# Patient Record
Sex: FEMALE | Race: WHITE | Employment: OTHER | URBAN - METROPOLITAN AREA
[De-identification: names, ages, dates, MRNs, and addresses within clinical notes are randomized per-mention and may not be internally consistent; named-entity substitution may affect disease eponyms.]

---

## 2017-04-28 ENCOUNTER — ONCOLOGY VISIT (OUTPATIENT)
Dept: ONCOLOGY | Facility: CLINIC | Age: 82
End: 2017-04-28
Payer: MEDICARE

## 2017-04-28 VITALS
HEART RATE: 82 BPM | BODY MASS INDEX: 24.75 KG/M2 | TEMPERATURE: 97.4 F | RESPIRATION RATE: 16 BRPM | OXYGEN SATURATION: 99 % | SYSTOLIC BLOOD PRESSURE: 151 MMHG | DIASTOLIC BLOOD PRESSURE: 82 MMHG | WEIGHT: 143 LBS

## 2017-04-28 DIAGNOSIS — C82.00 GRADE I FOLLICULAR SMALL CLEAVED CELL LYMPHOMA (H): Primary | ICD-10-CM

## 2017-04-28 DIAGNOSIS — C82.00 GRADE I FOLLICULAR SMALL CLEAVED CELL LYMPHOMA (H): ICD-10-CM

## 2017-04-28 LAB
ALBUMIN SERPL-MCNC: 3.7 G/DL (ref 3.4–5)
ALP SERPL-CCNC: 75 U/L (ref 40–150)
ALT SERPL W P-5'-P-CCNC: 30 U/L (ref 0–50)
ANION GAP SERPL CALCULATED.3IONS-SCNC: 7 MMOL/L (ref 3–14)
AST SERPL W P-5'-P-CCNC: 20 U/L (ref 0–45)
BASOPHILS # BLD AUTO: 0 10E9/L (ref 0–0.2)
BASOPHILS NFR BLD AUTO: 0.2 %
BILIRUB SERPL-MCNC: 0.8 MG/DL (ref 0.2–1.3)
BUN SERPL-MCNC: 25 MG/DL (ref 7–30)
CALCIUM SERPL-MCNC: 9.7 MG/DL (ref 8.5–10.1)
CHLORIDE SERPL-SCNC: 102 MMOL/L (ref 94–109)
CO2 SERPL-SCNC: 31 MMOL/L (ref 20–32)
CREAT SERPL-MCNC: 0.92 MG/DL (ref 0.52–1.04)
DIFFERENTIAL METHOD BLD: NORMAL
EOSINOPHIL # BLD AUTO: 0.1 10E9/L (ref 0–0.7)
EOSINOPHIL NFR BLD AUTO: 1.1 %
ERYTHROCYTE [DISTWIDTH] IN BLOOD BY AUTOMATED COUNT: 13.4 % (ref 10–15)
GFR SERPL CREATININE-BSD FRML MDRD: 58 ML/MIN/1.7M2
GLUCOSE SERPL-MCNC: 89 MG/DL (ref 70–99)
HCT VFR BLD AUTO: 37.9 % (ref 35–47)
HGB BLD-MCNC: 13 G/DL (ref 11.7–15.7)
LDH SERPL L TO P-CCNC: 194 U/L (ref 81–234)
LYMPHOCYTES # BLD AUTO: 1.3 10E9/L (ref 0.8–5.3)
LYMPHOCYTES NFR BLD AUTO: 29.4 %
MCH RBC QN AUTO: 30.2 PG (ref 26.5–33)
MCHC RBC AUTO-ENTMCNC: 34.3 G/DL (ref 31.5–36.5)
MCV RBC AUTO: 88 FL (ref 78–100)
MONOCYTES # BLD AUTO: 0.4 10E9/L (ref 0–1.3)
MONOCYTES NFR BLD AUTO: 9.9 %
NEUTROPHILS # BLD AUTO: 2.6 10E9/L (ref 1.6–8.3)
NEUTROPHILS NFR BLD AUTO: 59.4 %
PLATELET # BLD AUTO: 176 10E9/L (ref 150–450)
POTASSIUM SERPL-SCNC: 4.2 MMOL/L (ref 3.4–5.3)
PROT SERPL-MCNC: 7.3 G/DL (ref 6.8–8.8)
RBC # BLD AUTO: 4.31 10E12/L (ref 3.8–5.2)
SODIUM SERPL-SCNC: 140 MMOL/L (ref 133–144)
URATE SERPL-MCNC: 4.3 MG/DL (ref 2.6–6)
WBC # BLD AUTO: 4.4 10E9/L (ref 4–11)

## 2017-04-28 PROCEDURE — 85025 COMPLETE CBC W/AUTO DIFF WBC: CPT | Performed by: INTERNAL MEDICINE

## 2017-04-28 PROCEDURE — 84550 ASSAY OF BLOOD/URIC ACID: CPT | Performed by: INTERNAL MEDICINE

## 2017-04-28 PROCEDURE — 80053 COMPREHEN METABOLIC PANEL: CPT | Performed by: INTERNAL MEDICINE

## 2017-04-28 PROCEDURE — 83615 LACTATE (LD) (LDH) ENZYME: CPT | Performed by: INTERNAL MEDICINE

## 2017-04-28 PROCEDURE — 99214 OFFICE O/P EST MOD 30 MIN: CPT | Performed by: INTERNAL MEDICINE

## 2017-04-28 PROCEDURE — 36415 COLL VENOUS BLD VENIPUNCTURE: CPT | Performed by: INTERNAL MEDICINE

## 2017-04-28 ASSESSMENT — PAIN SCALES - GENERAL: PAINLEVEL: NO PAIN (0)

## 2017-04-28 NOTE — PROGRESS NOTES
Oncology Follow-up Visit:    4/28/2017      PCP: Dr. Rahman  Diagnosis: Low grade, stage IVB (BM involvement, night sweats) follicular lymphoma.  FLIPI score is 3 (one for stage IV, one for age>60, and one for elevated LDH), associated with 52% median 5 year OS survival and 36% median 10 year overall survival.     Oncologic History:   Ms. Potter is a 81 year old female who presented with night sweats and lymphadenopathy. She developed drenching night sweats in 11/2011 which persisted. She had no other associated constitutional symptoms such as fevers or weight loss. She had a negative ID workup with Dr. Ruiz (for valley fever as she had traveled to [place] in the past).   As part of evaluation, CT scan was performed in April 2012 which demonstrated multiple borderline mildly enlarged retroperitoneal and mesenteric lymph nodes, with the largest size of 1.5 cm. The patient, however, persisted to have night sweats and underwent repeat CTCAP done without contrast (she is allergic to IV contrast and had an anaphylactic reaction to it) which demonstrated unchanged dilatation of common bile duct, likely related to postcholecystectomy state, and stable mildly enlarged retroperitoneal and mesenteric lymph nodes which were nonspecific.   We proceeded with CT guided biopsy of one of the intraabdominal lymph nodes and results c/w low grade follicular lymphoma.  Flow cytometry showed kappa monotypic CD10 positive B cells.  We proceeded with bone marrow biopsy to complete her staging. The results, as detailed below, revealed involvement of marrow by follicular lymphoma. Flow cytometry showed a rare population of CD10-positive, kappa-monotypic B cells (0.2%). Molecular diagnostics was negative for B-cell gene rearrangement. Cytogenetics revealed, of the interphase cells examined, 0.2% had a signal pattern indicative of an IGH/BCL2 gene fusion, a rate that falls just slightly above the normal control range (0-0.1%) for our  "laboratory; thus, these findings are suspicious for the presence of low-level bone marrow involvement by follicular lymphoma. However, this cannot be determined with certainty, in light of the few abnormal cells found by FISH. Of note, a G-banded chromosome study is still pending.   Hep B/C negative. EVE negative. No M-spike on SPEP.    Treatment: Weekly Rituxan x 4, completed 9/13/2012. During her first Rituxan infusion she has developed a reaction with tingling of upper lip and chin and feeling \"cold\" in her chest. She was given IV solumedrol and the infusion was slowed down and these symptoms have resolved. Since then she has been premedicated with solumedrol, and all infusions were uneventful since.    Interval History:  Ms. Potter is a 81 year old female diagnosed with follicular lymphoma present today for follow-up. She has completed 4 weekly doses of Rituxan in 09/2012.   Her night sweats have resolved subsequently and LDH has remained normal.   CT of the chest, abdomen and pelvis on 4/26/2016 showed stable post treatment changes of lymphoma including stranding of the mesenteric fat. There was no enlarged lymph nodes or other evidence of recurrence of lymphoma. She had a screening mammogram on on 12/2/2016 which was negative.  She is here with her . They spent a winter in [place]. She had a lingering URI in March but now her respiratory symptoms and cough have resolved.   She is  well and has had no constitutional symptoms. She denies weight loss. She has no complaints.  In addition, a complete 12 point  review of systems is negative.        Past medical, social, surgical, and family histories reviewed.  Breast Cancer screening - mammogram negative 11/2013.  Echocardiogram in 10/2014 for evaluation of benign palpitations showed left ventricular function is normal with an EF of 55-60%.  Left ventricular Doppler filling pattern consistent with abnormal relaxation.  DEXA scan on 5/12/2015 showed most neg T " score of -2.2 in the lumbar spine. This was unchanged compared to 11/2010. She was on Boneva years ago but apparently developed L jaw tingling and is no longer on biphosphonates.       Allergies:  Allergies as of 04/28/2017 - Garcia as Reviewed 11/29/2016   Allergen Reaction Noted     Bisphosphonates  10/01/2009     Ivp dye [contrast dye] Swelling 10/01/2009     Lisinopril Cough 06/24/2013     Losartan Hives 11/22/2010     Morphine sulfate Nausea and Vomiting 08/16/2012     Prilosec [omeprazole] Hives 10/01/2009       Current Medications:  Current Outpatient Prescriptions   Medication Sig Dispense Refill     fluocinolone acetonide 0.01 % OIL Place 5 drops in ear(s) 2 times daily 20 mL 1     levothyroxine (SYNTHROID, LEVOTHROID) 50 MCG tablet Take 1 tablet (50 mcg) by mouth daily 90 tablet 4     HYZAAR 50-12.5 MG per tablet Take 1 tablet by mouth daily 90 tablet 4     calcium carbonate (OS-FIDENCIO 500 MG [place]. [place]) 500 MG tablet Take 500 mg by mouth 2 times daily       Cholecalciferol (D 1000) 1000 UNITS CAPS        Naproxen Sodium (ALEVE PO)        carboxymethylcellulose (REFRESH PLUS) 0.5 % SOLN Place 1 drop into both eyes 3 times daily as needed for dry eyes       Multiple Vitamin (MULTI-VITAMIN) per tablet Take 1 tablet by mouth daily.            Physical Exam:  /82 (BP Location: Right arm, Patient Position: Chair, Cuff Size: Adult Regular)  Pulse 82  Temp 97.4  F (36.3  C) (Oral)  Resp 16  Wt 64.9 kg (143 lb)  LMP 10/01/1986  SpO2 99%  BMI 24.75 kg/m2    LMP 10/01/1986  GENERAL APPEARANCE: Healthy, alert and in no acute distress.  HEENT: Sclerae anicteric. Oropharynx without ulcers, lesions, or thrush.  NECK: Supple. No asymmetry or masses.  LYMPHATICS: No palpable cervical, supraclavicular, axillary lymphadenopathy b/l  RESP: Lungs clear to auscultation bilaterally without rales, rhonchi or wheezes.  CARDIOVASCULAR: Regular rate and rhythm. Normal S1, S2; no S3 or S4. No murmur, gallop, or rub.  ABDOMEN:  Soft, nontender. Bowel sounds normal. No palpable organomegaly or masses.  MUSCULOSKELETAL: Extremities without gross deformities noted. No edema of bilateral lower extremities.  SKIN: No suspicious lesions or rashes.  NEURO: Alert and oriented x 3. Cranial nerves II-XII grossly intact.  PSYCHIATRIC: Mentation and affect appear normal.    Labs:  Lab Results   Component Value Date    WBC 4.4 04/28/2017     Lab Results   Component Value Date    RBC 4.31 04/28/2017     Lab Results   Component Value Date    HGB 13.0 04/28/2017     Lab Results   Component Value Date    HCT 37.9 04/28/2017     No components found for: MCT  Lab Results   Component Value Date    MCV 88 04/28/2017     Lab Results   Component Value Date    MCH 30.2 04/28/2017     Lab Results   Component Value Date    MCHC 34.3 04/28/2017     Lab Results   Component Value Date    RDW 13.4 04/28/2017     Lab Results   Component Value Date     04/28/2017       ASSESSMENT/PLAN:  Hilda is a very pleasant 81 year-old woman recently diagnosed with stage IV follicular lymphoma, FLIPI score is 3 (one for stage IV, one for age>60, and one for elevated LDH), associated with 52% median 5 year OS survival and 36% median 10 year overall survival.  1. NHL.  S/p Rituxan x4, completed in 09/2012. Responded to treatment with resolution of night sweats and decrease in lymphadenopathy. We'll continue to observe her from now on.  We'll see her back in 6 months with labs - cbcd, cmp, LDH, uric acid. Per updated NCCN guidelines, at this time, a CT of the chest, abdomen and pelvis is recommended no more often than annually in Hilda's situation. We'll obtain with next visit.    2. Immunizations - She is uptodate with  Pneumovax, Prevnar vaccinations. She did receive a flu shot this season already.    3. Breast cancer screening -mammogram negative in 12/2016. Next due in 12/2017. This is usually ordered by Dr. Rahman.      At the end of our visit the patient verbalized  understanding, denied any further questions, and concurred with the plan of care.

## 2017-04-28 NOTE — MR AVS SNAPSHOT
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 Also bring a list of your medicines, including vitamins, minerals and over-the-counter drugs. It is safest to leave personal items at home.  Be sure to tell your doctor:   If you have any allergies.   If there s any chance you are pregnant.   If you are breastfeeding.   If you have any special needs.  You may have contrast for this exam. To prepare:   Do not eat or drink for 2 hours before your exam. If you need to take medicine, you may take it with small sips of water. (We may ask you to take liquid medicine as well.)   The day before your exam, drink extra fluids at least six 8-ounce glasses (unless your doctor tells you to restrict your fluids).  Patients over 70 or patients with diabetes or kidney problems:   If you haven t had a blood test (creatinine test) within the last 30 days, go to your clinic or Diagnostic Imaging Department for this test.  If you have diabetes:   If your kidney function is normal, continue taking your metformin (Avandamet, Glucophage, Glucovance, Metaglip) on the day of your exam.   If your kidney function is abnormal, wait 48 hours before restarting this medicine.  You will have oral contrast for this exam:   You will drink the contrast  at home. Get this from your clinic or Diagnostic Imaging Department. Please follow the directions given.  Please wear loose clothing, such as a sweat suit or jogging clothes. Avoid snaps, zippers and other metal. We may ask you to undress and put on a hospital gown.  If you have any questions, please call the Imaging Department where you will have your exam.            Oct 24, 2017 10:30 AM CDT   LAB with LAB FIRST FLOOR [facility] ([facility])    [address] [address] [place] [id number] [phone]           Patient must bring picture ID.  Patient should be prepared to give a urine specimen  Please do not eat 10-12 hours before your appointment if you are coming in fasting for labs on lipids, cholesterol, or glucose (sugar).  Pregnant women should follow their Care Team instructions. Water with medications is okay. Do not drink coffee or other fluids.   If you have concerns about taking  your medications, please ask at office or if scheduling via [employer], send a message by clicking on Secure Messaging, Message Your Care Team.            Oct 27, 2017  3:00 PM CDT   Return Visit with Jane Roth MD   [facility] ([facility])    [id number] [place] [id number] [phone]            Nov 30, 2017 10:30 AM CST   New Visit with Andi Bay MD   [facility] ([facility])    [address] [place] [id number] [phone]              Future tests that were ordered for you today     Open Future Orders        Priority Expected Expires Ordered    CT Chest abdomen pelvis w & w/o contrast Routine  4/28/2018 4/28/2017            Who to contact     If you have questions or need follow up information about today's clinic visit or your schedule please contact [facility] directly at [phone].  Normal or non-critical lab and imaging results will be  communicated to you by [email or web address], letter or phone within 4 business days after the clinic has received the results. If you do not hear from us within 7 days, please contact the clinic through [employer] or phone. If you have a critical or abnormal lab result, we will notify you by phone as soon as possible.  Submit refill requests through [employer] or call your pharmacy and they will forward the refill request to us. Please allow 3 business days for your refill to be completed.          Additional Information About Your Visit        [employer] Information     [employer] is an electronic gateway that provides easy, online access to your medical records. With [employer], you can request a clinic appointment, read your test results, renew a prescription or communicate with your care team.     To sign up for [employer] visit the website at [email or web address]   You will be asked to enter the access code listed below, as well as some personal information. Please follow the directions to create your username and password.     Your access code is: [email or web address]  Expires: 2017 12:56 PM     Your access code will  in 90 days. If you need help or a new code, please contact your [facility] or call [phone] for assistance.        Care EveryWhere ID     This is your Care EveryWhere ID. This could be used by other organizations to access your [place] medical records  [id number]        Your Vitals Were     Pulse Temperature Respirations Last Period Pulse Oximetry BMI (Body Mass Index)    82 97.4  F (36.3  C) (Oral) 16 10/01/1986 99% 24.75 kg/m2       Blood Pressure from Last 3 Encounters:   17 151/82   16 138/74   16 122/82    Weight from Last 3 Encounters:   17 64.9 kg (143 lb)   16 65.8 kg (145 lb)   16 65.3 kg (144 lb)               Primary Care Provider Office Phone # Fax #    Jessica Rahman [phone]       [facility]  [address] [facility] [address]        Thank you!     Thank you for choosing [facility]  for your care. Our goal is always to provide you with excellent care. Hearing back from our patients is one way we can continue to improve our services. Please take a few minutes to complete the written survey that you may receive in the mail after your visit with us. Thank you!             Your Updated Medication List - Protect others around you: Learn how to safely use, store and throw away your medicines at www.disposemymeds.org.          This list is accurate as of: 4/28/17  1:04 PM.  Always use your most recent med list.                   Brand Name Dispense Instructions for use    calcium carbonate 500 MG tablet    OS-FIDENCIO 500 mg [place]. [place]     Take 500 mg by mouth 2 times daily       carboxymethylcellulose 0.5 % Soln ophthalmic solution    REFRESH PLUS     Place 1 drop into both eyes 3 times daily as needed for dry eyes       D 1000 1000 UNITS Caps          fluocinolone acetonide 0.01 % Oil     20 mL    Place 5 drops in ear(s) 2 times daily       HYZAAR 50-12.5 MG per tablet   Generic drug:  losartan-hydrochlorothiazide     90 tablet    Take 1 tablet by mouth daily       levothyroxine 50 MCG tablet    SYNTHROID/LEVOTHROID    90 tablet    Take 1 tablet (50 mcg) by mouth daily       Multi-vitamin Tabs tablet   Generic drug:  multivitamin, therapeutic with minerals      Take 1 tablet by mouth daily.

## 2017-04-28 NOTE — NURSING NOTE
"Oncology Rooming Note    April 28, 2017 12:37 PM   Hilda Potter is a 81 year old female who presents for: No chief complaint on file.    Initial Vitals: /82 (BP Location: Right arm, Patient Position: Chair, Cuff Size: Adult Regular)  Pulse 82  Temp 97.4  F (36.3  C) (Oral)  Wt 64.9 kg (143 lb)  LMP 10/01/1986  SpO2 99%  BMI 24.75 kg/m2 Estimated body mass index is 24.75 kg/(m^2) as calculated from the following:    Height as of 11/8/16: 1.619 m (5' 3.74\").    Weight as of this encounter: 64.9 kg (143 lb). Body surface area is 1.71 meters squared.  No Pain (0) Comment: Data Unavailable   Patient's last menstrual period was 10/01/1986.  Allergies reviewed: Yes  Medications reviewed: Yes    Medications: Medication refills not needed today.  Pharmacy name entered into [facility]:    [id number] - [place] [id number] AT [id number] - [place], [place] - [address] AT [facility] & [address]  [facility] - ONCOLOGY PH[facility]Y  [facility] [address] [place] [address]    Clinical concerns:    5 minutes for nursing intake (face to face time)     Tamra Rose RN                "

## 2017-05-16 RX ORDER — METHYLPREDNISOLONE 32 MG/1
TABLET ORAL
Qty: 2 TABLET | Refills: 1 | Status: SHIPPED | OUTPATIENT
Start: 2017-05-16 | End: 2017-07-12

## 2017-06-30 ENCOUNTER — TELEPHONE (OUTPATIENT)
Dept: ONCOLOGY | Facility: CLINIC | Age: 82
End: 2017-06-30

## 2017-06-30 NOTE — TELEPHONE ENCOUNTER
Patient left vm with a question for Dr Roth. 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 She read Xiaflex can cause reactivation of lymphoma. Looking for recommendation from Dr Roth. Message forwarded to Dr MERY Cleaning  RN, BSN, OCN

## 2017-07-05 NOTE — TELEPHONE ENCOUNTER
Received the following response from Dr. Roth regarding patient's request for Xiaflex treatment for right hand contracture:    I am not well familiar with collagenase (Xiaflex), but from package insert review and review or warnings on this drug, lymphoma as a side effect is not listed on my review - however, lymph node enlargement and lymph node pain is listed.  Hilda's lymphoma is not cured, it is latent and she is aware of it.  I think a local injection of collagenase to release her trigger finger should not be harmful.  I am ok with Hilda going ahead with it.  Thank you, Dr. LAURA.    Voicemail message was left for patient with above recommendations.  Encouraged patient to call with any further questions.    Jason Mosley, RN, BSN, OCN  Care Coordinator  [facility]  [phone]

## 2017-07-12 ENCOUNTER — OFFICE VISIT (OUTPATIENT)
Dept: FAMILY MEDICINE | Facility: CLINIC | Age: 82
End: 2017-07-12
Payer: MEDICARE

## 2017-07-12 VITALS
SYSTOLIC BLOOD PRESSURE: 142 MMHG | OXYGEN SATURATION: 96 % | BODY MASS INDEX: 24.33 KG/M2 | WEIGHT: 142.5 LBS | HEIGHT: 64 IN | DIASTOLIC BLOOD PRESSURE: 82 MMHG | TEMPERATURE: 97.6 F | HEART RATE: 89 BPM

## 2017-07-12 DIAGNOSIS — K21.9 GASTROESOPHAGEAL REFLUX DISEASE WITHOUT ESOPHAGITIS: ICD-10-CM

## 2017-07-12 DIAGNOSIS — R82.90 NONSPECIFIC FINDING ON EXAMINATION OF URINE: ICD-10-CM

## 2017-07-12 DIAGNOSIS — R31.9 BLOOD IN URINE: Primary | ICD-10-CM

## 2017-07-12 DIAGNOSIS — N30.01 ACUTE CYSTITIS WITH HEMATURIA: ICD-10-CM

## 2017-07-12 LAB
ALBUMIN UR-MCNC: ABNORMAL MG/DL
APPEARANCE UR: ABNORMAL
BACTERIA #/AREA URNS HPF: ABNORMAL /HPF
BILIRUB UR QL STRIP: NEGATIVE
COLOR UR AUTO: ABNORMAL
GLUCOSE UR STRIP-MCNC: NEGATIVE MG/DL
HGB UR QL STRIP: ABNORMAL
KETONES UR STRIP-MCNC: NEGATIVE MG/DL
LEUKOCYTE ESTERASE UR QL STRIP: ABNORMAL
NITRATE UR QL: POSITIVE
NON-SQ EPI CELLS #/AREA URNS LPF: ABNORMAL /LPF
PH UR STRIP: 6 PH (ref 5–7)
RBC #/AREA URNS AUTO: >100 /HPF (ref 0–2)
SP GR UR STRIP: 1.01 (ref 1–1.03)
URN SPEC COLLECT METH UR: ABNORMAL
UROBILINOGEN UR STRIP-ACNC: 0.2 EU/DL (ref 0.2–1)
WBC #/AREA URNS AUTO: ABNORMAL /HPF (ref 0–2)

## 2017-07-12 PROCEDURE — 81001 URINALYSIS AUTO W/SCOPE: CPT | Performed by: FAMILY MEDICINE

## 2017-07-12 PROCEDURE — 87088 URINE BACTERIA CULTURE: CPT | Performed by: FAMILY MEDICINE

## 2017-07-12 PROCEDURE — 87186 SC STD MICRODIL/AGAR DIL: CPT | Performed by: FAMILY MEDICINE

## 2017-07-12 PROCEDURE — 87086 URINE CULTURE/COLONY COUNT: CPT | Performed by: FAMILY MEDICINE

## 2017-07-12 PROCEDURE — 99213 OFFICE O/P EST LOW 20 MIN: CPT | Performed by: FAMILY MEDICINE

## 2017-07-12 RX ORDER — SULFAMETHOXAZOLE/TRIMETHOPRIM 800-160 MG
1 TABLET ORAL 2 TIMES DAILY
Qty: 14 TABLET | Refills: 0 | Status: SHIPPED | OUTPATIENT
Start: 2017-07-12 | End: 2017-10-03

## 2017-07-12 NOTE — NURSING NOTE
"Chief Complaint   Patient presents with     Urinary Problem     blood urine, right side abdomen pain x 2 days       Initial /82  Pulse 89  Temp 97.6  F (36.4  C) (Oral)  Ht 5' 3.74\" (1.619 m)  Wt 142 lb 8 oz (64.6 kg)  LMP 10/01/1986  SpO2 96%  BMI 24.66 kg/m2 Estimated body mass index is 24.66 kg/(m^2) as calculated from the following:    Height as of this encounter: 5' 3.74\" (1.619 m).    Weight as of this encounter: 142 lb 8 oz (64.6 kg).  Medication Reconciliation: complete     An NANCY Flood    "

## 2017-07-12 NOTE — MR AVS SNAPSHOT
After Visit Summary   7/12/2017    Hilda Potter    MRN: [id number]           Patient Information     Date Of Birth          1935        Visit Information        Provider Department      7/12/2017 10:00 AM Jessica Rahman MD [facility]        Today's Diagnoses     Blood in urine    -  1    Acute cystitis with hematuria        Gastroesophageal reflux disease without esophagitis          Care Instructions    [facility]    If you have any questions regarding to your visit please contact your care team:       Team Purple:   Clinic Hours Telephone Number   Dr. Jessica Lobo     7am-7pm  Monday - Thursday   7am-5pm  Fridays  [phone]  (Appointment scheduling available 24/7)    Questions about your Visit?   Team Line:  [phone]   Urgent Care - [place] - 11am-9pm Monday-Friday Saturday-Sunday- 9am-5pm   [place] - 5pm-9pm Monday-Friday Saturday-Sunday- 9am-5pm  [phone] - [facility]  [phone] - [place]       What options do I have for visits at the clinic other than the traditional office visit?  To expand how we care for you, many of our providers are utilizing electronic visits (e-visits) and telephone visits, when medically appropriate, for interactions with their patients rather than a visit in the clinic.   We also offer nurse visits for many medical concerns. Just like any other service, we will bill your insurance company for this type of visit based on time spent on the phone with your provider. Not all insurance companies cover these visits. Please check with your medical insurance if this type of visit is covered. You will be responsible for any charges that are not paid by your insurance.      E-visits via Sol[employer]:  generally incur a $35.00 fee.  Telephone visits:  Time spent on the phone: *charged based on time that is spent on the phone in increments of 10 minutes.  Estimated cost:   5-10 mins $30.00   11-20 mins. $59.00   21-30 mins. $85.00     Use [employer] (secure email communication and access to your chart) to send your primary care provider a message or make an appointment. Ask someone on your Team how to sign up for [employer].  For a Price Quote for your services, please call our [employer] Line at [phone].  As always, Thank you for trusting us with your health care needs!              Follow-ups after your visit        Your next 10 appointments already scheduled     Oct 24, 2017  9:45 AM CDT   CT CHEST ABDOMEN PELVIS W/O & W CONTRAST with MGCT1   [facility]    [address] [place] [address] [phone]           Please bring any scans or X-rays taken at other hospitals, if similar tests were done. Also bring a list of your medicines, including vitamins, minerals and over-the-counter drugs. It is safest to leave personal items at home.  Be sure to tell your doctor:   If you have any allergies.   If there s any chance you are pregnant.   If you are breastfeeding.   If you have any special needs.  You may have contrast for this exam. To prepare:   Do not eat or drink for 2 hours before your exam. If you need to take medicine, you may take it with small sips of water. (We may ask you to take liquid medicine as well.)   The day before your exam, drink extra fluids at least six 8-ounce glasses (unless your doctor tells you to restrict your fluids).  Patients over 70 or patients with diabetes or kidney problems:   If you haven t had a blood test (creatinine test) within the last 30 days, go to your clinic or Diagnostic Imaging Department for this test.  If you have diabetes:   If your kidney function is normal, continue taking your metformin (Avandamet, Glucophage, Glucovance, Metaglip) on the day of your exam.   If your kidney function is abnormal, wait 48 hours before restarting this medicine.  You will  have oral contrast for this exam:   You will drink the contrast at home. Get this from your clinic or Diagnostic Imaging Department. Please follow the directions given.  Please wear loose clothing, such as a sweat suit or jogging clothes. Avoid snaps, zippers and other metal. We may ask you to undress and put on a hospital gown.  If you have any questions, please call the Imaging Department where you will have your exam.            Oct 24, 2017 10:30 AM CDT   LAB with LAB FIRST FLOOR [facility])    [address] [place] [id number] [phone]           Patient must bring picture ID.  Patient should be prepared to give a urine specimen  Please do not eat 10-12 hours before your appointment if you are coming in fasting for labs on lipids, cholesterol, or glucose (sugar).  Pregnant women should follow their Care Team instructions. Water with medications is okay. Do not drink coffee or other fluids.   If you have concerns about taking  your medications, please ask at office or if scheduling via [employer], send a message by clicking on Secure Messaging, Message Your Care Team.            Oct 27, 2017  3:00 PM CDT   Return Visit with Jane Roth MD   [facility])    [address] [place] [id number] [phone]            Nov 30, 2017 10:30 AM CST   New Visit with Andi Bay MD   [facility] ([facility])    [address] [facility] [address] [phone]              Who to contact     If you have questions or need follow up information about today's clinic visit or your schedule please contact [facility] directly at [phone].  Normal or non-critical lab and imaging results will be communicated to you by MyChart, letter or phone within 4 business days after the clinic has received the results. If you do not hear from us  "within 7 days, please contact the clinic through Sqrrl or phone. If you have a critical or abnormal lab result, we will notify you by phone as soon as possible.  Submit refill requests through Sqrrl or call your pharmacy and they will forward the refill request to us. Please allow 3 business days for your refill to be completed.          Additional Information About Your Visit        Sqrrl Information     S[employer]l lets you send messages to your doctor, view your test results, renew your prescriptions, schedule appointments and more. To sign up, go to [email or web address] . Click on \"Log in\" on the left side of the screen, which will take you to the Welcome page. Then click on \"Sign up Now\" on the right side of the page.     You will be asked to enter the access code listed below, as well as some personal information. Please follow the directions to create your username and password.     Your access code is: [email or web address]  Expires: 2017 12:56 PM     Your access code will  in 90 days. If you need help or a new code, please call your [place] clinic or [phone].        Care EveryWhere ID     This is your Care EveryWhere ID. This could be used by other organizations to access your [place] medical records  [id number]        Your Vitals Were     Pulse Temperature Height Last Period Pulse Oximetry BMI (Body Mass Index)    89 97.6  F (36.4  C) (Oral) 5' 3.74\" (1.619 m) 10/01/1986 96% 24.66 kg/m2       Blood Pressure from Last 3 Encounters:   17 142/82   17 151/82   16 138/74    Weight from Last 3 Encounters:   17 142 lb 8 oz (64.6 kg)   17 143 lb (64.9 kg)   16 145 lb (65.8 kg)              We Performed the Following     UA reflex to Microscopic and Culture     Urine Microscopic          Today's Medication Changes          These changes are accurate as of: 17 10:39 AM.  If you have any questions, ask your nurse or doctor.               Start taking these " medicines.        Dose/Directions    sulfamethoxazole-trimethoprim 800-160 MG per tablet   Commonly known as:  BACTRIM DS/SEPTRA DS   Used for:  Acute cystitis with hematuria   Started by:  Jessica Rahman MD        Dose:  1 tablet   Take 1 tablet by mouth 2 times daily   Quantity:  14 tablet   Refills:  0            Where to get your medicines      These medications were sent to [facility] [id number] - [address]  AT [facility] & [address] [facility] [address]     Phone:  [phone]     sulfamethoxazole-trimethoprim 800-160 MG per tablet                Primary Care Provider Office Phone # Fax #    Jessica Rahman [phone]       [address] [address]        Equal Access to Services     JAY ORTEGA : Hadii aad ku hadasho Soomaali, waaxda luqadaha, qaybta kaalmada adeegyada, jj russellin hayaan misbah burgos . So [facility] [phone].    ATENCIÓN: Si habla español, tiene a ayala disposición servicios gratuitos de asistencia lingüística. Abdon al [phone].    We comply with applicable federal civil rights laws and [place] laws. We do not discriminate on the basis of race, color, national origin, age, disability sex, sexual orientation or gender identity.            Thank you!     Thank you for choosing [facility]  for your care. Our goal is always to provide you with excellent care. Hearing back from our patients is one way we can continue to improve our services. Please take a few minutes to complete the written survey that you may receive in the mail after your visit with us. Thank you!             Your Updated Medication List - Protect others around you: Learn how to safely use, store and throw away your medicines at www.disposemymeds.org.          This list is accurate as of: 7/12/17 10:39 AM.  Always use your most recent med list.                   Brand Name  Dispense Instructions for use Diagnosis    calcium carbonate 1250 MG tablet    OS-FIDENCIO 500 mg [place]. 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)       carboxymethylcellulose 0.5 % Soln ophthalmic solution    REFRESH PLUS     Place 1 drop into both eyes 3 times daily as needed for dry eyes        fluocinolone acetonide 0.01 % Oil     20 mL    Place 5 drops in ear(s) 2 times daily    Chronic eczematous otitis externa of both ears       HYZAAR 50-12.5 MG per tablet   Generic drug:  losartan-hydrochlorothiazide     90 tablet    Take 1 tablet by mouth daily    Essential hypertension with goal blood pressure less than 140/90       levothyroxine 50 MCG tablet    SYNTHROID/LEVOTHROID    90 tablet    Take 1 tablet (50 mcg) by mouth daily    Hypothyroidism, unspecified type       Multi-vitamin Tabs tablet   Generic drug:  multivitamin, therapeutic with minerals      Take 1 tablet by mouth daily.        sulfamethoxazole-trimethoprim 800-160 MG per tablet    BACTRIM DS/SEPTRA DS    14 tablet    Take 1 tablet by mouth 2 times daily    Acute cystitis with hematuria

## 2017-07-12 NOTE — LETTER
[facility]  [address] [place] [address]. [place] [address]    July 14, 2017    Hilda Potter  [address] [place] [address]          Dear Hilda,    Your urine culture shows that the antibiotic you are on should take care of the bacteria that grew out in your urine. Let me know if you are not feeling better      Enclosed is a copy of your results.     Results for orders placed or performed in visit on 07/12/17   UA reflex to Microscopic and Culture   Result Value Ref Range    Color Urine Brown     Appearance Urine Slightly Cloudy     Glucose Urine Negative NEG mg/dL    Bilirubin Urine Negative NEG    Ketones Urine Negative NEG mg/dL    Specific Gravity Urine 1.015 1.003 - 1.035    Blood Urine Large (A) NEG    pH Urine 6.0 5.0 - 7.0 pH    Protein Albumin Urine Trace (A) NEG mg/dL    Urobilinogen Urine 0.2 0.2 - 1.0 EU/dL    Nitrite Urine Positive (A) NEG    Leukocyte Esterase Urine Large (A) NEG    Source Midstream Urine    Urine Microscopic   Result Value Ref Range    WBC Urine  (A) 0 - 2 /HPF    RBC Urine >100 (A) 0 - 2 /HPF    Squamous Epithelial /LPF Urine Few FEW /LPF    Bacteria Urine Few (A) NEG /HPF   Urine Culture Aerobic Bacterial   Result Value Ref Range    Specimen Description Midstream Urine     Culture Micro >100,000 colonies/mL Escherichia coli (A)     Micro Report Status FINAL 07/14/2017     Organism: >100,000 colonies/mL Escherichia coli        Susceptibility    >100,000 colonies/ml escherichia coli (jannet) -  (no method available)     AMPICILLIN >=32 Resistant  ug/mL     CEFAZOLIN Value in next row  ug/mL      <=4 SusceptibleCefazolin JANNET breakpoints are for the treatment of uncomplicated urinary tract infections.  For the treatment of systemic infections, please contact the laboratory for additional testing.     CEFOXITIN Value in next row  ug/mL      <=4 SusceptibleCefazolin JANNET breakpoints are for the treatment  of uncomplicated urinary tract infections.  For the treatment of systemic infections, please contact the laboratory for additional testing.     CEFTAZIDIME Value in next row  ug/mL      <=4 SusceptibleCefazolin SERGIO breakpoints are for the treatment of uncomplicated urinary tract infections.  For the treatment of systemic infections, please contact the laboratory for additional testing.     CEFTRIAXONE Value in next row  ug/mL      <=4 SusceptibleCefazolin SERGIO breakpoints are for the treatment of uncomplicated urinary tract infections.  For the treatment of systemic infections, please contact the laboratory for additional testing.     CIPROFLOXACIN Value in next row  ug/mL      <=4 SusceptibleCefazolin SERGIO breakpoints are for the treatment of uncomplicated urinary tract infections.  For the treatment of systemic infections, please contact the laboratory for additional testing.     GENTAMICIN Value in next row  ug/mL      <=4 SusceptibleCefazolin SERGIO breakpoints are for the treatment of uncomplicated urinary tract infections.  For the treatment of systemic infections, please contact the laboratory for additional testing.     LEVOFLOXACIN Value in next row  ug/mL      <=4 SusceptibleCefazolin SERGIO breakpoints are for the treatment of uncomplicated urinary tract infections.  For the treatment of systemic infections, please contact the laboratory for additional testing.     NITROFURANTOIN Value in next row  ug/mL      <=4 SusceptibleCefazolin SERGIO breakpoints are for the treatment of uncomplicated urinary tract infections.  For the treatment of systemic infections, please contact the laboratory for additional testing.     TOBRAMYCIN Value in next row  ug/mL      <=4 SusceptibleCefazolin SERGIO breakpoints are for the treatment of uncomplicated urinary tract infections.  For the treatment of systemic infections, please contact the laboratory for additional testing.     Trimethoprim/Sulfa Value in next row  ug/mL      <=4  SusceptibleCefazolin SERGIO breakpoints are for the treatment of uncomplicated urinary tract infections.  For the treatment of systemic infections, please contact the laboratory for additional testing.     AMPICILLIN/SULBACTAM Value in next row  ug/mL      <=4 SusceptibleCefazolin SERGIO breakpoints are for the treatment of uncomplicated urinary tract infections.  For the treatment of systemic infections, please contact the laboratory for additional testing.     Piperacillin/Tazo Value in next row  ug/mL      <=4 SusceptibleCefazolin SERGIO breakpoints are for the treatment of uncomplicated urinary tract infections.  For the treatment of systemic infections, please contact the laboratory for additional testing.     CEFEPIME Value in next row  ug/mL      <=4 SusceptibleCefazolin SERGIO breakpoints are for the treatment of uncomplicated urinary tract infections.  For the treatment of systemic infections, please contact the laboratory for additional testing.       If you have any questions or concerns, please call myself or my nurse at [phone].      Sincerely,        Jessica Rahman MD/LAURA

## 2017-07-12 NOTE — PATIENT INSTRUCTIONS
[facility]    If you have any questions regarding to your visit please contact your care team:       Team Purple:   Clinic Hours Telephone Number   Dr. Jessica Lobo     7am-7pm  Monday - Thursday   7am-5pm  Fridays  [phone]  (Appointment scheduling available 24/7)    Questions about your Visit?   Team Line:  [phone]   Urgent Care - [place] and [facility] - 11am-9pm Monday-Friday Saturday-Sunday- 9am-5pm   [place] - 5pm-9pm Monday-Friday Saturday-Sunday- 9am-5pm  [phone] - [facility]  [phone] - [place]       What options do I have for visits at the clinic other than the traditional office visit?  To expand how we care for you, many of our providers are utilizing electronic visits (e-visits) and telephone visits, when medically appropriate, for interactions with their patients rather than a visit in the clinic.   We also offer nurse visits for many medical concerns. Just like any other service, we will bill your insurance company for this type of visit based on time spent on the phone with your provider. Not all insurance companies cover these visits. Please check with your medical insurance if this type of visit is covered. You will be responsible for any charges that are not paid by your insurance.      E-visits via [employer]:  generally incur a $35.00 fee.  Telephone visits:  Time spent on the phone: *charged based on time that is spent on the phone in increments of 10 minutes. Estimated cost:   5-10 mins $30.00   11-20 mins. $59.00   21-30 mins. $85.00     Use [employer] (secure email communication and access to your chart) to send your primary care provider a message or make an appointment. Ask someone on your Team how to sign up for [employer].  For a Price Quote for your services, please call our [employer] Line at [phone].  As always, Thank you for trusting us with your health care needs!

## 2017-07-12 NOTE — PROGRESS NOTES
SUBJECTIVE:                                                    Hilda Potter is a 82 year old female who presents to clinic today for the following health issues:      Patient presents with:  Urinary Problem: blood urine, right side abdomen pain x 2 days             Problem list and histories reviewed & adjusted, as indicated.  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)     Premature beats     Premature atrial beats     Compression fracture of L1 lumbar vertebra, seen on CT     Lung nodule < 6cm on CT     Dupuytren's contracture of right hand     Hypothyroidism, unspecified type     Past Surgical History:   Procedure Laterality Date     APPENDECTOMY       BLEPHAROPLASTY BILATERAL  10/2007    both eyes, upper lids     C LENGTHEN,TENDON,HAND/FINGER  1993    repair of dupytrons's contracture     COLONOSCOPY  6/02, 10/13    Q 5 years, polyps     D & C       HC REMOVAL GALLBLADDER       SALPINGO OOPHORECTOMY,R/L/LUIS DANIEL      left ovary and tube     SMALL BOWEL RESECTION  1986    colon resection      TONSILLECTOMY & ADENOIDECTOMY       TUBAL LIGATION       UPPER GI ENDOSCOPY  6/02, 8/11       Social History   Substance Use Topics     Smoking status: Never Smoker     Smokeless tobacco: Never Used     Alcohol use Yes      Comment: wine      Family History   Problem Relation Age of Onset     Hypertension Mother      Arthritis Mother      Cardiovascular Mother      Glaucoma Mother      C.A.D. Father      Hypertension Father      CEREBROVASCULAR DISEASE Father      Arthritis  "Father      Cardiovascular Father      Eye Disorder Father      HEART DISEASE Father      Glaucoma Father      Cardiovascular Brother          Allergies   Allergen Reactions     Bisphosphonates      GI distress     Ivp Dye [Contrast Dye] Swelling     Lisinopril Cough     Losartan Hives     But can tolerate Hyzaar.      Morphine Sulfate Nausea and Vomiting     Prilosec [Omeprazole] Hives     BP Readings from Last 3 Encounters:   07/12/17 142/82   04/28/17 151/82   11/08/16 138/74    Wt Readings from Last 3 Encounters:   07/12/17 142 lb 8 oz (64.6 kg)   04/28/17 143 lb (64.9 kg)   11/08/16 145 lb (65.8 kg)                  Labs reviewed in EPIC    Reviewed and updated as needed this visit by clinical staff  Tobacco  Allergies  Meds  Med Hx  Surg Hx  Fam Hx  Soc Hx      Reviewed and updated as needed this visit by Provider         ROS:  This 82 year old female is here today because she has had urine frequency for the past 2 days. Worse last night: Q 2 hours. She had hematuria this morning. She has lymphoma and will have cat scan in October. Lately, she has had more heart burn lately. She has had 2 normal upper endoscopies: 2011 and 2013. She wonders if she needs another one. She worries that her lymphoma may have spread to her esophagus. She is able to swallow normally and zantac helps her pain.   She had a little vaginal itch a few days ago. It seems to be better today.        OBJECTIVE:     /82  Pulse 89  Temp 97.6  F (36.4  C) (Oral)  Ht 5' 3.74\" (1.619 m)  Wt 142 lb 8 oz (64.6 kg)  LMP 10/01/1986  SpO2 96%  BMI 24.66 kg/m2  Body mass index is 24.66 kg/(m^2).  GENERAL: healthy, alert and no distress  NECK: no adenopathy, no asymmetry, masses, or scars and thyroid normal to palpation  RESP: lungs clear to auscultation - no rales, rhonchi or wheezes  CV: regular rate and rhythm, normal S1 S2, no S3 or S4, no murmur, click or rub, no peripheral edema and peripheral pulses strong  ABDOMEN: soft, " nontender, no hepatosplenomegaly, no masses and bowel sounds normal  MS: no gross musculoskeletal defects noted, no edema    Diagnostic Test Results:  Results for orders placed or performed in visit on 07/12/17 (from the past 24 hour(s))   UA reflex to Microscopic and Culture   Result Value Ref Range    Color Urine Brown     Appearance Urine Slightly Cloudy     Glucose Urine Negative NEG mg/dL    Bilirubin Urine Negative NEG    Ketones Urine Negative NEG mg/dL    Specific Gravity Urine 1.015 1.003 - 1.035    Blood Urine Large (A) NEG    pH Urine 6.0 5.0 - 7.0 pH    Protein Albumin Urine Trace (A) NEG mg/dL    Urobilinogen Urine 0.2 0.2 - 1.0 EU/dL    Nitrite Urine Positive (A) NEG    Leukocyte Esterase Urine Large (A) NEG    Source Midstream Urine    Urine Microscopic   Result Value Ref Range    WBC Urine  (A) 0 - 2 /HPF    RBC Urine >100 (A) 0 - 2 /HPF    Squamous Epithelial /LPF Urine Few FEW /LPF    Bacteria Urine Few (A) NEG /HPF       ASSESSMENT/PLAN:              1. Blood in urine  As above   - UA reflex to Microscopic and Culture  - Urine Microscopic    2. Acute cystitis with hematuria  As above   - sulfamethoxazole-trimethoprim (BACTRIM DS/SEPTRA DS) 800-160 MG per tablet; Take 1 tablet by mouth 2 times daily  Dispense: 14 tablet; Refill: 0  Call if vaginal itch returns, then I will treat with diflucan      3. Gastroesophageal reflux disease without esophagitis  Discussed. No need for further upper endo. OK to use over the counter zantac PRN       Return to clinic if no improvement     MALCOLM RANGEL MD  [facility]

## 2017-07-14 LAB
BACTERIA SPEC CULT: ABNORMAL
MICRO REPORT STATUS: ABNORMAL
MICROORGANISM SPEC CULT: ABNORMAL
SPECIMEN SOURCE: ABNORMAL

## 2017-07-26 ENCOUNTER — OFFICE VISIT (OUTPATIENT)
Dept: FAMILY MEDICINE | Facility: CLINIC | Age: 82
End: 2017-07-26
Payer: MEDICARE

## 2017-07-26 VITALS
WEIGHT: 145.6 LBS | TEMPERATURE: 97.6 F | SYSTOLIC BLOOD PRESSURE: 120 MMHG | OXYGEN SATURATION: 94 % | DIASTOLIC BLOOD PRESSURE: 58 MMHG | BODY MASS INDEX: 25.2 KG/M2 | HEART RATE: 86 BPM

## 2017-07-26 DIAGNOSIS — B37.31 YEAST INFECTION OF THE VAGINA: Primary | ICD-10-CM

## 2017-07-26 PROCEDURE — 99213 OFFICE O/P EST LOW 20 MIN: CPT | Performed by: FAMILY MEDICINE

## 2017-07-26 RX ORDER — FLUCONAZOLE 150 MG/1
TABLET ORAL
Qty: 2 TABLET | Refills: 0 | Status: SHIPPED | OUTPATIENT
Start: 2017-07-26 | End: 2017-10-03

## 2017-07-26 NOTE — NURSING NOTE
"Chief Complaint   Patient presents with     UTI     Vaginal Problem     Possible yeast infection       Initial /58  Pulse 86  Temp 97.6  F (36.4  C) (Oral)  Wt 145 lb 9.6 oz (66 kg)  LMP 10/01/1986  SpO2 94%  BMI 25.2 kg/m2 Estimated body mass index is 25.2 kg/(m^2) as calculated from the following:    Height as of 7/12/17: 5' 3.74\" (1.619 m).    Weight as of this encounter: 145 lb 9.6 oz (66 kg).  Medication Reconciliation: complete     Shanda George MA    "

## 2017-07-26 NOTE — PROGRESS NOTES
SUBJECTIVE:                                                    Hilad Potter is a 82 year old female who presents to clinic today for the following health issues:      Vaginal Symptoms      Duration: Along with bladder infection    Description  itching, burning, odor and pelvic pain    Intensity:  mild    Accompanying signs and symptoms (fever/dysuria/abdominal or back pain): None    History  Sexually active: yes, single partner, contraception not required  Possibility of pregnancy: No  Recent antibiotic use: YES    Precipitating or alleviating factors: None    Therapies tried and outcome: aquafor   Outcome: takes some itching away    Bladder infection follow up  Has gotten better  No current urinary symptoms           Problem list and histories reviewed & adjusted, as indicated.  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)     Premature beats     Premature atrial beats     Compression fracture of L1 lumbar vertebra, seen on CT     Lung nodule < 6cm on CT     Dupuytren's contracture of right hand     Hypothyroidism, unspecified type     Past Surgical History:   Procedure Laterality Date     APPENDECTOMY       BLEPHAROPLASTY BILATERAL  10/2007    both eyes, upper lids     C LENGTHEN,TENDON,HAND/FINGER  1993    repair of dupytrons's contracture     COLONOSCOPY  6/02, 10/13    Q 5 years, polyps     D & C       HC REMOVAL GALLBLADDER       SALPINGO OOPHORECTOMY,R/L/LUIS DANIEL      left ovary and tube     SMALL BOWEL RESECTION  1986    colon resection      TONSILLECTOMY &  ADENOIDECTOMY       TUBAL LIGATION       UPPER GI ENDOSCOPY  6/02, 8/11       Social History   Substance Use Topics     Smoking status: Never Smoker     Smokeless tobacco: Never Used     Alcohol use Yes      Comment: wine      Family History   Problem Relation Age of Onset     Hypertension Mother      Arthritis Mother      Cardiovascular Mother      Glaucoma Mother      C.A.D. Father      Hypertension Father      CEREBROVASCULAR DISEASE Father      Arthritis Father      Cardiovascular Father      Eye Disorder Father      HEART DISEASE Father      Glaucoma Father      Cardiovascular Brother          BP Readings from Last 3 Encounters:   07/26/17 120/58   07/12/17 142/82   04/28/17 151/82    Wt Readings from Last 3 Encounters:   07/26/17 145 lb 9.6 oz (66 kg)   07/12/17 142 lb 8 oz (64.6 kg)   04/28/17 143 lb (64.9 kg)                  Labs reviewed in EPIC        Reviewed and updated as needed this visit by clinical staffTobacco  Allergies  Meds       Reviewed and updated as needed this visit by Provider         ROS:  This 82 year old female is here today because she saw me 7/12/17 for urinary tract infection which grew out E Coli sensitive to septra. She finished her septra and now she has vaginal itching. She doesn't wear mini pads. Has no vaginal discharge. aquaphor isn't helping the itch very much. All other review of systems are negative  Personal, family, and social history reviewed with patient and revised.         OBJECTIVE:     /58  Pulse 86  Temp 97.6  F (36.4  C) (Oral)  Wt 145 lb 9.6 oz (66 kg)  LMP 10/01/1986  SpO2 94%  BMI 25.2 kg/m2  Body mass index is 25.2 kg/(m^2).   labia and vulva are normal   Introitus shows small areas of yeast infection  Bimanual exam was normal   Well hydrated  Well nourished  Well groomed      Diagnostic Test Results:  none     ASSESSMENT/PLAN:              1. Yeast infection of the vagina  As above, OK to use over the counter monistat or lotrimin cream or  suppositories to relieve the itch   - fluconazole (DIFLUCAN) 150 MG tablet; Take one today and repeat in 3 days  Dispense: 2 tablet; Refill: 0    Return to clinic if no improvement     MALCOLM RANGEL MD  [facility]

## 2017-07-26 NOTE — PATIENT INSTRUCTIONS
[facility]    If you have any questions regarding to your visit please contact your care team:       Team Purple:   Clinic Hours Telephone Number   Dr. Jessica Lobo     7am-7pm  Monday - Thursday   7am-5pm  Fridays  [phone]  (Appointment scheduling available 24/7)    Questions about your Visit?   Team Line:  [phone]   Urgent Care - [place] and [facility] - 11am-9pm Monday-Friday Saturday-Sunday- 9am-5pm   [place] - 5pm-9pm Monday-Friday Saturday-Sunday- 9am-5pm  [phone] - [facility]  [phone] - [place]       What options do I have for visits at the clinic other than the traditional office visit?  To expand how we care for you, many of our providers are utilizing electronic visits (e-visits) and telephone visits, when medically appropriate, for interactions with their patients rather than a visit in the clinic.   We also offer nurse visits for many medical concerns. Just like any other service, we will bill your insurance company for this type of visit based on time spent on the phone with your provider. Not all insurance companies cover these visits. Please check with your medical insurance if this type of visit is covered. You will be responsible for any charges that are not paid by your insurance.      E-visits via [employer]:  generally incur a $35.00 fee.  Telephone visits:  Time spent on the phone: *charged based on time that is spent on the phone in increments of 10 minutes. Estimated cost:   5-10 mins $30.00   11-20 mins. $59.00   21-30 mins. $85.00     Use [employer] (secure email communication and access to your chart) to send your primary care provider a message or make an appointment. Ask someone on your Team how to sign up for [employer].  For a Price Quote for your services, please call our Consumer Price Line at [phone].  As always, Thank you for trusting us with your health care needs!

## 2017-07-26 NOTE — MR AVS SNAPSHOT
After Visit Summary   7/26/2017    Hilda Potter    MRN: [id number]           Patient Information     Date Of Birth          1935        Visit Information        Provider Department      7/26/2017 3:30 PM Jessica Rahman MD [facility]        Today's Diagnoses     Yeast infection of the vagina    -  1      Care Instructions    [facility]    If you have any questions regarding to your visit please contact your care team:       Team Purple:   Clinic Hours Telephone Number   Dr. Jessica Lobo     7am-7pm  Monday - Thursday   7am-5pm  Fridays  [phone]  (Appointment scheduling available 24/7)    Questions about your Visit?   Team Line:  [phone]   Urgent Care - [facility] and [facility] - 11am-9pm Monday-Friday Saturday-Sunday- 9am-5pm   [place] - 5pm-9pm Monday-Friday Saturday-Sunday- 9am-5pm  [phone] - [facility]  [phone] - [place]       What options do I have for visits at the clinic other than the traditional office visit?  To expand how we care for you, many of our providers are utilizing electronic visits (e-visits) and telephone visits, when medically appropriate, for interactions with their patients rather than a visit in the clinic.   We also offer nurse visits for many medical concerns. Just like any other service, we will bill your insurance company for this type of visit based on time spent on the phone with your provider. Not all insurance companies cover these visits. Please check with your medical insurance if this type of visit is covered. You will be responsible for any charges that are not paid by your insurance.      E-visits via [employer]:  generally incur a $35.00 fee.  Telephone visits:  Time spent on the phone: *charged based on time that is spent on the phone in increments of 10 minutes. Estimated cost:   5-10 mins $30.00   11-20 mins. $59.00   21-30 mins. $85.00      Use [employer] (secure email communication and access to your chart) to send your primary care provider a message or make an appointment. Ask someone on your Team how to sign up for [employer].  For a Price Quote for your services, please call our Consumer Price Line at [phone].  As always, Thank you for trusting us with your health care needs!              Follow-ups after your visit        Your next 10 appointments already scheduled     Oct 24, 2017  9:45 AM CDT   CT CHEST ABDOMEN PELVIS W/O & W CONTRAST with MGCT1   [facility] ([facility])    [address] [place] [address] [phone]           Please bring any scans or X-rays taken at other hospitals, if similar tests were done. Also bring a list of your medicines, including vitamins, minerals and over-the-counter drugs. It is safest to leave personal items at home.  Be sure to tell your doctor:   If you have any allergies.   If there s any chance you are pregnant.   If you are breastfeeding.   If you have any special needs.  You may have contrast for this exam. To prepare:   Do not eat or drink for 2 hours before your exam. If you need to take medicine, you may take it with small sips of water. (We may ask you to take liquid medicine as well.)   The day before your exam, drink extra fluids at least six 8-ounce glasses (unless your doctor tells you to restrict your fluids).  Patients over 70 or patients with diabetes or kidney problems:   If you haven t had a blood test (creatinine test) within the last 30 days, go to your clinic or Diagnostic Imaging Department for this test.  If you have diabetes:   If your kidney function is normal, continue taking your metformin (Avandamet, Glucophage, Glucovance, Metaglip) on the day of your exam.   If your kidney function is abnormal, wait 48 hours before restarting this medicine.  You will have oral contrast for this exam:   You will drink the contrast at home. Get  this from your clinic or Diagnostic Imaging Department. Please follow the directions given.  Please wear loose clothing, such as a sweat suit or jogging clothes. Avoid snaps, zippers and other metal. We may ask you to undress and put on a hospital gown.  If you have any questions, please call the Imaging Department where you will have your exam.            Oct 24, 2017 10:30 AM CDT   LAB with LAB FIRST FLOOR [facility] ([facility])    [address] [place] [address] [phone]           Patient must bring picture ID. Patient should be prepared to give a urine specimen  Please do not eat 10-12 hours before your appointment if you are coming in fasting for labs on lipids, cholesterol, or glucose (sugar). Pregnant women should follow their Care Team instructions. Water with medications is okay. Do not drink coffee or other fluids. If you have concerns about taking  your medications, please ask at office or if scheduling via [employer], send a message by clicking on Secure Messaging, Message Your Care Team.            Oct 27, 2017  3:00 PM CDT   Return Visit with Jane Roth MD   [facility] ([facility])    [address] [place] [id number] [phone]            Nov 30, 2017 10:30 AM CST   New Visit with Andi Bay MD   [facility] [place] ([facility])    [address] [facility] [id number] [phone]              Who to contact     If you have questions or need follow up information about today's clinic visit or your schedule please contact [facility] directly at [phone].  Normal or non-critical lab and imaging results will be communicated to you by MyChart, letter or phone within 4 business days after the clinic has received the results. If you do not hear from us within 7 days, please contact the clinic through MyChart or phone. If you have a  "critical or abnormal lab result, we will notify you by phone as soon as possible.  Submit refill requests through [employer] or call your pharmacy and they will forward the refill request to us. Please allow 3 business days for your refill to be completed.          Additional Information About Your Visit        [employer] Information     [employer] lets you send messages to your doctor, view your test results, renew your prescriptions, schedule appointments and more. To sign up, go to [email or web address] . Click on \"Log in\" on the left side of the screen, which will take you to the Welcome page. Then click on \"Sign up Now\" on the right side of the page.     You will be asked to enter the access code listed below, as well as some personal information. Please follow the directions to create your username and password.     Your access code is: [email or web address]  Expires: 2017 12:56 PM     Your access code will  in 90 days. If you need help or a new code, please call your [facility] clinic or [phone].        Care EveryWhere ID     This is your Care EveryWhere ID. This could be used by other organizations to access your [facility] medical records  [id number]        Your Vitals Were     Pulse Temperature Last Period Pulse Oximetry BMI (Body Mass Index)       86 97.6  F (36.4  C) (Oral) 10/01/1986 94% 25.2 kg/m2        Blood Pressure from Last 3 Encounters:   17 120/58   17 142/82   17 151/82    Weight from Last 3 Encounters:   17 145 lb 9.6 oz (66 kg)   17 142 lb 8 oz (64.6 kg)   17 143 lb (64.9 kg)              Today, you had the following     No orders found for display         Today's Medication Changes          These changes are accurate as of: 17  3:55 PM.  If you have any questions, ask your nurse or doctor.               Start taking these medicines.        Dose/Directions    fluconazole 150 MG tablet   Commonly known as:  DIFLUCAN   Used for:  Yeast infection of the " vagina   Started by:  Jessica Rahman MD        Take one today and repeat in 3 days   Quantity:  2 tablet   Refills:  0            Where to get your medicines      These medications were sent to [facility] [id number] - [address]  AT [facility] [address] , [facility] [address]     Phone:  [phone]     fluconazole 150 MG tablet                Primary Care Provider Office Phone # Fax #    Jessica Rahman [phone]       [address] [address]        Equal Access to Services     [facility]: Hadii aad ku hadasho Soomaali, waaxda luqadaha, qaybta kaalmada adeegyada, waxay drewin haymayran misbah burgos . So [facility] [phone].    ATENCIÓN: Si habla español, tiene a ayala disposición servicios gratuitos de asistencia lingüística. [facility] [phone].    We comply with applicable federal civil rights laws and [place] laws. We do not discriminate on the basis of race, color, national origin, age, disability sex, sexual orientation or gender identity.            Thank you!     Thank you for choosing [facility]  for your care. Our goal is always to provide you with excellent care. Hearing back from our patients is one way we can continue to improve our services. Please take a few minutes to complete the written survey that you may receive in the mail after your visit with us. Thank you!             Your Updated Medication List - Protect others around you: Learn how to safely use, store and throw away your medicines at www.disposemymeds.org.          This list is accurate as of: 7/26/17  3:55 PM.  Always use your most recent med list.                   Brand Name Dispense Instructions for use Diagnosis    calcium carbonate 1250 MG tablet    OS-FIDENCIO 500 mg [place]. 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)        carboxymethylcellulose 0.5 % Soln ophthalmic solution    REFRESH PLUS     Place 1 drop into both eyes 3 times daily as needed for dry eyes        fluconazole 150 MG tablet    DIFLUCAN    2 tablet    Take one today and repeat in 3 days    Yeast infection of the vagina       fluocinolone acetonide 0.01 % Oil     20 mL    Place 5 drops in ear(s) 2 times daily    Chronic eczematous otitis externa of both ears       HYZAAR 50-12.5 MG per tablet   Generic drug:  losartan-hydrochlorothiazide     90 tablet    Take 1 tablet by mouth daily    Essential hypertension with goal blood pressure less than 140/90       levothyroxine 50 MCG tablet    SYNTHROID/LEVOTHROID    90 tablet    Take 1 tablet (50 mcg) by mouth daily    Hypothyroidism, unspecified type       Multi-vitamin Tabs tablet   Generic drug:  multivitamin, therapeutic with minerals      Take 1 tablet by mouth daily.        sulfamethoxazole-trimethoprim 800-160 MG per tablet    BACTRIM DS/SEPTRA DS    14 tablet    Take 1 tablet by mouth 2 times daily    Acute cystitis with hematuria

## 2017-09-05 ENCOUNTER — TELEPHONE (OUTPATIENT)
Dept: FAMILY MEDICINE | Facility: CLINIC | Age: 82
End: 2017-09-05

## 2017-09-05 NOTE — TELEPHONE ENCOUNTER
Reason for call: med refill  Patient called regarding (reason for call):med refill for yeast infection  Additional comments: She thinks it is back      Phone number to reach patient:  Home number on file [phone] (home)    Best Time:  anytime    Can we leave a detailed message on this number?  YES

## 2017-09-06 NOTE — TELEPHONE ENCOUNTER
Patient was seen on 7/26/17 for yeast infection and was given diflucan.    Voice mail left for patient to call RN hotline at [phone].    What symptoms is patient having?  Did symptoms get better when initially treated?  Marielle Oates RN

## 2017-09-22 NOTE — PATIENT INSTRUCTIONS
Preventive Health Recommendations    Female Ages 65 +    Yearly exam:     See your health care provider every year in order to  o Review health changes.   o Discuss preventive care.    o Review your medicines if your doctor has prescribed any.      You no longer need a yearly Pap test unless you've had an abnormal Pap test in the past 10 years. If you have vaginal symptoms, such as bleeding or discharge, be sure to talk with your provider about a Pap test.      Every 1 to 2 years, have a mammogram.  If you are over 69, talk with your health care provider about whether or not you want to continue having screening mammograms.      Every 10 years, have a colonoscopy. Or, have a yearly FIT test (stool test). These exams will check for colon cancer.       Have a cholesterol test every 5 years, or more often if your doctor advises it.       Have a diabetes test (fasting glucose) every three years. If you are at risk for diabetes, you should have this test more often.       At age 65, have a bone density scan (DEXA) to check for osteoporosis (brittle bone disease).    Shots:    Get a flu shot each year.    Get a tetanus shot every 10 years.    Talk to your doctor about your pneumonia vaccines. There are now two you should receive - Pneumovax (PPSV 23) and Prevnar (PCV 13).    Talk to your doctor about the shingles vaccine.    Talk to your doctor about the hepatitis B vaccine.    Nutrition:     Eat at least 5 servings of fruits and vegetables each day.      Eat whole-grain bread, whole-wheat pasta and brown rice instead of white grains and rice.      Talk to your provider about Calcium and Vitamin D.     Lifestyle    Exercise at least 150 minutes a week (30 minutes a day, 5 days a week). This will help you control your weight and prevent disease.      Limit alcohol to one drink per day.      No smoking.       Wear sunscreen to prevent skin cancer.       See your dentist twice a year for an exam and cleaning.      See your  eye doctor every 1 to 2 years to screen for conditions such as glaucoma, macular degeneration and cataracts.    [facility]    If you have any questions regarding to your visit please contact your care team:       Team Purple:   Clinic Hours Telephone Number   Dr. Jessica Lobo     7am-7pm  Monday - Thursday   7am-5pm  Fridays  [phone]  (Appointment scheduling available 24/7)    Questions about your Visit?   Team Line:  [phone]   Urgent Care - [place] - 11am-9pm Monday-Friday Saturday-Sunday- 9am-5pm   [place] - 5pm-9pm Monday-Friday Saturday-Sunday- 9am-5pm  [phone] - [facility]  [phone] - [place]       What options do I have for visits at the clinic other than the traditional office visit?  To expand how we care for you, many of our providers are utilizing electronic visits (e-visits) and telephone visits, when medically appropriate, for interactions with their patients rather than a visit in the clinic.   We also offer nurse visits for many medical concerns. Just like any other service, we will bill your insurance company for this type of visit based on time spent on the phone with your provider. Not all insurance companies cover these visits. Please check with your medical insurance if this type of visit is covered. You will be responsible for any charges that are not paid by your insurance.      E-visits via [employer]:  generally incur a $35.00 fee.  Telephone visits:  Time spent on the phone: *charged based on time that is spent on the phone in increments of 10 minutes. Estimated cost:   5-10 mins $30.00   11-20 mins. $59.00   21-30 mins. $85.00     Use [employer] (secure email communication and access to your chart) to send your primary care provider a message or make an appointment. Ask someone on your Team how to sign up for [employer].  For a Price Quote for your services, please call our Consumer Price  Line at [phone].  As always, Thank you for trusting us with your health care needs!

## 2017-09-23 DIAGNOSIS — I10 ESSENTIAL HYPERTENSION WITH GOAL BLOOD PRESSURE LESS THAN 140/90: ICD-10-CM

## 2017-09-25 NOTE — TELEPHONE ENCOUNTER
HYZAAR 50-12.5 MG per tablet      Last Written Prescription Date: 9/16/2016  Last Fill Quantity: 90, # refills: 4  Last Office Visit with [facility], [facility] or [facility] prescribing provider: 7/26/2017  Next 5 appointments (look out 90 days)     Oct 03, 2017 10:00 AM CDT   PHYSICAL with Jessica Rahman MD   [facility] ([address] [id number] [phone]            Nov 07, 2017 11:00 AM CST   Return Visit with Jane Roth MD   [facility] ([facility])    [address] [place] [id number] [phone]                   Potassium   Date Value Ref Range Status   04/28/2017 4.2 3.4 - 5.3 mmol/L Final     Creatinine   Date Value Ref Range Status   04/28/2017 0.92 0.52 - 1.04 mg/dL Final     BP Readings from Last 3 Encounters:   07/26/17 120/58   07/12/17 142/82   04/28/17 151/82

## 2017-09-26 RX ORDER — LOSARTAN/HYDROCHLOROTHIAZIDE 50-12.5 MG
TABLET ORAL
Qty: 90 TABLET | Refills: 0 | Status: SHIPPED | OUTPATIENT
Start: 2017-09-26 | End: 2017-10-03

## 2017-10-03 ENCOUNTER — OFFICE VISIT (OUTPATIENT)
Dept: FAMILY MEDICINE | Facility: CLINIC | Age: 82
End: 2017-10-03
Payer: MEDICARE

## 2017-10-03 VITALS
WEIGHT: 144 LBS | HEIGHT: 64 IN | TEMPERATURE: 97.2 F | OXYGEN SATURATION: 98 % | DIASTOLIC BLOOD PRESSURE: 80 MMHG | HEART RATE: 88 BPM | BODY MASS INDEX: 24.59 KG/M2 | SYSTOLIC BLOOD PRESSURE: 138 MMHG

## 2017-10-03 DIAGNOSIS — Z00.00 ENCOUNTER FOR ROUTINE ADULT HEALTH EXAMINATION WITHOUT ABNORMAL FINDINGS: Primary | ICD-10-CM

## 2017-10-03 DIAGNOSIS — E03.9 HYPOTHYROIDISM, UNSPECIFIED TYPE: ICD-10-CM

## 2017-10-03 DIAGNOSIS — Z91.81 AT RISK FOR FALLING: ICD-10-CM

## 2017-10-03 DIAGNOSIS — Z13.6 CARDIOVASCULAR SCREENING; LDL GOAL LESS THAN 130: ICD-10-CM

## 2017-10-03 DIAGNOSIS — Z23 NEED FOR PROPHYLACTIC VACCINATION AND INOCULATION AGAINST INFLUENZA: ICD-10-CM

## 2017-10-03 DIAGNOSIS — I10 ESSENTIAL HYPERTENSION WITH GOAL BLOOD PRESSURE LESS THAN 140/90: ICD-10-CM

## 2017-10-03 LAB
ALBUMIN SERPL-MCNC: 3.7 G/DL (ref 3.4–5)
ALP SERPL-CCNC: 79 U/L (ref 40–150)
ALT SERPL W P-5'-P-CCNC: 25 U/L (ref 0–50)
ANION GAP SERPL CALCULATED.3IONS-SCNC: 7 MMOL/L (ref 3–14)
AST SERPL W P-5'-P-CCNC: 16 U/L (ref 0–45)
BILIRUB SERPL-MCNC: 0.5 MG/DL (ref 0.2–1.3)
BUN SERPL-MCNC: 19 MG/DL (ref 7–30)
CALCIUM SERPL-MCNC: 9.2 MG/DL (ref 8.5–10.1)
CHLORIDE SERPL-SCNC: 103 MMOL/L (ref 94–109)
CHOLEST SERPL-MCNC: 225 MG/DL
CO2 SERPL-SCNC: 29 MMOL/L (ref 20–32)
CREAT SERPL-MCNC: 0.92 MG/DL (ref 0.52–1.04)
GFR SERPL CREATININE-BSD FRML MDRD: 58 ML/MIN/1.7M2
GLUCOSE SERPL-MCNC: 112 MG/DL (ref 70–99)
HDLC SERPL-MCNC: 61 MG/DL
LDLC SERPL CALC-MCNC: 137 MG/DL
NONHDLC SERPL-MCNC: 164 MG/DL
POTASSIUM SERPL-SCNC: 4.6 MMOL/L (ref 3.4–5.3)
PROT SERPL-MCNC: 7.3 G/DL (ref 6.8–8.8)
SODIUM SERPL-SCNC: 139 MMOL/L (ref 133–144)
TRIGL SERPL-MCNC: 136 MG/DL
TSH SERPL DL<=0.005 MIU/L-ACNC: 1.9 MU/L (ref 0.4–4)

## 2017-10-03 PROCEDURE — 84443 ASSAY THYROID STIM HORMONE: CPT | Performed by: FAMILY MEDICINE

## 2017-10-03 PROCEDURE — 36415 COLL VENOUS BLD VENIPUNCTURE: CPT | Performed by: FAMILY MEDICINE

## 2017-10-03 PROCEDURE — 80061 LIPID PANEL: CPT | Performed by: FAMILY MEDICINE

## 2017-10-03 PROCEDURE — G0008 ADMIN INFLUENZA VIRUS VAC: HCPCS | Performed by: FAMILY MEDICINE

## 2017-10-03 PROCEDURE — 90662 IIV NO PRSV INCREASED AG IM: CPT | Performed by: FAMILY MEDICINE

## 2017-10-03 PROCEDURE — 80053 COMPREHEN METABOLIC PANEL: CPT | Performed by: FAMILY MEDICINE

## 2017-10-03 PROCEDURE — G0439 PPPS, SUBSEQ VISIT: HCPCS | Performed by: FAMILY MEDICINE

## 2017-10-03 RX ORDER — LEVOTHYROXINE SODIUM 50 UG/1
50 TABLET ORAL DAILY
Qty: 90 TABLET | Refills: 4 | Status: SHIPPED | OUTPATIENT
Start: 2017-10-03 | End: 2018-09-10

## 2017-10-03 RX ORDER — LOSARTAN/HYDROCHLOROTHIAZIDE 50-12.5 MG
1 TABLET ORAL DAILY
Qty: 90 TABLET | Refills: 4 | Status: SHIPPED | OUTPATIENT
Start: 2017-10-03 | End: 2018-09-10

## 2017-10-03 ASSESSMENT — PAIN SCALES - GENERAL: PAINLEVEL: NO PAIN (0)

## 2017-10-03 NOTE — PROGRESS NOTES
SUBJECTIVE:   Hilda Potter is a 82 year old female who presents for Preventive Visit.    Are you in the first 12 months of your Medicare coverage?  No    Physical   Annual:     Getting at least 3 servings of Calcium per day::  Yes    Bi-annual eye exam::  Yes    Dental care twice a year::  NO    Sleep apnea or symptoms of sleep apnea::  None    Frequency of exercise::  4-5 days/week    Duration of exercise::  30-45 minutes    Taking medications regularly::  Yes    Medication side effects::  None    Additional concerns today::  No      COGNITIVE SCREEN  1) Repeat 3 items (Banana, Sunrise, Chair)    2) Clock draw: NORMAL  3) 3 item recall: Recalls 2 objects   Results: NORMAL clock, 1-2 items recalled: COGNITIVE IMPAIRMENT LESS LIKELY    Mini-CogTM Copyright LAINEY Garcia. Licensed by the author for use in [facility]; reprinted with permission (arben@[email or web address]). All rights reserved.          Reviewed and updated as needed this visit by clinical staffTobacco  Allergies  Meds         Reviewed and updated as needed this visit by Provider        Social History   Substance Use Topics     Smoking status: Never Smoker     Smokeless tobacco: Never Used     Alcohol use Yes      Comment: wine        The patient does not drink >3 drinks per day nor >7 drinks per week.               Today's PHQ-2 Score: PHQ-2 ( 1999 Pfizer) 10/3/2017   Q1: Little interest or pleasure in doing things 0   Q2: Feeling down, depressed or hopeless 0   PHQ-2 Score 0   Q1: Little interest or pleasure in doing things Not at all   Q2: Feeling down, depressed or hopeless Not at all   PHQ-2 Score 0       Do you feel safe in your environment - Yes    Do you have a Health Care Directive?: Yes: Advance Directive has been received and scanned.    Current providers sharing in care for this patient include:   Patient Care Team:  Jessica Rahman MD as PCP - General      Hearing impairment: No    Ability to successfully perform activities of daily  living: Yes, no assistance needed     Fall risk:  Fallen 2 or more times in the past year?: No  Any fall with injury in the past year?: No      Home safety:  lack of grab bars in the bathroom      The following health maintenance items are reviewed in Epic and correct as of today:Health Maintenance   Topic Date Due     MIGRAINE ACTION PLAN  06/24/1953     ADVANCE DIRECTIVE PLANNING Q5 YRS  06/02/2016     MEDICARE ANNUAL WELLNESS VISIT  11/09/2016     FALL RISK ASSESSMENT  11/09/2016     MICROALBUMIN Q1 YEAR  11/09/2016     INFLUENZA VACCINE (SYSTEM ASSIGNED)  09/01/2017     TSH Q1 YEAR  09/16/2017     EYE EXAM Q1 YEAR  11/29/2017     COLONOSCOPY Q5 YR  10/09/2018     TETANUS IMMUNIZATION (SYSTEM ASSIGNED)  07/25/2023     DEXA SCAN SCREENING (SYSTEM ASSIGNED)  Completed     PNEUMOCOCCAL  Completed     Labs reviewed in EPIC  BP Readings from Last 3 Encounters:   10/03/17 138/80   07/26/17 120/58   07/12/17 142/82    Wt Readings from Last 3 Encounters:   10/03/17 144 lb (65.3 kg)   07/26/17 145 lb 9.6 oz (66 kg)   07/12/17 142 lb 8 oz (64.6 kg)                  Patient Active Problem List   Diagnosis     AR (allergic rhinitis)     Reflux Esophagitis     Osteoporosis     Atrophic vaginitis     CARDIOVASCULAR SCREENING; LDL GOAL LESS THAN 130     Advanced directives, counseling/discussion     GERD (gastroesophageal reflux disease)     Cataract, mild-mod, ou     History of blepharoplasty, upper lids, ou     Migraine     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)     Premature beats     Premature atrial beats     Compression fracture of L1 lumbar vertebra, seen on CT     Lung nodule < 6cm on CT     Dupuytren's contracture of right hand     Hypothyroidism, unspecified type     Essential hypertension with goal blood pressure less than 140/90     Past Surgical History:   Procedure Laterality Date     APPENDECTOMY       BLEPHAROPLASTY BILATERAL  10/2007    both eyes, upper lids     C LENGTHEN,TENDON,HAND/FINGER  1993     repair of dupytrons's contracture     COLONOSCOPY  6/02, 10/13    Q 5 years, polyps     D & C       HC REMOVAL GALLBLADDER       SALPINGO OOPHORECTOMY,R/L/LUIS DANIEL      left ovary and tube     SMALL BOWEL RESECTION  1986    colon resection      TONSILLECTOMY & ADENOIDECTOMY       TUBAL LIGATION       UPPER GI ENDOSCOPY  6/02, 8/11       Social History   Substance Use Topics     Smoking status: Never Smoker     Smokeless tobacco: Never Used     Alcohol use Yes      Comment: wine      Family History   Problem Relation Age of Onset     Hypertension Mother      Arthritis Mother      Cardiovascular Mother      Glaucoma Mother      C.A.D. Father      Hypertension Father      CEREBROVASCULAR DISEASE Father      Arthritis Father      Cardiovascular Father      Eye Disorder Father      HEART DISEASE Father      Glaucoma Father      Cardiovascular Brother          Current Outpatient Prescriptions   Medication Sig Dispense Refill     HYZAAR 50-12.5 MG per tablet Take 1 tablet by mouth daily 90 tablet 4     levothyroxine (SYNTHROID/LEVOTHROID) 50 MCG tablet Take 1 tablet (50 mcg) by mouth daily 90 tablet 4     calcium carbonate (OS-FIDENCIO 500 MG [place]. [place]) 500 MG tablet Take 500 mg by mouth 2 times daily       carboxymethylcellulose (REFRESH PLUS) 0.5 % SOLN Place 1 drop into both eyes 3 times daily as needed for dry eyes       Multiple Vitamin (MULTI-VITAMIN) per tablet Take 1 tablet by mouth daily.         [DISCONTINUED] HYZAAR 50-12.5 MG per tablet TAKE 1 TABLET BY MOUTH DAILY 90 tablet 0     [DISCONTINUED] levothyroxine (SYNTHROID, LEVOTHROID) 50 MCG tablet Take 1 tablet (50 mcg) by mouth daily 90 tablet 4     Allergies   Allergen Reactions     Bisphosphonates      GI distress     Ivp Dye [Contrast Dye] Swelling     Lisinopril Cough     Losartan Hives     But can tolerate Hyzaar.      Morphine Sulfate Nausea and Vomiting     Prilosec [Omeprazole] Hives              Immunization History   Administered Date(s) Administered      "Influenza (High Dose) 3 valent vaccine 11/17/2011, 10/24/2013, 09/24/2014, 10/26/2015, 10/03/2017     Influenza (IIV3) 10/14/2008, 10/01/2009, 10/24/2010, 08/30/2012, 08/30/2012     Influenza Vaccine IM 3yrs+ 4 Valent IIV4 09/16/2016     Pneumococcal (PCV 13) 10/27/2015     Pneumococcal 23 valent 01/20/2009     TD (ADULT, 7+) 07/10/2003     TDAP Vaccine (Adacel) 07/25/2013     Zoster vaccine, live 04/19/2012        ROS:  This 82 year old female is here today for annual female exam. She sees oncology once a year for her follicular cell lymphoma. Annual labs and cat scan are coming up soon. She struggles with GERD and wonders if she should have another upper endo. She has had 2: in 2011 and 2013 by [facility]. She is due for colonoscopy again next fall. Zantac does help her GERD symptoms. All other review of systems are negative  Personal, family, and social history reviewed with patient and revised.    C: NEGATIVE for fever, chills, change in weight  I: NEGATIVE for worrisome rashes, moles or lesions  E: NEGATIVE for vision changes or irritation  E/M: NEGATIVE for ear, mouth and throat problems  R: NEGATIVE for significant cough or SOB  B: NEGATIVE for masses, tenderness or discharge  CV: NEGATIVE for chest pain, palpitations or peripheral edema  GI: NEGATIVE for nausea, abdominal pain, heartburn, or change in bowel habits, except for the above issues   : NEGATIVE for frequency, dysuria, or hematuria  M: NEGATIVE for significant arthralgias or myalgia  N: NEGATIVE for weakness, dizziness or paresthesias  E: NEGATIVE for temperature intolerance, skin/hair changes  H: NEGATIVE for bleeding problems  P: NEGATIVE for changes in mood or affect    OBJECTIVE:   /80  Pulse 88  Temp 97.2  F (36.2  C) (Oral)  Ht 5' 3.54\" (1.614 m)  Wt 144 lb (65.3 kg)  LMP 10/01/1986  SpO2 98%  BMI 25.07 kg/m2 Estimated body mass index is 25.07 kg/(m^2) as calculated from the following:    Height as of this encounter: 5' 3.54\" (1.614 " m).    Weight as of this encounter: 144 lb (65.3 kg).  EXAM:   GENERAL APPEARANCE: healthy, alert and no distress  EYES: Eyes grossly normal to inspection, PERRL and conjunctivae and sclerae normal  HENT: ear canals and TM's normal, nose and mouth without ulcers or lesions, oropharynx clear and oral mucous membranes moist  NECK: no adenopathy, no asymmetry, masses, or scars and thyroid normal to palpation  RESP: lungs clear to auscultation - no rales, rhonchi or wheezes  BREAST: normal without masses, tenderness or nipple discharge and no palpable axillary masses or adenopathy  CV: regular rate and rhythm, normal S1 S2, no S3 or S4, no murmur, click or rub, no peripheral edema and peripheral pulses strong  ABDOMEN: soft, nontender, no hepatosplenomegaly, no masses and bowel sounds normal  MS: no musculoskeletal defects are noted and gait is age appropriate without ataxia  SKIN: no suspicious lesions or rashes  NEURO: Normal strength and tone, sensory exam grossly normal, mentation intact and speech normal  PSYCH: mentation appears normal and affect normal/bright    ASSESSMENT / PLAN:   1. Encounter for routine adult health examination without abnormal findings  Healthy lady     2. Essential hypertension with goal blood pressure less than 140/90  Good control   - HYZAAR 50-12.5 MG per tablet; Take 1 tablet by mouth daily  Dispense: 90 tablet; Refill: 4  - Comprehensive metabolic panel    3. Hypothyroidism, unspecified type  Good control   - TSH WITH FREE T4 REFLEX  - levothyroxine (SYNTHROID/LEVOTHROID) 50 MCG tablet; Take 1 tablet (50 mcg) by mouth daily  Dispense: 90 tablet; Refill: 4  - Comprehensive metabolic panel    4. At risk for falling  Low risk     5. Need for prophylactic vaccination and inoculation against influenza  due  - FLU VACCINE, INCREASED ANTIGEN, PRESV FREE, AGE 65+ [81037]  - Vaccine Administration, Initial [13906]    6. CARDIOVASCULAR SCREENING; LDL GOAL LESS THAN 130  due  - Lipid panel reflex  "to direct LDL    End of Life Planning:  Patient currently has an advanced directive: No.  I have verified the patient's ablity to prepare an advanced directive/make health care decisions.  Literature was provided to assist patient in preparing an advanced directive.    COUNSELING:  Reviewed preventive health counseling, as reflected in patient instructions       Regular exercise       Healthy diet/nutrition        Estimated body mass index is 25.07 kg/(m^2) as calculated from the following:    Height as of this encounter: 5' 3.54\" (1.614 m).    Weight as of this encounter: 144 lb (65.3 kg).     reports that she has never smoked. She has never used smokeless tobacco.        Appropriate preventive services were discussed with this patient, including applicable screening as appropriate for cardiovascular disease, diabetes, osteopenia/osteoporosis, and glaucoma.  As appropriate for age/gender, discussed screening for colorectal cancer, prostate cancer, breast cancer, and cervical cancer. Checklist reviewing preventive services available has been given to the patient.    Reviewed patients plan of care and provided an AVS. The Basic Care Plan (routine screening as documented in Health Maintenance) for Hilda meets the Care Plan requirement. This Care Plan has been established and reviewed with the Patient.    Counseling Resources:  ATP IV Guidelines  Pooled Cohorts Equation Calculator  Breast Cancer Risk Calculator  FRAX Risk Assessment  ICSI Preventive Guidelines  Dietary Guidelines for Americans, 2010  USDA's MyPlate  ASA Prophylaxis  Lung CA Screening    MALCOLM RANGEL MD  [facility]                Injectable Influenza Immunization Documentation    1.  Is the person to be vaccinated sick today?   No    2. Does the person to be vaccinated have an allergy to a component   of the vaccine?   No    3. Has the person to be vaccinated ever had a serious reaction   to influenza vaccine in the past?   No    4. " Has the person to be vaccinated ever had Guillain-Barré syndrome?   No    Form completed by Madelyn Castillo MA

## 2017-10-03 NOTE — LETTER
[facility]  [address] [place] [address]    October 3, 2017    Hilda Potter  [address] [address]      Dear Hilda,    Your lab tests are looking OK. Your glucose is elevated simply because you were not fasting. Continue your healthy lifestyle.    Enclosed is a copy of your results.   Results for orders placed or performed in visit on 10/03/17   TSH WITH FREE T4 REFLEX   Result Value Ref Range    TSH 1.90 0.40 - 4.00 mU/L   Lipid panel reflex to direct LDL   Result Value Ref Range    Cholesterol 225 (H) <200 mg/dL    Triglycerides 136 <150 mg/dL    HDL Cholesterol 61 >49 mg/dL    LDL Cholesterol Calculated 137 (H) <100 mg/dL    Non HDL Cholesterol 164 (H) <130 mg/dL   Comprehensive metabolic panel   Result Value Ref Range    Sodium 139 133 - 144 mmol/L    Potassium 4.6 3.4 - 5.3 mmol/L    Chloride 103 94 - 109 mmol/L    Carbon Dioxide 29 20 - 32 mmol/L    Anion Gap 7 3 - 14 mmol/L    Glucose 112 (H) 70 - 99 mg/dL    Urea Nitrogen 19 7 - 30 mg/dL    Creatinine 0.92 0.52 - 1.04 mg/dL    GFR Estimate 58 (L) >60 mL/min/1.7m2    GFR Estimate If Black 71 >60 mL/min/1.7m2    Calcium 9.2 8.5 - 10.1 mg/dL    Bilirubin Total 0.5 0.2 - 1.3 mg/dL    Albumin 3.7 3.4 - 5.0 g/dL    Protein Total 7.3 6.8 - 8.8 g/dL    Alkaline Phosphatase 79 40 - 150 U/L    ALT 25 0 - 50 U/L    AST 16 0 - 45 U/L       If you have any questions or concerns, please call myself or my nurse at [phone].    Sincerely,    Jessica Rahman MD/los

## 2017-10-03 NOTE — MR AVS SNAPSHOT
After Visit Summary   10/3/2017    Hilda Potter    MRN: [id number]           Patient Information     Date Of Birth          1935        Visit Information        Provider Department      10/3/2017 10:00 AM Jessica Rahman MD [facility]        Today's Diagnoses     Encounter for routine adult health examination without abnormal findings    -  1    Essential hypertension with goal blood pressure less than 140/90        Hypothyroidism, unspecified type        At risk for falling        Need for prophylactic vaccination and inoculation against influenza        CARDIOVASCULAR SCREENING; LDL GOAL LESS THAN 130          Care Instructions      Preventive Health Recommendations    Female Ages 65 +    Yearly exam:     See your health care provider every year in order to  o Review health changes.   o Discuss preventive care.    o Review your medicines if your doctor has prescribed any.      You no longer need a yearly Pap test unless you've had an abnormal Pap test in the past 10 years. If you have vaginal symptoms, such as bleeding or discharge, be sure to talk with your provider about a Pap test.      Every 1 to 2 years, have a mammogram.  If you are over 69, talk with your health care provider about whether or not you want to continue having screening mammograms.      Every 10 years, have a colonoscopy. Or, have a yearly FIT test (stool test). These exams will check for colon cancer.       Have a cholesterol test every 5 years, or more often if your doctor advises it.       Have a diabetes test (fasting glucose) every three years. If you are at risk for diabetes, you should have this test more often.       At age 65, have a bone density scan (DEXA) to check for osteoporosis (brittle bone disease).    Shots:    Get a flu shot each year.    Get a tetanus shot every 10 years.    Talk to your doctor about your pneumonia vaccines. There are now two you should receive - Pneumovax (PPSV  23) and Prevnar (PCV 13).    Talk to your doctor about the shingles vaccine.    Talk to your doctor about the hepatitis B vaccine.    Nutrition:     Eat at least 5 servings of fruits and vegetables each day.      Eat whole-grain bread, whole-wheat pasta and brown rice instead of white grains and rice.      Talk to your provider about Calcium and Vitamin D.     Lifestyle    Exercise at least 150 minutes a week (30 minutes a day, 5 days a week). This will help you control your weight and prevent disease.      Limit alcohol to one drink per day.      No smoking.       Wear sunscreen to prevent skin cancer.       See your dentist twice a year for an exam and cleaning.      See your eye doctor every 1 to 2 years to screen for conditions such as glaucoma, macular degeneration and cataracts.    [facility]    If you have any questions regarding to your visit please contact your care team:       Team Purple:   Clinic Hours Telephone Number   Dr. Jessica Lobo     7am-7pm  Monday - Thursday   7am-5pm  Fridays  [phone]  (Appointment scheduling available 24/7)    Questions about your Visit?   Team Line:  [phone]   Urgent Care - [place] [facility] - 11am-9pm Monday-Friday Saturday-Sunday- 9am-5pm   [place] - 5pm-9pm Monday-Friday Saturday-Sunday- 9am-5pm  [phone] - [place]   [phone] - [place]       What options do I have for visits at the clinic other than the traditional office visit?  To expand how we care for you, many of our providers are utilizing electronic visits (e-visits) and telephone visits, when medically appropriate, for interactions with their patients rather than a visit in the clinic.   We also offer nurse visits for many medical concerns. Just like any other service, we will bill your insurance company for this type of visit based on time spent on the phone with your provider. Not all insurance companies cover  these visits. Please check with your medical insurance if this type of visit is covered. You will be responsible for any charges that are not paid by your insurance.      E-visits via [employer]:  generally incur a $35.00 fee.  Telephone visits:  Time spent on the phone: *charged based on time that is spent on the phone in increments of 10 minutes. Estimated cost:   5-10 mins $30.00   11-20 mins. $59.00   21-30 mins. $85.00     Use XGraph (secure email communication and access to your chart) to send your primary care provider a message or make an appointment. Ask someone on your Team how to sign up for XGraph.  For a Price Quote for your services, please call our [employer] Line at [phone].  As always, Thank you for trusting us with your health care needs!              Follow-ups after your visit        Your next 10 appointments already scheduled     Oct 24, 2017  9:45 AM CDT   CT CHEST ABDOMEN PELVIS W/O & W CONTRAST with MGCT1   [facility] ([facility])    [address] [place] [address] [phone]           Please bring any scans or X-rays taken at other hospitals, if similar tests were done. Also bring a list of your medicines, including vitamins, minerals and over-the-counter drugs. It is safest to leave personal items at home.  Be sure to tell your doctor:   If you have any allergies.   If there s any chance you are pregnant.   If you are breastfeeding.   If you have any special needs.  You may have contrast for this exam. To prepare:   Do not eat or drink for 2 hours before your exam. If you need to take medicine, you may take it with small sips of water. (We may ask you to take liquid medicine as well.)   The day before your exam, drink extra fluids at least six 8-ounce glasses (unless your doctor tells you to restrict your fluids).  Patients over 70 or patients with diabetes or kidney problems:   If you haven t had a blood test (creatinine test)  within the last 30 days, go to your clinic or Diagnostic Imaging Department for this test.  If you have diabetes:   If your kidney function is normal, continue taking your metformin (Avandamet, Glucophage, Glucovance, Metaglip) on the day of your exam.   If your kidney function is abnormal, wait 48 hours before restarting this medicine.  You will have oral contrast for this exam:   You will drink the contrast at home. Get this from your clinic or Diagnostic Imaging Department. Please follow the directions given.  Please wear loose clothing, such as a sweat suit or jogging clothes. Avoid snaps, zippers and other metal. We may ask you to undress and put on a hospital gown.  If you have any questions, please call the Imaging Department where you will have your exam.            Nov 07, 2017 10:15 AM CST   LAB with LAB ONC Critical access hospital ([facility])    [address] [place] [address] [phone]           Patient must bring picture ID. Patient should be prepared to give a urine specimen  Please do not eat 10-12 hours before your appointment if you are coming in fasting for labs on lipids, cholesterol, or glucose (sugar). Pregnant women should follow their Care Team instructions. Water with medications is okay. Do not drink coffee or other fluids. If you have concerns about taking  your medications, please ask at office or if scheduling via Impact, send a message by clicking on Secure Messaging, Message Your Care Team.            Nov 07, 2017 11:00 AM CST   Return Visit with Jane Roth MD   [facility] ([facility])    [address] [place] [address] [phone]            Nov 30, 2017 10:30 AM CST   New Visit with Andi Bay MD   [facility] [place] ([facility])    [address]  [place] [address] [phone]              Who to contact     If you have questions or  "need follow up information about today's clinic visit or your schedule please contact [facility] directly at [phone].  Normal or non-critical lab and imaging results will be communicated to you by MyChart, letter or phone within 4 business days after the clinic has received the results. If you do not hear from us within 7 days, please contact the clinic through [employer] or phone. If you have a critical or abnormal lab result, we will notify you by phone as soon as possible.  Submit refill requests through [employer] or call your pharmacy and they will forward the refill request to us. Please allow 3 business days for your refill to be completed.          Additional Information About Your Visit        [employer] Information     [employer] lets you send messages to your doctor, view your test results, renew your prescriptions, schedule appointments and more. To sign up, go to [email or web address] . Click on \"Log in\" on the left side of the screen, which will take you to the Welcome page. Then click on \"Sign up Now\" on the right side of the page.     You will be asked to enter the access code listed below, as well as some personal information. Please follow the directions to create your username and password.     Your access code is: [email or web address]  Expires: 10/26/2017  8:23 AM     Your access code will  in 90 days. If you need help or a new code, please call your [facility] clinic or [phone].        Care EveryWhere ID     This is your Care EveryWhere ID. This could be used by other organizations to access your [facility] medical records  [id number]        Your Vitals Were     Pulse Temperature Height Last Period Pulse Oximetry BMI (Body Mass Index)    88 97.2  F (36.2  C) (Oral) 5' 3.54\" (1.614 m) 10/01/1986 98% 25.07 kg/m2       Blood Pressure from Last 3 Encounters:   10/03/17 138/80   17 120/58   17 142/82    Weight from Last 3 Encounters:   10/03/17 144 lb (65.3 kg)   17 145 lb " 9.6 oz (66 kg)   07/12/17 142 lb 8 oz (64.6 kg)              We Performed the Following     Comprehensive metabolic panel     FLU VACCINE, INCREASED ANTIGEN, PRESV FREE, AGE 65+ [86407]     Lipid panel reflex to direct LDL     TSH WITH FREE T4 REFLEX     Vaccine Administration, Initial [49070]          Today's Medication Changes          These changes are accurate as of: 10/3/17 10:48 AM.  If you have any questions, ask your nurse or doctor.               These medicines have changed or have updated prescriptions.        Dose/Directions    HYZAAR 50-12.5 MG per tablet   This may have changed:  See the new instructions.   Used for:  Essential hypertension with goal blood pressure less than 140/90   Generic drug:  losartan-hydrochlorothiazide   Changed by:  Jessica Rahman MD        Dose:  1 tablet   Take 1 tablet by mouth daily   Quantity:  90 tablet   Refills:  4            Where to get your medicines      These medications were sent to [facility] [address] [place] [address]  AT [address] [facility] [address]     Phone:  [phone]     HYZAAR 50-12.5 MG per tablet    levothyroxine 50 MCG tablet                Primary Care Provider Office Phone # Fax #    Jessica Rahman [phone]       [address] [address]        Equal Access to Services     JAY ORTEGA AH: Hadii vaishnavi ku hadasho Soomaali, waaxda luqadaha, qaybta kaalmada adeegyada, jj hauser. So [facility] [phone].    ATENCIÓN: Si habla español, tiene a ayala disposición servicios gratuitos de asistencia lingüística. Abdon al [phone].    We comply with applicable federal civil rights laws and [place] laws. We do not discriminate on the basis of race, color, national origin, age, disability, sex, sexual orientation, or gender identity.            Thank you!     Thank you for choosing [facility]  for your care. Our goal is always to provide you with excellent care. Hearing back from our patients is one way we can continue to improve our services. Please take a few minutes to complete the written survey that you may receive in the mail after your visit with us. Thank you!             Your Updated Medication List - Protect others around you: Learn how to safely use, store and throw away your medicines at www.disposemymeds.org.          This list is accurate as of: 10/3/17 10:48 AM.  Always use your most recent med list.                   Brand Name Dispense Instructions for use Diagnosis    calcium carbonate 1250 MG tablet    OS-FIDENCIO 500 mg [place]. 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)       carboxymethylcellulose 0.5 % Soln ophthalmic solution    REFRESH PLUS     Place 1 drop into both eyes 3 times daily as needed for dry eyes        HYZAAR 50-12.5 MG per tablet   Generic drug:  losartan-hydrochlorothiazide     90 tablet    Take 1 tablet by mouth daily    Essential hypertension with goal blood pressure less than 140/90       levothyroxine 50 MCG tablet    SYNTHROID/LEVOTHROID    90 tablet    Take 1 tablet (50 mcg) by mouth daily    Hypothyroidism, unspecified type       Multi-vitamin Tabs tablet   Generic drug:  multivitamin, therapeutic with minerals      Take 1 tablet by mouth daily.

## 2017-10-03 NOTE — NURSING NOTE
"Chief Complaint   Patient presents with     Physical     Health Maintenance     Fall Risk       Initial /80  Pulse 88  Temp 97.2  F (36.2  C) (Oral)  Ht 5' 3.54\" (1.614 m)  Wt 144 lb (65.3 kg)  LMP 10/01/1986  SpO2 98%  BMI 25.07 kg/m2 Estimated body mass index is 25.07 kg/(m^2) as calculated from the following:    Height as of this encounter: 5' 3.54\" (1.614 m).    Weight as of this encounter: 144 lb (65.3 kg).  Medication Reconciliation: complete    "

## 2017-10-24 ENCOUNTER — RADIANT APPOINTMENT (OUTPATIENT)
Dept: CT IMAGING | Facility: CLINIC | Age: 82
End: 2017-10-24
Attending: INTERNAL MEDICINE
Payer: MEDICARE

## 2017-10-24 DIAGNOSIS — C82.00 GRADE I FOLLICULAR SMALL CLEAVED CELL LYMPHOMA (H): ICD-10-CM

## 2017-10-24 PROCEDURE — 71250 CT THORAX DX C-: CPT | Performed by: RADIOLOGY

## 2017-10-24 PROCEDURE — 74176 CT ABD & PELVIS W/O CONTRAST: CPT | Performed by: RADIOLOGY

## 2017-10-30 DIAGNOSIS — E03.9 HYPOTHYROIDISM, UNSPECIFIED TYPE: ICD-10-CM

## 2017-10-30 NOTE — TELEPHONE ENCOUNTER
Called and verified with pharmacy on duplicate prescription. Please disregard. Shanda George MA

## 2017-10-31 RX ORDER — LEVOTHYROXINE SODIUM 50 UG/1
TABLET ORAL
Qty: 90 TABLET | Refills: 0 | OUTPATIENT
Start: 2017-10-31

## 2017-11-06 NOTE — PROGRESS NOTES
Oncology Follow-up Visit:  11/7/2017      PCP: Dr. Rahman  Diagnosis: Low grade, stage IVB (BM involvement, night sweats) follicular lymphoma.  FLIPI score is 3 (one for stage IV, one for age>60, and one for elevated LDH), associated with 52% median 5 year OS survival and 36% median 10 year overall survival.     Oncologic History:   Ms. Potter is a 82 year old female who presented with night sweats and lymphadenopathy. She developed drenching night sweats in 11/2011 which persisted. She had no other associated constitutional symptoms such as fevers or weight loss. She had a negative ID workup with Dr. Ruiz (for valley fever as she had traveled to [place] in the past).   As part of evaluation, CT scan was performed in April 2012 which demonstrated multiple borderline mildly enlarged retroperitoneal and mesenteric lymph nodes, with the largest size of 1.5 cm. The patient, however, persisted to have night sweats and underwent repeat CTCAP done without contrast (she is allergic to IV contrast and had an anaphylactic reaction to it) which demonstrated unchanged dilatation of common bile duct, likely related to postcholecystectomy state, and stable mildly enlarged retroperitoneal and mesenteric lymph nodes which were nonspecific.   We proceeded with CT guided biopsy of one of the intraabdominal lymph nodes and results c/w low grade follicular lymphoma.  Flow cytometry showed kappa monotypic CD10 positive B cells.  We proceeded with bone marrow biopsy to complete her staging. The results, as detailed below, revealed involvement of marrow by follicular lymphoma. Flow cytometry showed a rare population of CD10-positive, kappa-monotypic B cells (0.2%). Molecular diagnostics was negative for B-cell gene rearrangement. Cytogenetics revealed, of the interphase cells examined, 0.2% had a signal pattern indicative of an IGH/BCL2 gene fusion, a rate that falls just slightly above the normal control range (0-0.1%) for our  "laboratory; thus, these findings are suspicious for the presence of low-level bone marrow involvement by follicular lymphoma. However, this cannot be determined with certainty, in light of the few abnormal cells found by FISH. Of note, a G-banded chromosome study is still pending.   Hep B/C negative. EVE negative. No M-spike on SPEP.    Treatment: Weekly Rituxan x 4, completed 9/13/2012. During her first Rituxan infusion she has developed a reaction with tingling of upper lip and chin and feeling \"cold\" in her chest. She was given IV solumedrol and the infusion was slowed down and these symptoms have resolved. Since then she has been premedicated with solumedrol, and all infusions were uneventful since.    Interval History:  Ms. Potter is a 82 year old female diagnosed with follicular lymphoma present today for follow-up. She has completed 4 weekly doses of Rituxan in 09/2012.   Her night sweats have resolved subsequently and LDH has remained normal.    She had a screening mammogram on on 12/2/2016 which was negative.  CT of the chest, abdomen and pelvis on 10/24/2017 showed no e/o lymphoma recurrence with stable posttreatment changes of lymphoma. There was no significant change in  mesenteric fat stranding from prior. There were stable tiny pulmonary nodules from 4/26/2016, including 2 mm nodule in  the right base. CT findings d/w patient.  She is here with her . She has had no constitutional symptoms. She denies weight loss. She has no complaints. She is on melatonin 3 mg PO QHS prn insomnia, but does not need it every day.  In addition, a complete 12 point  review of systems is negative.        Past medical, social, surgical, and family histories reviewed.  Breast Cancer screening - mammogram negative 11/2013.  Echocardiogram in 10/2014 for evaluation of benign palpitations showed left ventricular function is normal with an EF of 55-60%.  Left ventricular Doppler filling pattern consistent with abnormal " relaxation.  DEXA scan on 5/12/2015 showed most neg T score of -2.2 in the lumbar spine. This was unchanged compared to 11/2010. She was on Boneva years ago but apparently developed L jaw tingling and is no longer on biphosphonates.       Allergies:  Allergies as of 11/07/2017 - Garcia as Reviewed 10/03/2017   Allergen Reaction Noted     Bisphosphonates  10/01/2009     Ivp dye [contrast dye] Swelling 10/01/2009     Lisinopril Cough 06/24/2013     Losartan Hives 11/22/2010     Morphine sulfate Nausea and Vomiting 08/16/2012     Prilosec [omeprazole] Hives 10/01/2009       Current Medications:  Current Outpatient Prescriptions   Medication Sig Dispense Refill     HYZAAR 50-12.5 MG per tablet Take 1 tablet by mouth daily 90 tablet 4     levothyroxine (SYNTHROID/LEVOTHROID) 50 MCG tablet Take 1 tablet (50 mcg) by mouth daily 90 tablet 4     calcium carbonate (OS-FIDENCIO 500 MG [place]. [place]) 500 MG tablet Take 500 mg by mouth 2 times daily       carboxymethylcellulose (REFRESH PLUS) 0.5 % SOLN Place 1 drop into both eyes 3 times daily as needed for dry eyes       Multiple Vitamin (MULTI-VITAMIN) per tablet Take 1 tablet by mouth daily.            Physical Exam:  /80 (BP Location: Right arm)  Pulse 87  Temp 98.5  F (36.9  C)  Resp 16  Wt 65.5 kg (144 lb 4.8 oz)  LMP 10/01/1986  SpO2 97%  BMI 25.13 kg/m2      GENERAL APPEARANCE: Healthy, alert and in no acute distress.  HEENT: Sclerae anicteric. Oropharynx without ulcers, lesions, or thrush.  NECK: Supple. No asymmetry or masses.  LYMPHATICS: No palpable cervical, supraclavicular, axillary lymphadenopathy b/l  RESP: Lungs clear to auscultation bilaterally without rales, rhonchi or wheezes.  CARDIOVASCULAR: Regular rate and rhythm. Normal S1, S2; no S3 or S4. No murmur, gallop, or rub.  ABDOMEN: Soft, nontender. Bowel sounds normal. No palpable organomegaly or masses.  MUSCULOSKELETAL: Extremities without gross deformities noted. No edema of bilateral lower  extremities.  SKIN: No suspicious lesions or rashes.  NEURO: Alert and oriented x 3. Cranial nerves II-XII grossly intact.  PSYCHIATRIC: Mentation and affect appear normal.    Labs:  Orders Only on 11/07/2017   Component Date Value Ref Range Status     WBC 11/07/2017 4.3  4.0 - 11.0 10e9/L Final     RBC Count 11/07/2017 4.28  3.8 - 5.2 10e12/L Final     Hemoglobin 11/07/2017 12.9  11.7 - 15.7 g/dL Final     Hematocrit 11/07/2017 38.2  35.0 - 47.0 % Final     MCV 11/07/2017 89  78 - 100 fl Final     MCH 11/07/2017 30.1  26.5 - 33.0 pg Final     MCHC 11/07/2017 33.8  31.5 - 36.5 g/dL Final     RDW 11/07/2017 13.4  10.0 - 15.0 % Final     Platelet Count 11/07/2017 174  150 - 450 10e9/L Final     Diff Method 11/07/2017 Automated Method   Final     % Neutrophils 11/07/2017 59.7  % Final     % Lymphocytes 11/07/2017 29.8  % Final     % Monocytes 11/07/2017 9.1  % Final     % Eosinophils 11/07/2017 1.2  % Final     % Basophils 11/07/2017 0.2  % Final     Absolute Neutrophil 11/07/2017 2.6  1.6 - 8.3 10e9/L Final     Absolute Lymphocytes 11/07/2017 1.3  0.8 - 5.3 10e9/L Final     Absolute Monocytes 11/07/2017 0.4  0.0 - 1.3 10e9/L Final     Absolute Eosinophils 11/07/2017 0.1  0.0 - 0.7 10e9/L Final     Absolute Basophils 11/07/2017 0.0  0.0 - 0.2 10e9/L Final     Sodium 11/07/2017 140  133 - 144 mmol/L Final     Potassium 11/07/2017 4.1  3.4 - 5.3 mmol/L Final     Chloride 11/07/2017 102  94 - 109 mmol/L Final     Carbon Dioxide 11/07/2017 32  20 - 32 mmol/L Final     Anion Gap 11/07/2017 6  3 - 14 mmol/L Final     Glucose 11/07/2017 109* 70 - 99 mg/dL Final     Urea Nitrogen 11/07/2017 24  7 - 30 mg/dL Final     Creatinine 11/07/2017 0.90  0.52 - 1.04 mg/dL Final     GFR Estimate 11/07/2017 60* >60 mL/min/1.7m2 Final    Non  GFR Calc     GFR Estimate If Black 11/07/2017 72  >60 mL/min/1.7m2 Final    African American GFR Calc     Calcium 11/07/2017 9.1  8.5 - 10.1 mg/dL Final     Bilirubin Total 11/07/2017  0.7  0.2 - 1.3 mg/dL Final     Albumin 11/07/2017 3.6  3.4 - 5.0 g/dL Final     Protein Total 11/07/2017 7.2  6.8 - 8.8 g/dL Final     Alkaline Phosphatase 11/07/2017 72  40 - 150 U/L Final     ALT 11/07/2017 32  0 - 50 U/L Final     AST 11/07/2017 18  0 - 45 U/L Final     Lactate Dehydrogenase 11/07/2017 179  81 - 234 U/L Final     Uric Acid 11/07/2017 3.9  2.6 - 6.0 mg/dL Final       ASSESSMENT/PLAN:  Hilda is a very pleasant 82 year-old woman recently diagnosed with stage IV follicular lymphoma, FLIPI score is 3 (one for stage IV, one for age>60, and one for elevated LDH), associated with 52% median 5 year OS survival and 36% median 10 year overall survival.  1. NHL.  S/p Rituxan x4, completed in 09/2012. Responded to treatment with resolution of night sweats and decrease in lymphadenopathy. We'll continue to observe her from now on.She is leaving for [place] in Jan and will be back in April.   We'll see her back in 6 months with labs - cbcd, cmp, LDH, uric acid. Per updated NCCN guidelines, at this time, a CT of the chest, abdomen and pelvis is recommended no more often than annually in Hilda's situation. We'll consider repeat in 1,5-2 years from Nov 2017.    2. Immunizations - She is uptodate with  Pneumovax, Prevnar vaccinations. She did receive a flu shot this season already.    3. Breast cancer screening -mammogram negative in 12/2016. Next due in 12/2017. Screening mammogram ordered- she prefers to have it at [facility] and we'll inform her of the results.    4. Fasting hyperglycemia x2 (BG>100 but <126), check HbA1c today, and inform the patient of the results. F/up with Dr. Rahman.    At the end of our visit the patient verbalized understanding, denied any further questions, and concurred with the plan of care.

## 2017-11-07 ENCOUNTER — ONCOLOGY VISIT (OUTPATIENT)
Dept: ONCOLOGY | Facility: CLINIC | Age: 82
End: 2017-11-07
Payer: MEDICARE

## 2017-11-07 VITALS
TEMPERATURE: 98.5 F | OXYGEN SATURATION: 97 % | SYSTOLIC BLOOD PRESSURE: 159 MMHG | HEART RATE: 87 BPM | WEIGHT: 144.3 LBS | BODY MASS INDEX: 25.13 KG/M2 | RESPIRATION RATE: 16 BRPM | DIASTOLIC BLOOD PRESSURE: 80 MMHG

## 2017-11-07 DIAGNOSIS — C82.00 GRADE I FOLLICULAR SMALL CLEAVED CELL LYMPHOMA (H): Primary | ICD-10-CM

## 2017-11-07 DIAGNOSIS — C82.00 GRADE I FOLLICULAR SMALL CLEAVED CELL LYMPHOMA (H): ICD-10-CM

## 2017-11-07 DIAGNOSIS — R73.9 HYPERGLYCEMIA: ICD-10-CM

## 2017-11-07 DIAGNOSIS — Z12.31 VISIT FOR SCREENING MAMMOGRAM: ICD-10-CM

## 2017-11-07 LAB
ALBUMIN SERPL-MCNC: 3.6 G/DL (ref 3.4–5)
ALP SERPL-CCNC: 72 U/L (ref 40–150)
ALT SERPL W P-5'-P-CCNC: 32 U/L (ref 0–50)
ANION GAP SERPL CALCULATED.3IONS-SCNC: 6 MMOL/L (ref 3–14)
AST SERPL W P-5'-P-CCNC: 18 U/L (ref 0–45)
BASOPHILS # BLD AUTO: 0 10E9/L (ref 0–0.2)
BASOPHILS NFR BLD AUTO: 0.2 %
BILIRUB SERPL-MCNC: 0.7 MG/DL (ref 0.2–1.3)
BUN SERPL-MCNC: 24 MG/DL (ref 7–30)
CALCIUM SERPL-MCNC: 9.1 MG/DL (ref 8.5–10.1)
CHLORIDE SERPL-SCNC: 102 MMOL/L (ref 94–109)
CO2 SERPL-SCNC: 32 MMOL/L (ref 20–32)
CREAT SERPL-MCNC: 0.9 MG/DL (ref 0.52–1.04)
DIFFERENTIAL METHOD BLD: NORMAL
EOSINOPHIL # BLD AUTO: 0.1 10E9/L (ref 0–0.7)
EOSINOPHIL NFR BLD AUTO: 1.2 %
ERYTHROCYTE [DISTWIDTH] IN BLOOD BY AUTOMATED COUNT: 13.4 % (ref 10–15)
GFR SERPL CREATININE-BSD FRML MDRD: 60 ML/MIN/1.7M2
GLUCOSE SERPL-MCNC: 109 MG/DL (ref 70–99)
HBA1C MFR BLD: 5.6 % (ref 4.3–6)
HCT VFR BLD AUTO: 38.2 % (ref 35–47)
HGB BLD-MCNC: 12.9 G/DL (ref 11.7–15.7)
LDH SERPL L TO P-CCNC: 179 U/L (ref 81–234)
LYMPHOCYTES # BLD AUTO: 1.3 10E9/L (ref 0.8–5.3)
LYMPHOCYTES NFR BLD AUTO: 29.8 %
MCH RBC QN AUTO: 30.1 PG (ref 26.5–33)
MCHC RBC AUTO-ENTMCNC: 33.8 G/DL (ref 31.5–36.5)
MCV RBC AUTO: 89 FL (ref 78–100)
MONOCYTES # BLD AUTO: 0.4 10E9/L (ref 0–1.3)
MONOCYTES NFR BLD AUTO: 9.1 %
NEUTROPHILS # BLD AUTO: 2.6 10E9/L (ref 1.6–8.3)
NEUTROPHILS NFR BLD AUTO: 59.7 %
PLATELET # BLD AUTO: 174 10E9/L (ref 150–450)
POTASSIUM SERPL-SCNC: 4.1 MMOL/L (ref 3.4–5.3)
PROT SERPL-MCNC: 7.2 G/DL (ref 6.8–8.8)
RBC # BLD AUTO: 4.28 10E12/L (ref 3.8–5.2)
SODIUM SERPL-SCNC: 140 MMOL/L (ref 133–144)
URATE SERPL-MCNC: 3.9 MG/DL (ref 2.6–6)
WBC # BLD AUTO: 4.3 10E9/L (ref 4–11)

## 2017-11-07 PROCEDURE — 83615 LACTATE (LD) (LDH) ENZYME: CPT | Performed by: INTERNAL MEDICINE

## 2017-11-07 PROCEDURE — 84550 ASSAY OF BLOOD/URIC ACID: CPT | Performed by: INTERNAL MEDICINE

## 2017-11-07 PROCEDURE — 99214 OFFICE O/P EST MOD 30 MIN: CPT | Performed by: INTERNAL MEDICINE

## 2017-11-07 PROCEDURE — 36415 COLL VENOUS BLD VENIPUNCTURE: CPT | Performed by: INTERNAL MEDICINE

## 2017-11-07 PROCEDURE — 80053 COMPREHEN METABOLIC PANEL: CPT | Performed by: INTERNAL MEDICINE

## 2017-11-07 PROCEDURE — 85025 COMPLETE CBC W/AUTO DIFF WBC: CPT | Performed by: INTERNAL MEDICINE

## 2017-11-07 PROCEDURE — 83036 HEMOGLOBIN GLYCOSYLATED A1C: CPT | Performed by: INTERNAL MEDICINE

## 2017-11-07 NOTE — MR AVS SNAPSHOT
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 If you do, we will ask you to remove it before your exam.  Wear comfortable, two-piece clothing.  If you have any allergies, tell your care team.  Bring any previous mammograms from other facilities or have them mailed to the breast center.            Apr 27, 2018  1:45 PM CDT   LAB with LAB ONC [facility])    [address] [place] [address] [phone]           Please do not eat 10-12 hours before your appointment if you are coming in fasting for labs on lipids, cholesterol, or glucose (sugar). This does not apply to pregnant women. Water, hot tea and black coffee (with nothing added) are okay. Do not drink other fluids, diet soda or chew gum.            Apr 27, 2018  2:30 PM CDT   [facility] Visit with Jane Roth MD   [facility]    [address] [place] [address] [phone]              Future tests that  were ordered for you today     Open Future Orders        Priority Expected Expires Ordered    MA Screening Digital Bilateral Routine  2018            Who to contact     If you have questions or need follow up information about today's clinic visit or your schedule please contact [facility] [[email or web address]] directly at [phone].  Normal or non-critical lab and imaging results will be communicated to you by MyChart, letter or phone within 4 business days after the clinic has received the results. If you do not hear from us within 7 days, please contact the clinic through MyChart or phone. If you have a critical or abnormal lab result, we will notify you by phone as soon as possible.  Submit refill requests through [employer] or call your pharmacy and they will forward the refill request to us. Please allow 3 business days for your refill to be completed.          Additional Information About Your Visit        Sw[employer] Information     [employer] is an electronic gateway that provides easy, online access to your medical records. With [employer], you can request a clinic appointment, read your test results, renew a prescription or communicate with your care team.     To sign up for [employer] visit the website at [email or web address]   You will be asked to enter the access code listed below, as well as some personal information. Please follow the directions to create your username and password.     Your access code is: [email or web address]  Expires: 2018 10:12 AM     Your access code will  in 90 days. If you need help or a new code, please contact your [facility] or call [phone] for assistance.        Care EveryWhere ID     This is your Care EveryWhere ID. This could be used by other organizations to access your [place] medical records  [id number]        Your Vitals Were     Pulse Temperature Respirations Last Period Pulse Oximetry BMI (Body Mass Index)    87 98.5  F  (36.9  C) 16 10/01/1986 97% 25.13 kg/m2       Blood Pressure from Last 3 Encounters:   11/07/17 159/80   10/03/17 138/80   07/26/17 120/58    Weight from Last 3 Encounters:   11/07/17 65.5 kg (144 lb 4.8 oz)   10/03/17 65.3 kg (144 lb)   07/26/17 66 kg (145 lb 9.6 oz)              We Performed the Following     Hgb A1C        Primary Care Provider Office Phone # Fax #    Jessica Rahman [phone]       [address] [address]        Equal Access to Services     JAY ORTEGA : Hadii vaishnavi rubyo [place], waaxda luqadaha, qaybta kaalmada adeegyada, jj hauser. So [facility] [phone].    ATENCIÓN: Si habla español, tiene a ayala disposición servicios gratuitos de asistencia lingüística. Llame al [phone].    We comply with applicable federal civil rights laws and [place] laws. We do not discriminate on the basis of race, color, national origin, age, disability, sex, sexual orientation, or gender identity.            Thank you!     Thank you for choosing [facility]  for your care. Our goal is always to provide you with excellent care. Hearing back from our patients is one way we can continue to improve our services. Please take a few minutes to complete the written survey that you may receive in the mail after your visit with us. Thank you!             Your Updated Medication List - Protect others around you: Learn how to safely use, store and throw away your medicines at www.disposemymeds.org.          This list is accurate as of: 11/7/17 11:28 AM.  Always use your most recent med list.                   Brand Name Dispense Instructions for use Diagnosis    calcium carbonate 1250 MG tablet    OS-FIDENCIO 500 mg [place]. 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)       carboxymethylcellulose 0.5 % Soln ophthalmic solution    REFRESH PLUS     Place 1  drop into both eyes 3 times daily as needed for dry eyes        HYZAAR 50-12.5 MG per tablet   Generic drug:  losartan-hydrochlorothiazide     90 tablet    Take 1 tablet by mouth daily    Essential hypertension with goal blood pressure less than 140/90       levothyroxine 50 MCG tablet    SYNTHROID/LEVOTHROID    90 tablet    Take 1 tablet (50 mcg) by mouth daily    Hypothyroidism, unspecified type       MELATONIN PO      Take 3 mg by mouth At Bedtime        Multi-vitamin Tabs tablet   Generic drug:  multivitamin, therapeutic with minerals      Take 1 tablet by mouth daily.           Abrash

## 2017-11-07 NOTE — LETTER
11/7/2017         RE: Hilda Potter  [address]  [place] [address]        Dear Colleague,    Thank you for referring your patient, Hilda Potter, to the [facility]. Please see a copy of my visit note below.    Oncology Follow-up Visit:  11/7/2017      PCP: Dr. Rahman  Diagnosis: Low grade, stage IVB (BM involvement, night sweats) follicular lymphoma.  FLIPI score is 3 (one for stage IV, one for age>60, and one for elevated LDH), associated with 52% median 5 year OS survival and 36% median 10 year overall survival.     Oncologic History:   Ms. Potter is a 82 year old female who presented with night sweats and lymphadenopathy. She developed drenching night sweats in 11/2011 which persisted. She had no other associated constitutional symptoms such as fevers or weight loss. She had a negative ID workup with Dr. Ruiz (for valley fever as she had traveled to [place] in the past).   As part of evaluation, CT scan was performed in April 2012 which demonstrated multiple borderline mildly enlarged retroperitoneal and mesenteric lymph nodes, with the largest size of 1.5 cm. The patient, however, persisted to have night sweats and underwent repeat CTCAP done without contrast (she is allergic to IV contrast and had an anaphylactic reaction to it) which demonstrated unchanged dilatation of common bile duct, likely related to postcholecystectomy state, and stable mildly enlarged retroperitoneal and mesenteric lymph nodes which were nonspecific.   We proceeded with CT guided biopsy of one of the intraabdominal lymph nodes and results c/w low grade follicular lymphoma.  Flow cytometry showed kappa monotypic CD10 positive B cells.  We proceeded with bone marrow biopsy to complete her staging. The results, as detailed below, revealed involvement of marrow by follicular lymphoma. Flow cytometry showed a rare population of CD10-positive, kappa-monotypic B cells (0.2%). Molecular  "diagnostics was negative for B-cell gene rearrangement. Cytogenetics revealed, of the interphase cells examined, 0.2% had a signal pattern indicative of an IGH/BCL2 gene fusion, a rate that falls just slightly above the normal control range (0-0.1%) for our laboratory; thus, these findings are suspicious for the presence of low-level bone marrow involvement by follicular lymphoma. However, this cannot be determined with certainty, in light of the few abnormal cells found by FISH. Of note, a G-banded chromosome study is still pending.   Hep B/C negative. EVE negative. No M-spike on SPEP.    Treatment: Weekly Rituxan x 4, completed 9/13/2012. During her first Rituxan infusion she has developed a reaction with tingling of upper lip and chin and feeling \"cold\" in her chest. She was given IV solumedrol and the infusion was slowed down and these symptoms have resolved. Since then she has been premedicated with solumedrol, and all infusions were uneventful since.    Interval History:  Ms. Potter is a 82 year old female diagnosed with follicular lymphoma present today for follow-up. She has completed 4 weekly doses of Rituxan in 09/2012.   Her night sweats have resolved subsequently and LDH has remained normal.    She had a screening mammogram on on 12/2/2016 which was negative.  CT of the chest, abdomen and pelvis on 10/24/2017 showed no e/o lymphoma recurrence with stable posttreatment changes of lymphoma. There was no significant change in  mesenteric fat stranding from prior. There were stable tiny pulmonary nodules from 4/26/2016, including 2 mm nodule in  the right base. CT findings d/w patient.  She is here with her . She has had no constitutional symptoms. She denies weight loss. She has no complaints. She is on melatonin 3 mg PO QHS prn insomnia, but does not need it every day.  In addition, a complete 12 point  review of systems is negative.        Past medical, social, surgical, and family histories " reviewed.  Breast Cancer screening - mammogram negative 11/2013.  Echocardiogram in 10/2014 for evaluation of benign palpitations showed left ventricular function is normal with an EF of 55-60%.  Left ventricular Doppler filling pattern consistent with abnormal relaxation.  DEXA scan on 5/12/2015 showed most neg T score of -2.2 in the lumbar spine. This was unchanged compared to 11/2010. She was on Boneva years ago but apparently developed L jaw tingling and is no longer on biphosphonates.       Allergies:  Allergies as of 11/07/2017 - Garcia as Reviewed 10/03/2017   Allergen Reaction Noted     Bisphosphonates  10/01/2009     Ivp dye [contrast dye] Swelling 10/01/2009     Lisinopril Cough 06/24/2013     Losartan Hives 11/22/2010     Morphine sulfate Nausea and Vomiting 08/16/2012     Prilosec [omeprazole] Hives 10/01/2009       Current Medications:  Current Outpatient Prescriptions   Medication Sig Dispense Refill     HYZAAR 50-12.5 MG per tablet Take 1 tablet by mouth daily 90 tablet 4     levothyroxine (SYNTHROID/LEVOTHROID) 50 MCG tablet Take 1 tablet (50 mcg) by mouth daily 90 tablet 4     calcium carbonate (OS-FIDENCIO 500 MG [place]. [place]) 500 MG tablet Take 500 mg by mouth 2 times daily       carboxymethylcellulose (REFRESH PLUS) 0.5 % SOLN Place 1 drop into both eyes 3 times daily as needed for dry eyes       Multiple Vitamin (MULTI-VITAMIN) per tablet Take 1 tablet by mouth daily.            Physical Exam:  /80 (BP Location: Right arm)  Pulse 87  Temp 98.5  F (36.9  C)  Resp 16  Wt 65.5 kg (144 lb 4.8 oz)  LMP 10/01/1986  SpO2 97%  BMI 25.13 kg/m2      GENERAL APPEARANCE: Healthy, alert and in no acute distress.  HEENT: Sclerae anicteric. Oropharynx without ulcers, lesions, or thrush.  NECK: Supple. No asymmetry or masses.  LYMPHATICS: No palpable cervical, supraclavicular, axillary lymphadenopathy b/l  RESP: Lungs clear to auscultation bilaterally without rales, rhonchi or wheezes.  CARDIOVASCULAR:  Regular rate and rhythm. Normal S1, S2; no S3 or S4. No murmur, gallop, or rub.  ABDOMEN: Soft, nontender. Bowel sounds normal. No palpable organomegaly or masses.  MUSCULOSKELETAL: Extremities without gross deformities noted. No edema of bilateral lower extremities.  SKIN: No suspicious lesions or rashes.  NEURO: Alert and oriented x 3. Cranial nerves II-XII grossly intact.  PSYCHIATRIC: Mentation and affect appear normal.    Labs:  Orders Only on 11/07/2017   Component Date Value Ref Range Status     WBC 11/07/2017 4.3  4.0 - 11.0 10e9/L Final     RBC Count 11/07/2017 4.28  3.8 - 5.2 10e12/L Final     Hemoglobin 11/07/2017 12.9  11.7 - 15.7 g/dL Final     Hematocrit 11/07/2017 38.2  35.0 - 47.0 % Final     MCV 11/07/2017 89  78 - 100 fl Final     MCH 11/07/2017 30.1  26.5 - 33.0 pg Final     MCHC 11/07/2017 33.8  31.5 - 36.5 g/dL Final     RDW 11/07/2017 13.4  10.0 - 15.0 % Final     Platelet Count 11/07/2017 174  150 - 450 10e9/L Final     Diff Method 11/07/2017 Automated Method   Final     % Neutrophils 11/07/2017 59.7  % Final     % Lymphocytes 11/07/2017 29.8  % Final     % Monocytes 11/07/2017 9.1  % Final     % Eosinophils 11/07/2017 1.2  % Final     % Basophils 11/07/2017 0.2  % Final     Absolute Neutrophil 11/07/2017 2.6  1.6 - 8.3 10e9/L Final     Absolute Lymphocytes 11/07/2017 1.3  0.8 - 5.3 10e9/L Final     Absolute Monocytes 11/07/2017 0.4  0.0 - 1.3 10e9/L Final     Absolute Eosinophils 11/07/2017 0.1  0.0 - 0.7 10e9/L Final     Absolute Basophils 11/07/2017 0.0  0.0 - 0.2 10e9/L Final     Sodium 11/07/2017 140  133 - 144 mmol/L Final     Potassium 11/07/2017 4.1  3.4 - 5.3 mmol/L Final     Chloride 11/07/2017 102  94 - 109 mmol/L Final     Carbon Dioxide 11/07/2017 32  20 - 32 mmol/L Final     Anion Gap 11/07/2017 6  3 - 14 mmol/L Final     Glucose 11/07/2017 109* 70 - 99 mg/dL Final     Urea Nitrogen 11/07/2017 24  7 - 30 mg/dL Final     Creatinine 11/07/2017 0.90  0.52 - 1.04 mg/dL Final     GFR  Estimate 11/07/2017 60* >60 mL/min/1.7m2 Final    Non  GFR Calc     GFR Estimate If Black 11/07/2017 72  >60 mL/min/1.7m2 Final    African American GFR Calc     Calcium 11/07/2017 9.1  8.5 - 10.1 mg/dL Final     Bilirubin Total 11/07/2017 0.7  0.2 - 1.3 mg/dL Final     Albumin 11/07/2017 3.6  3.4 - 5.0 g/dL Final     Protein Total 11/07/2017 7.2  6.8 - 8.8 g/dL Final     Alkaline Phosphatase 11/07/2017 72  40 - 150 U/L Final     ALT 11/07/2017 32  0 - 50 U/L Final     AST 11/07/2017 18  0 - 45 U/L Final     Lactate Dehydrogenase 11/07/2017 179  81 - 234 U/L Final     Uric Acid 11/07/2017 3.9  2.6 - 6.0 mg/dL Final       ASSESSMENT/PLAN:  Hilda is a very pleasant 82 year-old woman recently diagnosed with stage IV follicular lymphoma, FLIPI score is 3 (one for stage IV, one for age>60, and one for elevated LDH), associated with 52% median 5 year OS survival and 36% median 10 year overall survival.  1. NHL.  S/p Rituxan x4, completed in 09/2012. Responded to treatment with resolution of night sweats and decrease in lymphadenopathy. We'll continue to observe her from now on.She is leaving for [place] in Jan and will be back in April.   We'll see her back in 6 months with labs - cbcd, cmp, LDH, uric acid. Per updated NCCN guidelines, at this time, a CT of the chest, abdomen and pelvis is recommended no more often than annually in Hilda's situation. We'll consider repeat in 1,5-2 years from Nov 2017.    2. Immunizations - She is uptodate with  Pneumovax, Prevnar vaccinations. She did receive a flu shot this season already.    3. Breast cancer screening -mammogram negative in 12/2016. Next due in 12/2017. Screening mammogram ordered- she prefers to have it at [facility] [place] [facility] and we'll inform her of the results.    4. Fasting hyperglycemia x2 (BG>100 but <126), check HbA1c today, and inform the patient of the results. F/up with Dr. Rahman.    At the end of our visit the patient verbalized understanding, denied any  "further questions, and concurred with the plan of care.                Oncology Rooming Note    November 7, 2017 10:56 AM   Hilda Potter is a 82 year old female who presents for:    Chief Complaint   Patient presents with     Oncology Clinic Visit     Initial Vitals: LMP 10/01/1986 Estimated body mass index is 25.07 kg/(m^2) as calculated from the following:    Height as of 10/3/17: 1.614 m (5' 3.54\").    Weight as of 10/3/17: 65.3 kg (144 lb). There is no height or weight on file to calculate BSA.  Data Unavailable Comment: Data Unavailable   Patient's last menstrual period was 10/01/1986.  Allergies reviewed: Yes  Medications reviewed: Yes    Medications: Medication refills not needed today.  Pharmacy name entered into [facility]:    [facility] [id number] [place] [address]  AT [facility] [address] [place] [address] - [phone] [facility] [address]  [facility] - ONCOLOGY PHARMACY  [facility] [address] [place] [address]        Thalia Spence, LAUREL                Again, thank you for allowing me to participate in the care of your patient.        Sincerely,        Jane Roth MD, MD    "

## 2017-11-07 NOTE — PROGRESS NOTES
"Oncology Rooming Note    November 7, 2017 10:56 AM   Hilda Potter is a 82 year old female who presents for:    Chief Complaint   Patient presents with     Oncology Clinic Visit     Initial Vitals: LMP 10/01/1986 Estimated body mass index is 25.07 kg/(m^2) as calculated from the following:    Height as of 10/3/17: 1.614 m (5' 3.54\").    Weight as of 10/3/17: 65.3 kg (144 lb). There is no height or weight on file to calculate BSA.  Data Unavailable Comment: Data Unavailable   Patient's last menstrual period was 10/01/1986.  Allergies reviewed: Yes  Medications reviewed: Yes    Medications: Medication refills not needed today.  Pharmacy name entered into [facility]:    [facility] [phone] AT [facility] [id number] - [place], [place] - [phone] AT [facility] & [address]  [facility] - ONCOLOGY PHARMACY  [facility] [address] [place] [address]        Thalia Spence RN              "

## 2017-11-13 ENCOUNTER — OFFICE VISIT (OUTPATIENT)
Dept: FAMILY MEDICINE | Facility: CLINIC | Age: 82
End: 2017-11-13
Payer: MEDICARE

## 2017-11-13 VITALS
HEART RATE: 91 BPM | WEIGHT: 145.6 LBS | OXYGEN SATURATION: 97 % | TEMPERATURE: 98.5 F | SYSTOLIC BLOOD PRESSURE: 124 MMHG | DIASTOLIC BLOOD PRESSURE: 64 MMHG | BODY MASS INDEX: 25.35 KG/M2

## 2017-11-13 DIAGNOSIS — M54.42 ACUTE BILATERAL LOW BACK PAIN WITH LEFT-SIDED SCIATICA: Primary | ICD-10-CM

## 2017-11-13 DIAGNOSIS — Z71.89 ADVANCED DIRECTIVES, COUNSELING/DISCUSSION: ICD-10-CM

## 2017-11-13 PROCEDURE — 99213 OFFICE O/P EST LOW 20 MIN: CPT | Performed by: NURSE PRACTITIONER

## 2017-11-13 RX ORDER — CYCLOBENZAPRINE HCL 10 MG
5-10 TABLET ORAL 3 TIMES DAILY PRN
Qty: 30 TABLET | Refills: 1 | Status: SHIPPED | OUTPATIENT
Start: 2017-11-13 | End: 2018-09-10

## 2017-11-13 NOTE — PROGRESS NOTES
SUBJECTIVE:   Hilda Potter is a 82 year old female who presents to clinic today for the following health issues:      Muscle/Joint Pain     Onset: 2 days    Description:   Location: lower left side/back  Character: Sharp    Progression of Symptoms: worse    Accompanying Signs & Symptoms:  Other symptoms: none    Precipitating factors:   Trauma or overuse: YES- raking leaves    Alleviating factors:  Improved by: nothing  Therapies Tried and outcome: heat         Problem list and histories reviewed & adjusted, as indicated.  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)     Premature beats     Premature atrial beats     Compression fracture of L1 lumbar vertebra, seen on CT     Lung nodule < 6cm on CT     Dupuytren's contracture of right hand     Hypothyroidism, unspecified type     Essential hypertension with goal blood pressure less than 140/90     Combined forms of age-related cataract, mild-mod, both eyes     Past Surgical History:   Procedure Laterality Date     APPENDECTOMY       BLEPHAROPLASTY BILATERAL  10/2007    both eyes, upper lids     C LENGTHEN,TENDON,HAND/FINGER  1993    repair of dupytrons's contracture     COLONOSCOPY  6/02, 10/13    Q 5 years, polyps     D & C       HC REMOVAL GALLBLADDER       SALPINGO OOPHORECTOMY,R/L/LUIS DANIEL      left ovary and tube     SMALL BOWEL RESECTION  1986    colon resection      TONSILLECTOMY & ADENOIDECTOMY       TUBAL LIGATION       UPPER GI ENDOSCOPY  6/02, 8/11       Social History   Substance Use Topics     Smoking status: Never Smoker     Smokeless tobacco: Never Used     Alcohol use Yes       Comment: wine      Family History   Problem Relation Age of Onset     Hypertension Mother      Arthritis Mother      Cardiovascular Mother      Glaucoma Mother      C.A.D. Father      Hypertension Father      CEREBROVASCULAR DISEASE Father      Arthritis Father      Cardiovascular Father      Eye Disorder Father      HEART DISEASE Father      Glaucoma Father      Cardiovascular Brother          Current Outpatient Prescriptions   Medication Sig Dispense Refill     cyclobenzaprine (FLEXERIL) 10 MG tablet Take 0.5-1 tablets (5-10 mg) by mouth 3 times daily as needed for muscle spasms 30 tablet 1     MELATONIN PO Take 3 mg by mouth At Bedtime       HYZAAR 50-12.5 MG per tablet Take 1 tablet by mouth daily 90 tablet 4     levothyroxine (SYNTHROID/LEVOTHROID) 50 MCG tablet Take 1 tablet (50 mcg) by mouth daily 90 tablet 4     calcium carbonate (OS-FIDENCIO 500 MG [place]. [place]) 500 MG tablet Take 500 mg by mouth 2 times daily       carboxymethylcellulose (REFRESH PLUS) 0.5 % SOLN Place 1 drop into both eyes 3 times daily as needed for dry eyes       Multiple Vitamin (MULTI-VITAMIN) per tablet Take 1 tablet by mouth daily.         Allergies   Allergen Reactions     Bisphosphonates      GI distress     Ivp Dye [Contrast Dye] Swelling     Lisinopril Cough     Losartan Hives     But can tolerate Hyzaar.      Morphine Sulfate Nausea and Vomiting     Prilosec [Omeprazole] Hives     BP Readings from Last 3 Encounters:   11/13/17 124/64   11/07/17 159/80   10/03/17 138/80    Wt Readings from Last 3 Encounters:   11/13/17 145 lb 9.6 oz (66 kg)   11/07/17 144 lb 4.8 oz (65.5 kg)   10/03/17 144 lb (65.3 kg)                  Labs reviewed in EPIC        Reviewed and updated as needed this visit by clinical staff       Reviewed and updated as needed this visit by Provider         ROS:  Constitutional, HEENT, cardiovascular, pulmonary, GI, , musculoskeletal, neuro, skin, endocrine and psych systems are negative, except as otherwise  noted.      OBJECTIVE:   /64  Pulse 91  Temp 98.5  F (36.9  C) (Oral)  Wt 145 lb 9.6 oz (66 kg)  LMP 10/01/1986  SpO2 97%  BMI 25.35 kg/m2  Body mass index is 25.35 kg/(m^2).  GENERAL: healthy, alert and no distress  NECK: no adenopathy, no asymmetry, masses, or scars and thyroid normal to palpation  RESP: lungs clear to auscultation - no rales, rhonchi or wheezes  CV: regular rate and rhythm, normal S1 S2, no S3 or S4, no murmur, click or rub, no peripheral edema and peripheral pulses strong  MS: no gross musculoskeletal defects noted, no edema No pain with palpation of spine POSITIVE for pain with position change (bending/ twisting/ moving/ laying), left sided back pain with dependent leg raise > 90 degrees   NEURO: Normal strength and tone, mentation intact and speech normal    Diagnostic Test Results:  See orders    ASSESSMENT/PLAN:         ICD-10-CM    1. Acute bilateral low back pain with left-sided sciatica M54.42 cyclobenzaprine (FLEXERIL) 10 MG tablet     SPINE SURGERY REFERRAL     PHYSICAL THERAPY REFERRAL    recent ct- shows multilevel degenerative changes in thoracolumbar spine, endplate lheight loss at L1, grade 1 anterolithesis of L4-L5, severe disc space narrowi   2. Advanced directives, counseling/discussion Z71.89        See Patient Instructions Let me know how your back is doing, follow up as needed. As discussed, if you take the muscle relaxer, be careful not to fall as it can make you tired.     Fatimah Cervantes, KATHRINEP  [facility]

## 2017-11-13 NOTE — MR AVS SNAPSHOT
After Visit Summary   11/13/2017    Hilda Potter    MRN: [id number]           Patient Information     Date Of Birth          1935        Visit Information        Provider Department      11/13/2017 3:20 PM Fatimah Cervantes NP [facility]        Today's Diagnoses     Acute bilateral low back pain with left-sided sciatica    -  1    Advanced directives, counseling/discussion          Care Instructions    Let me know how your back is doing, follow up as needed. As discussed, if you take the muscle relaxer, be careful not to fall as it can make you tired.   Back Care Tips    Caring for your back  These are things you can do to prevent a recurrence of acute back pain and to reduce symptoms from chronic back pain:    Maintain a healthy weight. If you are overweight, losing weight will help most types of back pain.    Exercise is an important part of recovery from most types of back pain. The muscles behind and in front of the spine support the back. This means strengthening both the back muscles and the abdominal muscles will provide better support for your spine.     Swimming and brisk walking are good overall exercises to improve your fitness level.    Practice safe lifting methods (below).    Practice good posture when sitting, standing and walking. Avoid prolonged sitting. This puts more stress on the lower back than standing or walking.    Wear quality shoes with sufficient arch support. Foot and ankle alignment can affect back symptoms. Women should avoid wearing high heels.    Therapeutic massage can help relax the back muscles without stretching them.    During the first 24 to 72 hours after an acute injury or flare-up of chronic back pain, apply an ice pack to the painful area for 20 minutes and then remove it for 20 minutes, over a period of 60 to 90 minutes, or several times a day. As a safety precaution, do not use a heating pad at bedtime. Sleeping on a heating pad can lead  to skin burns or tissue damage.    You can alternate ice and heat therapies.  Medications  Talk to your healthcare provider before using medicines, especially if you have other medical problems or are taking other medicines.    You may use acetaminophen or ibuprofen to control pain, unless your healthcare provider prescribed other pain medicine. If you have chronic conditions like diabetes, liver or kidney disease, stomach ulcers, or gastrointestinal bleeding, or are taking blood thinners, talk with your healthcare provider before taking any medicines.    Be careful if you are given prescription pain medicines, narcotics, or medicine for muscle spasm. They can cause drowsiness, affect your coordination, reflexes, and judgment. Do not drive or operate heavy machinery while taking these types of medicines. Take prescription pain medicine only as prescribed by your healthcare provider.  Lumbar stretch  Here is a simple stretching exercise that will help relax muscle spasm and keep your back more limber. If exercise makes your back pain worse, don t do it.    Lie on your back with your knees bent and both feet on the ground.    Slowly raise your left knee to your chest as you flatten your lower back against the floor. Hold for 5 seconds.    Relax and repeat the exercise with your right knee.    Do 10 of these exercises for each leg.  Safe lifting method    Don t bend over at the waist to lift an object off the floor.  Instead, bend your knees and hips in a squat.     Keep your back and head upright    Hold the object close to your body, directly in front of you.    Straighten your legs to lift the object.     Lower the object to the floor in the reverse fashion.    If you must slide something across the floor, push it.  Posture tips  Sitting  Sit in chairs with straight backs or low-back support. Keep your knees lower than your hips, with your feet flat on the floor.  When driving, sit up straight. Adjust the seat  forward so you are not leaning toward the steering wheel.  A small pillow or rolled towel behind your lower back may help if you are driving long distances.   Standing  When standing for long periods, shift most of your weight to one leg at a time. Alternate legs every few minutes.   Sleeping  The best way to sleep is on your side with your knees bent. Put a low pillow under your head to support your neck in a neutral spine position. Avoid thick pillows that bend your neck to one side. Put a pillow between your legs to further relax your lower back. If you sleep on your back, put pillows under your knees to support your legs in a slightly flexed position. Use a firm mattress. If your mattress sags, replace it, or use a 1/2-inch plywood board under the mattress to add support.  Follow-up care  Follow up with your healthcare provider, or as advised.  If X-rays, a CT scan or an MRI scan were taken, they will be reviewed by a radiologist. You will be notified of any new findings that may affect your care.  Call 911  Seek emergency medical care if any of the following occur:    Trouble breathing    Confusion    Very drowsy    Fainting or loss of consciousness    Rapid or very slow heart rate    Loss of  bowel or bladder control  When to seek medical care  Call your healthcare provider if any of the following occur:    Pain becomes worse or spreads to your arms or legs    Weakness or numbness in one or both arms or legs    Numbness in the groin area  Date Last Reviewed: 6/1/2016 2000-2017 [employer]. [address] [place] [address]. All rights reserved. This information is not intended as a substitute for professional medical care. Always follow your healthcare professional's instructions.        Relieving Back Pain  Back pain is a common problem. You can strain back muscles by lifting too much weight or just by moving the wrong way. Back strain can be uncomfortable, even painful. And it can  take weeks or months to improve. To help yourself feel better and prevent future back strains, try these tips.  Important Note: Do not give aspirin to children or teens without first discussing it with your healthcare provider.      ? Ice    Ice reduces muscle pain and swelling. It helps most during the first 24 to 48 hours after an injury.    Wrap an ice pack or a bag of frozen peas in a thin towel. (Never place ice directly on your skin.)    Place the ice where your back hurts the most.    Don t ice for more than 20 minutes at a time.    You can use ice several times a day.  ? Medicines  Over-the-counter pain relievers can include acetaminophen and anti-inflammatory medicines, which includes aspirin or ibuprofen. They can help ease discomfort. Some also reduce swelling.    Tell your healthcare provider about any medicines you are already taking.    Take medicines only as directed.  ? Heat  After the first 48 hours, heat can relax sore muscles and improve blood flow.    Try a warm bath or shower. Or use a heating pad set on low. To prevent a burn, keep a cloth between you and the heating pad.    Don t use a heating pad for more than 15 minutes at a time. Never sleep on a heating pad.  Date Last Reviewed: 9/1/2015 2000-2017 [employer]. [address] [place] [address], [place] [address]. All rights reserved. This information is not intended as a substitute for professional medical care. Always follow your healthcare professional's instructions.                Follow-ups after your visit        Additional Services     PHYSICAL THERAPY REFERRAL       *This therapy referral will be filtered to a centralized scheduling office at [facility] and the patient will receive a call to schedule an appointment at a [facility] location most convenient for them. *     [facility] provides Physical Therapy evaluation and treatment and many specialty services across the [facility]  "system.  If requesting a specialty program, please choose from the list below.    If you have not heard from the scheduling office within 2 business days, please call [phone] for all locations, with the exception of [place], please call [phone].  Treatment: Evaluation & Treatment  Special Instructions/Modalities: back pain- massage, stretching, core strengthening  Special Programs: None    Please be aware that coverage of these services is subject to the terms and limitations of your health insurance plan.  Call member services at your health plan with any benefit or coverage questions.      **Note to Provider:  If you are referring outside of [place] for the therapy appointment, please list the name of the location in the \"special instructions\" above, print the referral and give to the patient to schedule the appointment.            SPINE SURGERY REFERRAL       Please choose Medical Spine Specialist (unless patient was seen by a Medical Spine Specialist within the past 6 months).  Surgical Evaluation is advised if the patient presents with one or more of the following red flags:     **Cauda Equina Syndrome  **Evidence of Spinal Tumor  **Fracture  **Infection  **Loss of Bowel or Bladder Control  **Sudden or Progressive Weakness  **Any other documented emergent neurological condition resulting from a Lumbar Spinal Condition.    You have been referred to: Spine Lumbar: Medical Spine Specialist: [facility]: [facility] [facility] - [facility]  [phone]   [email or web address]  [facility] [phone]   [email or web address]  [facility]: [facility] [phone]   [email or web address]    Please be aware that coverage of these services is subject to the terms and " limitations of your health insurance plan.  Call member services at your health plan with any benefit or coverage questions.      Please bring the following to your appointment:    **Any x-rays, CTs or MRIs which have been performed.  Contact the facility where they were done to arrange for  prior to your scheduled appointment.    **List of current medications   **This referral request   **Any documents/labs given to you regarding this referral                  Follow-up notes from your care team     Return if symptoms worsen or fail to improve.      Your next 10 appointments already scheduled     Nov 13, 2017  3:20 PM CST   Office Visit with Fatimah Cervantes NP   [facility] ([facility])    [address] [facility] [address] [phone]           Bring a current list of meds and any records pertaining to this visit. For Physicals, please bring immunization records and any forms needing to be filled out. Please arrive 10 minutes early to complete paperwork.            Nov 30, 2017 10:30 AM CST   [facility] with Andi Bay MD   [facility]    [address] [facility] [id number] [phone]            Dec 06, 2017  9:45 AM CST   MA SCREENING DIGITAL BILATERAL with FKMA1   [facility] ([facility]    [address] [facility] [id number] [phone]           Do not use any powder, lotion or deodorant under your arms or on your breast. If you do, we will ask you to remove it before your exam.  Wear comfortable, two-piece clothing.  If you have any allergies, tell your care team.  Bring any previous mammograms from other facilities or have them mailed to the breast center.            Apr 27, 2018  1:45 PM CDT   LAB with LAB ONC [facility] ([facility]    [address] [address] [place] [address] [phone]           Please do  not eat 10-12 hours before your appointment if you are coming in fasting for labs on lipids, cholesterol, or glucose (sugar). This does not apply to pregnant women. Water, hot tea and black coffee (with nothing added) are okay. Do not drink other fluids, diet soda or chew gum.            Apr 27, 2018  2:30 PM CDT   [facility] Roth MD   [facility]    [address] [place] [address] [phone]              Who to contact     Normal or non-critical lab and imaging results will be communicated to you by MyChart, letter or phone within 4 business days after the clinic has received the results. If you do not hear from us within 7 days, please contact the clinic through MyChart or phone. If you have a critical or abnormal lab result, we will notify you by phone as soon as possible.  Submit refill requests through [employer] or call your pharmacy and they will forward the refill request to us. Please allow 3 business days for your refill to be completed.          If you need to speak with a  for additional information , please call: [phone]             Additional Information About Your Visit        Care EveryWhere ID     This is your Care EveryWhere ID. This could be used by other organizations to access your [facility] medical records  [id number]        Your Vitals Were     Pulse Temperature Last Period Pulse Oximetry BMI (Body Mass Index)       91 98.5  F (36.9  C) (Oral) 10/01/1986 97% 25.35 kg/m2        Blood Pressure from Last 3 Encounters:   11/13/17 148/68   11/07/17 159/80   10/03/17 138/80    Weight from Last 3 Encounters:   11/13/17 145 lb 9.6 oz (66 kg)   11/07/17 144 lb 4.8 oz (65.5 kg)   10/03/17 144 lb (65.3 kg)              We Performed the Following     MIGRAINE ACTION PLAN     PHYSICAL THERAPY REFERRAL     SPINE SURGERY REFERRAL          Today's Medication Changes          These changes are accurate as  of: 11/13/17  3:17 PM.  If you have any questions, ask your nurse or doctor.               Start taking these medicines.        Dose/Directions    cyclobenzaprine 10 MG tablet   Commonly known as:  FLEXERIL   Used for:  Acute bilateral low back pain with left-sided sciatica   Started by:  Fatimah Cervantes NP        Dose:  5-10 mg   Take 0.5-1 tablets (5-10 mg) by mouth 3 times daily as needed for muscle spasms   Quantity:  30 tablet   Refills:  1            Where to get your medicines      These medications were sent to [facility] [place] [id number] [place]  AT [address] , [facility] [address]     Phone:  [phone]     cyclobenzaprine 10 MG tablet                Primary Care Provider Office Phone # Fax #    Jessica Rahman [phone]       [address] [address]        Equal Access to Services     JAY ORTEGA AH: Hadii aad ku hadasho Soomaali, waaxda luqadaha, qaybta kaalmada adeegyada, waxay idiin hayaan misbah burgos . So [facility] [phone].    ATENCIÓN: Si habla español, tiene a ayala disposición servicios gratuitos de asistencia lingüística. [facility] [phone].    We comply with applicable federal civil rights laws and [place] laws. We do not discriminate on the basis of race, color, national origin, age, disability, sex, sexual orientation, or gender identity.            Thank you!     Thank you for choosing [facility]  for your care. Our goal is always to provide you with excellent care. Hearing back from our patients is one way we can continue to improve our services. Please take a few minutes to complete the written survey that you may receive in the mail after your visit with us. Thank you!             Your Updated Medication List - Protect others around you: Learn how to safely use, store and throw away your medicines at www.disposemymeds.org.           This list is accurate as of: 11/13/17  3:17 PM.  Always use your most recent med list.                   Brand Name Dispense Instructions for use Diagnosis    calcium carbonate 1250 MG tablet    OS-FIDENCIO 500 mg [place]. 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)       carboxymethylcellulose 0.5 % Soln ophthalmic solution    REFRESH PLUS     Place 1 drop into both eyes 3 times daily as needed for dry eyes        cyclobenzaprine 10 MG tablet    FLEXERIL    30 tablet    Take 0.5-1 tablets (5-10 mg) by mouth 3 times daily as needed for muscle spasms    Acute bilateral low back pain with left-sided sciatica       HYZAAR 50-12.5 MG per tablet   Generic drug:  losartan-hydrochlorothiazide     90 tablet    Take 1 tablet by mouth daily    Essential hypertension with goal blood pressure less than 140/90       levothyroxine 50 MCG tablet    SYNTHROID/LEVOTHROID    90 tablet    Take 1 tablet (50 mcg) by mouth daily    Hypothyroidism, unspecified type       MELATONIN PO      Take 3 mg by mouth At Bedtime        Multi-vitamin Tabs tablet   Generic drug:  multivitamin, therapeutic with minerals      Take 1 tablet by mouth daily.

## 2017-11-13 NOTE — NURSING NOTE
"Chief Complaint   Patient presents with     Musculoskeletal Problem       Initial /68  Pulse 91  Temp 98.5  F (36.9  C) (Oral)  Wt 145 lb 9.6 oz (66 kg)  LMP 10/01/1986  SpO2 97%  BMI 25.35 kg/m2 Estimated body mass index is 25.35 kg/(m^2) as calculated from the following:    Height as of 10/3/17: 5' 3.54\" (1.614 m).    Weight as of this encounter: 145 lb 9.6 oz (66 kg).  Medication Reconciliation: complete     Madyson Matthews MA  "

## 2017-11-13 NOTE — PATIENT INSTRUCTIONS
Let me know how your back is doing, follow up as needed. As discussed, if you take the muscle relaxer, be careful not to fall as it can make you tired.   Back Care Tips    Caring for your back  These are things you can do to prevent a recurrence of acute back pain and to reduce symptoms from chronic back pain:    Maintain a healthy weight. If you are overweight, losing weight will help most types of back pain.    Exercise is an important part of recovery from most types of back pain. The muscles behind and in front of the spine support the back. This means strengthening both the back muscles and the abdominal muscles will provide better support for your spine.     Swimming and brisk walking are good overall exercises to improve your fitness level.    Practice safe lifting methods (below).    Practice good posture when sitting, standing and walking. Avoid prolonged sitting. This puts more stress on the lower back than standing or walking.    Wear quality shoes with sufficient arch support. Foot and ankle alignment can affect back symptoms. Women should avoid wearing high heels.    Therapeutic massage can help relax the back muscles without stretching them.    During the first 24 to 72 hours after an acute injury or flare-up of chronic back pain, apply an ice pack to the painful area for 20 minutes and then remove it for 20 minutes, over a period of 60 to 90 minutes, or several times a day. As a safety precaution, do not use a heating pad at bedtime. Sleeping on a heating pad can lead to skin burns or tissue damage.    You can alternate ice and heat therapies.  Medications  Talk to your healthcare provider before using medicines, especially if you have other medical problems or are taking other medicines.    You may use acetaminophen or ibuprofen to control pain, unless your healthcare provider prescribed other pain medicine. If you have chronic conditions like diabetes, liver or kidney disease, stomach ulcers, or  gastrointestinal bleeding, or are taking blood thinners, talk with your healthcare provider before taking any medicines.    Be careful if you are given prescription pain medicines, narcotics, or medicine for muscle spasm. They can cause drowsiness, affect your coordination, reflexes, and judgment. Do not drive or operate heavy machinery while taking these types of medicines. Take prescription pain medicine only as prescribed by your healthcare provider.  Lumbar stretch  Here is a simple stretching exercise that will help relax muscle spasm and keep your back more limber. If exercise makes your back pain worse, don t do it.    Lie on your back with your knees bent and both feet on the ground.    Slowly raise your left knee to your chest as you flatten your lower back against the floor. Hold for 5 seconds.    Relax and repeat the exercise with your right knee.    Do 10 of these exercises for each leg.  Safe lifting method    Don t bend over at the waist to lift an object off the floor.  Instead, bend your knees and hips in a squat.     Keep your back and head upright    Hold the object close to your body, directly in front of you.    Straighten your legs to lift the object.     Lower the object to the floor in the reverse fashion.    If you must slide something across the floor, push it.  Posture tips  Sitting  Sit in chairs with straight backs or low-back support. Keep your knees lower than your hips, with your feet flat on the floor.  When driving, sit up straight. Adjust the seat forward so you are not leaning toward the steering wheel.  A small pillow or rolled towel behind your lower back may help if you are driving long distances.   Standing  When standing for long periods, shift most of your weight to one leg at a time. Alternate legs every few minutes.   Sleeping  The best way to sleep is on your side with your knees bent. Put a low pillow under your head to support your neck in a neutral spine position. Avoid  thick pillows that bend your neck to one side. Put a pillow between your legs to further relax your lower back. If you sleep on your back, put pillows under your knees to support your legs in a slightly flexed position. Use a firm mattress. If your mattress sags, replace it, or use a 1/2-inch plywood board under the mattress to add support.  Follow-up care  Follow up with your healthcare provider, or as advised.  If X-rays, a CT scan or an MRI scan were taken, they will be reviewed by a radiologist. You will be notified of any new findings that may affect your care.  Call 911  Seek emergency medical care if any of the following occur:    Trouble breathing    Confusion    Very drowsy    Fainting or loss of consciousness    Rapid or very slow heart rate    Loss of  bowel or bladder control  When to seek medical care  Call your healthcare provider if any of the following occur:    Pain becomes worse or spreads to your arms or legs    Weakness or numbness in one or both arms or legs    Numbness in the groin area  Date Last Reviewed: 6/1/2016 2000-2017 [employer]. [address] [place] [address]. All rights reserved. This information is not intended as a substitute for professional medical care. Always follow your healthcare professional's instructions.        Relieving Back Pain  Back pain is a common problem. You can strain back muscles by lifting too much weight or just by moving the wrong way. Back strain can be uncomfortable, even painful. And it can take weeks or months to improve. To help yourself feel better and prevent future back strains, try these tips.  Important Note: Do not give aspirin to children or teens without first discussing it with your healthcare provider.      ? Ice    Ice reduces muscle pain and swelling. It helps most during the first 24 to 48 hours after an injury.    Wrap an ice pack or a bag of frozen peas in a thin towel. (Never place ice directly on your skin.)     Place the ice where your back hurts the most.    Don t ice for more than 20 minutes at a time.    You can use ice several times a day.  ? Medicines  Over-the-counter pain relievers can include acetaminophen and anti-inflammatory medicines, which includes aspirin or ibuprofen. They can help ease discomfort. Some also reduce swelling.    Tell your healthcare provider about any medicines you are already taking.    Take medicines only as directed.  ? Heat  After the first 48 hours, heat can relax sore muscles and improve blood flow.    Try a warm bath or shower. Or use a heating pad set on low. To prevent a burn, keep a cloth between you and the heating pad.    Don t use a heating pad for more than 15 minutes at a time. Never sleep on a heating pad.  Date Last Reviewed: 9/1/2015 2000-2017 [employer]. [address] [place] [address], [place] [address]. All rights reserved. This information is not intended as a substitute for professional medical care. Always follow your healthcare professional's instructions.

## 2017-11-14 ENCOUNTER — TELEPHONE (OUTPATIENT)
Dept: FAMILY MEDICINE | Facility: CLINIC | Age: 82
End: 2017-11-14

## 2017-11-14 NOTE — TELEPHONE ENCOUNTER
Insurance requires PA for cyclobenzaprine (FLEXERIL) 10 MG tablet. PA sent through cover my meds. Waiting for response

## 2017-11-14 NOTE — TELEPHONE ENCOUNTER
Patient was having significant muscular pain (8/10) keeping her from sleep.  Discussed risks and benefits with patient, she was unsure if she would take it but  Wanted it just in case her pain was too much for her. ALICIA Parham, FNP-BC

## 2017-11-15 NOTE — TELEPHONE ENCOUNTER
Sunil calling back. They are resending the fax with the 2 questions that need answered for this PA. If they do not receive this information back, the PA will be denied.

## 2017-11-30 ENCOUNTER — OFFICE VISIT (OUTPATIENT)
Dept: OPHTHALMOLOGY | Facility: CLINIC | Age: 82
End: 2017-11-30
Payer: MEDICARE

## 2017-11-30 DIAGNOSIS — H52.4 PRESBYOPIA: ICD-10-CM

## 2017-11-30 DIAGNOSIS — H25.813 COMBINED FORMS OF AGE-RELATED CATARACT OF BOTH EYES: Primary | ICD-10-CM

## 2017-11-30 DIAGNOSIS — Z98.890 HISTORY OF BLEPHAROPLASTY: ICD-10-CM

## 2017-11-30 PROCEDURE — 92015 DETERMINE REFRACTIVE STATE: CPT | Mod: GY | Performed by: OPHTHALMOLOGY

## 2017-11-30 PROCEDURE — 92014 COMPRE OPH EXAM EST PT 1/>: CPT | Performed by: OPHTHALMOLOGY

## 2017-11-30 ASSESSMENT — REFRACTION_MANIFEST
OS_AXIS: 177
OS_CYLINDER: +1.00
OD_CYLINDER: +1.50
OD_AXIS: 176
OS_ADD: +2.50
OD_ADD: +2.50
OS_SPHERE: +3.00
OD_SPHERE: +3.00

## 2017-11-30 ASSESSMENT — TONOMETRY
OD_IOP_MMHG: 25
IOP_METHOD: APPLANATION
OS_IOP_MMHG: 24

## 2017-11-30 ASSESSMENT — EXTERNAL EXAM - RIGHT EYE: OD_EXAM: NORMAL

## 2017-11-30 ASSESSMENT — REFRACTION_WEARINGRX
OD_ADD: +2.50
SPECS_TYPE: PAL
OS_AXIS: 021
OD_AXIS: 175
OS_ADD: +2.50
OD_CYLINDER: +1.00
OD_SPHERE: +2.75
OS_CYLINDER: +0.75
OS_SPHERE: +3.50

## 2017-11-30 ASSESSMENT — EXTERNAL EXAM - LEFT EYE: OS_EXAM: NORMAL

## 2017-11-30 ASSESSMENT — CONF VISUAL FIELD
OD_NORMAL: 1
OS_NORMAL: 1

## 2017-11-30 ASSESSMENT — VISUAL ACUITY
OS_CC: 20/30
OD_CC: 2
OS_CC+: -1
OS_CC: 2
OD_CC: 20/30
METHOD: SNELLEN - LINEAR
OD_CC+: +2
CORRECTION_TYPE: GLASSES

## 2017-11-30 ASSESSMENT — CUP TO DISC RATIO
OD_RATIO: 0.6
OS_RATIO: 0.5

## 2017-11-30 NOTE — MR AVS SNAPSHOT
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 (Refresh Tears or Systane Ultra/Balance)   Possible posterior vitreous detachment (sudden onset large floater and/or flashing lights) discussed.   Glasses Rx given - optional   Call in July 2018 for an appointment in November 2018 for Complete Exam    Dr. Bay [phone]          Follow-ups after your visit        Your next 10 appointments already scheduled     Dec 06, 2017  9:45 AM CST   MA SCREENING DIGITAL BILATERAL with FKMA1   [facility] ([facility])    [address] [place] [id number] [phone]           Do not use any powder, lotion or deodorant under your arms or on your breast. If you do, we will ask you to remove it before your exam.  Wear comfortable, two-piece clothing.  If you have any allergies, tell your care team.  Bring any previous mammograms from other facilities or have them mailed to the breast center.            Apr 27, 2018  1:45 PM CDT   LAB with LAB ONC [facility] ([facility])    [id number] [place] [id number] [phone]           Please do not eat 10-12 hours before your appointment if you are coming in fasting for labs on lipids, cholesterol, or glucose (sugar). This does not apply to pregnant women. Water, hot tea and black coffee (with nothing added) are okay. Do not drink other fluids, diet soda or chew gum.            Apr  27, 2018  2:30 PM CDT   [facility]    [address] [place] [id number] [phone]              Who to contact     If you have questions or need follow up information about today's clinic visit or your schedule please contact [facility] directly at [phone].  Normal or non-critical lab and imaging results will be communicated to you by MyChart, letter or phone within 4 business days after the clinic has received the results. If you do not hear from us within 7 days, please contact the clinic through MyChart or phone. If you have a critical or abnormal lab result, we will notify you by phone as soon as possible.  Submit refill requests through [employer] or call your pharmacy and they will forward the refill request to us. Please allow 3 business days for your refill to be completed.          Additional Information About Your Visit        Care EveryWhere ID     This is your Care EveryWhere ID. This could be used by other organizations to access your [facility] medical records  [id number]        Your Vitals Were     Last Period                   10/01/1986            Blood Pressure from Last 3 Encounters:   11/13/17 124/64   11/07/17 159/80   10/03/17 138/80    Weight from Last 3 Encounters:   11/13/17 66 kg (145 lb 9.6 oz)   11/07/17 65.5 kg (144 lb 4.8 oz)   10/03/17 65.3 kg (144 lb)              Today, you had the following     No orders found for display       Primary Care Provider Office Phone # Fax #    Jessica aRhman [phone]       [facility] [address] [facility] [address]        Equal Access to Services     JAY OTREGA AH: Payam Jaime, watylerda luqadaha, qashannanta kaalmada misbahyada, jj hauser. So [facility] [phone].    ATENCIÓN: Si habla español, tiene a ayala disposición servicios gratuitos de  tamekaa lingüística. Abdon al [phone].    We comply with applicable federal civil rights laws and [place] laws. We do not discriminate on the basis of race, color, national origin, age, disability, sex, sexual orientation, or gender identity.            Thank you!     Thank you for choosing [facility]  for your care. Our goal is always to provide you with excellent care. Hearing back from our patients is one way we can continue to improve our services. Please take a few minutes to complete the written survey that you may receive in the mail after your visit with us. Thank you!             Your Updated Medication List - Protect others around you: Learn how to safely use, store and throw away your medicines at www.disposemymeds.org.          This list is accurate as of: 11/30/17 11:47 AM.  Always use your most recent med list.                   Brand Name Dispense Instructions for use Diagnosis    calcium carbonate 1250 MG tablet    OS-FIDENCIO 500 mg [place]. 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)       carboxymethylcellulose 0.5 % Soln ophthalmic solution    REFRESH PLUS     Place 1 drop into both eyes 3 times daily as needed for dry eyes        cyclobenzaprine 10 MG tablet    FLEXERIL    30 tablet    Take 0.5-1 tablets (5-10 mg) by mouth 3 times daily as needed for muscle spasms    Acute bilateral low back pain with left-sided sciatica       HYZAAR 50-12.5 MG per tablet   Generic drug:  losartan-hydrochlorothiazide     90 tablet    Take 1 tablet by mouth daily    Essential hypertension with goal blood pressure less than 140/90       levothyroxine 50 MCG tablet    SYNTHROID/LEVOTHROID    90 tablet    Take 1 tablet (50 mcg) by mouth daily    Hypothyroidism, unspecified type       MELATONIN PO      Take 3 mg by mouth At Bedtime        Multi-vitamin Tabs tablet   Generic drug:  multivitamin, therapeutic with minerals      Take 1 tablet by mouth  daily.

## 2017-11-30 NOTE — PATIENT INSTRUCTIONS
Use artificial tears up to 4 times daily both eyes. (Refresh Tears or Systane Ultra/Balance)   Possible posterior vitreous detachment (sudden onset large floater and/or flashing lights) discussed.   Glasses Rx given - optional   Call in July 2018 for an appointment in November 2018 for Complete Exam    Dr. Bay [phone]

## 2017-11-30 NOTE — PROGRESS NOTES
Current Eye Medications:  Refresh three times a day both eyes.      Subjective:  Comprehensive Eye Exam.  Both eyes are feeling more dry, and she is using the refresh more frequently.  Vision is less clear in each eye.       Objective:  See Ophthalmology Exam.       Assessment:  Stable eye exam.      ICD-10-CM    1. 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 History of blepharoplasty, upper lids, ou Z98.890    3. Presbyopia H52.4 REFRACTIVE STATUS        Plan:  Use artificial tears up to 4 times daily both eyes. (Refresh Tears or Systane Ultra/Balance)   Possible posterior vitreous detachment (sudden onset large floater and/or flashing lights) discussed.   Glasses Rx given - optional   Call in July 2018 for an appointment in November 2018 for Complete Exam    Dr. Bay [phone]

## 2017-12-06 ENCOUNTER — RADIANT APPOINTMENT (OUTPATIENT)
Dept: MAMMOGRAPHY | Facility: CLINIC | Age: 82
End: 2017-12-06
Attending: INTERNAL MEDICINE
Payer: MEDICARE

## 2017-12-06 DIAGNOSIS — Z12.31 VISIT FOR SCREENING MAMMOGRAM: ICD-10-CM

## 2017-12-06 PROCEDURE — G0202 SCR MAMMO BI INCL CAD: HCPCS | Mod: TC

## 2018-04-27 DIAGNOSIS — C82.00 GRADE I FOLLICULAR SMALL CLEAVED CELL LYMPHOMA (H): ICD-10-CM

## 2018-04-27 LAB
ALBUMIN SERPL-MCNC: 3.4 G/DL (ref 3.4–5)
ALP SERPL-CCNC: 70 U/L (ref 40–150)
ALT SERPL W P-5'-P-CCNC: 21 U/L (ref 0–50)
ANION GAP SERPL CALCULATED.3IONS-SCNC: 7 MMOL/L (ref 3–14)
AST SERPL W P-5'-P-CCNC: 22 U/L (ref 0–45)
BASOPHILS # BLD AUTO: 0 10E9/L (ref 0–0.2)
BASOPHILS NFR BLD AUTO: 0.4 %
BILIRUB SERPL-MCNC: 0.6 MG/DL (ref 0.2–1.3)
BUN SERPL-MCNC: 24 MG/DL (ref 7–30)
CALCIUM SERPL-MCNC: 9 MG/DL (ref 8.5–10.1)
CHLORIDE SERPL-SCNC: 103 MMOL/L (ref 94–109)
CO2 SERPL-SCNC: 32 MMOL/L (ref 20–32)
CREAT SERPL-MCNC: 0.91 MG/DL (ref 0.52–1.04)
DIFFERENTIAL METHOD BLD: NORMAL
EOSINOPHIL # BLD AUTO: 0.1 10E9/L (ref 0–0.7)
EOSINOPHIL NFR BLD AUTO: 1.8 %
ERYTHROCYTE [DISTWIDTH] IN BLOOD BY AUTOMATED COUNT: 13.3 % (ref 10–15)
GFR SERPL CREATININE-BSD FRML MDRD: 59 ML/MIN/1.7M2
GLUCOSE SERPL-MCNC: 95 MG/DL (ref 70–99)
HCT VFR BLD AUTO: 38.4 % (ref 35–47)
HGB BLD-MCNC: 12.5 G/DL (ref 11.7–15.7)
IMM GRANULOCYTES # BLD: 0 10E9/L (ref 0–0.4)
IMM GRANULOCYTES NFR BLD: 0.2 %
LDH SERPL L TO P-CCNC: 181 U/L (ref 81–234)
LYMPHOCYTES # BLD AUTO: 1.6 10E9/L (ref 0.8–5.3)
LYMPHOCYTES NFR BLD AUTO: 29.2 %
MCH RBC QN AUTO: 29.3 PG (ref 26.5–33)
MCHC RBC AUTO-ENTMCNC: 32.6 G/DL (ref 31.5–36.5)
MCV RBC AUTO: 90 FL (ref 78–100)
MONOCYTES # BLD AUTO: 0.5 10E9/L (ref 0–1.3)
MONOCYTES NFR BLD AUTO: 9.2 %
NEUTROPHILS # BLD AUTO: 3.2 10E9/L (ref 1.6–8.3)
NEUTROPHILS NFR BLD AUTO: 59.2 %
PLATELET # BLD AUTO: 185 10E9/L (ref 150–450)
POTASSIUM SERPL-SCNC: 3.8 MMOL/L (ref 3.4–5.3)
PROT SERPL-MCNC: 7 G/DL (ref 6.8–8.8)
RBC # BLD AUTO: 4.27 10E12/L (ref 3.8–5.2)
SODIUM SERPL-SCNC: 142 MMOL/L (ref 133–144)
URATE SERPL-MCNC: 4.2 MG/DL (ref 2.6–6)
WBC # BLD AUTO: 5.4 10E9/L (ref 4–11)

## 2018-04-27 PROCEDURE — 36415 COLL VENOUS BLD VENIPUNCTURE: CPT | Performed by: INTERNAL MEDICINE

## 2018-04-27 PROCEDURE — 83615 LACTATE (LD) (LDH) ENZYME: CPT | Performed by: INTERNAL MEDICINE

## 2018-04-27 PROCEDURE — 85025 COMPLETE CBC W/AUTO DIFF WBC: CPT | Performed by: INTERNAL MEDICINE

## 2018-04-27 PROCEDURE — 80053 COMPREHEN METABOLIC PANEL: CPT | Performed by: INTERNAL MEDICINE

## 2018-04-27 PROCEDURE — 84550 ASSAY OF BLOOD/URIC ACID: CPT | Performed by: INTERNAL MEDICINE

## 2018-05-04 ENCOUNTER — CARE COORDINATION (OUTPATIENT)
Dept: ONCOLOGY | Facility: CLINIC | Age: 83
End: 2018-05-04

## 2018-05-04 ENCOUNTER — ONCOLOGY VISIT (OUTPATIENT)
Dept: ONCOLOGY | Facility: CLINIC | Age: 83
End: 2018-05-04
Payer: MEDICARE

## 2018-05-04 VITALS
TEMPERATURE: 97.6 F | SYSTOLIC BLOOD PRESSURE: 155 MMHG | HEIGHT: 64 IN | DIASTOLIC BLOOD PRESSURE: 73 MMHG | BODY MASS INDEX: 24.41 KG/M2 | HEART RATE: 81 BPM | OXYGEN SATURATION: 98 % | WEIGHT: 143 LBS

## 2018-05-04 DIAGNOSIS — Z12.31 VISIT FOR SCREENING MAMMOGRAM: ICD-10-CM

## 2018-05-04 DIAGNOSIS — B37.0 ORAL THRUSH: ICD-10-CM

## 2018-05-04 DIAGNOSIS — C82.00 GRADE I FOLLICULAR SMALL CLEAVED CELL LYMPHOMA (H): Primary | ICD-10-CM

## 2018-05-04 PROCEDURE — 99214 OFFICE O/P EST MOD 30 MIN: CPT | Performed by: INTERNAL MEDICINE

## 2018-05-04 RX ORDER — NYSTATIN 100000/ML
500000 SUSPENSION, ORAL (FINAL DOSE FORM) ORAL 4 TIMES DAILY
Qty: 280 ML | Refills: 1 | Status: SHIPPED | OUTPATIENT
Start: 2018-05-04 | End: 2018-05-04

## 2018-05-04 RX ORDER — NYSTATIN 100000/ML
500000 SUSPENSION, ORAL (FINAL DOSE FORM) ORAL 4 TIMES DAILY
Qty: 560 ML | Refills: 1
Start: 2018-05-04 | End: 2018-09-10

## 2018-05-04 ASSESSMENT — PAIN SCALES - GENERAL: PAINLEVEL: NO PAIN (0)

## 2018-05-04 NOTE — LETTER
5/4/2018         RE: Hilda Potter  [address]  [facility] [id number]        Dear Colleague,    Thank you for referring your patient, Hilda Potter, to the [facility]. Please see a copy of my visit note below.    Oncology Follow-up Visit:  May 4, 2018      PCP: Dr. Rahman  Diagnosis: Low grade, stage IVB (BM involvement, night sweats) follicular lymphoma.  FLIPI score is 3 (one for stage IV, one for age>60, and one for elevated LDH), associated with 52% median 5 year OS survival and 36% median 10 year overall survival.     Oncologic History:   Ms. Potter is a 82 year old female who presented with night sweats and lymphadenopathy. She developed drenching night sweats in 11/2011 which persisted. She had no other associated constitutional symptoms such as fevers or weight loss. She had a negative ID workup with Dr. Ruiz (for valley fever as she had traveled to [place] in the past).   As part of evaluation, CT scan was performed in April 2012 which demonstrated multiple borderline mildly enlarged retroperitoneal and mesenteric lymph nodes, with the largest size of 1.5 cm. The patient, however, persisted to have night sweats and underwent repeat CTCAP done without contrast (she is allergic to IV contrast and had an anaphylactic reaction to it) which demonstrated unchanged dilatation of common bile duct, likely related to postcholecystectomy state, and stable mildly enlarged retroperitoneal and mesenteric lymph nodes which were nonspecific.   We proceeded with CT guided biopsy of one of the intraabdominal lymph nodes and results c/w low grade follicular lymphoma.  Flow cytometry showed kappa monotypic CD10 positive B cells.  We proceeded with bone marrow biopsy to complete her staging. The results, as detailed below, revealed involvement of marrow by follicular lymphoma. Flow cytometry showed a rare population of CD10-positive, kappa-monotypic B cells (0.2%). Molecular  "diagnostics was negative for B-cell gene rearrangement. Cytogenetics revealed, of the interphase cells examined, 0.2% had a signal pattern indicative of an IGH/BCL2 gene fusion, a rate that falls just slightly above the normal control range (0-0.1%) for our laboratory; thus, these findings are suspicious for the presence of low-level bone marrow involvement by follicular lymphoma. However, this cannot be determined with certainty, in light of the few abnormal cells found by FISH. Of note, a G-banded chromosome study is still pending.   Hep B/C negative. EVE negative. No M-spike on SPEP.    Treatment: Weekly Rituxan x 4, completed 9/13/2012. During her first Rituxan infusion she has developed a reaction with tingling of upper lip and chin and feeling \"cold\" in her chest. She was given IV solumedrol and the infusion was slowed down and these symptoms have resolved. Since then she has been premedicated with solumedrol, and all infusions were uneventful since.    Interval History:  Ms. Potter is a 82 year old female diagnosed with follicular lymphoma present today for follow-up. She has completed 4 weekly doses of Rituxan in 09/2012.   Her night sweats have resolved subsequently and LDH has remained normal.    She had a screening mammogram in 12/2017 which was negative.   She has had no constitutional symptoms. She denies weight loss. She has no complaints. She is on melatonin 3 mg PO QHS prn insomnia, but does not need it every day.  She is here with her .  In addition, a complete 12 point  review of systems is negative.        Past medical, social, surgical, and family histories reviewed.  Breast Cancer screening - mammogram negative 11/2013.  Echocardiogram in 10/2014 for evaluation of benign palpitations showed left ventricular function is normal with an EF of 55-60%.  Left ventricular Doppler filling pattern consistent with abnormal relaxation.  DEXA scan on 5/12/2015 showed most neg T score of -2.2 in " "the lumbar spine. This was unchanged compared to 11/2010. She was on Boneva years ago but apparently developed L jaw tingling and is no longer on biphosphonates.       Allergies:  Allergies as of 05/04/2018 - Review Complete 05/04/2018   Allergen Reaction Noted     Bisphosphonates  10/01/2009     Ivp dye [contrast dye] Swelling 10/01/2009     Lisinopril Cough 06/24/2013     Losartan Hives 11/22/2010     Morphine sulfate Nausea and Vomiting 08/16/2012     Prilosec [omeprazole] Hives 10/01/2009       Current Medications:  Current Outpatient Prescriptions   Medication Sig Dispense Refill     calcium carbonate (OS-FIDENCIO 500 MG [place]. [place]) 500 MG tablet Take 500 mg by mouth 2 times daily       carboxymethylcellulose (REFRESH PLUS) 0.5 % SOLN Place 1 drop into both eyes 3 times daily as needed for dry eyes       cyclobenzaprine (FLEXERIL) 10 MG tablet Take 0.5-1 tablets (5-10 mg) by mouth 3 times daily as needed for muscle spasms (Patient not taking: Reported on 5/4/2018) 30 tablet 1     HYZAAR 50-12.5 MG per tablet Take 1 tablet by mouth daily (Patient not taking: Reported on 5/4/2018) 90 tablet 4     levothyroxine (SYNTHROID/LEVOTHROID) 50 MCG tablet Take 1 tablet (50 mcg) by mouth daily (Patient not taking: Reported on 5/4/2018) 90 tablet 4     MELATONIN PO Take 3 mg by mouth At Bedtime       Multiple Vitamin (MULTI-VITAMIN) per tablet Take 1 tablet by mouth daily.            Physical Exam:  /85  Pulse 68  Temp 98.3  F (36.8  C)  Ht 1.613 m (5' 3.5\")  Wt 58.5 kg (129 lb)  LMP 10/01/1986  SpO2 99%  BMI 22.49 kg/m2      GENERAL APPEARANCE: Healthy, alert and in no acute distress.  HEENT: Sclerae anicteric. Oropharynx without ulcers, lesions, or thrush.  NECK: Supple. No asymmetry or masses.  LYMPHATICS: No palpable cervical, supraclavicular, axillary lymphadenopathy b/l  RESP: Lungs clear to auscultation bilaterally without rales, rhonchi or wheezes.  CARDIOVASCULAR: Regular rate and rhythm. Normal S1, S2; no S3 " or S4. No murmur, gallop, or rub.  ABDOMEN: Soft, nontender. Bowel sounds normal. No palpable organomegaly or masses.  MUSCULOSKELETAL: Extremities without gross deformities noted. No edema of bilateral lower extremities.  SKIN: No suspicious lesions or rashes.  NEURO: Alert and oriented x 3. Cranial nerves II-XII grossly intact.  PSYCHIATRIC: Mentation and affect appear normal.    Labs:  Orders Only on 04/27/2018   Component Date Value Ref Range Status     WBC 04/27/2018 5.4  4.0 - 11.0 10e9/L Final     RBC Count 04/27/2018 4.27  3.8 - 5.2 10e12/L Final     Hemoglobin 04/27/2018 12.5  11.7 - 15.7 g/dL Final     Hematocrit 04/27/2018 38.4  35.0 - 47.0 % Final     MCV 04/27/2018 90  78 - 100 fl Final     MCH 04/27/2018 29.3  26.5 - 33.0 pg Final     MCHC 04/27/2018 32.6  31.5 - 36.5 g/dL Final     RDW 04/27/2018 13.3  10.0 - 15.0 % Final     Platelet Count 04/27/2018 185  150 - 450 10e9/L Final     Diff Method 04/27/2018 Automated Method   Final     % Neutrophils 04/27/2018 59.2  % Final     % Lymphocytes 04/27/2018 29.2  % Final     % Monocytes 04/27/2018 9.2  % Final     % Eosinophils 04/27/2018 1.8  % Final     % Basophils 04/27/2018 0.4  % Final     % Immature Granulocytes 04/27/2018 0.2  % Final     Absolute Neutrophil 04/27/2018 3.2  1.6 - 8.3 10e9/L Final     Absolute Lymphocytes 04/27/2018 1.6  0.8 - 5.3 10e9/L Final     Absolute Monocytes 04/27/2018 0.5  0.0 - 1.3 10e9/L Final     Absolute Eosinophils 04/27/2018 0.1  0.0 - 0.7 10e9/L Final     Absolute Basophils 04/27/2018 0.0  0.0 - 0.2 10e9/L Final     Abs Immature Granulocytes 04/27/2018 0.0  0 - 0.4 10e9/L Final     Sodium 04/27/2018 142  133 - 144 mmol/L Final     Potassium 04/27/2018 3.8  3.4 - 5.3 mmol/L Final     Chloride 04/27/2018 103  94 - 109 mmol/L Final     Carbon Dioxide 04/27/2018 32  20 - 32 mmol/L Final     Anion Gap 04/27/2018 7  3 - 14 mmol/L Final     Glucose 04/27/2018 95  70 - 99 mg/dL Final     Urea Nitrogen 04/27/2018 24  7 - 30  mg/dL Final     Creatinine 04/27/2018 0.91  0.52 - 1.04 mg/dL Final     GFR Estimate 04/27/2018 59* >60 mL/min/1.7m2 Final    Non  GFR Calc     GFR Estimate If Black 04/27/2018 72  >60 mL/min/1.7m2 Final    African American GFR Calc     Calcium 04/27/2018 9.0  8.5 - 10.1 mg/dL Final     Bilirubin Total 04/27/2018 0.6  0.2 - 1.3 mg/dL Final     Albumin 04/27/2018 3.4  3.4 - 5.0 g/dL Final     Protein Total 04/27/2018 7.0  6.8 - 8.8 g/dL Final     Alkaline Phosphatase 04/27/2018 70  40 - 150 U/L Final     ALT 04/27/2018 21  0 - 50 U/L Final     AST 04/27/2018 22  0 - 45 U/L Final     Lactate Dehydrogenase 04/27/2018 181  81 - 234 U/L Final     Uric Acid 04/27/2018 4.2  2.6 - 6.0 mg/dL Final     HbA1C 5.7% 11/2017    ASSESSMENT/PLAN:  Hilda is a very pleasant 82 year-old woman recently diagnosed with stage IV follicular lymphoma, FLIPI score is 3 (one for stage IV, one for age>60, and one for elevated LDH), associated with 52% median 5 year OS survival and 36% median 10 year overall survival. Most recent CT of the chest, abdomen and pelvis on 10/24/2017 showed no e/o lymphoma recurrence with stable posttreatment changes of lymphoma. There was no significant change in mesenteric fat stranding from prior. There were stable tiny pulmonary nodules from 4/26/2016, including 2 mm nodule in the right base.     1. NHL.  S/p Rituxan x4, completed in 09/2012. Responded to treatment with resolution of night sweats and decrease in lymphadenopathy. We'll continue to observe her from now on. She spends bautista in [place] from Jan to April.    We'll see her back in 6 months with labs - cbcd, cmp, LDH, uric acid. Per updated NCCN guidelines, at this time, a CT of the chest, abdomen and pelvis is recommended no more often than annually in Hilda's situation. We'll consider repeat in 1,5-2 years from Nov 2017.    2. Immunizations - She is uptodate with  Pneumovax, Prevnar vaccinations. She is aware of the need for an annual  influenza vaccination.    3. Breast cancer screening Screening mammogram negative 12/2017, next due 12/2018. Screening mammogram ordered- she prefers to have it at [facility] and we'll inform her of the results.    At the end of our visit the patient verbalized understanding, denied any further questions, and concurred with the plan of care.                Again, thank you for allowing me to participate in the care of your patient.        Sincerely,        Jane Roth MD, MD

## 2018-05-04 NOTE — PROGRESS NOTES
Oncology Follow-up Visit:  May 4, 2018      PCP: Dr. Rahman  Diagnosis: Low grade, stage IVB (BM involvement, night sweats) follicular lymphoma.  FLIPI score is 3 (one for stage IV, one for age>60, and one for elevated LDH), associated with 52% median 5 year OS survival and 36% median 10 year overall survival.     Oncologic History:   Ms. Potter is a 82 year old female who presented with night sweats and lymphadenopathy. She developed drenching night sweats in 11/2011 which persisted. She had no other associated constitutional symptoms such as fevers or weight loss. She had a negative ID workup with Dr. Ruiz (for valley fever as she had traveled to [place] in the past).   As part of evaluation, CT scan was performed in April 2012 which demonstrated multiple borderline mildly enlarged retroperitoneal and mesenteric lymph nodes, with the largest size of 1.5 cm. The patient, however, persisted to have night sweats and underwent repeat CTCAP done without contrast (she is allergic to IV contrast and had an anaphylactic reaction to it) which demonstrated unchanged dilatation of common bile duct, likely related to postcholecystectomy state, and stable mildly enlarged retroperitoneal and mesenteric lymph nodes which were nonspecific.   We proceeded with CT guided biopsy of one of the intraabdominal lymph nodes and results c/w low grade follicular lymphoma.  Flow cytometry showed kappa monotypic CD10 positive B cells.  We proceeded with bone marrow biopsy to complete her staging. The results, as detailed below, revealed involvement of marrow by follicular lymphoma. Flow cytometry showed a rare population of CD10-positive, kappa-monotypic B cells (0.2%). Molecular diagnostics was negative for B-cell gene rearrangement. Cytogenetics revealed, of the interphase cells examined, 0.2% had a signal pattern indicative of an IGH/BCL2 gene fusion, a rate that falls just slightly above the normal control range (0-0.1%) for our  "laboratory; thus, these findings are suspicious for the presence of low-level bone marrow involvement by follicular lymphoma. However, this cannot be determined with certainty, in light of the few abnormal cells found by FISH. Of note, a G-banded chromosome study is still pending.   Hep B/C negative. EVE negative. No M-spike on SPEP.    Treatment: Weekly Rituxan x 4, completed 9/13/2012. During her first Rituxan infusion she has developed a reaction with tingling of upper lip and chin and feeling \"cold\" in her chest. She was given IV solumedrol and the infusion was slowed down and these symptoms have resolved. Since then she has been premedicated with solumedrol, and all infusions were uneventful since.    Interval History:  Ms. Potter is a 82 year old female diagnosed with follicular lymphoma present today for follow-up. She has completed 4 weekly doses of Rituxan in 09/2012.   Her night sweats have resolved subsequently and LDH has remained normal.    She had a screening mammogram in 12/2017 which was negative.   She has had no constitutional symptoms. She denies weight loss. She has no complaints. She is on melatonin 3 mg PO QHS prn insomnia, but does not need it every day.  She is here with her .  In addition, a complete 12 point  review of systems is negative.        Past medical, social, surgical, and family histories reviewed.  Breast Cancer screening - mammogram negative 11/2013.  Echocardiogram in 10/2014 for evaluation of benign palpitations showed left ventricular function is normal with an EF of 55-60%.  Left ventricular Doppler filling pattern consistent with abnormal relaxation.  DEXA scan on 5/12/2015 showed most neg T score of -2.2 in the lumbar spine. This was unchanged compared to 11/2010. She was on Boneva years ago but apparently developed L jaw tingling and is no longer on biphosphonates.       Allergies:  Allergies as of 05/04/2018 - Review Complete 05/04/2018   Allergen Reaction " "Noted     Bisphosphonates  10/01/2009     Ivp dye [contrast dye] Swelling 10/01/2009     Lisinopril Cough 06/24/2013     Losartan Hives 11/22/2010     Morphine sulfate Nausea and Vomiting 08/16/2012     Prilosec [omeprazole] Hives 10/01/2009       Current Medications:  Current Outpatient Prescriptions   Medication Sig Dispense Refill     calcium carbonate (OS-FIDENCIO 500 MG [place]. [place]) 500 MG tablet Take 500 mg by mouth 2 times daily       carboxymethylcellulose (REFRESH PLUS) 0.5 % SOLN Place 1 drop into both eyes 3 times daily as needed for dry eyes       cyclobenzaprine (FLEXERIL) 10 MG tablet Take 0.5-1 tablets (5-10 mg) by mouth 3 times daily as needed for muscle spasms (Patient not taking: Reported on 5/4/2018) 30 tablet 1     HYZAAR 50-12.5 MG per tablet Take 1 tablet by mouth daily (Patient not taking: Reported on 5/4/2018) 90 tablet 4     levothyroxine (SYNTHROID/LEVOTHROID) 50 MCG tablet Take 1 tablet (50 mcg) by mouth daily (Patient not taking: Reported on 5/4/2018) 90 tablet 4     MELATONIN PO Take 3 mg by mouth At Bedtime       Multiple Vitamin (MULTI-VITAMIN) per tablet Take 1 tablet by mouth daily.            Physical Exam:  /85  Pulse 68  Temp 98.3  F (36.8  C)  Ht 1.613 m (5' 3.5\")  Wt 58.5 kg (129 lb)  LMP 10/01/1986  SpO2 99%  BMI 22.49 kg/m2      GENERAL APPEARANCE: Healthy, alert and in no acute distress.  HEENT: Sclerae anicteric. Oropharynx without ulcers, lesions, or thrush.  NECK: Supple. No asymmetry or masses.  LYMPHATICS: No palpable cervical, supraclavicular, axillary lymphadenopathy b/l  RESP: Lungs clear to auscultation bilaterally without rales, rhonchi or wheezes.  CARDIOVASCULAR: Regular rate and rhythm. Normal S1, S2; no S3 or S4. No murmur, gallop, or rub.  ABDOMEN: Soft, nontender. Bowel sounds normal. No palpable organomegaly or masses.  MUSCULOSKELETAL: Extremities without gross deformities noted. No edema of bilateral lower extremities.  SKIN: No suspicious lesions or " rashes.  NEURO: Alert and oriented x 3. Cranial nerves II-XII grossly intact.  PSYCHIATRIC: Mentation and affect appear normal.    Labs:  Orders Only on 04/27/2018   Component Date Value Ref Range Status     WBC 04/27/2018 5.4  4.0 - 11.0 10e9/L Final     RBC Count 04/27/2018 4.27  3.8 - 5.2 10e12/L Final     Hemoglobin 04/27/2018 12.5  11.7 - 15.7 g/dL Final     Hematocrit 04/27/2018 38.4  35.0 - 47.0 % Final     MCV 04/27/2018 90  78 - 100 fl Final     MCH 04/27/2018 29.3  26.5 - 33.0 pg Final     MCHC 04/27/2018 32.6  31.5 - 36.5 g/dL Final     RDW 04/27/2018 13.3  10.0 - 15.0 % Final     Platelet Count 04/27/2018 185  150 - 450 10e9/L Final     Diff Method 04/27/2018 Automated Method   Final     % Neutrophils 04/27/2018 59.2  % Final     % Lymphocytes 04/27/2018 29.2  % Final     % Monocytes 04/27/2018 9.2  % Final     % Eosinophils 04/27/2018 1.8  % Final     % Basophils 04/27/2018 0.4  % Final     % Immature Granulocytes 04/27/2018 0.2  % Final     Absolute Neutrophil 04/27/2018 3.2  1.6 - 8.3 10e9/L Final     Absolute Lymphocytes 04/27/2018 1.6  0.8 - 5.3 10e9/L Final     Absolute Monocytes 04/27/2018 0.5  0.0 - 1.3 10e9/L Final     Absolute Eosinophils 04/27/2018 0.1  0.0 - 0.7 10e9/L Final     Absolute Basophils 04/27/2018 0.0  0.0 - 0.2 10e9/L Final     Abs Immature Granulocytes 04/27/2018 0.0  0 - 0.4 10e9/L Final     Sodium 04/27/2018 142  133 - 144 mmol/L Final     Potassium 04/27/2018 3.8  3.4 - 5.3 mmol/L Final     Chloride 04/27/2018 103  94 - 109 mmol/L Final     Carbon Dioxide 04/27/2018 32  20 - 32 mmol/L Final     Anion Gap 04/27/2018 7  3 - 14 mmol/L Final     Glucose 04/27/2018 95  70 - 99 mg/dL Final     Urea Nitrogen 04/27/2018 24  7 - 30 mg/dL Final     Creatinine 04/27/2018 0.91  0.52 - 1.04 mg/dL Final     GFR Estimate 04/27/2018 59* >60 mL/min/1.7m2 Final    Non  GFR Calc     GFR Estimate If Black 04/27/2018 72  >60 mL/min/1.7m2 Final    African American GFR Calc      Calcium 04/27/2018 9.0  8.5 - 10.1 mg/dL Final     Bilirubin Total 04/27/2018 0.6  0.2 - 1.3 mg/dL Final     Albumin 04/27/2018 3.4  3.4 - 5.0 g/dL Final     Protein Total 04/27/2018 7.0  6.8 - 8.8 g/dL Final     Alkaline Phosphatase 04/27/2018 70  40 - 150 U/L Final     ALT 04/27/2018 21  0 - 50 U/L Final     AST 04/27/2018 22  0 - 45 U/L Final     Lactate Dehydrogenase 04/27/2018 181  81 - 234 U/L Final     Uric Acid 04/27/2018 4.2  2.6 - 6.0 mg/dL Final     HbA1C 5.7% 11/2017    ASSESSMENT/PLAN:  Hilda is a very pleasant 82 year-old woman recently diagnosed with stage IV follicular lymphoma, FLIPI score is 3 (one for stage IV, one for age>60, and one for elevated LDH), associated with 52% median 5 year OS survival and 36% median 10 year overall survival. Most recent CT of the chest, abdomen and pelvis on 10/24/2017 showed no e/o lymphoma recurrence with stable posttreatment changes of lymphoma. There was no significant change in mesenteric fat stranding from prior. There were stable tiny pulmonary nodules from 4/26/2016, including 2 mm nodule in the right base.     1. NHL.  S/p Rituxan x4, completed in 09/2012. Responded to treatment with resolution of night sweats and decrease in lymphadenopathy. We'll continue to observe her from now on. She spends b[place] in [place] from Jan to April.    We'll see her back in 6 months with labs - cbcd, cmp, LDH, uric acid. Per updated NCCN guidelines, at this time, a CT of the chest, abdomen and pelvis is recommended no more often than annually in Hilda's situation. We'll consider repeat in 1,5-2 years from Nov 2017.    2. Immunizations - She is uptodate with  Pneumovax, Prevnar vaccinations. She is aware of the need for an annual influenza vaccination.    3. Breast cancer screening Screening mammogram negative 12/2017, next due 12/2018. Screening mammogram ordered- she prefers to have it at [facility] and we'll inform her of the results.    At the end of our visit the patient  verbalized understanding, denied any further questions, and concurred with the plan of care.

## 2018-05-04 NOTE — MR AVS SNAPSHOT
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 This does not apply to pregnant women. Water, hot tea and black coffee (with nothing added) are okay. Do not drink other fluids, diet soda or chew gum.            Nov 06, 2018 12:30 PM CST   [facility] with [facility] Roth [facility]   [facility]    [address] [place] [address] [id number] [phone]              Future tests that were ordered for you today     Open Future Orders        Priority Expected Expires Ordered    CBC with platelets differential Routine  5/4/2019 5/4/2018    Comprehensive metabolic panel Routine  5/4/2019 5/4/2018    Lactate Dehydrogenase Routine  5/4/2019 5/4/2018    Uric acid Routine  5/4/2019 5/4/2018    MA Screening Digital Bilateral Routine  5/4/2019 5/4/2018            Who to contact     If you have questions or need follow up information about today's clinic visit or your schedule please contact [facility] directly at [phone].  Normal or non-critical lab and imaging results will be communicated to you by MyChart, letter or phone within 4 business days after the clinic has received the  "results. If you do not hear from us within 7 days, please contact the clinic through [employer] or phone. If you have a critical or abnormal lab result, we will notify you by phone as soon as possible.  Submit refill requests through [employer] or call your pharmacy and they will forward the refill request to us. Please allow 3 business days for your refill to be completed.          Additional Information About Your Visit        [employer] Information     [employer] is an electronic gateway that provides easy, online access to your medical records. With [employer], you can request a clinic appointment, read your test results, renew a prescription or communicate with your care team.     To sign up for [employer] visit the website at [email or web address]   You will be asked to enter the access code listed below, as well as some personal information. Please follow the directions to create your username and password.     Your access code is: [email or web address]  Expires: 2018  3:29 PM     Your access code will  in 90 days. If you need help or a new code, please contact your [facility] or call [phone] for assistance.        Care EveryWhere ID     This is your Care EveryWhere ID. This could be used by other organizations to access your [facility] medical records  [id number]        Your Vitals Were     Pulse Temperature Height Last Period Pulse Oximetry BMI (Body Mass Index)    81 97.6  F (36.4  C) 1.613 m (5' 3.5\") 10/01/1986 98% 24.93 kg/m2       Blood Pressure from Last 3 Encounters:   18 155/73   17 124/64   17 159/80    Weight from Last 3 Encounters:   18 64.9 kg (143 lb)   17 66 kg (145 lb 9.6 oz)   17 65.5 kg (144 lb 4.8 oz)                 Today's Medication Changes          These changes are accurate as of 18  3:29 PM.  If you have any questions, ask your nurse or doctor.               Start taking these medicines.        Dose/Directions    " nystatin 542964 UNIT/ML suspension   Commonly known as:  MYCOSTATIN   Used for:  Oral thrush   Started by:  Jane Roth MD        Dose:  712728 Units   Take 5 mLs (500,000 Units) by mouth 4 times daily Swish and spit with 10 cc PO four times a day, for two weeks   Quantity:  280 mL   Refills:  1            Where to get your medicines      These medications were sent to [facility] [address] - [address]  AT [facility] & [address] [facility] [address]     Phone:  [phone]     nystatin 353818 UNIT/ML suspension                Primary Care Provider Office Phone # Fax #    Jessica Rahman [phone] [address] [facility] [address]        Equal Access to Services     DANIEL ORTEGA : Hadii vaishnavi estevez hadasho Soomaali, waaxda luqadaha, qaybta kaalmada adeegyada, jj burgos . So [facility] [phone].    ATENCIÓN: Si habla español, tiene a ayala disposición servicios gratuitos de asistencia lingüística. [facility] [phone].    We comply with applicable federal civil rights laws and [place] laws. We do not discriminate on the basis of race, color, national origin, age, disability, sex, sexual orientation, or gender identity.            Thank you!     Thank you for choosing [facility]  for your care. Our goal is always to provide you with excellent care. Hearing back from our patients is one way we can continue to improve our services. Please take a few minutes to complete the written survey that you may receive in the mail after your visit with us. Thank you!             Your Updated Medication List - Protect others around you: Learn how to safely use, store and throw away your medicines at www.disposemymeds.org.          This list is accurate as of 5/4/18  3:29 PM.  Always use your most recent med list.                   Brand Name Dispense Instructions for use Diagnosis     calcium carbonate 500 tablet    OS-FIDENCIO 500 mg [place]. 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)       carboxymethylcellulose 0.5 % Soln ophthalmic solution    REFRESH PLUS     Place 1 drop into both eyes 3 times daily as needed for dry eyes        cyclobenzaprine 10 MG tablet    FLEXERIL    30 tablet    Take 0.5-1 tablets (5-10 mg) by mouth 3 times daily as needed for muscle spasms    Acute bilateral low back pain with left-sided sciatica       HYZAAR 50-12.5 MG per tablet   Generic drug:  losartan-hydrochlorothiazide     90 tablet    Take 1 tablet by mouth daily    Essential hypertension with goal blood pressure less than 140/90       levothyroxine 50 MCG tablet    SYNTHROID/LEVOTHROID    90 tablet    Take 1 tablet (50 mcg) by mouth daily    Hypothyroidism, unspecified type       MELATONIN PO      Take 3 mg by mouth At Bedtime        Multi-vitamin Tabs tablet   Generic drug:  multivitamin, therapeutic with minerals      Take 1 tablet by mouth daily.        nystatin 241734 UNIT/ML suspension    MYCOSTATIN    280 mL    Take 5 mLs (500,000 Units) by mouth 4 times daily Swish and spit with 10 cc PO four times a day, for two weeks    Oral thrush

## 2018-05-04 NOTE — NURSING NOTE
"Oncology Rooming Note    May 4, 2018 3:01 PM   Hilda Potter is a 82 year old female who presents for:    Chief Complaint   Patient presents with     Oncology Clinic Visit     follow up      Initial Vitals: /73  Pulse 81  Temp 97.6  F (36.4  C)  Ht 1.613 m (5' 3.5\")  Wt 64.9 kg (143 lb)  LMP 10/01/1986  SpO2 98%  BMI 24.93 kg/m2 Estimated body mass index is 24.93 kg/(m^2) as calculated from the following:    Height as of this encounter: 1.613 m (5' 3.5\").    Weight as of this encounter: 64.9 kg (143 lb). Body surface area is 1.71 meters squared.  No Pain (0) Comment: Data Unavailable   Patient's last menstrual period was 10/01/1986.  Allergies reviewed: Yes  Medications reviewed: Yes    Medications: Medication refills not needed today.  Pharmacy name entered into [facility]:    [facility] - [place] [id number] [facility]COL[facility]Y PH[facility]ACY  [facility] - [place], [place] - [address] [place] [address]      5 minutes for nursing intake (face to face time)     Belen Capone LPN              "

## 2018-05-04 NOTE — NURSING NOTE
"Oncology Rooming Note    May 4, 2018 2:44 PM   Hilda Potter is a 82 year old female who presents for:    Chief Complaint   Patient presents with     Oncology Clinic Visit     follow up     Initial Vitals: /85  Pulse 68  Temp 98.3  F (36.8  C)  Ht 1.613 m (5' 3.5\")  Wt 58.5 kg (129 lb)  LMP 10/01/1986  SpO2 99%  BMI 22.49 kg/m2 Estimated body mass index is 22.49 kg/(m^2) as calculated from the following:    Height as of this encounter: 1.613 m (5' 3.5\").    Weight as of this encounter: 58.5 kg (129 lb). Body surface area is 1.62 meters squared.  No Pain (0) Comment: Data Unavailable   Patient's last menstrual period was 10/01/1986.  Allergies reviewed: Yes  Medications reviewed: Yes    Medications: Medication refills not needed today.  Pharmacy name entered into [facility]:    [id number] - [place] [id number] [facility] ONCOLOGY PHARMACY  [facility] - [place], [place] - [address] [place] [address]        5 minutes for nursing intake (face to face time)     Belen Capone LPN              "

## 2018-05-04 NOTE — PROGRESS NOTES
Order for Nystatin shows discrepancy in 5 ml vs 10 ml - quantity would need to be changed if 10 ml quantity.  Dr. Roth advised to change to 10ml so quantity changed.  Pharmacy informed.

## 2018-05-07 ENCOUNTER — TELEPHONE (OUTPATIENT)
Dept: FAMILY MEDICINE | Facility: CLINIC | Age: 83
End: 2018-05-07

## 2018-05-07 DIAGNOSIS — Z12.11 SPECIAL SCREENING FOR MALIGNANT NEOPLASMS, COLON: ICD-10-CM

## 2018-05-07 DIAGNOSIS — K21.00 REFLUX ESOPHAGITIS: Primary | ICD-10-CM

## 2018-05-07 NOTE — TELEPHONE ENCOUNTER
Voice mail left for patient to call RN hotline at [phone].  Patient requesting order for colonoscopy and endoscopy, are there any symptoms that patient feels she needs endoscopy?   Marielle Oates RN

## 2018-05-07 NOTE — TELEPHONE ENCOUNTER
Reason for Call:  Other call back    Detailed comments: Patient is due for colonoscopy and would like to schedule at [facility] in [place]. Could she have an order for colonoscopy and endoscopy. Please call [facility] [phone] option 1 to call approval. Please when done.     Phone Number Patient can be reached at: Cell number on file:    Telephone Information:   Mobile [phone]       Best Time: Any    Can we leave a detailed message on this number? YES    Call taken on 5/7/2018 at 2:41 PM by Caitie Hart

## 2018-05-07 NOTE — TELEPHONE ENCOUNTER
Per patient, she had her last colonoscopy done over 5 years ago (2008?)  She stated that she is to have this completed once every 5 years and is overdue at this time  She would also like to have an EGD completed d/t worsening GERD symptoms since winter (cough, reflux at night, soreness in the esophagus and throat)  She has had EGD completed in the past for this.  Did not do well on Prilosec (had hives) and has been taking Zantac which is somewhat helpful    Had been in [place] for the winter but is back now and would like to get Colonoscopy and EGD ordered    Ankit Marquez RN

## 2018-05-08 NOTE — TELEPHONE ENCOUNTER
Call her. Referral has been placed at [facility] for colonoscopy and upper endo. She can all [phone].     MALCOLM RANGEL M.D.

## 2018-05-08 NOTE — TELEPHONE ENCOUNTER
Patient notified of providers message as written.  Patient would like to have this done at [facility] in [place].  Please fax GI referral.   Thanks  Marielle Oates RN

## 2018-05-16 NOTE — PATIENT INSTRUCTIONS
Before Your Surgery      Call your surgeon if there is any change in your health. This includes signs of a cold or flu (such as a sore throat, runny nose, cough, rash or fever).    Do not smoke, drink alcohol or take over the counter medicine (unless your surgeon or primary care doctor tells you to) for the 24 hours before and after surgery.    If you take prescribed drugs: Follow your doctor s orders about which medicines to take and which to stop until after surgery.    Eating and drinking prior to surgery: follow the instructions from your surgeon    Take a shower or bath the night before surgery. Use the soap your surgeon gave you to gently clean your skin. If you do not have soap from your surgeon, use your regular soap. Do not shave or scrub the surgery site.  Wear clean pajamas and have clean sheets on your bed.     [facility] ([facility])    If you have any questions regarding to your visit please contact your care team:       Team Purple:   Clinic Hours Telephone Number   Dr. Jessica Shelton   7am-7pm  Monday - Thursday   7am-5pm  Fridays  [phone]  (Appointment scheduling available 24/7)    Questions about your recent visit?   Team Line:  [phone]   Urgent Care - [place] and [facility] - 11am-9pm Monday-Friday Saturday-Sunday- 9am-5pm   [place] - 5pm-9pm Monday-Friday Saturday-Sunday- 9am-5pm  [phone] - [place]  [phone] - [place]       What options do I have for a visit other than an office visit? We offer electronic visits (e-visits) and telephone visits, when medically appropriate.  Please check with your medical insurance to see if these types of visits are covered, as you will be responsible for any charges that are not paid by your insurance.      You can use [employer] (secure electronic communication) to access to your chart, send your primary care provider a message, or make an appointment. Ask a team member how  to get started.     For a price quote for your services, please call our Consumer Price Line at [phone] or our Imaging Cost estimation line at [phone] (for imaging tests).

## 2018-05-16 NOTE — PROGRESS NOTES
[facility]  [address] [place] [id number]  [phone]  Dept: [phone]    PRE-OP EVALUATION:  Today's date: 2018    Hilda Potter (: 1935) presents for pre-operative evaluation assessment as requested by Dr. Watkins.  She requires evaluation and anesthesia risk assessment prior to undergoing surgery/procedure for treatment of Colonoscopy and Endoscopy .    Proposed Surgery/ Procedure: Colonoscopy and Endoscopy  Date of Surgery/ Procedure: 2018  Time of Surgery/ Procedure: 8:45am  Hospital/Surgical Facility: [facility]   Fax number for surgical facility:   Primary Physician: Jessica Rahman  Type of Anesthesia Anticipated: to be determined    Patient has a Health Care Directive or Living Will:  YES Living Will    1. NO - Do you have a history of heart attack, stroke, stent, bypass or surgery on an artery in the head, neck, heart or legs?  2. NO - Do you ever have any pain or discomfort in your chest?  3. NO - Do you have a history of  Heart Failure?  4. NO - Are you troubled by shortness of breath when: walking on the level, up a slight hill or at night?  5. NO - Do you currently have a cold, bronchitis or other respiratory infection?  6. NO - Do you have a cough, shortness of breath or wheezing?  7. NO - Do you sometimes get pains in the calves of your legs when you walk?  8. NO - Do you or anyone in your family have previous history of blood clots?  9. NO - Do you or does anyone in your family have a serious bleeding problem such as prolonged bleeding following surgeries or cuts?  10. NO - Have you ever had problems with anemia or been told to take iron pills?  11. NO - Have you had any abnormal blood loss such as black, tarry or bloody stools, or abnormal vaginal bleeding?  12. NO - Have you ever had a blood transfusion?  13. NO - Have you or any of your relatives ever had problems with anesthesia?  14. NO - Do you have sleep apnea, excessive snoring  or daytime drowsiness?  15. NO - Do you have any prosthetic heart valves?  16. NO - Do you have prosthetic joints?  17. NO - Is there any chance that you may be pregnant?      HPI: patient has family history of colon cancer. Also has had colon polyps. Patient also has difficulty swallowing and needs upper endoscopy.      HPI related to upcoming procedure: patient needs colonoscopy and upper endoscopy under sedation at [place] due to her age.       HYPERTENSION - Patient has longstanding history of HTN , currently denies any symptoms referable to elevated blood pressure. Specifically denies chest pain, palpitations, dyspnea, orthopnea, PND or peripheral edema. Blood pressure readings have been in normal range. Current medication regimen is as listed below. Patient denies any side effects of medication.                                                                                                                                                                                          .  HYPOTHYROIDISM - Patient has a longstanding history of chronic Hypothyroidism. Patient has been doing well, noting no tremor, insomnia, hair loss or changes in skin texture. Continues to take medications as directed, without adverse reactions or side effects. Last TSH   Lab Results   Component Value Date    TSH 1.90 10/03/2017   .                                                                                                                                                                                                                        .    MEDICAL HISTORY:     Patient Active Problem List    Diagnosis Date Noted     Combined forms of age-related cataract, mild-mod, both eyes 11/30/2017     Priority: Medium     Essential hypertension with goal blood pressure less than 140/90 10/03/2017     Priority: Medium     Hypothyroidism, unspecified type 09/16/2016     Priority: Medium     Dupuytren's contracture of right hand 11/09/2015      Priority: Medium     Lung nodule < 6cm on CT 05/15/2015     Priority: Medium     Compression fracture of L1 lumbar vertebra, seen on CT 04/01/2015     Priority: Medium     Premature beats 10/29/2014     Priority: Medium     Problem list name updated by automated process. 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  Document previously scanned on 4-1-13.  Validation form completed and sent to be scanned.  Code Status needs to be updated to reflect choices in most recent ACP document-full code.  Confirmed/documented designated decision maker(s). See permanent comments section of demographics in clinical tab. View document(s) and details by clicking on code status.   Added by Rhiannon Sales RN System ACP Coordinator on 4/3/2013.    Advance Care Planning 11/13/2017: ACP Review of Chart / Resources Provided:  Reviewed chart for advance care plan.  Hilda Potter has an advance care plan which needs to be updated. Patient states presence of new/updated ACP document. Copy requested  Added by Madyson Matthews                     Patient states has Advance Directive and will bring in a copy to clinic.        GERD (gastroesophageal reflux disease) 06/02/2011     Priority: Medium     CARDIOVASCULAR SCREENING; LDL GOAL LESS THAN 130 05/26/2011     Priority: Medium     Osteoporosis 10/01/2009     Priority: Medium     AR (allergic rhinitis)      Priority: Medium     Reflux Esophagitis      Priority: Medium     Atrophic vaginitis      Priority: Medium      Past Medical History:    Diagnosis Date     AR (allergic rhinitis)      Atrophic vaginitis      Compression fracture of L1 lumbar vertebra (H) 4/2015     HTN (hypertension)      Migraine      Nonsenile cataract      Osteoporosis      Reflux esophagitis      Thyroid disease      Past Surgical History:   Procedure Laterality Date     APPENDECTOMY       BLEPHAROPLASTY BILATERAL  10/2007    both eyes, upper lids     C LENGTHEN,TENDON,HAND/FINGER  1993    repair of dupytrons's contracture     COLONOSCOPY  6/02, 10/13    Q 5 years, polyps     D & C       HC REMOVAL GALLBLADDER       SALPINGO OOPHORECTOMY,R/L/LUIS DANIEL      left ovary and tube     SMALL BOWEL RESECTION  1986    colon resection      TONSILLECTOMY & ADENOIDECTOMY       TUBAL LIGATION       UPPER GI ENDOSCOPY  6/02, 8/11     Current Outpatient Prescriptions   Medication Sig Dispense Refill     calcium carbonate (OS-FIDENCIO 500 MG [place]. [place]) 500 MG tablet Take 500 mg by mouth 2 times daily       carboxymethylcellulose (REFRESH PLUS) 0.5 % SOLN Place 1 drop into both eyes 3 times daily as needed for dry eyes       cyclobenzaprine (FLEXERIL) 10 MG tablet Take 0.5-1 tablets (5-10 mg) by mouth 3 times daily as needed for muscle spasms 30 tablet 1     HYZAAR 50-12.5 MG per tablet Take 1 tablet by mouth daily 90 tablet 4     levothyroxine (SYNTHROID/LEVOTHROID) 50 MCG tablet Take 1 tablet (50 mcg) by mouth daily 90 tablet 4     MELATONIN PO Take 3 mg by mouth At Bedtime       Multiple Vitamin (MULTI-VITAMIN) per tablet Take 1 tablet by mouth daily.         nystatin (MYCOSTATIN) 652255 UNIT/ML suspension Take 5 mLs (500,000 Units) by mouth 4 times daily Swish and spit with 10 cc PO four times a day, for two weeks 560 mL 1     OTC products: None, except as noted above    Allergies   Allergen Reactions     Bisphosphonates      GI distress     Ivp Dye [Contrast Dye] Swelling     Lisinopril Cough     Losartan Hives     But can tolerate Hyzaar.      Morphine Sulfate Nausea and Vomiting     Prilosec  "[Omeprazole] Hives      Latex Allergy: NO    Social History   Substance Use Topics     Smoking status: Never Smoker     Smokeless tobacco: Never Used     Alcohol use Yes      Comment: wine      History   Drug Use No     Immunization History   Administered Date(s) Administered     FLU 6-35 months 10/19/2006     Flu, Unspecified 10/15/1996     Influenza (High Dose) 3 valent vaccine 11/17/2011, 10/24/2013, 09/24/2014, 10/26/2015, 10/03/2017     Influenza (IIV3) PF 10/26/2002, 10/11/2004, 10/14/2008, 10/01/2009, 10/24/2010, 08/30/2012, 08/30/2012     Influenza Vaccine IM 3yrs+ 4 Valent IIV4 09/16/2016     Pneumo Conj 13-V (2010&after) 10/27/2015     Pneumococcal 23 valent 01/20/2009     TD (ADULT, 7+) 07/10/2003     TDAP Vaccine (Adacel) 07/25/2013     Zoster vaccine, live 04/19/2012      REVIEW OF SYSTEMS:   CONSTITUTIONAL: NEGATIVE for fever, chills, change in weight  INTEGUMENTARY/SKIN: NEGATIVE for worrisome rashes, moles or lesions  EYES: NEGATIVE for vision changes or irritation  ENT/MOUTH: NEGATIVE for ear, mouth and throat problems  RESP: NEGATIVE for significant cough or SOB  BREAST: NEGATIVE for masses, tenderness or discharge  CV: NEGATIVE for chest pain, palpitations or peripheral edema  GI: NEGATIVE for nausea, abdominal pain, heartburn, or change in bowel habits  : NEGATIVE for frequency, dysuria, or hematuria  MUSCULOSKELETAL: NEGATIVE for significant arthralgias or myalgia  NEURO: NEGATIVE for weakness, dizziness or paresthesias  ENDOCRINE: NEGATIVE for temperature intolerance, skin/hair changes  HEME: NEGATIVE for bleeding problems  PSYCHIATRIC: NEGATIVE for changes in mood or affect    EXAM:   /68 (BP Location: Left arm, Cuff Size: Adult Regular)  Pulse 82  Temp 96.9  F (36.1  C) (Oral)  Resp 14  Ht 5' 3.78\" (1.62 m)  Wt 143 lb (64.9 kg)  LMP 10/01/1986  SpO2 95%  BMI 24.72 kg/m2    GENERAL APPEARANCE: healthy, alert and no distress     EYES: EOMI, PERRL     HENT: ear canals and TM's " normal and nose and mouth without ulcers or lesions     NECK: no adenopathy, no asymmetry, masses, or scars and thyroid normal to palpation     RESP: lungs clear to auscultation - no rales, rhonchi or wheezes     CV: regular rates and rhythm, normal S1 S2, no S3 or S4 and no murmur, click or rub     ABDOMEN:  soft, nontender, no HSM or masses and bowel sounds normal     MS: extremities normal- no gross deformities noted, no evidence of inflammation in joints, FROM in all extremities.     SKIN: no suspicious lesions or rashes     NEURO: Normal strength and tone, sensory exam grossly normal, mentation intact and speech normal     PSYCH: mentation appears normal. and affect normal/bright     LYMPHATICS: No cervical adenopathy    DIAGNOSTICS:   EKG: appears normal, NSR, normal axis, normal intervals, no acute ST/T changes c/w ischemia, no LVH by voltage criteria    Recent Labs   Lab Test  04/27/18   1351  11/07/17   1016   07/31/12   1140   HGB  12.5  12.9   < >  13.0   PLT  185  174   < >  165   INR   --    --    --   0.91   NA  142  140   < >   --    POTASSIUM  3.8  4.1   < >   --    CR  0.91  0.90   < >   --    A1C   --   5.6   --    --     < > = values in this interval not displayed.        IMPRESSION:   Reason for surgery/procedure: need for colonoscopy and upper endoscopy   Diagnosis/reason for consult: need for clearance and EKG    The proposed surgical procedure is considered LOW risk.    REVISED CARDIAC RISK INDEX  The patient has the following serious cardiovascular risks for perioperative complications such as (MI, PE, VFib and 3  AV Block):  No serious cardiac risks  INTERPRETATION: 0 risks: Class I (very low risk - 0.4% complication rate)    The patient has the following additional risks for perioperative complications:  No identified additional risks      ICD-10-CM    1. Preop general physical exam Z01.818 EKG 12-lead complete w/read - Clinics       RECOMMENDATIONS:     --Consult hospital rounder / IM to  assist post-op medical management    --Patient is to take all scheduled medications on the day of surgery EXCEPT for modifications listed below.    APPROVAL GIVEN to proceed with proposed procedure, without further diagnostic evaluation       Signed Electronically by: MALCOLM RANGEL MD    Copy of this evaluation report is provided to requesting physician.    [facility] Preop Guidelines    Revised Cardiac Risk Index

## 2018-05-29 ENCOUNTER — OFFICE VISIT (OUTPATIENT)
Dept: FAMILY MEDICINE | Facility: CLINIC | Age: 83
End: 2018-05-29
Payer: MEDICARE

## 2018-05-29 VITALS
OXYGEN SATURATION: 95 % | DIASTOLIC BLOOD PRESSURE: 68 MMHG | SYSTOLIC BLOOD PRESSURE: 124 MMHG | HEIGHT: 64 IN | RESPIRATION RATE: 14 BRPM | HEART RATE: 82 BPM | BODY MASS INDEX: 24.41 KG/M2 | TEMPERATURE: 96.9 F | WEIGHT: 143 LBS

## 2018-05-29 DIAGNOSIS — Z01.818 PREOP GENERAL PHYSICAL EXAM: Primary | ICD-10-CM

## 2018-05-29 PROCEDURE — 99214 OFFICE O/P EST MOD 30 MIN: CPT | Performed by: FAMILY MEDICINE

## 2018-05-29 PROCEDURE — 93000 ELECTROCARDIOGRAM COMPLETE: CPT | Performed by: FAMILY MEDICINE

## 2018-05-29 NOTE — MR AVS SNAPSHOT
After Visit Summary   5/29/2018    Hilda Potter    MRN: [id number]           Patient Information     Date Of Birth          1935        Visit Information        Provider Department      5/29/2018 8:30 AM Jessica Rahman MD [facility]        Today's Diagnoses     Preop general physical exam    -  1      Care Instructions      Before Your Surgery      Call your surgeon if there is any change in your health. This includes signs of a cold or flu (such as a sore throat, runny nose, cough, rash or fever).    Do not smoke, drink alcohol or take over the counter medicine (unless your surgeon or primary care doctor tells you to) for the 24 hours before and after surgery.    If you take prescribed drugs: Follow your doctor s orders about which medicines to take and which to stop until after surgery.    Eating and drinking prior to surgery: follow the instructions from your surgeon    Take a shower or bath the night before surgery. Use the soap your surgeon gave you to gently clean your skin. If you do not have soap from your surgeon, use your regular soap. Do not shave or scrub the surgery site.  Wear clean pajamas and have clean sheets on your bed.     [facility]    If you have any questions regarding to your visit please contact your care team:       Team Purple:   Clinic Hours Telephone Number   Dr. Jessica Shelton   7am-7pm  Monday - Thursday   7am-5pm  Fridays  [phone]  (Appointment scheduling available 24/7)    Questions about your recent visit?   Team Line:  [phone]   Urgent Care - [place] and [place] - 11am-9pm Monday-Friday Saturday-Sunday- 9am-5pm   [place] - 5pm-9pm Monday-Friday Saturday-Sunday- 9am-5pm  [phone] - [place]  [phone] - [place]       What options do I have for a visit other than an office visit? We offer electronic visits (e-visits) and telephone visits,  when medically appropriate.  Please check with your medical insurance to see if these types of visits are covered, as you will be responsible for any charges that are not paid by your insurance.      You can use Jive Bike (secure electronic communication) to access to your chart, send your primary care provider a message, or make an appointment. Ask a team member how to get started.     For a price quote for your services, please call our Consumer Price Line at [phone] or our Imaging Cost estimation line at [phone] (for imaging tests).              Follow-ups after your visit        Your next 10 appointments already scheduled     Nov 06, 2018 11:45 AM CST   LAB with LAB ONC [facility] ([facility])    [address] [place] [id number] [phone]           Please do not eat 10-12 hours before your appointment if you are coming in fasting for labs on lipids, cholesterol, or glucose (sugar). This does not apply to pregnant women. Water, hot tea and black coffee (with nothing added) are okay. Do not drink other fluids, diet soda or chew gum.            Nov 06, 2018 12:30 PM CST   Return Visit with Jane Roth MD   [facility] ([facility]    [address] [place] [id number] [phone]              Who to contact     If you have questions or need follow up information about today's clinic visit or your schedule please contact [facility] directly at [phone].  Normal or non-critical lab and imaging results will be communicated to you by Adyoulikehart, letter or phone within 4 business days after the clinic has received the results. If you do not hear from us within 7 days, please contact the clinic through Adyoulikehart or phone. If you have a critical or abnormal lab result, we will notify you by phone as soon as possible.  Submit refill requests through Jive Bike or call your pharmacy and  "they will forward the refill request to us. Please allow 3 business days for your refill to be completed.          Additional Information About Your Visit        MyChart Information     [employer] lets you send messages to your doctor, view your test results, renew your prescriptions, schedule appointments and more. To sign up, go to [email or web address] . Click on \"Log in\" on the left side of the screen, which will take you to the Welcome page. Then click on \"Sign up Now\" on the right side of the page.     You will be asked to enter the access code listed below, as well as some personal information. Please follow the directions to create your username and password.     Your access code is: [email or web address]  Expires: 2018  3:29 PM     Your access code will  in 90 days. If you need help or a new code, please call your [place] clinic or [phone].        Care EveryWhere ID     This is your [facility] EveryWhere ID. This could be used by other organizations to access your [place] medical records  [id number]        Your Vitals Were     Pulse Temperature Respirations Height Last Period Pulse Oximetry    82 96.9  F (36.1  C) (Oral) 14 5' 3.78\" (1.62 m) 10/01/1986 95%    BMI (Body Mass Index)                   24.72 kg/m2            Blood Pressure from Last 3 Encounters:   18 124/68   18 155/73   17 124/64    Weight from Last 3 Encounters:   18 143 lb (64.9 kg)   18 143 lb (64.9 kg)   17 145 lb 9.6 oz (66 kg)              We Performed the Following     EKG 12-lead complete w/read - Clinics        Primary Care Provider Office Phone # Fax #    Jessica Rahman [phone] [address] [facility] [address]        Equal Access to Services     JAY ORTEGA : berny Araiza, jj mehta. [facility] [phone].    ATENCIÓN: Si valerio holden a ayala " disposición servicios gratuitos de asistencia lingüística. Abdon devine [phone].    We comply with applicable federal civil rights laws and [place] laws. We do not discriminate on the basis of race, color, national origin, age, disability, sex, sexual orientation, or gender identity.            Thank you!     Thank you for choosing [facility]  for your care. Our goal is always to provide you with excellent care. Hearing back from our patients is one way we can continue to improve our services. Please take a few minutes to complete the written survey that you may receive in the mail after your visit with us. Thank you!             Your Updated Medication List - Protect others around you: Learn how to safely use, store and throw away your medicines at www.disposemymeds.org.          This list is accurate as of 5/29/18  9:08 AM.  Always use your most recent med list.                   Brand Name Dispense Instructions for use Diagnosis    calcium carbonate 500 MG tablet    OS-FIDENCIO 500 mg [place]. 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)       carboxymethylcellulose 0.5 % Soln ophthalmic solution    REFRESH PLUS     Place 1 drop into both eyes 3 times daily as needed for dry eyes        cyclobenzaprine 10 MG tablet    FLEXERIL    30 tablet    Take 0.5-1 tablets (5-10 mg) by mouth 3 times daily as needed for muscle spasms    Acute bilateral low back pain with left-sided sciatica       HYZAAR 50-12.5 MG per tablet   Generic drug:  losartan-hydrochlorothiazide     90 tablet    Take 1 tablet by mouth daily    Essential hypertension with goal blood pressure less than 140/90       levothyroxine 50 MCG tablet    SYNTHROID/LEVOTHROID    90 tablet    Take 1 tablet (50 mcg) by mouth daily    Hypothyroidism, unspecified type       MELATONIN PO      Take 3 mg by mouth At Bedtime        Multi-vitamin Tabs tablet   Generic drug:  multivitamin, therapeutic with  minerals      Take 1 tablet by mouth daily.        nystatin 924652 UNIT/ML suspension    MYCOSTATIN    560 mL    Take 5 mLs (500,000 Units) by mouth 4 times daily Swish and spit with 10 cc PO four times a day, for two weeks    Oral thrush

## 2018-05-30 ENCOUNTER — TELEPHONE (OUTPATIENT)
Dept: FAMILY MEDICINE | Facility: CLINIC | Age: 83
End: 2018-05-30

## 2018-05-30 NOTE — TELEPHONE ENCOUNTER
Patient left VM on RN hotline asking for call back regarding shingles rx.    Nabila Zamora, RN, BSN, PHN

## 2018-05-30 NOTE — TELEPHONE ENCOUNTER
RN called patient:   Patient has Shingrex vaccine on Left arm 2 days ago per PCP advice and patient is not having extreme left leg and hip pain where she is having to walk with walker. Patient is taking tylenol with mild relief.   Patient asked to be seen by PCP tomorrow 5/31 around 11 am because  will be at clinic.   Appointment scheduled to only opening with PCP at 6:15pm 5/31.   Please advise if PCP is able to see patient sooner.    Below information from CDC given to patient about shingrex vaccine.  Studies show that Shingrix is safe. The vaccine helps your body create a strong defense against shingles. As a result, you are likely to have temporary side effects from getting the shots. The side effects may affect your ability to do normal daily activities for 2 to 3 days.  Most people got a sore arm with mild or moderate pain after getting Shingrix, and some also had redness and swelling where they got the shot. Some people felt tired, had muscle pain, a headache, shivering, fever, stomach pain, or nausea. About 1 out of 6 people who got Shingrix experienced side effects that prevented them from doing regular activities. Symptoms went away on their own in about 2 to 3 days. Side effects were more common in younger people.  You might have a reaction to the first or second dose of Shingrix, or both doses. If you experience side effects, you may choose to take over-the-counter pain medicine such as ibuprofen or acetaminophen.  Severe allergic reactions to any vaccine are very rare. Signs of a severe allergic reaction can include hives, swelling of the face and throat, difficulty breathing, a fast heartbeat, dizziness, and weakness. These would start a few minutes to a few hours after the vaccination. If you have a severe allergic reaction or other emergency that can t wait, call 9-1-1 or go to the nearest hospital. Otherwise, call your doctor.    Nabila Zamora RN, BSN, PHN

## 2018-05-30 NOTE — TELEPHONE ENCOUNTER
Patient left 2nd message on RN hotline to return call regarding shingles rx.    Nabila Zamora, RN, BSN, PHN

## 2018-05-30 NOTE — TELEPHONE ENCOUNTER
Reason for Call:  Other call back    Detailed comments:  Patient calling. She went to [facility] yesterday and had a shingles shot. She thinks that she is having a reaction. Please call her.     Phone Number Patient can be reached at: Cell number on file:    Telephone Information:   Mobile [phone]       Best Time:  Any     Can we leave a detailed message on this number? YES    Call taken on 5/30/2018 at 1:07 PM by Faye Corrales

## 2018-05-30 NOTE — TELEPHONE ENCOUNTER
Left message on voicemail for patient to return call to RN hotline at # [phone].  Ankit Marquez RN

## 2018-05-31 ENCOUNTER — OFFICE VISIT (OUTPATIENT)
Dept: FAMILY MEDICINE | Facility: CLINIC | Age: 83
End: 2018-05-31
Payer: MEDICARE

## 2018-05-31 VITALS
WEIGHT: 144.8 LBS | RESPIRATION RATE: 14 BRPM | HEART RATE: 89 BPM | TEMPERATURE: 97.6 F | BODY MASS INDEX: 24.72 KG/M2 | SYSTOLIC BLOOD PRESSURE: 140 MMHG | OXYGEN SATURATION: 98 % | DIASTOLIC BLOOD PRESSURE: 72 MMHG | HEIGHT: 64 IN

## 2018-05-31 DIAGNOSIS — M70.62 TROCHANTERIC BURSITIS OF LEFT HIP: Primary | ICD-10-CM

## 2018-05-31 PROCEDURE — 99213 OFFICE O/P EST LOW 20 MIN: CPT | Performed by: FAMILY MEDICINE

## 2018-05-31 ASSESSMENT — PAIN SCALES - GENERAL: PAINLEVEL: EXTREME PAIN (8)

## 2018-05-31 NOTE — MR AVS SNAPSHOT
After Visit Summary   5/31/2018    Hilda Potter    MRN: [id number]           Patient Information     Date Of Birth          1935        Visit Information        Provider Department      5/31/2018 6:15 PM Jessica Rahman MD [facility]        Today's Diagnoses     Trochanteric bursitis of left hip    -  1      Care Instructions    [facility] (formerly [facility])    If you have any questions regarding to your visit please contact your care team:       Team Purple:   Clinic Hours Telephone Number   Dr. Jessica Shelton   7am-7pm  Monday - Thursday   7am-5pm  Fridays  [phone]  (Appointment scheduling available 24/7)    Questions about your recent visit?   Team Line:  [phone]   Urgent Care - [place] and [facility] - 11am-9pm Monday-Friday Saturday-Sunday- 9am-5pm   [place] - 5pm-9pm Monday-Friday Saturday-Sunday- 9am-5pm  [phone] - [place]  [phone] [facility]       What options do I have for a visit other than an office visit? We offer electronic visits (e-visits) and telephone visits, when medically appropriate.  Please check with your medical insurance to see if these types of visits are covered, as you will be responsible for any charges that are not paid by your insurance.      You can use [employer] (secure electronic communication) to access to your chart, send your primary care provider a message, or make an appointment. Ask a team member how to get started.     For a price quote for your services, please call our Consumer Price Line at [phone] or our Imaging Cost estimation line at [phone] (for imaging tests).              Follow-ups after your visit        Your next 10 appointments already scheduled     Nov 06, 2018 11:45 AM CST   LAB with LAB ONC [facility] ([facility] [[email or web address]    [id number] [place] [id number] [phone]         "   Please do not eat 10-12 hours before your appointment if you are coming in fasting for labs on lipids, cholesterol, or glucose (sugar). This does not apply to pregnant women. Water, hot tea and black coffee (with nothing added) are okay. Do not drink other fluids, diet soda or chew gum.            Nov 06, 2018 12:30 PM CST   [facility]    [address] [place] [id number] [phone]            Dec 03, 2018 10:00 AM CST   New Visit with Andi Bay MD   [facility] ([facility])    [address] [place] [id number] [phone]              Who to contact     If you have questions or need follow up information about today's clinic visit or your schedule please contact [facility] directly at [phone].  Normal or non-critical lab and imaging results will be communicated to you by [employer], letter or phone within 4 business days after the clinic has received the results. If you do not hear from us within 7 days, please contact the clinic through MyChart or phone. If you have a critical or abnormal lab result, we will notify you by phone as soon as possible.  Submit refill requests through [employer] or call your pharmacy and they will forward the refill request to us. Please allow 3 business days for your refill to be completed.          Additional Information About Your Visit        [employer] Information     [employer] lets you send messages to your doctor, view your test results, renew your prescriptions, schedule appointments and more. To sign up, go to [email or web address] . Click on \"Log in\" on the left side of the screen, which will take you to the Welcome page. Then click on \"Sign up Now\" on the right side of the page.     You will be asked to enter the access code listed below, as well as some personal information. Please follow the directions to " "create your username and password.     Your access code is: [email or web address]  Expires: 2018  3:29 PM     Your access code will  in 90 days. If you need help or a new code, please call your [facility] clinic or [phone].        Care EveryWhere ID     This is your Care EveryWhere ID. This could be used by other organizations to access your [facility] medical records  [id number]        Your Vitals Were     Pulse Temperature Respirations Height Last Period Pulse Oximetry    89 97.6  F (36.4  C) (Oral) 14 5' 3.78\" (1.62 m) 10/01/1986 98%    BMI (Body Mass Index)                   25.03 kg/m2            Blood Pressure from Last 3 Encounters:   18 140/72   18 124/68   18 155/73    Weight from Last 3 Encounters:   18 144 lb 12.8 oz (65.7 kg)   18 143 lb (64.9 kg)   18 143 lb (64.9 kg)              Today, you had the following     No orders found for display       Primary Care Provider Office Phone # Fax #    Jessica Rahman [phone]       [address] [facility] [address]        Equal Access to Services     [facility]: Hadii vaishnavi ku hadasho Soomaali, waaxda luqadaha, qaybta kaalmada adeegyada, jj ochoa haymayran misbah burgos . So [facility] [phone].    ATENCIÓN: Si habla español, tiene a ayala disposición servicios gratuitos de asistencia lingüística. Llame al [phone].    We comply with applicable federal civil rights laws and [place] laws. We do not discriminate on the basis of race, color, national origin, age, disability, sex, sexual orientation, or gender identity.            Thank you!     Thank you for choosing [facility]  for your care. Our goal is always to provide you with excellent care. Hearing back from our patients is one way we can continue to improve our services. Please take a few minutes to complete the written survey that you may receive in the mail after your visit with us. Thank you!           "   Your Updated Medication List - Protect others around you: Learn how to safely use, store and throw away your medicines at www.disposemymeds.org.          This list is accurate as of 5/31/18  6:31 PM.  Always use your most recent med list.                   Brand Name Dispense Instructions for use Diagnosis    calcium carbonate 500 MG tablet    OS-FIDENCIO 500 mg [place]. 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)       carboxymethylcellulose 0.5 % Soln ophthalmic solution    REFRESH PLUS     Place 1 drop into both eyes 3 times daily as needed for dry eyes        cyclobenzaprine 10 MG tablet    FLEXERIL    30 tablet    Take 0.5-1 tablets (5-10 mg) by mouth 3 times daily as needed for muscle spasms    Acute bilateral low back pain with left-sided sciatica       HYZAAR 50-12.5 MG per tablet   Generic drug:  losartan-hydrochlorothiazide     90 tablet    Take 1 tablet by mouth daily    Essential hypertension with goal blood pressure less than 140/90       levothyroxine 50 MCG tablet    SYNTHROID/LEVOTHROID    90 tablet    Take 1 tablet (50 mcg) by mouth daily    Hypothyroidism, unspecified type       MELATONIN PO      Take 3 mg by mouth At Bedtime        Multi-vitamin Tabs tablet   Generic drug:  multivitamin, therapeutic with minerals      Take 1 tablet by mouth daily.        nystatin 975653 UNIT/ML suspension    MYCOSTATIN    560 mL    Take 5 mLs (500,000 Units) by mouth 4 times daily Swish and spit with 10 cc PO four times a day, for two weeks    Oral thrush

## 2018-05-31 NOTE — TELEPHONE ENCOUNTER
Patient notified of providers message as written.  Patient was told by pharmacy this is a severe reaction so patient would like to keep visit scheduled with PCP today.   Marielle Oates RN

## 2018-05-31 NOTE — PROGRESS NOTES
SUBJECTIVE:   Hilda Potter is a 82 year old female who presents to clinic today for the following health issues:      Patient presents with:  Medication Problem: received a Shingrix shot on 05/29/2018 @ [facility] and after receiving Shingrix starting having extreme left hip pain   Patient Request: would like to get an x-ray of left hip and if it will be chronic              Problem list and histories reviewed & adjusted, as indicated.  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)     Premature beats     Premature atrial beats     Compression fracture of L1 lumbar vertebra, seen on CT     Lung nodule < 6cm on CT     Dupuytren's contracture of right hand     Hypothyroidism, unspecified type     Essential hypertension with goal blood pressure less than 140/90     Combined forms of age-related cataract, mild-mod, both eyes     Past Surgical History:   Procedure Laterality Date     APPENDECTOMY       BLEPHAROPLASTY BILATERAL  10/2007    both eyes, upper lids     C LENGTHEN,TENDON,HAND/FINGER  1993    repair of dupytrons's contracture     COLONOSCOPY  6/02, 10/13    Q 5 years, polyps     D & C       HC REMOVAL GALLBLADDER       SALPINGO OOPHORECTOMY,R/L/LUIS DANIEL      left ovary and tube     SMALL BOWEL RESECTION  1986    colon resection      TONSILLECTOMY & ADENOIDECTOMY       TUBAL LIGATION       UPPER GI ENDOSCOPY  6/02, 8/11       Social History   Substance Use Topics     Smoking status: Never Smoker     Smokeless tobacco: Never Used     Alcohol use Yes      Comment: wine      Family History   Problem Relation Age of Onset      Hypertension Mother      Arthritis Mother      Cardiovascular Mother      Glaucoma Mother      C.A.D. Father      Hypertension Father      CEREBROVASCULAR DISEASE Father      Arthritis Father      Cardiovascular Father      Eye Disorder Father      HEART DISEASE Father      Glaucoma Father      Cardiovascular Brother          Current Outpatient Prescriptions   Medication Sig Dispense Refill     calcium carbonate (OS-FIDENCIO 500 MG [place]. CA) 500 MG tablet Take 500 mg by mouth 2 times daily       carboxymethylcellulose (REFRESH PLUS) 0.5 % SOLN Place 1 drop into both eyes 3 times daily as needed for dry eyes       cyclobenzaprine (FLEXERIL) 10 MG tablet Take 0.5-1 tablets (5-10 mg) by mouth 3 times daily as needed for muscle spasms 30 tablet 1     HYZAAR 50-12.5 MG per tablet Take 1 tablet by mouth daily 90 tablet 4     levothyroxine (SYNTHROID/LEVOTHROID) 50 MCG tablet Take 1 tablet (50 mcg) by mouth daily 90 tablet 4     MELATONIN PO Take 3 mg by mouth At Bedtime       Multiple Vitamin (MULTI-VITAMIN) per tablet Take 1 tablet by mouth daily.         nystatin (MYCOSTATIN) 101871 UNIT/ML suspension Take 5 mLs (500,000 Units) by mouth 4 times daily Swish and spit with 10 cc PO four times a day, for two weeks 560 mL 1     Allergies   Allergen Reactions     Bisphosphonates      GI distress     Ivp Dye [Contrast Dye] Swelling     Lisinopril Cough     Losartan Hives     But can tolerate Hyzaar.      Morphine Sulfate Nausea and Vomiting     Prilosec [Omeprazole] Hives     BP Readings from Last 3 Encounters:   05/31/18 140/72   05/29/18 124/68   05/04/18 155/73    Wt Readings from Last 3 Encounters:   05/31/18 144 lb 12.8 oz (65.7 kg)   05/29/18 143 lb (64.9 kg)   05/04/18 143 lb (64.9 kg)                  Labs reviewed in EPIC    Reviewed and updated as needed this visit by clinical staff  Tobacco  Allergies  Meds  Med Hx  Surg Hx  Fam Hx  Soc Hx      Reviewed and updated as needed this visit by Provider         ROS:   "This 82 year old female is here today because I saw her yesterday and encouraged her to get shingrix at her pharmacy, which she did, in her left deltoid about 1 p.m. She developed severe pain in her left lateral hip about 5:30 last night and it got so severe she had to use her mom's old walker in order to walk around. Very painful to lie on her left side. Her pain has gotten better throughout the day but she wonders if she needs an x-ray. She has no fevers. She called her pharmacist who appreciated the information. All other review of systems are negative  Personal, family, and social history reviewed with patient and revised.         OBJECTIVE:     /72  Pulse 89  Temp 97.6  F (36.4  C) (Oral)  Resp 14  Ht 5' 3.78\" (1.62 m)  Wt 144 lb 12.8 oz (65.7 kg)  LMP 10/01/1986  SpO2 98%  BMI 25.03 kg/m2  Body mass index is 25.03 kg/(m^2).  Patient is point tender over her left lateral hip bursa sac.   No pain over her left sacroiliac joint.   She is able to move her left hip well and bear weight  No signs of any infection      Diagnostic Test Results:  none     ASSESSMENT/PLAN:              1. Trochanteric bursitis of left hip  As above, this is most likely an inflammatory reaction to her shingrix. Reassured her. No x-ray needed. Recommend salon pas patch or topical aspercreme.       Return to clinic if no improvement     MALCOLM RANGEL MD  [facility]  "

## 2018-05-31 NOTE — TELEPHONE ENCOUNTER
"Call her. Yes, in some patients, they can feel \"flu like\" symptoms for about 24 hours but that will pass. Continue with rest and tylenol. Reassure her that this will not be permanent.   MALCOLM RANGEL M.D.    "

## 2018-05-31 NOTE — PATIENT INSTRUCTIONS
[facility]    If you have any questions regarding to your visit please contact your care team:       Team Purple:   Clinic Hours Telephone Number   Dr. Jessica Shelton   7am-7pm  Monday - Thursday   7am-5pm  Fridays  [phone]  (Appointment scheduling available 24/7)    Questions about your recent visit?   Team Line:  [phone]   Urgent Care - [place] and [facility] - 11am-9pm Monday-Friday Saturday-Sunday- 9am-5pm   [place] - 5pm-9pm Monday-Friday Saturday-Sunday- 9am-5pm  [phone] - [place]  [phone] [facility]       What options do I have for a visit other than an office visit? We offer electronic visits (e-visits) and telephone visits, when medically appropriate.  Please check with your medical insurance to see if these types of visits are covered, as you will be responsible for any charges that are not paid by your insurance.      You can use [employer] (secure electronic communication) to access to your chart, send your primary care provider a message, or make an appointment. Ask a team member how to get started.     For a price quote for your services, please call our Consumer Price Line at [phone] or our Imaging Cost estimation line at [phone] (for imaging tests).

## 2018-06-12 ENCOUNTER — TRANSFERRED RECORDS (OUTPATIENT)
Dept: HEALTH INFORMATION MANAGEMENT | Facility: CLINIC | Age: 83
End: 2018-06-12

## 2018-06-26 ENCOUNTER — TELEPHONE (OUTPATIENT)
Dept: FAMILY MEDICINE | Facility: CLINIC | Age: 83
End: 2018-06-26

## 2018-06-26 NOTE — TELEPHONE ENCOUNTER
Reason for Call:  SAM    Detailed comments: Patient left a voice mail on the surgery line, patient would like you to know she had her colonoscopy/endoscopy done last month at [facility]. SAM    Phone Number Patient can be reached at: Home number on file [phone] (home)    Best Time: any    Can we leave a detailed message on this number? YES    Call taken on 6/26/2018 at 2:39 PM by Yovana Mckeon

## 2018-07-10 NOTE — TELEPHONE ENCOUNTER
Called [facility] and asked for [facility] to fax over colonoscopy and endoscopy results to clinic.    Kelli Flood MA

## 2018-07-10 NOTE — TELEPHONE ENCOUNTER
Received fax from [facility] and send to abstract.  Kelli Flood MA

## 2018-08-27 NOTE — PROGRESS NOTES
SUBJECTIVE:   Hilda Potter is a 83 year old female who presents to clinic today for the following health issues:      Patient presents with:  Headache: Migraine Issues  Flu Shot  Referral: Neurologist              Problem list and histories reviewed & adjusted, as indicated.  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)     Premature beats     Premature atrial beats     Compression fracture of L1 lumbar vertebra, seen on CT     Lung nodule < 6cm on CT     Dupuytren's contracture of right hand     Hypothyroidism, unspecified type     Essential hypertension with goal blood pressure less than 140/90     Combined forms of age-related cataract, mild-mod, both eyes     Migraine without status migrainosus, not intractable, unspecified migraine type     Past Surgical History:   Procedure Laterality Date     APPENDECTOMY       BLEPHAROPLASTY BILATERAL  10/2007    both eyes, upper lids     C LENGTHEN,TENDON,HAND/FINGER  1993    repair of dupytrons's contracture     COLONOSCOPY  6/02, 10/13    Q 5 years, polyps     D & C       HC REMOVAL GALLBLADDER       SALPINGO OOPHORECTOMY,R/L/LUIS DANIEL      left ovary and tube     SMALL BOWEL RESECTION  1986    colon resection      TONSILLECTOMY & ADENOIDECTOMY       TUBAL LIGATION       UPPER GI ENDOSCOPY  6/02, 8/11       Social History   Substance Use Topics     Smoking status: Never Smoker     Smokeless tobacco: Never Used     Alcohol use Yes      Comment: wine      Family History   Problem Relation Age of Onset     Hypertension Mother      Arthritis Mother      Cardiovascular Mother      Glaucoma Mother       DESHAUN Father      Hypertension Father      Cerebrovascular Disease Father      Arthritis Father      Cardiovascular Father      Eye Disorder Father      HEART DISEASE Father      Glaucoma Father      Cardiovascular Brother          Current Outpatient Prescriptions   Medication Sig Dispense Refill     calcium carbonate (OS-FIDENCIO 500 MG [place]. CA) 500 MG tablet Take 500 mg by mouth daily        carboxymethylcellulose (REFRESH PLUS) 0.5 % SOLN Place 1 drop into both eyes 3 times daily as needed for dry eyes       HYZAAR 50-12.5 MG per tablet Take 1 tablet by mouth daily 90 tablet 4     levothyroxine (SYNTHROID/LEVOTHROID) 50 MCG tablet Take 1 tablet (50 mcg) by mouth daily 90 tablet 4     MELATONIN PO Take 3 mg by mouth nightly as needed        metoprolol succinate (TOPROL-XL) 25 MG 24 hr tablet Take 1 tablet (25 mg) by mouth daily 90 tablet 3     Multiple Vitamin (MULTI-VITAMIN) per tablet Take 1 tablet by mouth daily.         [DISCONTINUED] HYZAAR 50-12.5 MG per tablet Take 1 tablet by mouth daily 90 tablet 4     [DISCONTINUED] levothyroxine (SYNTHROID/LEVOTHROID) 50 MCG tablet Take 1 tablet (50 mcg) by mouth daily 90 tablet 4     Allergies   Allergen Reactions     Bisphosphonates      GI distress     Ivp Dye [Contrast Dye] Swelling     Lisinopril Cough     Losartan Hives     But can tolerate Hyzaar.      Morphine Sulfate Nausea and Vomiting     Prilosec [Omeprazole] Hives     BP Readings from Last 3 Encounters:   09/10/18 132/60   05/31/18 140/72   05/29/18 124/68    Wt Readings from Last 3 Encounters:   09/10/18 140 lb 9.6 oz (63.8 kg)   05/31/18 144 lb 12.8 oz (65.7 kg)   05/29/18 143 lb (64.9 kg)                  Labs reviewed in EPIC    Reviewed and updated as needed this visit by clinical staff       Reviewed and updated as needed this visit by Provider         ROS:  This 83 year old female is here today because she has a history of migraines in the past. Started in high school. Then none for 20 -30 years. Then  "started having migraines about once a year while she was a . Usually 2 excedrin migraine and sleeping in a dark room for 2 hours will take the headache away. Unfortunately, she had a migraine on her trip to [place] this summer. Starts with an aura of blurred vision. No nausea. 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 Seeing a chiropractor for that. Her daughter wants patient to see a neurologist. All other review of systems are negative  Personal, family, and social history reviewed with patient and revised.         OBJECTIVE:     /60  Pulse 89  Temp 98.6  F (37  C) (Oral)  Resp 12  Ht 5' 3.78\" (1.62 m)  Wt 140 lb 9.6 oz (63.8 kg)  LMP 10/01/1986  SpO2 96%  BMI 24.3 kg/m2  Body mass index is 24.3 kg/(m^2).  GENERAL: healthy, alert and no distress  NECK: no adenopathy, no asymmetry, masses, or scars and thyroid normal to palpation  RESP: lungs clear to auscultation - no rales, rhonchi or wheezes  CV: regular rate and rhythm, normal S1 S2, no S3 or S4, no murmur, click or rub, no peripheral edema and peripheral pulses strong  MS: no gross musculoskeletal defects noted, no edema  Well hydrated  Well nourished  Well groomed  Alert and oriented X 3  Good spirits  Brisk gait with no shortness of breath   Diagnostic Test Results:  none     ASSESSMENT/PLAN:              1. Essential hypertension with goal blood pressure less than 140/90  Fair control. I will add a low dose beta blocker to see if that brings her blood pressure down and also prevents her migraines   - HYZAAR 50-12.5 MG per tablet; Take 1 tablet by mouth daily  Dispense: 90 tablet; Refill: 4  - metoprolol succinate (TOPROL-XL) 25 MG 24 hr tablet; Take 1 tablet (25 mg) by mouth daily  Dispense: 90 tablet; Refill: 3    2. Hypothyroidism, unspecified type  Good control   - levothyroxine (SYNTHROID/LEVOTHROID) 50 MCG tablet; Take 1 tablet (50 mcg) by mouth daily  Dispense: 90 tablet; Refill: 4    3. Need for " prophylactic vaccination and inoculation against influenza  due  - FLU VACCINE, INCREASED ANTIGEN, PRESV FREE, AGE 65+ [06881]  - ADMIN INFLUENZA (For MEDICARE Patients ONLY) []    4. Migraine without status migrainosus, not intractable, unspecified migraine type  As above. If low dose beta blocker doesn't help to prevent her migraines, I will try low dose verapamil and then refer her to Dr. Iliana Cooper  in [place]   - metoprolol succinate (TOPROL-XL) 25 MG 24 hr tablet; Take 1 tablet (25 mg) by mouth daily  Dispense: 90 tablet; Refill: 3    Return to clinic if no improvement     MALCOLM RANGEL MD  [facility]    Injectable Influenza Immunization Documentation    1.  Is the person to be vaccinated sick today?   No    2. Does the person to be vaccinated have an allergy to a component   of the vaccine?   No  Egg Allergy Algorithm Link    3. Has the person to be vaccinated ever had a serious reaction   to influenza vaccine in the past?   No    4. Has the person to be vaccinated ever had Guillain-Barré syndrome?   No    Form completed by Kelli Flood MA

## 2018-09-10 ENCOUNTER — OFFICE VISIT (OUTPATIENT)
Dept: FAMILY MEDICINE | Facility: CLINIC | Age: 83
End: 2018-09-10
Payer: MEDICARE

## 2018-09-10 VITALS
OXYGEN SATURATION: 96 % | HEIGHT: 64 IN | TEMPERATURE: 98.6 F | BODY MASS INDEX: 24.01 KG/M2 | DIASTOLIC BLOOD PRESSURE: 60 MMHG | RESPIRATION RATE: 12 BRPM | WEIGHT: 140.6 LBS | SYSTOLIC BLOOD PRESSURE: 132 MMHG | HEART RATE: 89 BPM

## 2018-09-10 DIAGNOSIS — E03.9 HYPOTHYROIDISM, UNSPECIFIED TYPE: ICD-10-CM

## 2018-09-10 DIAGNOSIS — G43.909 MIGRAINE WITHOUT STATUS MIGRAINOSUS, NOT INTRACTABLE, UNSPECIFIED MIGRAINE TYPE: ICD-10-CM

## 2018-09-10 DIAGNOSIS — I10 ESSENTIAL HYPERTENSION WITH GOAL BLOOD PRESSURE LESS THAN 140/90: Primary | ICD-10-CM

## 2018-09-10 DIAGNOSIS — Z23 NEED FOR PROPHYLACTIC VACCINATION AND INOCULATION AGAINST INFLUENZA: ICD-10-CM

## 2018-09-10 PROCEDURE — 90662 IIV NO PRSV INCREASED AG IM: CPT | Performed by: FAMILY MEDICINE

## 2018-09-10 PROCEDURE — G0008 ADMIN INFLUENZA VIRUS VAC: HCPCS | Performed by: FAMILY MEDICINE

## 2018-09-10 PROCEDURE — 99213 OFFICE O/P EST LOW 20 MIN: CPT | Mod: 25 | Performed by: FAMILY MEDICINE

## 2018-09-10 RX ORDER — LOSARTAN/HYDROCHLOROTHIAZIDE 50-12.5 MG
1 TABLET ORAL DAILY
Qty: 90 TABLET | Refills: 4 | Status: SHIPPED | OUTPATIENT
Start: 2018-09-10 | End: 2019-12-16

## 2018-09-10 RX ORDER — METOPROLOL SUCCINATE 25 MG/1
25 TABLET, EXTENDED RELEASE ORAL DAILY
Qty: 90 TABLET | Refills: 3 | Status: SHIPPED | OUTPATIENT
Start: 2018-09-10 | End: 2018-11-06

## 2018-09-10 RX ORDER — LEVOTHYROXINE SODIUM 50 UG/1
50 TABLET ORAL DAILY
Qty: 90 TABLET | Refills: 4 | Status: SHIPPED | OUTPATIENT
Start: 2018-09-10 | End: 2019-10-22

## 2018-09-10 NOTE — MR AVS SNAPSHOT
After Visit Summary   9/10/2018    Hilda Potter    MRN: [id number]           Patient Information     Date Of Birth          1935        Visit Information        Provider Department      9/10/2018 2:00 PM Jessica Rahman MD [facility]        Today's Diagnoses     Essential hypertension with goal blood pressure less than 140/90    -  1    Hypothyroidism, unspecified type        Need for prophylactic vaccination and inoculation against influenza        Migraine without status migrainosus, not intractable, unspecified migraine type          Care Instructions    [facility]    If you have any questions regarding to your visit please contact your care team:       Team Purple:   Clinic Hours Telephone Number   Dr. Jessica Shelton   7am-7pm  Monday - Thursday   7am-5pm  Fridays  [phone]  (Appointment scheduling available 24/7)   Urgent Care - [place] - 11am-9pm Monday-Friday Saturday-Sunday- 9am-5pm   [place] - 5pm-9pm Monday-Friday Saturday-Sunday- 9am-5pm  [phone] - [place]  [phone] [facility]       What options do I have for a visit other than an office visit? We offer electronic visits (e-visits) and telephone visits, when medically appropriate.  Please check with your medical insurance to see if these types of visits are covered, as you will be responsible for any charges that are not paid by your insurance.      You can use Trilliant (secure electronic communication) to access to your chart, send your primary care provider a message, or make an appointment. Ask a team member how to get started.     For a price quote for your services, please call our Consumer Price Line at [phone] or our Imaging Cost estimation line at [phone] (for imaging tests).              Follow-ups after your visit        Your next 10 appointments already scheduled     Nov 06, 2018 11:45  "AM CST   LAB with LAB ONC [facility] ([facility])    [address] [address] [place] [id number] [phone]           Please do not eat 10-12 hours before your appointment if you are coming in fasting for labs on lipids, cholesterol, or glucose (sugar). This does not apply to pregnant women. Water, hot tea and black coffee (with nothing added) are okay. Do not drink other fluids, diet soda or chew gum.            Nov 06, 2018 12:30 PM CST   [facility] Visit with [facility] Roth MD   [facility]    [address] [address] [place] [place] [id number] [phone]            Dec 03, 2018 10:00 AM CST   New Visit with Andi Bay MD   [facility] ([facility])    [address] [place] [address] [phone]              Who to contact     If you have questions or need follow up information about today's clinic visit or your schedule please contact [facility] directly at [phone].  Normal or non-critical lab and imaging results will be communicated to you by MyChart, letter or phone within 4 business days after the clinic has received the results. If you do not hear from us within 7 days, please contact the clinic through MyChart or phone. If you have a critical or abnormal lab result, we will notify you by phone as soon as possible.  Submit refill requests through [employer] or call your pharmacy and they will forward the refill request to us. Please allow 3 business days for your refill to be completed.          Additional Information About Your Visit        Care EveryWhere ID     This is your Care EveryWhere ID. This could be used by other organizations to access your [place] medical records  [id number]        Your Vitals Were     Pulse Temperature Respirations Height Last Period Pulse Oximetry    89 98.6  F (37  C) (Oral) 12 5' 3.78\" (1.62 m) 10/01/1986 96%    " BMI (Body Mass Index)                   24.3 kg/m2            Blood Pressure from Last 3 Encounters:   09/10/18 132/60   05/31/18 140/72   05/29/18 124/68    Weight from Last 3 Encounters:   09/10/18 140 lb 9.6 oz (63.8 kg)   05/31/18 144 lb 12.8 oz (65.7 kg)   05/29/18 143 lb (64.9 kg)              We Performed the Following     ADMIN INFLUENZA (For MEDICARE Patients ONLY) []     FLU VACCINE, INCREASED ANTIGEN, PRESV FREE, AGE 65+ [95391]          Today's Medication Changes          These changes are accurate as of 9/10/18  2:42 PM.  If you have any questions, ask your nurse or doctor.               Start taking these medicines.        Dose/Directions    metoprolol succinate 25 MG 24 hr tablet   Commonly known as:  TOPROL-XL   Used for:  Essential hypertension with goal blood pressure less than 140/90, Migraine without status migrainosus, not intractable, unspecified migraine type   Started by:  Jessica Rahman MD        Dose:  25 mg   Take 1 tablet (25 mg) by mouth daily   Quantity:  90 tablet   Refills:  3            Where to get your medicines      These medications were sent to [facility] [address] [place] [address]  AT [address] [facility] [address]     Phone:  [phone]     HYZAAR 50-12.5 MG per tablet    levothyroxine 50 MCG tablet    metoprolol succinate 25 MG 24 hr tablet                Primary Care Provider Office Phone # Fax #    Jessica Rahman [phone] [address] [facility] [phone]        Equal Access to Services     JAY ORTEGA AH: Payam Jaime, berny hurst, qajj luke. So [facility] [phone].    ATENCIÓN: Si habla español, tiene a ayala disposición servicios gratuitos de asistencia lingüística. Abdon al [phone].    We comply with applicable federal civil rights laws and [place] laws. We do not  discriminate on the basis of race, color, national origin, age, disability, sex, sexual orientation, or gender identity.            Thank you!     Thank you for choosing [facility]  for your care. Our goal is always to provide you with excellent care. Hearing back from our patients is one way we can continue to improve our services. Please take a few minutes to complete the written survey that you may receive in the mail after your visit with us. Thank you!             Your Updated Medication List - Protect others around you: Learn how to safely use, store and throw away your medicines at www.disposemymeds.org.          This list is accurate as of 9/10/18  2:42 PM.  Always use your most recent med list.                   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)       carboxymethylcellulose 0.5 % Soln ophthalmic solution    REFRESH PLUS     Place 1 drop into both eyes 3 times daily as needed for dry eyes        HYZAAR 50-12.5 MG per tablet   Generic drug:  losartan-hydrochlorothiazide     90 tablet    Take 1 tablet by mouth daily    Essential hypertension with goal blood pressure less than 140/90       levothyroxine 50 MCG tablet    SYNTHROID/LEVOTHROID    90 tablet    Take 1 tablet (50 mcg) by mouth daily    Hypothyroidism, unspecified type       MELATONIN PO      Take 3 mg by mouth nightly as needed        metoprolol succinate 25 MG 24 hr tablet    TOPROL-XL    90 tablet    Take 1 tablet (25 mg) by mouth daily    Essential hypertension with goal blood pressure less than 140/90, Migraine without status migrainosus, not intractable, unspecified migraine type       Multi-vitamin Tabs tablet   Generic drug:  multivitamin, therapeutic with minerals      Take 1 tablet by mouth daily.

## 2018-09-10 NOTE — PATIENT INSTRUCTIONS
[facility]    If you have any questions regarding to your visit please contact your care team:       Team Purple:   Clinic Hours Telephone Number   Dr. Jessica Shelton   7am-7pm  Monday - Thursday   7am-5pm  Fridays  [phone]  (Appointment scheduling available 24/7)   Urgent Care - [place] [facility] - 11am-9pm Monday-Friday Saturday-Sunday- 9am-5pm   [place] - 5pm-9pm Monday-Friday Saturday-Sunday- 9am-5pm  [phone] - [place]  [phone] - [place]       What options do I have for a visit other than an office visit? We offer electronic visits (e-visits) and telephone visits, when medically appropriate.  Please check with your medical insurance to see if these types of visits are covered, as you will be responsible for any charges that are not paid by your insurance.      You can use InMyRoom (secure electronic communication) to access to your chart, send your primary care provider a message, or make an appointment. Ask a team member how to get started.     For a price quote for your services, please call our Consumer Price Line at [phone] or our Imaging Cost estimation line at [phone] (for imaging tests).

## 2018-09-20 ENCOUNTER — TELEPHONE (OUTPATIENT)
Dept: OPHTHALMOLOGY | Facility: CLINIC | Age: 83
End: 2018-09-20

## 2018-09-20 ENCOUNTER — TELEPHONE (OUTPATIENT)
Dept: FAMILY MEDICINE | Facility: CLINIC | Age: 83
End: 2018-09-20

## 2018-09-20 NOTE — TELEPHONE ENCOUNTER
Reason for call:  Other   Patient called regarding (reason for call): call back  Additional comments: She has been having a harder with migraines and flashing lights related to the migraines and thinking this might be the cause of her having a harder time reading and knows she has to wait for her next appointment in December to be seen for her insurance to cover the eye exam.  I was not sure if there was anything else that might be helpful before that so I let her know I would have someone call and talk to her about this.    Phone number to reach patient:  Home number on file [phone] (home)    Best Time:  any    Can we leave a detailed message on this number?  YES

## 2018-09-20 NOTE — TELEPHONE ENCOUNTER
Patient saw Dr. Rahman 10 days ago, was given a low dose beta blocker.  Now still having migraines on and off with flashes and then the flashes dissipate when the headache stops. No floaters or curtains over the vision, vision seems to be ok.  She is dry like she was prior. She has not scheduled with neurology doctor, was suggested to her by Dr. MARTINEZ.  She will call for that appointment and if there are any other vision problems told patient we could see her sooner, but complete exam is scheduled in Dec which is per insurance when she can have the next one. She will call Dr. Iliana Cooper up in [place], [place] for appointment regarding the reoccurring migraines.

## 2018-09-20 NOTE — TELEPHONE ENCOUNTER
Reason for call:  Patient reporting a symptom    Symptom or request: Headaches/migraines    Duration (how long have symptoms been present): On going    Have you been treated for this before? Yes    Additional comments: Patient was in on 09/10/18 and put on a beta blocker for her migraines. She is currently taking 25 mg but has had 3 headaches since she started the medication. Wondering what next step would be. Should she increase the medication or what do you recommend?     Phone Number patient can be reached at:  Home number on file [phone] (home) It's ok to leave a message or talk with her     Best Time:  ASAP    Can we leave a detailed message on this number:  YES    Call taken on 9/20/2018 at 2:48 PM by Caitie Hart

## 2018-09-20 NOTE — TELEPHONE ENCOUNTER
Call her. Yes, she can increase her metoprolol to 2 daily  Keep me informed. If her headaches persist, she will need MRI of her head.   MALCOLM RANGEL M.D.

## 2018-09-20 NOTE — TELEPHONE ENCOUNTER
Patients  notified of providers message as written, he verbalized understanding and wrote message down for patient.   Marielle Oates RN

## 2018-09-21 ENCOUNTER — TELEPHONE (OUTPATIENT)
Dept: FAMILY MEDICINE | Facility: CLINIC | Age: 83
End: 2018-09-21

## 2018-09-21 DIAGNOSIS — G43.909 MIGRAINE WITHOUT STATUS MIGRAINOSUS, NOT INTRACTABLE, UNSPECIFIED MIGRAINE TYPE: Primary | ICD-10-CM

## 2018-09-21 NOTE — TELEPHONE ENCOUNTER
Reason for Call: Request for an order or referral:    Order or referral being requested: MRI     Date needed: as soon as possible    Has the patient been seen by the PCP for this problem? YES    Additional comments: Patient says headaches persist.     Phone number Patient can be reached at:  Cell number on file:    Telephone Information:   Mobile [phone]       Best Time:  Any     Can we leave a detailed message on this number?  YES    Call taken on 9/21/2018 at 4:32 PM by John Murdock

## 2018-09-24 NOTE — TELEPHONE ENCOUNTER
Detailed message left for patient with providers message as written, advised to call back RN hotline if wanting referral for neurologist.   Marielle Oates RN

## 2018-09-24 NOTE — TELEPHONE ENCOUNTER
Patient notified of Provider's message as written.  Patient verbalized understanding.  She would like a referral placed to see neuro in [place]    She is leaving out of town and will not be back until first week of October.  She will set up an appointment for an MRI and neuro for sometime after that    Please sign referral for neuro     Ankit Marquez RN

## 2018-09-24 NOTE — TELEPHONE ENCOUNTER
Patient notified of providers message as written.  Patient verbalized understanding, no further questions or concerns.  Gave patient Neurology [place] [phone].  Marielle Oates RN

## 2018-09-24 NOTE — TELEPHONE ENCOUNTER
Call her. Order has been placed for MRI of her head. She can call [phone].  I can also refer her to neurologist at [place], [place], which we discussed in the office. It is up to her.     MALCOLM RANGEL M.D.

## 2018-09-24 NOTE — TELEPHONE ENCOUNTER
Call her. Referral has been placed. Make sure she has phone # for neurology in [place]    MALCOLM RANGEL M.D.

## 2018-10-01 ENCOUNTER — RADIANT APPOINTMENT (OUTPATIENT)
Dept: MRI IMAGING | Facility: CLINIC | Age: 83
End: 2018-10-01
Attending: FAMILY MEDICINE
Payer: MEDICARE

## 2018-10-01 DIAGNOSIS — G43.909 MIGRAINE WITHOUT STATUS MIGRAINOSUS, NOT INTRACTABLE, UNSPECIFIED MIGRAINE TYPE: ICD-10-CM

## 2018-10-01 PROCEDURE — 70551 MRI BRAIN STEM W/O DYE: CPT | Mod: TC

## 2018-10-12 ENCOUNTER — OFFICE VISIT (OUTPATIENT)
Dept: OPHTHALMOLOGY | Facility: CLINIC | Age: 83
End: 2018-10-12
Payer: MEDICARE

## 2018-10-12 DIAGNOSIS — Z98.890 HISTORY OF BLEPHAROPLASTY: ICD-10-CM

## 2018-10-12 DIAGNOSIS — H25.813 COMBINED FORMS OF AGE-RELATED CATARACT OF BOTH EYES: ICD-10-CM

## 2018-10-12 DIAGNOSIS — H43.812 POSTERIOR VITREOUS DETACHMENT OF LEFT EYE: Primary | ICD-10-CM

## 2018-10-12 PROCEDURE — 92012 INTRM OPH EXAM EST PATIENT: CPT | Performed by: OPHTHALMOLOGY

## 2018-10-12 ASSESSMENT — EXTERNAL EXAM - RIGHT EYE: OD_EXAM: NORMAL

## 2018-10-12 ASSESSMENT — REFRACTION_WEARINGRX
OS_AXIS: 021
OD_SPHERE: +2.75
OS_ADD: +2.50
OD_ADD: +2.50
SPECS_TYPE: PAL
OS_SPHERE: +3.50
OD_CYLINDER: +1.00
OS_CYLINDER: +0.75
OD_AXIS: 175

## 2018-10-12 ASSESSMENT — EXTERNAL EXAM - LEFT EYE: OS_EXAM: NORMAL

## 2018-10-12 ASSESSMENT — VISUAL ACUITY
OD_CC: 20/30
CORRECTION_TYPE: GLASSES
OS_CC: 20/30
METHOD: SNELLEN - LINEAR

## 2018-10-12 ASSESSMENT — TONOMETRY
OS_IOP_MMHG: 22
OD_IOP_MMHG: 24
IOP_METHOD: APPLANATION

## 2018-10-12 ASSESSMENT — CUP TO DISC RATIO
OD_RATIO: 0.6
OS_RATIO: 0.5

## 2018-10-12 NOTE — TELEPHONE ENCOUNTER
Patient arrived in clinic today asking for the name of provider that she was referred to for Neurology.  Noted at visit on 9/10/18 she was referred to Dr. Iliana Cooper at [facility].  Patient given name of provider, she will schedule.   Marielle Oates RN

## 2018-10-12 NOTE — PROGRESS NOTES
Current Eye Medications:  Refresh tears three times a day     Subjective:  Floaters left   As  above plus floaters time several weeks , has had many small floaters for sometime , but for a few weeks had this larger one the disappeared today. Pt also reports flashing for about the past month. In both eyes.     Objective:  See Ophthalmology Exam.       Assessment:  Acute posterior vitreous detachment left eye.      ICD-10-CM    1. Posterior vitreous detachment of left eye H43.812    2. Combined forms of age-related cataract, mild-mod, both eyes H25.813    3. History of blepharoplasty, upper lids, ou Z98.890         Plan:  Possible posterior vitreous detachment (sudden onset large floater and/or flashing lights) right eye discussed.  Signs and symptoms of retinal detachment (shower of black floaters, frequent flashing even during day, curtain over part of visual field) discussed.  Return visit in December for complete exam as scheduled.    Adni Bay M.D.  [phone]

## 2018-10-12 NOTE — MR AVS SNAPSHOT
After Visit Summary   10/12/2018    Hilda Potter    MRN: [id number]           Patient Information     Date Of Birth          1935        Visit Information        Provider Department      10/12/2018 12:45 PM Andi Bay MD [facility]        Today's Diagnoses     Posterior vitreous detachment of left eye    -  1      Care Instructions    Possible posterior vitreous detachment (sudden onset large floater and/or flashing lights) right eye discussed.  Signs and symptoms of retinal detachment (shower of black floaters, frequent flashing even during day, curtain over part of visual field) discussed.  Return visit in December for complete exam as scheduled.    Andi Bay M.D.  [phone]            Follow-ups after your visit        Your next 10 appointments already scheduled     Nov 06, 2018 11:45 AM CST   LAB with LAB ONC [facility] ([facility])    [address] [place] [id number] [phone]           Please do not eat 10-12 hours before your appointment if you are coming in fasting for labs on lipids, cholesterol, or glucose (sugar). This does not apply to pregnant women. Water, hot tea and black coffee (with nothing added) are okay. Do not drink other fluids, diet soda or chew gum.            Nov 06, 2018 12:30 PM CST   Return Visit with Jane Roth MD   [facility] ([facility])    [address] [place] [phone]            Dec 03, 2018 10:00 AM CST   New Visit with Andi Bay MD   [facility] ([facility])    [address] [place] [id number] [phone]              Who to contact     If you have questions or need follow up information about today's clinic visit or your schedule please contact [facility] directly at [phone].  Normal or non-critical lab and imaging results  will be communicated to you by MyChart, letter or phone within 4 business days after the clinic has received the results. If you do not hear from us within 7 days, please contact the clinic through MyChart or phone. If you have a critical or abnormal lab result, we will notify you by phone as soon as possible.  Submit refill requests through [employer] or call your pharmacy and they will forward the refill request to us. Please allow 3 business days for your refill to be completed.          Additional Information About Your Visit        Care EveryWhere ID     This is your Care EveryWhere ID. This could be used by other organizations to access your [place] medical records  [id number]        Your Vitals Were     Last Period                   10/01/1986            Blood Pressure from Last 3 Encounters:   09/10/18 132/60   05/31/18 140/72   05/29/18 124/68    Weight from Last 3 Encounters:   09/10/18 63.8 kg (140 lb 9.6 oz)   05/31/18 65.7 kg (144 lb 12.8 oz)   05/29/18 64.9 kg (143 lb)              Today, you had the following     No orders found for display       Primary Care Provider Office Phone # Fax #    Jessica Rahman [phone] [address] [facility] [phone]        Equal Access to Services     JAY ORTEGA AH: Hadii vaishnavi ku hadasho Soomaali, waaxda luqadaha, qaybta kaalmada adeegyada, jj hauser. So [facility] [phone].    ATENCIÓN: Si habla español, tiene a ayala disposición servicios gratuitos de asistencia lingüística. Llame al [phone].    We comply with applicable federal civil rights laws and [place] laws. We do not discriminate on the basis of race, color, national origin, age, disability, sex, sexual orientation, or gender identity.            Thank you!     Thank you for choosing [facility]  for your care. Our goal is always to provide you with excellent care. Hearing back from our patients is one way we can  continue to improve our services. Please take a few minutes to complete the written survey that you may receive in the mail after your visit with us. Thank you!             Your Updated Medication List - Protect others around you: Learn how to safely use, store and throw away your medicines at www.disposemymeds.org.          This list is accurate as of 10/12/18  1:27 PM.  Always use your most recent med list.                   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)       carboxymethylcellulose 0.5 % Soln ophthalmic solution    REFRESH PLUS     Place 1 drop into both eyes 3 times daily as needed for dry eyes        HYZAAR 50-12.5 MG per tablet   Generic drug:  losartan-hydrochlorothiazide     90 tablet    Take 1 tablet by mouth daily    Essential hypertension with goal blood pressure less than 140/90       levothyroxine 50 MCG tablet    SYNTHROID/LEVOTHROID    90 tablet    Take 1 tablet (50 mcg) by mouth daily    Hypothyroidism, unspecified type       MELATONIN PO      Take 3 mg by mouth nightly as needed        metoprolol succinate 25 MG 24 hr tablet    TOPROL-XL    90 tablet    Take 1 tablet (25 mg) by mouth daily    Essential hypertension with goal blood pressure less than 140/90, Migraine without status migrainosus, not intractable, unspecified migraine type       Multi-vitamin Tabs tablet   Generic drug:  multivitamin, therapeutic with minerals      Take 1 tablet by mouth daily.

## 2018-10-12 NOTE — LETTER
10/12/2018         RE: Hilda Potter  [address]  [facility] [place] [address]        Dear Colleague,    Thank you for referring your patient, Hilda Potter, to the [facility]. Please see a copy of my visit note below.     Current Eye Medications:  Refresh tears three times a day     Subjective:  Floaters left   As  above plus floaters time several weeks , has had many small floaters for sometime , but for a few weeks had this larger one the disappeared today. Pt also reports flashing for about the past month. In both eyes.     Objective:  See Ophthalmology Exam.       Assessment:  Acute posterior vitreous detachment left eye.      ICD-10-CM    1. Posterior vitreous detachment of left eye H43.812    2. Combined forms of age-related cataract, mild-mod, both eyes H25.813    3. History of blepharoplasty, upper lids, ou Z98.890         Plan:  Possible posterior vitreous detachment (sudden onset large floater and/or flashing lights) right eye discussed.  Signs and symptoms of retinal detachment (shower of black floaters, frequent flashing even during day, curtain over part of visual field) discussed.  Return visit in December for complete exam as scheduled.    Andi Bay M.D.  [phone]      Again, thank you for allowing me to participate in the care of your patient.        Sincerely,        Andi Bay MD

## 2018-10-12 NOTE — PATIENT INSTRUCTIONS
Possible posterior vitreous detachment (sudden onset large floater and/or flashing lights) right eye discussed.  Signs and symptoms of retinal detachment (shower of black floaters, frequent flashing even during day, curtain over part of visual field) discussed.  Return visit in December for complete exam as scheduled.    Andi Bay M.D.  [phone]

## 2018-10-23 ENCOUNTER — TELEPHONE (OUTPATIENT)
Dept: OPHTHALMOLOGY | Facility: CLINIC | Age: 83
End: 2018-10-23

## 2018-10-23 NOTE — TELEPHONE ENCOUNTER
Patient left voicemail on surgery scheduling line and has some additional questions regarding her visit on 10/12/18 with Dr. Bay. Please call to clarify with patient and schedule if necessary.

## 2018-10-25 DIAGNOSIS — E03.9 HYPOTHYROIDISM, UNSPECIFIED TYPE: ICD-10-CM

## 2018-10-26 RX ORDER — LEVOTHYROXINE SODIUM 50 UG/1
TABLET ORAL
Qty: 90 TABLET | Refills: 0
Start: 2018-10-26

## 2018-10-26 NOTE — TELEPHONE ENCOUNTER
Spoke to pharmacy and confirmed duplicate request.  Susan MORIN CMA ([facility])

## 2018-10-26 NOTE — TELEPHONE ENCOUNTER
Pharmacy closed. Please call after 9 to confirm duplicate.    levothyroxine (SYNTHROID/LEVOTHROID) 50 MCG tablet 90 tablet 4 9/10/2018  No      Sig - Route: Take 1 tablet (50 mcg) by mouth daily - Oral     Class: E-Prescribe     Order: [id number]     E-Prescribing Status: Receipt confirmed by pharmacy (9/10/2018  2:33 PM CDT)       Susan MORIN CMA ([facility])

## 2018-10-31 ENCOUNTER — OFFICE VISIT (OUTPATIENT)
Dept: OPHTHALMOLOGY | Facility: CLINIC | Age: 83
End: 2018-10-31
Payer: MEDICARE

## 2018-10-31 DIAGNOSIS — H43.812 POSTERIOR VITREOUS DETACHMENT OF LEFT EYE: ICD-10-CM

## 2018-10-31 DIAGNOSIS — H40.003 GLAUCOMA SUSPECT OF BOTH EYES: Primary | ICD-10-CM

## 2018-10-31 DIAGNOSIS — H40.003 GLAUCOMA SUSPECT OF BOTH EYES: ICD-10-CM

## 2018-10-31 PROCEDURE — 92012 INTRM OPH EXAM EST PATIENT: CPT | Performed by: OPHTHALMOLOGY

## 2018-10-31 PROCEDURE — 92020 GONIOSCOPY: CPT | Performed by: OPHTHALMOLOGY

## 2018-10-31 PROCEDURE — 92133 CPTRZD OPH DX IMG PST SGM ON: CPT | Performed by: OPHTHALMOLOGY

## 2018-10-31 RX ORDER — LATANOPROST 50 UG/ML
1 SOLUTION/ DROPS OPHTHALMIC AT BEDTIME
Qty: 1 BOTTLE | Refills: 11 | Status: SHIPPED | OUTPATIENT
Start: 2018-10-31 | End: 2018-10-31

## 2018-10-31 RX ORDER — LATANOPROST 50 UG/ML
1 SOLUTION/ DROPS OPHTHALMIC AT BEDTIME
Qty: 7.5 ML | Refills: 3 | Status: SHIPPED | OUTPATIENT
Start: 2018-10-31 | End: 2019-07-17

## 2018-10-31 ASSESSMENT — GONIOSCOPY
OD_TEMPORAL: GRADE 1
OS_INFERIOR: GRADE 3
OS_NASAL: GRADE 3
OS_TEMPORAL: GRADE 3
OD_SUPERIOR: GRADE 2-3
OS_SUPERIOR: GRADE 3
OD_NASAL: GRADE 2-3
OD_INFERIOR: GRADE 2-3

## 2018-10-31 ASSESSMENT — VISUAL ACUITY
METHOD: SNELLEN - LINEAR
CORRECTION_TYPE: GLASSES
OS_CC+: -3
OD_CC: 20/25
OD_CC+: -2
OS_CC: 20/25 SLOW

## 2018-10-31 ASSESSMENT — REFRACTION_WEARINGRX
OS_AXIS: 021
OD_CYLINDER: +1.00
OD_SPHERE: +2.75
OD_ADD: +2.50
OS_ADD: +2.50
SPECS_TYPE: PAL
OD_AXIS: 172
OS_CYLINDER: +1.00
OS_SPHERE: +3.50

## 2018-10-31 ASSESSMENT — EXTERNAL EXAM - RIGHT EYE: OD_EXAM: NORMAL

## 2018-10-31 ASSESSMENT — TONOMETRY
OS_IOP_MMHG: 27
IOP_METHOD: APPLANATION
OD_IOP_MMHG: 26

## 2018-10-31 ASSESSMENT — EXTERNAL EXAM - LEFT EYE: OS_EXAM: NORMAL

## 2018-10-31 NOTE — PROGRESS NOTES
Current Eye Medications:  Refresh tears three times a day.      Subjective: patient called in last week.  Was just here a few weeks ago for the left eye spider webs in the left eye.  She now describes bigger spider web-like appearance in the left eye, now over half the vision in the left eye.  Also noticing it starting in the right eye over the past few days, same spider web appearance.  No flashing lights or curtains, does have migraines up to once a week now and getting worse.   Was only every 6 weeks, but have increased to once a week.  Has a neurology appointment due to this on 11-30-18.  Father had glaucoma, patient is hyperopic. Tech note, OCT done today for macula and nerve.  If not needed, please take billing out.      Objective:  See Ophthalmology Exam.       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  Stable posterior vitreous detachment left eye and otherwise stable dilated exam.      Plan:  Signs and symptoms of retinal detachment (shower of black floaters, frequent flashing even during day, curtain over part of visual field) left eye  discussed.  Possible posterior vitreous detachment (sudden onset large floater and/or flashing lights) right eye discussed.  Start Latanoprost one drop both eyes at bedtime.  Keep appointment as scheduled.  Andi Bay M.D.  [phone]

## 2018-10-31 NOTE — LETTER
10/31/2018         RE: Hilda Potter  [address]  [place] [address]        Dear Colleague,    Thank you for referring your patient, Hilda Potter, to the [facility]. Please see a copy of my visit note below.     Current Eye Medications:  Refresh tears three times a day.      Subjective:  patient called in last week.  Was just here a few weeks ago for the left eye spider webs in the left eye.  She now describes bigger spider web-like appearance in the left eye, now over half the vision in the left eye.  Also noticing it starting in the right eye over the past few days, same spider web appearance.  No flashing lights or curtains, does have migraines up to once a week now and getting worse.   Was only every 6 weeks, but have increased to once a week.  Has a neurology appointment due to this on 11-30-18.  Father had glaucoma, patient is hyperopic. Tech note, OCT done today for macula and nerve.  If not needed, please take billing out.      Objective:  See Ophthalmology Exam.       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  Stable posterior vitreous detachment left eye and otherwise stable dilated exam.      Plan:  Signs and symptoms of retinal detachment (shower of black floaters, frequent flashing even during day, curtain over part of visual field) left eye  discussed.  Possible posterior vitreous detachment (sudden onset large floater and/or flashing lights) right eye discussed.  Start Latanoprost one drop both eyes at bedtime.  Keep appointment as scheduled.  Andi Bay M.D.  [phone]        Again, thank you for allowing me to participate in the care of your patient.        Sincerely,        Andi Bay MD

## 2018-10-31 NOTE — PATIENT INSTRUCTIONS
Signs and symptoms of retinal detachment (shower of black floaters, frequent flashing even during day, curtain over part of visual field) left eye  discussed.  Possible posterior vitreous detachment (sudden onset large floater and/or flashing lights) right eye discussed.  Start Latanoprost one drop both eyes at bedtime.  Keep appointment as scheduled.  Andi Bay M.D.  [phone]

## 2018-10-31 NOTE — MR AVS SNAPSHOT
After Visit Summary   10/31/2018    Hilda Potter    MRN: [id number]           Patient Information     Date Of Birth          1935        Visit Information        Provider Department      10/31/2018 10:45 AM Andi Bay MD [facility]        Today's Diagnoses     Posterior vitreous detachment of left eye    -  1    Glaucoma suspect of both eyes          Care Instructions    Signs and symptoms of retinal detachment (shower of black floaters, frequent flashing even during day, curtain over part of visual field) left eye  discussed.  Possible posterior vitreous detachment (sudden onset large floater and/or flashing lights) right eye discussed.  Start Latanoprost one drop both eyes at bedtime.  Keep appointment as scheduled.  Andi Bay M.D.  [phone]              Follow-ups after your visit        Your next 10 appointments already scheduled     Nov 06, 2018 11:45 AM CST   LAB with LAB ONC [facility] ([facility])    [address] [place] [address] [phone]           Please do not eat 10-12 hours before your appointment if you are coming in fasting for labs on lipids, cholesterol, or glucose (sugar). This does not apply to pregnant women. Water, hot tea and black coffee (with nothing added) are okay. Do not drink other fluids, diet soda or chew gum.            Nov 06, 2018 12:30 PM CST   Return Visit with Jane Roth MD   [facility] ([facility])    [address] [place] [id number] [phone]            Dec 03, 2018 10:00 AM CST   New Visit with Andi Bay MD   [facility] ([facility])    [address] [place] [id number] [phone]            Dec 10, 2018 11:00 AM CST   MA SCREENING DIGITAL BILATERAL with [id number]   [facility] ([facility])    [address]  "[place] [address]   [phone]           How do I prepare for my exam? (Food and drink instructions) No Food and Drink Restrictions.  How do I prepare for my exam? (Other instructions) Do not use any powder, lotion or deodorant under your arms or on your breast. If you do, we will ask you to remove it before your exam.  What should I wear: Wear comfortable, two-piece clothing.  How long does the exam take: Most scans will take 15 minutes.  What should I bring: Bring any previous mammograms from other facilities or have them mailed to the breast center.  Do I need a :  No  is needed.  What do I need to tell my doctor: If you have any allergies, tell your care team.  What should I do after the exam: No restrictions, You may resume normal activities.  What is this test: This test is an x-ray of the breast to look for breast disease. The breast is pressed between two plates to flatten and spread the tissue. An X-ray is taken of the breast from different angles.  Who should I call with questions: If you have any questions, please call the Imaging Department where you will have your exam. Directions, parking instructions, and other information is available on our website, [email or web address].  Other information about my exam Three-dimensional (3D) mammograms are available at [facility] locations in [facility], [facility], [place], [facility] and [place]. [facility] locations include [place] and the [facility] in [place].  Benefits of 3D mammograms include * Improved rate of cancer detection * Decreases your chance of having to go back for more tests, which means fewer: * \"False-positive\" results (This means that there is an abnormal area but it isn't cancer.) * Invasive testing procedures, such as a biopsy or surgery * Can provide clearer images of the breast if you have dense breast tissue.  *3D mammography is an optional exam that anyone can " have with a 2D mammogram. It doesn't replace or take the place of a 2D mammogram. 2D mammograms remain an effective screening test for all women.  Not all insurance companies cover the cost of a 3D mammogram. Check with your insurance.            Dec 20, 2018  8:00 AM CST   New Visit with Iliana Connor MD   [facility] ([facility]    [address] [facility] [address] [phone]              Who to contact     If you have questions or need follow up information about today's clinic visit or your schedule please contact [facility] directly at [phone].  Normal or non-critical lab and imaging results will be communicated to you by MyChart, letter or phone within 4 business days after the clinic has received the results. If you do not hear from us within 7 days, please contact the clinic through MyChart or phone. If you have a critical or abnormal lab result, we will notify you by phone as soon as possible.  Submit refill requests through [employer] or call your pharmacy and they will forward the refill request to us. Please allow 3 business days for your refill to be completed.          Additional Information About Your Visit        Care EveryWhere ID     This is your Care EveryWhere ID. This could be used by other organizations to access your [facility] medical records  [id number]        Your Vitals Were     Last Period                   10/01/1986            Blood Pressure from Last 3 Encounters:   09/10/18 132/60   05/31/18 140/72   05/29/18 124/68    Weight from Last 3 Encounters:   09/10/18 63.8 kg (140 lb 9.6 oz)   05/31/18 65.7 kg (144 lb 12.8 oz)   05/29/18 64.9 kg (143 lb)              We Performed the Following     HC COMPUTERIZED OPHTHALMIC IMAGING RETINA          Today's Medication Changes          These changes are accurate as of 10/31/18 12:18 PM.  If you have any questions, ask your nurse or doctor.               Start taking  these medicines.        Dose/Directions    latanoprost 0.005 % ophthalmic solution   Commonly known as:  XALATAN   Used for:  Glaucoma suspect of both eyes   Started by:  Andi Bay MD        Dose:  1 drop   Place 1 drop into both eyes At Bedtime   Quantity:  1 Bottle   Refills:  11            Where to get your medicines      These medications were sent to [facility] [id number] - [address]  AT [facility] & [address] , [facility] [address]     Phone:  [phone]     latanoprost 0.005 % ophthalmic solution                Primary Care Provider Office Phone # Fax #    Jessica Rahman [phone] [address] [facility] [address]        Equal Access to Services     JAY ORTEGA : Hadii vaishnavi estevez hadasho Soomaali, waaxda luqadaha, qaybta kaalmada adeegyada, jj hauser. So [facility] [phone].    ATENCIÓN: Si habla español, tiene a ayala disposición servicios gratuitos de asistencia lingüística. [facility] [phone].    We comply with applicable federal civil rights laws and [place] laws. We do not discriminate on the basis of race, color, national origin, age, disability, sex, sexual orientation, or gender identity.            Thank you!     Thank you for choosing [facility]  for your care. Our goal is always to provide you with excellent care. Hearing back from our patients is one way we can continue to improve our services. Please take a few minutes to complete the written survey that you may receive in the mail after your visit with us. Thank you!             Your Updated Medication List - Protect others around you: Learn how to safely use, store and throw away your medicines at www.disposemymeds.org.          This list is accurate as of 10/31/18 12:18 PM.  Always use your most recent med list.                   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)       carboxymethylcellulose 0.5 % Soln ophthalmic solution    REFRESH PLUS     Place 1 drop into both eyes 3 times daily as needed for dry eyes        HYZAAR 50-12.5 MG per tablet   Generic drug:  losartan-hydrochlorothiazide     90 tablet    Take 1 tablet by mouth daily    Essential hypertension with goal blood pressure less than 140/90       latanoprost 0.005 % ophthalmic solution    XALATAN    1 Bottle    Place 1 drop into both eyes At Bedtime    Glaucoma suspect of both eyes       levothyroxine 50 MCG tablet    SYNTHROID/LEVOTHROID    90 tablet    Take 1 tablet (50 mcg) by mouth daily    Hypothyroidism, unspecified type       MELATONIN PO      Take 3 mg by mouth nightly as needed        metoprolol succinate 25 MG 24 hr tablet    TOPROL-XL    90 tablet    Take 1 tablet (25 mg) by mouth daily    Essential hypertension with goal blood pressure less than 140/90, Migraine without status migrainosus, not intractable, unspecified migraine type       Multi-vitamin Tabs tablet   Generic drug:  multivitamin, therapeutic with minerals      Take 1 tablet by mouth daily.

## 2018-11-04 PROBLEM — H40.003 GLAUCOMA SUSPECT OF BOTH EYES: Status: ACTIVE | Noted: 2018-11-04

## 2018-11-04 ASSESSMENT — CUP TO DISC RATIO
OD_RATIO: 0.7
OS_RATIO: 0.6

## 2018-11-06 ENCOUNTER — ONCOLOGY VISIT (OUTPATIENT)
Dept: ONCOLOGY | Facility: CLINIC | Age: 83
End: 2018-11-06
Payer: MEDICARE

## 2018-11-06 VITALS
HEART RATE: 81 BPM | OXYGEN SATURATION: 97 % | RESPIRATION RATE: 16 BRPM | BODY MASS INDEX: 24.02 KG/M2 | DIASTOLIC BLOOD PRESSURE: 76 MMHG | TEMPERATURE: 98 F | WEIGHT: 139 LBS | SYSTOLIC BLOOD PRESSURE: 148 MMHG

## 2018-11-06 DIAGNOSIS — C82.00 GRADE I FOLLICULAR SMALL CLEAVED CELL LYMPHOMA (H): ICD-10-CM

## 2018-11-06 DIAGNOSIS — C82.00 GRADE I FOLLICULAR SMALL CLEAVED CELL LYMPHOMA (H): Primary | ICD-10-CM

## 2018-11-06 DIAGNOSIS — Z12.31 VISIT FOR SCREENING MAMMOGRAM: ICD-10-CM

## 2018-11-06 LAB
ALBUMIN SERPL-MCNC: 3.5 G/DL (ref 3.4–5)
ALP SERPL-CCNC: 80 U/L (ref 40–150)
ALT SERPL W P-5'-P-CCNC: 27 U/L (ref 0–50)
ANION GAP SERPL CALCULATED.3IONS-SCNC: 3 MMOL/L (ref 3–14)
AST SERPL W P-5'-P-CCNC: 19 U/L (ref 0–45)
BASOPHILS # BLD AUTO: 0 10E9/L (ref 0–0.2)
BASOPHILS NFR BLD AUTO: 0.2 %
BILIRUB SERPL-MCNC: 0.6 MG/DL (ref 0.2–1.3)
BUN SERPL-MCNC: 23 MG/DL (ref 7–30)
CALCIUM SERPL-MCNC: 8.9 MG/DL (ref 8.5–10.1)
CHLORIDE SERPL-SCNC: 103 MMOL/L (ref 94–109)
CO2 SERPL-SCNC: 35 MMOL/L (ref 20–32)
CREAT SERPL-MCNC: 0.95 MG/DL (ref 0.52–1.04)
DIFFERENTIAL METHOD BLD: NORMAL
EOSINOPHIL # BLD AUTO: 0.1 10E9/L (ref 0–0.7)
EOSINOPHIL NFR BLD AUTO: 1.3 %
ERYTHROCYTE [DISTWIDTH] IN BLOOD BY AUTOMATED COUNT: 13.2 % (ref 10–15)
GFR SERPL CREATININE-BSD FRML MDRD: 56 ML/MIN/1.7M2
GLUCOSE SERPL-MCNC: 89 MG/DL (ref 70–99)
HCT VFR BLD AUTO: 39 % (ref 35–47)
HGB BLD-MCNC: 12.8 G/DL (ref 11.7–15.7)
IMM GRANULOCYTES # BLD: 0 10E9/L (ref 0–0.4)
IMM GRANULOCYTES NFR BLD: 0.2 %
LDH SERPL L TO P-CCNC: 163 U/L (ref 81–234)
LYMPHOCYTES # BLD AUTO: 1.3 10E9/L (ref 0.8–5.3)
LYMPHOCYTES NFR BLD AUTO: 23.8 %
MCH RBC QN AUTO: 29.4 PG (ref 26.5–33)
MCHC RBC AUTO-ENTMCNC: 32.8 G/DL (ref 31.5–36.5)
MCV RBC AUTO: 90 FL (ref 78–100)
MONOCYTES # BLD AUTO: 0.6 10E9/L (ref 0–1.3)
MONOCYTES NFR BLD AUTO: 10.7 %
NEUTROPHILS # BLD AUTO: 3.4 10E9/L (ref 1.6–8.3)
NEUTROPHILS NFR BLD AUTO: 63.8 %
PLATELET # BLD AUTO: 170 10E9/L (ref 150–450)
POTASSIUM SERPL-SCNC: 4.1 MMOL/L (ref 3.4–5.3)
PROT SERPL-MCNC: 7.3 G/DL (ref 6.8–8.8)
RBC # BLD AUTO: 4.35 10E12/L (ref 3.8–5.2)
SODIUM SERPL-SCNC: 141 MMOL/L (ref 133–144)
URATE SERPL-MCNC: 3.9 MG/DL (ref 2.6–6)
WBC # BLD AUTO: 5.3 10E9/L (ref 4–11)

## 2018-11-06 PROCEDURE — 84550 ASSAY OF BLOOD/URIC ACID: CPT | Performed by: INTERNAL MEDICINE

## 2018-11-06 PROCEDURE — 99214 OFFICE O/P EST MOD 30 MIN: CPT | Performed by: INTERNAL MEDICINE

## 2018-11-06 PROCEDURE — 36415 COLL VENOUS BLD VENIPUNCTURE: CPT | Performed by: INTERNAL MEDICINE

## 2018-11-06 PROCEDURE — 83615 LACTATE (LD) (LDH) ENZYME: CPT | Performed by: INTERNAL MEDICINE

## 2018-11-06 PROCEDURE — 85025 COMPLETE CBC W/AUTO DIFF WBC: CPT | Performed by: INTERNAL MEDICINE

## 2018-11-06 PROCEDURE — 80053 COMPREHEN METABOLIC PANEL: CPT | Performed by: INTERNAL MEDICINE

## 2018-11-06 ASSESSMENT — PAIN SCALES - GENERAL: PAINLEVEL: NO PAIN (0)

## 2018-11-06 NOTE — NURSING NOTE
"Oncology Rooming Note    November 6, 2018 12:39 PM   Hilda Potter is a 83 year old female who presents for:    Chief Complaint   Patient presents with     Oncology Clinic Visit     6 month follow up     Initial Vitals: /76  Pulse 81  Temp 98  F (36.7  C) (Oral)  Resp 16  Wt 63 kg (139 lb)  LMP 10/01/1986  SpO2 97%  BMI 24.02 kg/m2 Estimated body mass index is 24.02 kg/(m^2) as calculated from the following:    Height as of 9/10/18: 1.62 m (5' 3.78\").    Weight as of this encounter: 63 kg (139 lb). Body surface area is 1.68 meters squared.  No Pain (0) Comment: Data Unavailable   Patient's last menstrual period was 10/01/1986.  Allergies reviewed: Yes  Medications reviewed: Yes    Medications: Medication refills not needed today.  Pharmacy name entered into [facility]:    [id number] - [facility] [address] [place] [facility] - [place], [place] - [address] [place] [address]       5 minutes for nursing intake (face to face time)     LENNOX SUNG RN              "

## 2018-11-06 NOTE — LETTER
11/6/2018         RE: Hilda Potter  [address]  [facility] [address]        Dear Colleague,    Thank you for referring your patient, Hilda Potter, to the [facility]. Please see a copy of my visit note below.    Oncology Follow-up Visit:  Nov 6, 2018      PCP: Dr. Rahman  Diagnosis: Low grade, stage IVB (BM involvement, night sweats) follicular lymphoma.  FLIPI score is 3 (one for stage IV, one for age>60, and one for elevated LDH), associated with 52% median 5 year OS survival and 36% median 10 year overall survival.     Oncologic History:    presented with night sweats and lymphadenopathy. She developed drenching night sweats in 11/2011 which persisted. She had no other associated constitutional symptoms such as fevers or weight loss. She had a negative ID workup with Dr. Ruiz (for valley fever as she had traveled to [place] in the past).   As part of evaluation, CT scan was performed in April 2012 which demonstrated multiple borderline mildly enlarged retroperitoneal and mesenteric lymph nodes, with the largest size of 1.5 cm. The patient, however, persisted to have night sweats and underwent repeat CTCAP done without contrast (she is allergic to IV contrast and had an anaphylactic reaction to it) which demonstrated unchanged dilatation of common bile duct, likely related to postcholecystectomy state, and stable mildly enlarged retroperitoneal and mesenteric lymph nodes which were nonspecific.   We proceeded with CT guided biopsy of one of the intraabdominal lymph nodes and results c/w low grade follicular lymphoma.  Flow cytometry showed kappa monotypic CD10 positive B cells.  We proceeded with bone marrow biopsy to complete her staging. The results, as detailed below, revealed involvement of marrow by follicular lymphoma. Flow cytometry showed a rare population of CD10-positive, kappa-monotypic B cells (0.2%). Molecular diagnostics was negative for B-cell gene  "rearrangement. Cytogenetics revealed, of the interphase cells examined, 0.2% had a signal pattern indicative of an IGH/BCL2 gene fusion, a rate that falls just slightly above the normal control range (0-0.1%) for our laboratory; thus, these findings are suspicious for the presence of low-level bone marrow involvement by follicular lymphoma. However, this cannot be determined with certainty, in light of the few abnormal cells found by FISH. Of note, a G-banded chromosome study is still pending.   Hep B/C negative. EVE negative. No M-spike on SPEP.    Treatment: Weekly Rituxan x 4, completed 9/13/2012. During her first Rituxan infusion she has developed a reaction with tingling of upper lip and chin and feeling \"cold\" in her chest. She was given IV solumedrol and the infusion was slowed down and these symptoms have resolved. Since then she has been premedicated with solumedrol, and all infusions were uneventful since.    Interval History:  Ms. Potter is a 83 year old female diagnosed with follicular lymphoma present today for follow-up. She has completed 4 weekly doses of Rituxan in 09/2012.   Her night sweats have resolved subsequently and LDH has remained normal.    She had a screening mammogram in 12/2017 which was negative.   She has had no constitutional symptoms. She denies weight loss. She has no complaints. She is on melatonin 3 mg PO QHS prn insomnia, but does not need it every day. She has been having more migraines and had a noncontrast brain MRI for further evaluation on   10/1/2018 which showed:  1. No evidence of acute infarct, mass, hemorrhage, or herniation.  2. Mild diffuse parenchymal volume loss and white matter changes  likely due to chronic microvascular ischemic disease.   She will be seeing a neurologist. She is here with her . They traveled to [place] over the summer but will be staying in [place] this winter.  In addition, a complete 12 point  review of systems is negative.        Past " medical, social, surgical, and family histories reviewed.  Breast Cancer screening - mammogram negative 11/2013.  Echocardiogram in 10/2014 for evaluation of benign palpitations showed left ventricular function is normal with an EF of 55-60%.  Left ventricular Doppler filling pattern consistent with abnormal relaxation.  DEXA scan on 5/12/2015 showed most neg T score of -2.2 in the lumbar spine. This was unchanged compared to 11/2010. She was on Boneva years ago but apparently developed L jaw tingling and is no longer on biphosphonates.       Allergies:  Allergies as of 11/06/2018 - Garcia as Reviewed 11/06/2018   Allergen Reaction Noted     Diatrizoate Other (See Comments) 05/28/2008     Bisphosphonates GI Disturbance 10/01/2009     Ivp dye [contrast dye] Swelling 10/01/2009     Lisinopril Cough 06/24/2013     Losartan Hives 11/22/2010     Morphine Hives 06/06/2018     Morphine sulfate Nausea and Vomiting 08/16/2012     Prilosec [omeprazole] Hives 10/01/2009     Latex Rash 09/19/2014       Current Medications:  Current Outpatient Prescriptions   Medication Sig Dispense Refill     calcium carbonate (OS-FIDENCIO 500 MG [place]. [place]) 500 MG tablet Take 500 mg by mouth daily        carboxymethylcellulose (REFRESH PLUS) 0.5 % SOLN Place 1 drop into both eyes 3 times daily as needed for dry eyes       HYZAAR 50-12.5 MG per tablet Take 1 tablet by mouth daily 90 tablet 4     latanoprost (XALATAN) 0.005 % ophthalmic solution Place 1 drop into both eyes At Bedtime 7.5 mL 3     levothyroxine (SYNTHROID/LEVOTHROID) 50 MCG tablet Take 1 tablet (50 mcg) by mouth daily 90 tablet 4     MELATONIN PO Take 3 mg by mouth nightly as needed        Multiple Vitamin (MULTI-VITAMIN) per tablet Take 1 tablet by mouth daily.            Physical Exam:  /76  Pulse 81  Temp 98  F (36.7  C) (Oral)  Resp 16  Wt 63 kg (139 lb)  LMP 10/01/1986  SpO2 97%  BMI 24.02 kg/m2      GENERAL APPEARANCE: Healthy, alert and in no acute distress.  HEENT:  Sclerae anicteric. Oropharynx without ulcers, lesions, or thrush.  NECK: Supple. No asymmetry or masses.  LYMPHATICS: No palpable cervical, supraclavicular, axillary lymphadenopathy b/l  RESP: Lungs clear to auscultation bilaterally without rales, rhonchi or wheezes.  CARDIOVASCULAR: Regular rate and rhythm. Normal S1, S2; no S3 or S4. No murmur, gallop, or rub.  ABDOMEN: Soft, nontender. Bowel sounds normal. No palpable organomegaly or masses.  MUSCULOSKELETAL: Extremities without gross deformities noted. No edema of bilateral lower extremities.  SKIN: No suspicious lesions or rashes.  NEURO: Alert and oriented x 3. Cranial nerves II-XII grossly intact.  PSYCHIATRIC: Mentation and affect appear normal.    Labs:  Orders Only on 11/06/2018   Component Date Value Ref Range Status     WBC 11/06/2018 5.3  4.0 - 11.0 10e9/L Final     RBC Count 11/06/2018 4.35  3.8 - 5.2 10e12/L Final     Hemoglobin 11/06/2018 12.8  11.7 - 15.7 g/dL Final     Hematocrit 11/06/2018 39.0  35.0 - 47.0 % Final     MCV 11/06/2018 90  78 - 100 fl Final     MCH 11/06/2018 29.4  26.5 - 33.0 pg Final     MCHC 11/06/2018 32.8  31.5 - 36.5 g/dL Final     RDW 11/06/2018 13.2  10.0 - 15.0 % Final     Platelet Count 11/06/2018 170  150 - 450 10e9/L Final     % Neutrophils 11/06/2018 63.8  % Final     % Lymphocytes 11/06/2018 23.8  % Final     % Monocytes 11/06/2018 10.7  % Final     % Eosinophils 11/06/2018 1.3  % Final     % Basophils 11/06/2018 0.2  % Final     % Immature Granulocytes 11/06/2018 0.2  % Final     Absolute Neutrophil 11/06/2018 3.4  1.6 - 8.3 10e9/L Final     Absolute Lymphocytes 11/06/2018 1.3  0.8 - 5.3 10e9/L Final     Absolute Monocytes 11/06/2018 0.6  0.0 - 1.3 10e9/L Final     Absolute Eosinophils 11/06/2018 0.1  0.0 - 0.7 10e9/L Final     Absolute Basophils 11/06/2018 0.0  0.0 - 0.2 10e9/L Final     Abs Immature Granulocytes 11/06/2018 0.0  0 - 0.4 10e9/L Final     Diff Method 11/06/2018 Automated Method   Final     Sodium  11/06/2018 141  133 - 144 mmol/L Final     Potassium 11/06/2018 4.1  3.4 - 5.3 mmol/L Final     Chloride 11/06/2018 103  94 - 109 mmol/L Final     Carbon Dioxide 11/06/2018 35* 20 - 32 mmol/L Final     Anion Gap 11/06/2018 3  3 - 14 mmol/L Final     Glucose 11/06/2018 89  70 - 99 mg/dL Final    Non Fasting     Urea Nitrogen 11/06/2018 23  7 - 30 mg/dL Final     Creatinine 11/06/2018 0.95  0.52 - 1.04 mg/dL Final     GFR Estimate 11/06/2018 56* >60 mL/min/1.7m2 Final    Non  GFR Calc     GFR Estimate If Black 11/06/2018 68  >60 mL/min/1.7m2 Final    African American GFR Calc     Calcium 11/06/2018 8.9  8.5 - 10.1 mg/dL Final     Bilirubin Total 11/06/2018 0.6  0.2 - 1.3 mg/dL Final     Albumin 11/06/2018 3.5  3.4 - 5.0 g/dL Final     Protein Total 11/06/2018 7.3  6.8 - 8.8 g/dL Final     Alkaline Phosphatase 11/06/2018 80  40 - 150 U/L Final     ALT 11/06/2018 27  0 - 50 U/L Final     AST 11/06/2018 19  0 - 45 U/L Final     Lactate Dehydrogenase 11/06/2018 163  81 - 234 U/L Final     Uric Acid 11/06/2018 3.9  2.6 - 6.0 mg/dL Final       ASSESSMENT/PLAN:  Hilda is a very pleasant 83 year-old woman recently diagnosed with stage IV follicular lymphoma, FLIPI score is 3 (one for stage IV, one for age>60, and one for elevated LDH), associated with 52% median 5 year OS survival and 36% median 10 year overall survival. Most recent CT of the chest, abdomen and pelvis on 10/24/2017 showed no e/o lymphoma recurrence with stable posttreatment changes of lymphoma. There was no significant change in mesenteric fat stranding from prior. There were stable tiny pulmonary nodules from 4/26/2016, including 2 mm nodule in the right base.     1. NHL.  S/p Rituxan x4, completed in 09/2012. Responded to treatment with resolution of night sweats and decrease in lymphadenopathy. We'll continue to observe her from now on.   We'll see her back in 6 months with labs - cbcd, cmp, LDH, uric acid. Per updated NCCN guidelines, at  this time, a CT of the chest, abdomen and pelvis is recommended no more often than annually in Hilda's situation. We'll plan repeat CT of the chest, abdomen and pelvis 2 years from Nov 2017 (due Nov 2019).    2. Immunizations - She is uptodate with  Pneumovax, Prevnar vaccinations. She has already had her annual influenza vaccination this year    3. Breast cancer screening Screening mammogram negative 12/2017, next due 12/2018. Screening mammogram ordered and she scheduled [facility] at [facility] per pt preference and we'll inform her of the results.    At the end of our visit the patient verbalized understanding, denied any further questions, and concurred with the plan of care.                Again, thank you for allowing me to participate in the care of your patient.        Sincerely,        Jane Roth MD, MD

## 2018-11-06 NOTE — PROGRESS NOTES
Oncology Follow-up Visit:  Nov 6, 2018      PCP: Dr. Rahman  Diagnosis: Low grade, stage IVB (BM involvement, night sweats) follicular lymphoma.  FLIPI score is 3 (one for stage IV, one for age>60, and one for elevated LDH), associated with 52% median 5 year OS survival and 36% median 10 year overall survival.     Oncologic History:    presented with night sweats and lymphadenopathy. She developed drenching night sweats in 11/2011 which persisted. She had no other associated constitutional symptoms such as fevers or weight loss. She had a negative ID workup with Dr. Ruiz (for valley fever as she had traveled to [place] in the past).   As part of evaluation, CT scan was performed in April 2012 which demonstrated multiple borderline mildly enlarged retroperitoneal and mesenteric lymph nodes, with the largest size of 1.5 cm. The patient, however, persisted to have night sweats and underwent repeat CTCAP done without contrast (she is allergic to IV contrast and had an anaphylactic reaction to it) which demonstrated unchanged dilatation of common bile duct, likely related to postcholecystectomy state, and stable mildly enlarged retroperitoneal and mesenteric lymph nodes which were nonspecific.   We proceeded with CT guided biopsy of one of the intraabdominal lymph nodes and results c/w low grade follicular lymphoma.  Flow cytometry showed kappa monotypic CD10 positive B cells.  We proceeded with bone marrow biopsy to complete her staging. The results, as detailed below, revealed involvement of marrow by follicular lymphoma. Flow cytometry showed a rare population of CD10-positive, kappa-monotypic B cells (0.2%). Molecular diagnostics was negative for B-cell gene rearrangement. Cytogenetics revealed, of the interphase cells examined, 0.2% had a signal pattern indicative of an IGH/BCL2 gene fusion, a rate that falls just slightly above the normal control range (0-0.1%) for our laboratory; thus, these findings are  "suspicious for the presence of low-level bone marrow involvement by follicular lymphoma. However, this cannot be determined with certainty, in light of the few abnormal cells found by FISH. Of note, a G-banded chromosome study is still pending.   Hep B/C negative. EVE negative. No M-spike on SPEP.    Treatment: Weekly Rituxan x 4, completed 9/13/2012. During her first Rituxan infusion she has developed a reaction with tingling of upper lip and chin and feeling \"cold\" in her chest. She was given IV solumedrol and the infusion was slowed down and these symptoms have resolved. Since then she has been premedicated with solumedrol, and all infusions were uneventful since.    Interval History:  Ms. Potter is a 83 year old female diagnosed with follicular lymphoma present today for follow-up. She has completed 4 weekly doses of Rituxan in 09/2012.   Her night sweats have resolved subsequently and LDH has remained normal.    She had a screening mammogram in 12/2017 which was negative.   She has had no constitutional symptoms. She denies weight loss. She has no complaints. She is on melatonin 3 mg PO QHS prn insomnia, but does not need it every day. She has been having more migraines and had a noncontrast brain MRI for further evaluation on   10/1/2018 which showed:  1. No evidence of acute infarct, mass, hemorrhage, or herniation.  2. Mild diffuse parenchymal volume loss and white matter changes  likely due to chronic microvascular ischemic disease.   She will be seeing a neurologist. She is here with her . They traveled to [facility] over the summer but will be staying in [place] this winter.  In addition, a complete 12 point  review of systems is negative.        Past medical, social, surgical, and family histories reviewed.  Breast Cancer screening - mammogram negative 11/2013.  Echocardiogram in 10/2014 for evaluation of benign palpitations showed left ventricular function is normal with an EF of 55-60%.  Left " ventricular Doppler filling pattern consistent with abnormal relaxation.  DEXA scan on 5/12/2015 showed most neg T score of -2.2 in the lumbar spine. This was unchanged compared to 11/2010. She was on Boneva years ago but apparently developed L jaw tingling and is no longer on biphosphonates.       Allergies:  Allergies as of 11/06/2018 - Garcia as Reviewed 11/06/2018   Allergen Reaction Noted     Diatrizoate Other (See Comments) 05/28/2008     Bisphosphonates GI Disturbance 10/01/2009     Ivp dye [contrast dye] Swelling 10/01/2009     Lisinopril Cough 06/24/2013     Losartan Hives 11/22/2010     Morphine Hives 06/06/2018     Morphine sulfate Nausea and Vomiting 08/16/2012     Prilosec [omeprazole] Hives 10/01/2009     Latex Rash 09/19/2014       Current Medications:  Current Outpatient Prescriptions   Medication Sig Dispense Refill     calcium carbonate (OS-FIDENCIO 500 MG [place]. CA) 500 MG tablet Take 500 mg by mouth daily        carboxymethylcellulose (REFRESH PLUS) 0.5 % SOLN Place 1 drop into both eyes 3 times daily as needed for dry eyes       HYZAAR 50-12.5 MG per tablet Take 1 tablet by mouth daily 90 tablet 4     latanoprost (XALATAN) 0.005 % ophthalmic solution Place 1 drop into both eyes At Bedtime 7.5 mL 3     levothyroxine (SYNTHROID/LEVOTHROID) 50 MCG tablet Take 1 tablet (50 mcg) by mouth daily 90 tablet 4     MELATONIN PO Take 3 mg by mouth nightly as needed        Multiple Vitamin (MULTI-VITAMIN) per tablet Take 1 tablet by mouth daily.            Physical Exam:  /76  Pulse 81  Temp 98  F (36.7  C) (Oral)  Resp 16  Wt 63 kg (139 lb)  LMP 10/01/1986  SpO2 97%  BMI 24.02 kg/m2      GENERAL APPEARANCE: Healthy, alert and in no acute distress.  HEENT: Sclerae anicteric. Oropharynx without ulcers, lesions, or thrush.  NECK: Supple. No asymmetry or masses.  LYMPHATICS: No palpable cervical, supraclavicular, axillary lymphadenopathy b/l  RESP: Lungs clear to auscultation bilaterally without rales,  rhonchi or wheezes.  CARDIOVASCULAR: Regular rate and rhythm. Normal S1, S2; no S3 or S4. No murmur, gallop, or rub.  ABDOMEN: Soft, nontender. Bowel sounds normal. No palpable organomegaly or masses.  MUSCULOSKELETAL: Extremities without gross deformities noted. No edema of bilateral lower extremities.  SKIN: No suspicious lesions or rashes.  NEURO: Alert and oriented x 3. Cranial nerves II-XII grossly intact.  PSYCHIATRIC: Mentation and affect appear normal.    Labs:  Orders Only on 11/06/2018   Component Date Value Ref Range Status     WBC 11/06/2018 5.3  4.0 - 11.0 10e9/L Final     RBC Count 11/06/2018 4.35  3.8 - 5.2 10e12/L Final     Hemoglobin 11/06/2018 12.8  11.7 - 15.7 g/dL Final     Hematocrit 11/06/2018 39.0  35.0 - 47.0 % Final     MCV 11/06/2018 90  78 - 100 fl Final     MCH 11/06/2018 29.4  26.5 - 33.0 pg Final     MCHC 11/06/2018 32.8  31.5 - 36.5 g/dL Final     RDW 11/06/2018 13.2  10.0 - 15.0 % Final     Platelet Count 11/06/2018 170  150 - 450 10e9/L Final     % Neutrophils 11/06/2018 63.8  % Final     % Lymphocytes 11/06/2018 23.8  % Final     % Monocytes 11/06/2018 10.7  % Final     % Eosinophils 11/06/2018 1.3  % Final     % Basophils 11/06/2018 0.2  % Final     % Immature Granulocytes 11/06/2018 0.2  % Final     Absolute Neutrophil 11/06/2018 3.4  1.6 - 8.3 10e9/L Final     Absolute Lymphocytes 11/06/2018 1.3  0.8 - 5.3 10e9/L Final     Absolute Monocytes 11/06/2018 0.6  0.0 - 1.3 10e9/L Final     Absolute Eosinophils 11/06/2018 0.1  0.0 - 0.7 10e9/L Final     Absolute Basophils 11/06/2018 0.0  0.0 - 0.2 10e9/L Final     Abs Immature Granulocytes 11/06/2018 0.0  0 - 0.4 10e9/L Final     Diff Method 11/06/2018 Automated Method   Final     Sodium 11/06/2018 141  133 - 144 mmol/L Final     Potassium 11/06/2018 4.1  3.4 - 5.3 mmol/L Final     Chloride 11/06/2018 103  94 - 109 mmol/L Final     Carbon Dioxide 11/06/2018 35* 20 - 32 mmol/L Final     Anion Gap 11/06/2018 3  3 - 14 mmol/L Final      Glucose 11/06/2018 89  70 - 99 mg/dL Final    Non Fasting     Urea Nitrogen 11/06/2018 23  7 - 30 mg/dL Final     Creatinine 11/06/2018 0.95  0.52 - 1.04 mg/dL Final     GFR Estimate 11/06/2018 56* >60 mL/min/1.7m2 Final    Non  GFR Calc     GFR Estimate If Black 11/06/2018 68  >60 mL/min/1.7m2 Final    African American GFR Calc     Calcium 11/06/2018 8.9  8.5 - 10.1 mg/dL Final     Bilirubin Total 11/06/2018 0.6  0.2 - 1.3 mg/dL Final     Albumin 11/06/2018 3.5  3.4 - 5.0 g/dL Final     Protein Total 11/06/2018 7.3  6.8 - 8.8 g/dL Final     Alkaline Phosphatase 11/06/2018 80  40 - 150 U/L Final     ALT 11/06/2018 27  0 - 50 U/L Final     AST 11/06/2018 19  0 - 45 U/L Final     Lactate Dehydrogenase 11/06/2018 163  81 - 234 U/L Final     Uric Acid 11/06/2018 3.9  2.6 - 6.0 mg/dL Final       ASSESSMENT/PLAN:  Hilda is a very pleasant 83 year-old woman recently diagnosed with stage IV follicular lymphoma, FLIPI score is 3 (one for stage IV, one for age>60, and one for elevated LDH), associated with 52% median 5 year OS survival and 36% median 10 year overall survival. Most recent CT of the chest, abdomen and pelvis on 10/24/2017 showed no e/o lymphoma recurrence with stable posttreatment changes of lymphoma. There was no significant change in mesenteric fat stranding from prior. There were stable tiny pulmonary nodules from 4/26/2016, including 2 mm nodule in the right base.     1. NHL.  S/p Rituxan x4, completed in 09/2012. Responded to treatment with resolution of night sweats and decrease in lymphadenopathy. We'll continue to observe her from now on.   We'll see her back in 6 months with labs - cbcd, cmp, LDH, uric acid. Per updated NCCN guidelines, at this time, a CT of the chest, abdomen and pelvis is recommended no more often than annually in Hilda's situation. We'll plan repeat CT of the chest, abdomen and pelvis 2 years from Nov 2017 (due Nov 2019).    2. Immunizations - She is uptodate with   Pneumovax, Prevnar vaccinations. She has already had her annual influenza vaccination this year    3. Breast cancer screening Screening mammogram negative 12/2017, next due 12/2018. Screening mammogram ordered and she scheduled it at [facility] [place] per pt preference and we'll inform her of the results.    At the end of our visit the patient verbalized understanding, denied any further questions, and concurred with the plan of care.  Addendum:  She had an EGD with biopsies on 6/12/2018 due to c/o heartburn and intermittent dysphagia to solids and liquids x 1 year. There was a Schatzki's ring found which was dilated and mid and distant esophageal Bx were taken. Stomach and duodenum appeared normal.   She also had a colonoscopy on the same day where transverse polyps were removed.  All biopsies were negative for malignancy. Transverse colon polyp  biopsies showed normal colonic mucosa. She does not future colonoscopies due to age.

## 2018-11-06 NOTE — MR AVS SNAPSHOT
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 (Food and drink instructions) No Food and Drink Restrictions.  How do I prepare for my exam? (Other instructions) Do not use any powder, lotion or deodorant under your arms or on your breast. If you do, we will ask you to remove it before your exam.  What should I wear: Wear comfortable, two-piece clothing.  How long does the exam take: Most scans will take 15 minutes.  What should I bring: Bring any previous mammograms from other facilities or have them mailed to the breast center.  Do I need a :  No  is needed.  What do I need to tell my doctor: If you have any allergies, tell your care team.  What should I do after the exam: No restrictions, You may resume normal activities.  What is this test: This test is an x-ray of the breast to look for breast disease. The breast is pressed between two plates to flatten and spread the tissue. An X-ray is taken of the breast from different angles.  Who should I call with questions: If you have any questions, please call the Imaging Department where you will have your exam. Directions, parking  "instructions, and other information is available on our website, [email or web address].  Other information about my exam Three-dimensional (3D) mammograms are available at [facility] locations in [facility], [place], [place], [facility], [place], [facility] and [place]. [facility] locations include [place] and the [facility] in [place].  Benefits of 3D mammograms include * Improved rate of cancer detection * Decreases your chance of having to go back for more tests, which means fewer: * \"False-positive\" results (This means that there is an abnormal area but it isn't cancer.) * Invasive testing procedures, such as a biopsy or surgery * Can provide clearer images of the breast if you have dense breast tissue.  *3D mammography is an optional exam that anyone can have with a 2D mammogram. It doesn't replace or take the place of a 2D mammogram. 2D mammograms remain an effective screening test for all women.  Not all insurance companies cover the cost of a 3D mammogram. Check with your insurance.            Dec 20, 2018  8:00 AM CST   New Visit with Iliana Connor MD   [facility]    [address] [address]   [phone]            May 07, 2019 11:45 AM CDT   LAB with LAB ON[facility] ([facility])    [id number] [place] [id number] [phone]           Please do not eat 10-12 hours before your appointment if you are coming in fasting for labs on lipids, cholesterol, or glucose (sugar). This does not apply to pregnant women. Water, hot tea and black coffee (with nothing added) are okay. Do not drink other fluids, diet soda or chew gum.            May 07, 2019 12:30 PM CDT   Return Visit with Jane Roth MD   [facility]    [address] [place] [address] [id number] [phone]            "   Future tests that were ordered for you today     Open Future Orders        Priority Expected Expires Ordered    CBC with platelets differential Routine  11/6/2019 11/6/2018    Comprehensive metabolic panel Routine  11/6/2019 11/6/2018    Lactate Dehydrogenase Routine  11/6/2019 11/6/2018            Who to contact     If you have questions or need follow up information about today's clinic visit or your schedule please contact [facility] directly at [phone].  Normal or non-critical lab and imaging results will be communicated to you by MyChart, letter or phone within 4 business days after the clinic has received the results. If you do not hear from us within 7 days, please contact the clinic through MyChart or phone. If you have a critical or abnormal lab result, we will notify you by phone as soon as possible.  Submit refill requests through [facility]one or call your pharmacy and they will forward the refill request to us. Please allow 3 business days for your refill to be completed.          Additional Information About Your Visit        Care EveryWhere ID     This is your Care EveryWhere ID. This could be used by other organizations to access your [facility] medical records  [id number]        Your Vitals Were     Pulse Temperature Respirations Last Period Pulse Oximetry BMI (Body Mass Index)    81 98  F (36.7  C) (Oral) 16 10/01/1986 97% 24.02 kg/m2       Blood Pressure from Last 3 Encounters:   11/06/18 148/76   09/10/18 132/60   05/31/18 140/72    Weight from Last 3 Encounters:   11/06/18 63 kg (139 lb)   09/10/18 63.8 kg (140 lb 9.6 oz)   05/31/18 65.7 kg (144 lb 12.8 oz)               Primary Care Provider Office Phone # Fax #    Jessica Rahman [phone] [address] [address]        Equal Access to Services     JAY ORTEGA : Payam Jaime, berny hurst, jj mehta  lademarco hauser. So [facility] [phone].    ATENCIÓN: Si melvi rankin, tiene a ayala disposición servicios gratuitos de asistencia lingüística. Abdon devine [phone].    We comply with applicable federal civil rights laws and [place] laws. We do not discriminate on the basis of race, color, national origin, age, disability, sex, sexual orientation, or gender identity.            Thank you!     Thank you for choosing [facility]  for your care. Our goal is always to provide you with excellent care. Hearing back from our patients is one way we can continue to improve our services. Please take a few minutes to complete the written survey that you may receive in the mail after your visit with us. Thank you!             Your Updated Medication List - Protect others around you: Learn how to safely use, store and throw away your medicines at www.disposemymeds.org.          This list is accurate as of 11/6/18  1:04 PM.  Always use your most recent med list.                   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)       carboxymethylcellulose 0.5 % Soln ophthalmic solution    REFRESH PLUS     Place 1 drop into both eyes 3 times daily as needed for dry eyes        HYZAAR 50-12.5 MG per tablet   Generic drug:  losartan-hydrochlorothiazide     90 tablet    Take 1 tablet by mouth daily    Essential hypertension with goal blood pressure less than 140/90       latanoprost 0.005 % ophthalmic solution    XALATAN    7.5 mL    Place 1 drop into both eyes At Bedtime    Glaucoma suspect of both eyes       levothyroxine 50 MCG tablet    SYNTHROID/LEVOTHROID    90 tablet    Take 1 tablet (50 mcg) by mouth daily    Hypothyroidism, unspecified type       MELATONIN PO      Take 3 mg by mouth nightly as needed        Multi-vitamin Tabs tablet   Generic drug:  multivitamin,  therapeutic with minerals      Take 1 tablet by mouth daily.

## 2018-12-03 ENCOUNTER — OFFICE VISIT (OUTPATIENT)
Dept: OPHTHALMOLOGY | Facility: CLINIC | Age: 83
End: 2018-12-03
Payer: MEDICARE

## 2018-12-03 DIAGNOSIS — H40.003 GLAUCOMA SUSPECT OF BOTH EYES: Primary | ICD-10-CM

## 2018-12-03 DIAGNOSIS — H25.813 COMBINED FORMS OF AGE-RELATED CATARACT OF BOTH EYES: ICD-10-CM

## 2018-12-03 DIAGNOSIS — H52.4 PRESBYOPIA: ICD-10-CM

## 2018-12-03 DIAGNOSIS — H43.812 POSTERIOR VITREOUS DETACHMENT OF LEFT EYE: ICD-10-CM

## 2018-12-03 DIAGNOSIS — Z98.890 HISTORY OF BLEPHAROPLASTY: ICD-10-CM

## 2018-12-03 PROCEDURE — 92015 DETERMINE REFRACTIVE STATE: CPT | Mod: GY | Performed by: OPHTHALMOLOGY

## 2018-12-03 PROCEDURE — 92014 COMPRE OPH EXAM EST PT 1/>: CPT | Performed by: OPHTHALMOLOGY

## 2018-12-03 ASSESSMENT — VISUAL ACUITY
CORRECTION_TYPE: GLASSES
OD_CC+: -2
OS_CC+: -1
OD_CC: 20/25
METHOD: SNELLEN - LINEAR
OS_CC: 20/25

## 2018-12-03 ASSESSMENT — CUP TO DISC RATIO
OS_RATIO: 0.6
OD_RATIO: 0.7

## 2018-12-03 ASSESSMENT — REFRACTION_WEARINGRX
OS_SPHERE: +3.50
OD_SPHERE: +2.75
OD_ADD: +2.50
SPECS_TYPE: PAL
OD_AXIS: 172
OD_CYLINDER: +1.00
OS_AXIS: 021
OS_ADD: +2.50
OS_CYLINDER: +1.00

## 2018-12-03 ASSESSMENT — REFRACTION_MANIFEST
OD_ADD: +2.50
OS_CYLINDER: +1.50
OD_AXIS: 177
OD_CYLINDER: +1.75
OD_SPHERE: +3.00
OS_SPHERE: +3.00
OS_AXIS: 177
OS_ADD: +2.50

## 2018-12-03 ASSESSMENT — EXTERNAL EXAM - RIGHT EYE: OD_EXAM: NORMAL

## 2018-12-03 ASSESSMENT — TONOMETRY
OS_IOP_MMHG: 21
OD_IOP_MMHG: 19
IOP_METHOD: APPLANATION

## 2018-12-03 ASSESSMENT — EXTERNAL EXAM - LEFT EYE: OS_EXAM: NORMAL

## 2018-12-03 ASSESSMENT — CONF VISUAL FIELD
OS_NORMAL: 1
OD_NORMAL: 1
METHOD: COUNTING FINGERS

## 2018-12-03 NOTE — LETTER
12/3/2018         RE: Hilda Potter  [address]  [place] [address]        Dear Colleague,    Thank you for referring your patient, Hilda Potter, to the [facility]. Please see a copy of my visit note below.     Current Eye Medications:  Refresh three times a day both eyes. Latanoprost at bedtime both eyes, last took at 10 pm.      Subjective:  Complete eye exam. Patient having migraines since high school. Migraines have been more often last 6 months. Patient also has TMJ. Vision is pretty good both eyes in distance. Having some trouble at near. Avoids reading books, because reading causes migraines. Eyes feel dry both eyes. No eye pain in either eye. Still having flashing lights both eyes in peripheral for last 5 weeks, just not as often. Still can see spider web thing with small floaters Left eye > Right eye for last 5 weeks.      Objective:  See Ophthalmology Exam.       Assessment:  Stable intraocular pressure and discs in patient who is a treated glaucoma suspect.      Plan:  Glasses Rx given - optional.  Continue using Latanoprost both eyes at bedtime.   Use artificial tears up to 4 times daily both eyes.  (Refresh Tears, Systane Ultra/Balance, or Theratears)  Possible posterior vitreous detachment (sudden onset large floater and/or flashing lights) right eye discussed.   Return visit in 6 months for intraocular pressure check, glaucoma OCT and [facility].    Andi Bay M.D.  [phone]             Again, thank you for allowing me to participate in the care of your patient.        Sincerely,        Andi Bay MD

## 2018-12-03 NOTE — PROGRESS NOTES
Current Eye Medications:  Refresh three times a day both eyes. Latanoprost at bedtime both eyes, last took at 10 pm.      Subjective:  Complete eye exam. Patient having migraines since high school. Migraines have been more often last 6 months. Patient also has TMJ. Vision is pretty good both eyes in distance. Having some trouble at near. Avoids reading books, because reading causes migraines. Eyes feel dry both eyes. No eye pain in either eye. Still having flashing lights both eyes in peripheral for last 5 weeks, just not as often. Still can see spider web thing with small floaters Left eye > Right eye for last 5 weeks.      Objective:  See Ophthalmology Exam.       Assessment:  Stable intraocular pressure and discs in patient who is a treated glaucoma suspect.      ICD-10-CM    1. 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 Combined forms of age-related cataract, mild-mod, both eyes H25.813    3. History of blepharoplasty, upper lids, ou Z98.890    4. Posterior vitreous detachment of left eye H43.812    5. Presbyopia H52.4 REFRACTIVE STATUS        Plan:  Glasses Rx given - optional.  Continue using Latanoprost both eyes at bedtime.   Use artificial tears up to 4 times daily both eyes.  (Refresh Tears, Systane Ultra/Balance, or Theratears)  Possible posterior vitreous detachment (sudden onset large floater and/or flashing lights) right eye discussed.   Return visit in 6 months for intraocular pressure check, glaucoma OCT and [facility].    Andi Bay M.D.  [phone]

## 2018-12-03 NOTE — MR AVS SNAPSHOT
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  Continue using Latanoprost both eyes at bedtime.   Use artificial tears up to 4 times daily both eyes.  (Refresh Tears, Systane Ultra/Balance, or Theratears)  Possible posterior vitreous detachment (sudden onset large floater and/or flashing lights) right eye discussed.   Return visit in 6 months for intraocular pressure check, glaucoma OCT and Enciso Visual Field.    Andi Bay M.D.  [phone]            Follow-ups after your visit        Your next 10 appointments already scheduled     Dec 10, 2018 11:00 AM CST   MA SCREENING DIGITAL BILATERAL with FKMA1   [facility] ([facility])    [address] [place] [id number] [phone]           How do I prepare for my exam? (Food and drink instructions) No Food and Drink Restrictions.  How do I prepare for my exam? (Other instructions) Do not use any powder, lotion or deodorant under your arms or on your breast. If you do, we will ask you to remove it before your exam.  What should I wear: Wear comfortable, two-piece clothing.  How long does the exam take: Most scans will take 15 minutes.  What should I bring: Bring any previous mammograms from other facilities or have them mailed to the breast center.  Do I need a :  No  is needed.  What do I need to tell my doctor: If you have any allergies, tell your care team.  What should I do after the exam: No restrictions, You may  "resume normal activities.  What is this test: This test is an x-ray of the breast to look for breast disease. The breast is pressed between two plates to flatten and spread the tissue. An X-ray is taken of the breast from different angles.  Who should I call with questions: If you have any questions, please call the Imaging Department where you will have your exam. Directions, parking instructions, and other information is available on our website, [email or web address].  Other information about my exam Three-dimensional (3D) mammograms are available at [facility] locations in [facility], [facility], [facility], [place], [facility] and [place]. [facility] locations include [place] and the [facility] in [place].  Benefits of 3D mammograms include * Improved rate of cancer detection * Decreases your chance of having to go back for more tests, which means fewer: * \"False-positive\" results (This means that there is an abnormal area but it isn't cancer.) * Invasive testing procedures, such as a biopsy or surgery * Can provide clearer images of the breast if you have dense breast tissue.  *3D mammography is an optional exam that anyone can have with a 2D mammogram. It doesn't replace or take the place of a 2D mammogram. 2D mammograms remain an effective screening test for all women.  Not all insurance companies cover the cost of a 3D mammogram. Check with your insurance.            Dec 20, 2018  8:00 AM CST   New Visit with Iliana Connor MD   [facility]    [address] [facility] [address] [phone]            May 07, 2019 11:45 AM CDT   LAB with LAB ON[facility] ([facility])    [address] [place] [address] [phone]           Please do not eat 10-12 hours before your appointment if you are coming in fasting for labs on lipids, cholesterol, " or glucose (sugar). This does not apply to pregnant women. Water, hot tea and black coffee (with nothing added) are okay. Do not drink other fluids, diet soda or chew gum.            May 07, 2019 12:30 PM CDT   [facility] MD   [facility]    [address] [address] [place] [address] [phone]              Who to contact     If you have questions or need follow up information about today's clinic visit or your schedule please contact [facility] directly at [phone].  Normal or non-critical lab and imaging results will be communicated to you by MyChart, letter or phone within 4 business days after the clinic has received the results. If you do not hear from us within 7 days, please contact the clinic through MyChart or phone. If you have a critical or abnormal lab result, we will notify you by phone as soon as possible.  Submit refill requests through [facility] or call your pharmacy and they will forward the refill request to us. Please allow 3 business days for your refill to be completed.          Additional Information About Your Visit        Care EveryWhere ID     This is your Care EveryWhere ID. This could be used by other organizations to access your [facility] medical records  [id number]        Your Vitals Were     Last Period                   10/01/1986            Blood Pressure from Last 3 Encounters:   11/06/18 148/76   09/10/18 132/60   05/31/18 140/72    Weight from Last 3 Encounters:   11/06/18 63 kg (139 lb)   09/10/18 63.8 kg (140 lb 9.6 oz)   05/31/18 65.7 kg (144 lb 12.8 oz)              Today, you had the following     No orders found for display       Primary Care Provider Office Phone # Fax #    Jessica Rahman [phone] [address] [facility] [address]        Equal Access to Services     JAY ORTEGA AH: berny Araiza,  malini mcknight ninajj townsend ah. So [facility] [phone].    ATENCIÓN: Si melvi rankin, tiene a ayala disposición servicios gratuitos de asistencia lingüística. Abdon al [phone].    We comply with applicable federal civil rights laws and [place] laws. We do not discriminate on the basis of race, color, national origin, age, disability, sex, sexual orientation, or gender identity.            Thank you!     Thank you for choosing [facility]  for your care. Our goal is always to provide you with excellent care. Hearing back from our patients is one way we can continue to improve our services. Please take a few minutes to complete the written survey that you may receive in the mail after your visit with us. Thank you!             Your Updated Medication List - Protect others around you: Learn how to safely use, store and throw away your medicines at www.disposemymeds.org.          This list is accurate as of 12/3/18 11:18 AM.  Always use your most recent med list.                   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)       carboxymethylcellulose 0.5 % Soln ophthalmic solution    REFRESH PLUS     Place 1 drop into both eyes 3 times daily as needed for dry eyes        HYZAAR 50-12.5 MG tablet   Generic drug:  losartan-hydrochlorothiazide     90 tablet    Take 1 tablet by mouth daily    Essential hypertension with goal blood pressure less than 140/90       latanoprost 0.005 % ophthalmic solution    XALATAN    7.5 mL    Place 1 drop into both eyes At Bedtime    Glaucoma suspect of both eyes       levothyroxine 50 MCG tablet    SYNTHROID/LEVOTHROID    90 tablet    Take 1 tablet (50 mcg) by mouth daily    Hypothyroidism, unspecified type       MELATONIN PO      Take 3 mg by mouth nightly as needed        Multi-vitamin tablet    Generic drug:  multivitamin w/minerals      Take 1 tablet by mouth daily.

## 2018-12-03 NOTE — PATIENT INSTRUCTIONS
Glasses Rx given - optional.  Continue using Latanoprost both eyes at bedtime.   Use artificial tears up to 4 times daily both eyes.  (Refresh Tears, Systane Ultra/Balance, or Theratears)  Possible posterior vitreous detachment (sudden onset large floater and/or flashing lights) right eye discussed.   Return visit in 6 months for intraocular pressure check, glaucoma OCT and Enciso Visual Field.    Andi Bay M.D.  [phone]

## 2018-12-10 ENCOUNTER — RADIANT APPOINTMENT (OUTPATIENT)
Dept: MAMMOGRAPHY | Facility: CLINIC | Age: 83
End: 2018-12-10
Payer: MEDICARE

## 2018-12-10 DIAGNOSIS — Z12.31 VISIT FOR SCREENING MAMMOGRAM: ICD-10-CM

## 2018-12-10 DIAGNOSIS — I10 ESSENTIAL HYPERTENSION WITH GOAL BLOOD PRESSURE LESS THAN 140/90: ICD-10-CM

## 2018-12-10 PROCEDURE — 77067 SCR MAMMO BI INCL CAD: CPT | Mod: TC

## 2018-12-12 RX ORDER — LOSARTAN POTASSIUM AND HYDROCHLOROTHIAZIDE 12.5; 5 MG/1; MG/1
TABLET ORAL
Qty: 90 TABLET | Refills: 0
Start: 2018-12-12

## 2018-12-19 ENCOUNTER — TELEPHONE (OUTPATIENT)
Dept: NEUROLOGY | Facility: CLINIC | Age: 83
End: 2018-12-19

## 2018-12-19 NOTE — TELEPHONE ENCOUNTER
FUTURE VISIT INFORMATION        FUTURE VISIT INFORMATION:    Date: 12/20/18    Time:  0800    Location: [facility]   REFERRAL INFORMATION:    Referring provider:  Jessica Rahman MD    Referring providers clinic:  [facility]    Reason for visit/diagnosis:  Migraine        NOTES (FOR ALL VISITS) STATUS DETAILS   OFFICE NOTE from referring provider Printed 10/1/09, 9/10/18   OFFICE NOTE from other specialist Printed  Dr. Bay (Opth)   DISCHARGE SUMMARY from hospital      DISCHARGE REPORT from the ER     OPERATIVE REPORT      MEDICATION LIST      IMAGING  (FOR ALL VISITS)       EMG      EEG      MRI (HEAD, NECK, SPINE) Printed/Images in PACs  10/1/18               OTHER

## 2018-12-20 ENCOUNTER — OFFICE VISIT (OUTPATIENT)
Dept: NEUROLOGY | Facility: CLINIC | Age: 83
End: 2018-12-20
Attending: FAMILY MEDICINE
Payer: MEDICARE

## 2018-12-20 VITALS
WEIGHT: 140.6 LBS | HEART RATE: 81 BPM | RESPIRATION RATE: 12 BRPM | SYSTOLIC BLOOD PRESSURE: 127 MMHG | TEMPERATURE: 97.5 F | BODY MASS INDEX: 24.3 KG/M2 | DIASTOLIC BLOOD PRESSURE: 69 MMHG

## 2018-12-20 DIAGNOSIS — G43.109 MIGRAINE WITH AURA AND WITHOUT STATUS MIGRAINOSUS, NOT INTRACTABLE: Primary | ICD-10-CM

## 2018-12-20 DIAGNOSIS — M26.609 TEMPOROMANDIBULAR JOINT DISORDER: ICD-10-CM

## 2018-12-20 LAB — ERYTHROCYTE [SEDIMENTATION RATE] IN BLOOD BY WESTERGREN METHOD: 16 MM/H (ref 0–30)

## 2018-12-20 PROCEDURE — 85652 RBC SED RATE AUTOMATED: CPT | Performed by: PSYCHIATRY & NEUROLOGY

## 2018-12-20 PROCEDURE — 99204 OFFICE O/P NEW MOD 45 MIN: CPT | Performed by: PSYCHIATRY & NEUROLOGY

## 2018-12-20 PROCEDURE — 36415 COLL VENOUS BLD VENIPUNCTURE: CPT | Performed by: PSYCHIATRY & NEUROLOGY

## 2018-12-20 ASSESSMENT — PAIN SCALES - GENERAL: PAINLEVEL: NO PAIN (0)

## 2018-12-20 NOTE — NURSING NOTE
Left message on patient's identified voicemail asking her to have add-on ESR lab drawn at her convenience.

## 2018-12-20 NOTE — PROGRESS NOTES
"INITIAL NEUROLOGY CONSULTATION    DATE OF VISIT: 12/20/2018  MRN: [id number]  PATIENT NAME: Hilda Potter  YOB: 1935    REFERRING PROVIDER: Jessica Rahman    Chief Complaint   Patient presents with     New Patient     Migraine.  Referral by Jessica Rahman MD       SUBJECTIVE:                                                      HPI:   iHlda Potter is a 83 year old female whom I was asked by Dr. Rahman to see in consultation for headaches. Per chart review, the patient has a several-year history of migraines. She has described visual aura (blurring). Also has history of TMJ dysfunction/pain. Brain MRI in 10.2018 was unremarkable, with age-appropriate atrophy and SVID. Mildly decreased GFR.    The patient tells me that the migraines always start with jagged lights in the Right eye. This lasts about 45 minutes. She takes Excedrin and rests in a dark room and then usually it dissipates. She says that she does not really have a lot of pain with the visual symptoms any longer but used to. She says when she did have pain in the past, it was Right-sided.    She has nausea and fatigue with the \"headaches.\" She says she feels tired for 4-5 hours afterwards, with continued nausea and sometimes dizziness.     No curtained vision loss. She does otherwise notice a change in her vision due to her glaucoma, some \"cottony\" substance in the periphery of the Left eye. She thinks starting the eye drops for this may be helping with the migraines.    No weakness or sensory change. No runny nose or tearing.  Weather changes are a trigger.     Her TMJ has been worse lately and she sees a chiropractor every couple of weeks for this. Her dentist is apparently aware, but the patient says that lately she feels that her bite is off. Her jaw hurts sometimes with chewing, mainly on the Left side.     She says that the migraines started in high school with a 20 year gap. No history of head or neck " injury/surgery.    Headache treatments tried in the past: Metoprolol, Tylenol. Tylenol was not effective. Metoprolol increased the frequency of her headaches.     She asks about hydrocephalus. Her brother was just diagnosed with this.     Past Medical History:   Diagnosis Date     AR (allergic rhinitis)      Atrophic vaginitis      Compression fracture of L1 lumbar vertebra (H) 4/2015     HTN (hypertension)      Migraine      Nonsenile cataract      Osteoporosis      Reflux esophagitis      Thyroid disease      Past Surgical History:   Procedure Laterality Date     APPENDECTOMY       BLEPHAROPLASTY BILATERAL  10/2007    both eyes, upper lids     C LENGTHEN,TENDON,HAND/FINGER  1993    repair of dupytrons's contracture     COLONOSCOPY  6/02, 10/13    Q 5 years, polyps     D & C       HC REMOVAL GALLBLADDER       REPAIR PTOSIS       SALPINGO OOPHORECTOMY,R/L/LUIS DANIEL      left ovary and tube     SMALL BOWEL RESECTION  1986    colon resection      TONSILLECTOMY & ADENOIDECTOMY       TUBAL LIGATION       UPPER GI ENDOSCOPY  6/02, 8/11         Current Outpatient Medications on File Prior to Visit:  carboxymethylcellulose (REFRESH PLUS) 0.5 % SOLN Place 1 drop into both eyes 3 times daily as needed for dry eyes   HYZAAR 50-12.5 MG per tablet Take 1 tablet by mouth daily   latanoprost (XALATAN) 0.005 % ophthalmic solution Place 1 drop into both eyes At Bedtime   levothyroxine (SYNTHROID/LEVOTHROID) 50 MCG tablet Take 1 tablet (50 mcg) by mouth daily   MELATONIN PO Take 3 mg by mouth nightly as needed    Multiple Vitamin (MULTI-VITAMIN) per tablet Take 1 tablet by mouth daily.       No current facility-administered medications on file prior to visit.   Allergies   Allergen Reactions     Diatrizoate Other (See Comments)     Bisphosphonates GI Disturbance     GI distress     Ivp Dye [Contrast Dye] Swelling     Lisinopril Cough     Losartan Hives     But can tolerate Hyzaar.      Morphine Hives     Morphine Sulfate Nausea and  Vomiting     Prilosec [Omeprazole] Hives     Latex Rash        Problem (# of Occurrences) Relation (Name,Age of Onset)    Arthritis (2) Mother ([place]ge[place]), Father    C.A.D. (1) Father    Cardiovascular (3) Mother ([place]ge[place]), Father, Brother (Carlos)    Cerebrovascular Disease (1) Father    Eye Disorder (1) Father    Glaucoma (1) Father    Heart Disease (1) Father    Hypertension (2) Mother ([place]ge[place]), Father    Migraines (1) Mother ([place]ge[place])    Parkinsonism (1) Father    Peripheral Neuropathy (1) Brother ([place])    Retinal detachment (1) Mother ([place]gebor)       Negative family history of: Macular Degeneration        Social History     Tobacco Use     Smoking status: Never Smoker     Smokeless tobacco: Never Used   Substance Use Topics     Alcohol use: Yes     Comment: wine      Drug use: No       REVIEW OF SYSTEMS:                                                      10-point review of systems is negative except as mentioned above in HPI.     EXAM:                                                      Physical Exam:   Vitals: /69 (BP Location: Right arm, Patient Position: Sitting, Cuff Size: Adult Regular)   Pulse 81   Temp 97.5  F (36.4  C) (Tympanic)   Resp 12   Wt 63.8 kg (140 lb 9.6 oz)   LMP 10/01/1986   BMI 24.30 kg/m    BMI= Body mass index is 24.3 kg/m .  GENERAL: NAD. Appears younger than stated age.   HEENT: NC/AT. No TTP of scalp or neck. No clicking at TMJ.   CV: RRR. S1, S2.   NECK: No bruits.  Neurologic:  MENTAL STATUS: Alert, attentive. Speech is fluent. Normal comprehension. Sherri concentration. Adequate fund of knowledge.   CRANIAL NERVES: Discs technically difficult to visualize. Visual fields intact to confrontation. Pupils equally, round and reactive to light. Facial sensation and movement normal. EOM full. Hearing intact with finger rub. Trapezius strength intact. Palate moves symmetrically. Tongue midline.  MOTOR: 5/5 in proximal and distal muscle groups of upper and lower extremities.  Tone and bulk normal.   DTRs: Intact and symmetric. Babinski down-going bilaterally.   SENSATION: Normal light touch and pinprick. Intact proprioception. Vibration: Normal at both ankles.   COORDINATION: Finger tapping normal. Knee heel shin normal.  STATION AND GAIT: Romberg Negative. Casual gait is normal.     Relevant Data:  MRI Brain (10.1.18):  IMPRESSION:    1. No evidence of acute infarct, mass, hemorrhage, or herniation.  2. Mild diffuse parenchymal volume loss and white matter changes  likely due to chronic microvascular ischemic disease.     Imaging reviewed by me. Agree with radiology read. Laury SVID for age.     ASSESSMENT and PLAN:                                                      Assessment and Plan:     ICD-10-CM    1. Migraine with aura and without status migrainosus, not intractable G43.109    2. Temporomandibular joint disorder M26.609 PAIN MANAGEMENT REFERRAL     **ESR FUTURE anytime        Ms. Potter is a pleasant 82 yo woman with long history of migraine headaches, more frequent recently. She also describes increased problems with jaw pain likely related to TMJ dysfunction, which may be contributing to the headaches. I recommend she see a TMJ specialist regarding this.     We reviewed her MRI in clinic today. No structural cause for the headaches.     She is currently achieving good abortive control of her headache with Excedrin migraine, so I recommend she continue. No concern about MOH (analgesic overuse) with current use. We discussed other abortive methods, such as triptans, which could cautiously be tried in the future if needed. She does not have frequent-enough headaches to warrant preventative treatment, so I agree with her discontinuation of the metoprolol. We will follow-up in 3-4 months to make sure that the headaches are still well-controlled, and hopefully less frequent. I have asked the patient to keep a headache diary in the interim. I will also order an ESR, given her age  and the jaw pain. She understands and agrees with the plan.    Patient Instructions:  -- Continue the Excedrin Migraine at symptom onset. If you find that this stops working or you are taking the medication more than 2 days per week, we may need to consider an alternative.   -- Referral to [facility] for the TMJ.  -- Headache Diary.   -- Return to neurology in 3-4 months.     Total Time: 45 minutes were spent with the patient. More than 50% of the time spent on counseling (as described above in Assessment and Plan/Instructions)/coordinating the care.    Iliana Connor MD  Neurology    CC: Jessica Rahman MD

## 2018-12-20 NOTE — PATIENT INSTRUCTIONS
Plan:    -- Continue the Excedrin Migraine at symptom onset. If you find that this stops working or you are taking the medication more than 2 days per week, we may need to consider an alternative.   -- Referral to [facility] for the TMJ.  -- Headache Diary.   -- Return to neurology in 3-4 months.

## 2018-12-20 NOTE — NURSING NOTE
Patient prefers to be contacted: [phone]  Okay to leave detailed message on voicemail: yes     Reina ZIMMERMANA

## 2018-12-20 NOTE — LETTER
"    12/20/2018         RE: Hilda Potter  [address]  [facility] [address]        Dear Colleague,    Thank you for referring your patient, Hilda Potter, to the [facility]. Please see a copy of my visit note below.    INITIAL NEUROLOGY CONSULTATION    DATE OF VISIT: 12/20/2018  MRN: [id number]  PATIENT NAME: Hilda Potter  YOB: 1935    REFERRING PROVIDER: Jessica Rahman    Chief Complaint   Patient presents with     New Patient     Migraine.  Referral by Jessica Rahman MD       SUBJECTIVE:                                                      HPI:   Hilda Potter is a 83 year old female whom I was asked by Dr. Rahman to see in consultation for headaches. Per chart review, the patient has a several-year history of migraines. She has described visual aura (blurring). Also has history of TMJ dysfunction/pain. Brain MRI in 10.2018 was unremarkable, with age-appropriate atrophy and SVID. Mildly decreased GFR.    The patient tells me that the migraines always start with jagged lights in the Right eye. This lasts about 45 minutes. She takes Excedrin and rests in a dark room and then usually it dissipates. She says that she does not really have a lot of pain with the visual symptoms any longer but used to. She says when she did have pain in the past, it was Right-sided.    She has nausea and fatigue with the \"headaches.\" She says she feels tired for 4-5 hours afterwards, with continued nausea and sometimes dizziness.     No curtained vision loss. She does otherwise notice a change in her vision due to her glaucoma, some \"cottony\" substance in the periphery of the Left eye. She thinks starting the eye drops for this may be helping with the migraines.    No weakness or sensory change. No runny nose or tearing.  Weather changes are a trigger.     Her TMJ has been worse lately and she sees a chiropractor every couple of weeks for this. Her dentist is " apparently aware, but the patient says that lately she feels that her bite is off. Her jaw hurts sometimes with chewing, mainly on the Left side.     She says that the migraines started in high school with a 20 year gap. No history of head or neck injury/surgery.    Headache treatments tried in the past: Metoprolol, Tylenol. Tylenol was not effective. Metoprolol increased the frequency of her headaches.     She asks about hydrocephalus. Her brother was just diagnosed with this.     Past Medical History:   Diagnosis Date     AR (allergic rhinitis)      Atrophic vaginitis      Compression fracture of L1 lumbar vertebra (H) 4/2015     HTN (hypertension)      Migraine      Nonsenile cataract      Osteoporosis      Reflux esophagitis      Thyroid disease      Past Surgical History:   Procedure Laterality Date     APPENDECTOMY       BLEPHAROPLASTY BILATERAL  10/2007    both eyes, upper lids     C LENGTHEN,TENDON,HAND/FINGER  1993    repair of dupytrons's contracture     COLONOSCOPY  6/02, 10/13    Q 5 years, polyps     D & C       HC REMOVAL GALLBLADDER       REPAIR PTOSIS       SALPINGO OOPHORECTOMY,R/L/LUIS DANIEL      left ovary and tube     SMALL BOWEL RESECTION  1986    colon resection      TONSILLECTOMY & ADENOIDECTOMY       TUBAL LIGATION       UPPER GI ENDOSCOPY  6/02, 8/11         Current Outpatient Medications on File Prior to Visit:  carboxymethylcellulose (REFRESH PLUS) 0.5 % SOLN Place 1 drop into both eyes 3 times daily as needed for dry eyes   HYZAAR 50-12.5 MG per tablet Take 1 tablet by mouth daily   latanoprost (XALATAN) 0.005 % ophthalmic solution Place 1 drop into both eyes At Bedtime   levothyroxine (SYNTHROID/LEVOTHROID) 50 MCG tablet Take 1 tablet (50 mcg) by mouth daily   MELATONIN PO Take 3 mg by mouth nightly as needed    Multiple Vitamin (MULTI-VITAMIN) per tablet Take 1 tablet by mouth daily.       No current facility-administered medications on file prior to visit.   Allergies   Allergen Reactions      Diatrizoate Other (See Comments)     Bisphosphonates GI Disturbance     GI distress     Ivp Dye [Contrast Dye] Swelling     Lisinopril Cough     Losartan Hives     But can tolerate Hyzaar.      Morphine Hives     Morphine Sulfate Nausea and Vomiting     Prilosec [Omeprazole] Hives     Latex Rash        Problem (# of Occurrences) Relation (Name,Age of Onset)    Arthritis (2) Mother ([place]), Father    C.A.D. (1) Father    Cardiovascular (3) Mother ([place]), Father, Brother (Carlos)    Cerebrovascular Disease (1) Father    Eye Disorder (1) Father    Glaucoma (1) Father    Heart Disease (1) Father    Hypertension (2) Mother ([place]), Father    Migraines (1) Mother ([place])    Parkinsonism (1) Father    Peripheral Neuropathy (1) Brother ([place])    Retinal detachment (1) Mother ([place]ge[place])       Negative family history of: Macular Degeneration        Social History     Tobacco Use     Smoking status: Never Smoker     Smokeless tobacco: Never Used   Substance Use Topics     Alcohol use: Yes     Comment: wine      Drug use: No       REVIEW OF SYSTEMS:                                                      10-point review of systems is negative except as mentioned above in HPI.     EXAM:                                                      Physical Exam:   Vitals: /69 (BP Location: Right arm, Patient Position: Sitting, Cuff Size: Adult Regular)   Pulse 81   Temp 97.5  F (36.4  C) (Tympanic)   Resp 12   Wt 63.8 kg (140 lb 9.6 oz)   LMP 10/01/1986   BMI 24.30 kg/m     BMI= Body mass index is 24.3 kg/m .  GENERAL: NAD. Appears younger than stated age.   HEENT: NC/AT. No TTP of scalp or neck. No clicking at TMJ.   CV: RRR. S1, S2.   NECK: No bruits.  Neurologic:  MENTAL STATUS: Alert, attentive. Speech is fluent. Normal comprehension. Sherri concentration. Adequate fund of knowledge.   CRANIAL NERVES: Discs technically difficult to visualize. Visual fields intact to confrontation. Pupils equally, round and reactive to  light. Facial sensation and movement normal. EOM full. Hearing intact with finger rub. Trapezius strength intact. Palate moves symmetrically. Tongue midline.  MOTOR: 5/5 in proximal and distal muscle groups of upper and lower extremities. Tone and bulk normal.   DTRs: Intact and symmetric. Babinski down-going bilaterally.   SENSATION: Normal light touch and pinprick. Intact proprioception. Vibration: Normal at both ankles.   COORDINATION: Finger tapping normal. Knee heel shin normal.  STATION AND GAIT: Romberg Negative. Casual gait is normal.     Relevant Data:  MRI Brain (10.1.18):  IMPRESSION:    1. No evidence of acute infarct, mass, hemorrhage, or herniation.  2. Mild diffuse parenchymal volume loss and white matter changes  likely due to chronic microvascular ischemic disease.     Imaging reviewed by me. Agree with radiology read. Laury SVID for age.     ASSESSMENT and PLAN:                                                      Assessment and Plan:     ICD-10-CM    1. Migraine with aura and without status migrainosus, not intractable G43.109    2. Temporomandibular joint disorder M26.609 PAIN MANAGEMENT REFERRAL     **ESR FUTURE anytime        Ms. Potter is a pleasant 84 yo woman with long history of migraine headaches, more frequent recently. She also describes increased problems with jaw pain likely related to TMJ dysfunction, which may be contributing to the headaches. I recommend she see a TMJ specialist regarding this.     We reviewed her MRI in clinic today. No structural cause for the headaches.     She is currently achieving good abortive control of her headache with Excedrin migraine, so I recommend she continue. No concern about MOH (analgesic overuse) with current use. We discussed other abortive methods, such as triptans, which could cautiously be tried in the future if needed. She does not have frequent-enough headaches to warrant preventative treatment, so I agree with her discontinuation of  the metoprolol. We will follow-up in 3-4 months to make sure that the headaches are still well-controlled, and hopefully less frequent. I have asked the patient to keep a headache diary in the interim. I will also order an ESR, given her age and the jaw pain. She understands and agrees with the plan.    Patient Instructions:  -- Continue the Excedrin Migraine at symptom onset. If you find that this stops working or you are taking the medication more than 2 days per week, we may need to consider an alternative.   -- Referral to [facility] for the TMJ.  -- Headache Diary.   -- Return to neurology in 3-4 months.     Total Time: 45 minutes were spent with the patient. More than 50% of the time spent on counseling (as described above in Assessment and Plan/Instructions)/coordinating the care.    Iliana Connor MD  Neurology    CC: Jessica Rahman MD      Again, thank you for allowing me to participate in the care of your patient.        Sincerely,        Iliana Connor MD

## 2018-12-21 NOTE — RESULT ENCOUNTER NOTE
Please advise Hilda Potter,  1935, that her lab results were normal.  [phone] (home)   Iliana Connor

## 2019-01-04 ENCOUNTER — TELEPHONE (OUTPATIENT)
Dept: FAMILY MEDICINE | Facility: CLINIC | Age: 84
End: 2019-01-04

## 2019-01-04 NOTE — TELEPHONE ENCOUNTER
Reason for Call:  Other call back    Detailed comments: Patient calling yo ask if you have any recommendations for family practice or internal medicine doctor in  [place] or [facility]? Please advise    Phone Number Patient can be reached at: Home number on file [phone] (home)    Best Time: ASAP    Can we leave a detailed message on this number? YES    Call taken on 1/4/2019 at 11:21 AM by Caitie Hart

## 2019-01-04 NOTE — TELEPHONE ENCOUNTER
Call her. I am sorry. I don't know any providers in [facility]. I wish her well.     MALCOLM RANGEL M.D.

## 2019-01-28 ENCOUNTER — OFFICE VISIT (OUTPATIENT)
Dept: FAMILY MEDICINE | Facility: CLINIC | Age: 84
End: 2019-01-28
Payer: MEDICARE

## 2019-01-28 VITALS
DIASTOLIC BLOOD PRESSURE: 71 MMHG | HEART RATE: 89 BPM | BODY MASS INDEX: 24.85 KG/M2 | OXYGEN SATURATION: 97 % | HEIGHT: 63 IN | SYSTOLIC BLOOD PRESSURE: 130 MMHG | TEMPERATURE: 97.6 F | WEIGHT: 140.25 LBS

## 2019-01-28 DIAGNOSIS — L82.1 SEBORRHEIC KERATOSIS: ICD-10-CM

## 2019-01-28 DIAGNOSIS — I10 ESSENTIAL HYPERTENSION WITH GOAL BLOOD PRESSURE LESS THAN 140/90: ICD-10-CM

## 2019-01-28 DIAGNOSIS — C82.00 GRADE I FOLLICULAR SMALL CLEAVED CELL LYMPHOMA (H): ICD-10-CM

## 2019-01-28 DIAGNOSIS — Z00.00 MEDICARE ANNUAL WELLNESS VISIT, SUBSEQUENT: Primary | ICD-10-CM

## 2019-01-28 DIAGNOSIS — E03.9 HYPOTHYROIDISM, UNSPECIFIED TYPE: ICD-10-CM

## 2019-01-28 DIAGNOSIS — E78.5 HYPERLIPIDEMIA LDL GOAL <130: ICD-10-CM

## 2019-01-28 LAB
CHOLEST SERPL-MCNC: 213 MG/DL
HDLC SERPL-MCNC: 62 MG/DL
LDLC SERPL CALC-MCNC: 128 MG/DL
NONHDLC SERPL-MCNC: 151 MG/DL
TRIGL SERPL-MCNC: 113 MG/DL
TSH SERPL DL<=0.005 MIU/L-ACNC: 2.15 MU/L (ref 0.4–4)

## 2019-01-28 PROCEDURE — 80061 LIPID PANEL: CPT | Performed by: FAMILY MEDICINE

## 2019-01-28 PROCEDURE — 36415 COLL VENOUS BLD VENIPUNCTURE: CPT | Performed by: FAMILY MEDICINE

## 2019-01-28 PROCEDURE — 99213 OFFICE O/P EST LOW 20 MIN: CPT | Mod: 25 | Performed by: FAMILY MEDICINE

## 2019-01-28 PROCEDURE — G0439 PPPS, SUBSEQ VISIT: HCPCS | Performed by: FAMILY MEDICINE

## 2019-01-28 PROCEDURE — 84443 ASSAY THYROID STIM HORMONE: CPT | Performed by: FAMILY MEDICINE

## 2019-01-28 ASSESSMENT — MIFFLIN-ST. JEOR: SCORE: 1065.3

## 2019-01-28 NOTE — LETTER
February 4, 2019      Hilda Potter  [address] [facility] [address]        Dear ,    We are writing to inform you of your test results.    Your recent labs looked good.   Thyroid function test was normal. Continue current dose of Levothyroxine.   Cholesterol although slightly elevated, it was lower than a year ago.   No cholesterol lowering medications recommended at this time.   Continue to eat a well balanced heart healthy diet and exercise regularly.     Resulted Orders   TSH with free T4 reflex   Result Value Ref Range    TSH 2.15 0.40 - 4.00 mU/L   Lipid panel reflex to direct LDL Fasting   Result Value Ref Range    Cholesterol 213 (H) <200 mg/dL      Comment:      Desirable:       <200 mg/dl    Triglycerides 113 <150 mg/dL      Comment:      Fasting specimen    HDL Cholesterol 62 >49 mg/dL    LDL Cholesterol Calculated 128 (H) <100 mg/dL      Comment:      Above desirable:  100-129 mg/dl  Borderline High:  130-159 mg/dL  High:             160-189 mg/dL  Very high:       >189 mg/dl      Non HDL Cholesterol 151 (H) <130 mg/dL      Comment:      Above Desirable:  130-159 mg/dl  Borderline high:  160-189 mg/dl  High:             190-219 mg/dl  Very high:       >219 mg/dl         If you have any questions or concerns, please call the clinic at the number listed above.       Sincerely,        Ame Dobson MD/o

## 2019-01-28 NOTE — PROGRESS NOTES
"  SUBJECTIVE:   Hilda Potter is a 83 year old female who presents for Preventive Visit.    Transfer care  Previous PCP: Dr Rahman  Are you in the first 12 months of your Medicare Part B coverage?  No    Physical Health:    In general, how would you rate your overall physical health? good    Outside of work, how many days during the week do you exercise? 4-5 days/week    Outside of work, approximately how many minutes a day do you exercise?30-45 minutes    If you drink alcohol do you typically have >3 drinks per day or >7 drinks per week? No    Do you usually eat at least 4 servings of fruit and vegetables a day, include whole grains & fiber and avoid regularly eating high fat or \"junk\" foods? NO    Do you have any problems taking medications regularly?  No    Do you have any side effects from medications? none    Needs assistance for the following daily activities: no assistance needed    Which of the following safety concerns are present in your home?  none identified     Hearing impairment: No    In the past 6 months, have you been bothered by leaking of urine? no    Mental Health:    In general, how would you rate your overall mental or emotional health? good  PHQ-2 Score:      Do you feel safe in your environment? Yes    Do you have a Health Care Directive? Yes: Patient states has Advance Directive and will bring in a copy to clinic.    Additional concerns to address?  YES- wart-like spot on L arm      Fall risk:  Fallen 2 or more times in the past year?: No  Any fall with injury in the past year?: No    Cognitive Screenin) Repeat 3 items (Leader, Season, Table)    2) Clock draw: NORMAL  3) 3 item recall: Recalls 3 objects  Results: 3 items recalled: COGNITIVE IMPAIRMENT LESS LIKELY    Mini-CogTM Copyright LAINEY Garcia. Licensed by the author for use in [facility]; reprinted with permission (arben@.[email or web address]). All rights reserved.      Do you have sleep apnea, excessive snoring or daytime " drowsiness?: no      HYPERTENSION - Patient has longstanding history of HTN , currently denies any symptoms referable to elevated blood pressure. Specifically denies chest pain, palpitations, dyspnea, orthopnea, PND or peripheral edema. Blood pressure readings have been in normal range. Current medication regimen is as listed below. Patient denies any side effects of medication.                                                                                                                                                                                          .  HYPOTHYROIDISM - Patient has a longstanding history of chronic Hypothyroidism. Patient has been doing well, noting no tremor, insomnia, hair loss or changes in skin texture. Continues to take medications as directed, without adverse reactions or side effects. Last TSH   Lab Results   Component Value Date    TSH 1.90 10/03/2017   .                                                                                                                                                                                                                        .  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 Following with Oncology.       Patient informed that anything we discuss that is not related to preventative medicine, may be billed for; patient verbalizes understanding.    Reviewed and updated as needed this visit by clinical staff  Tobacco  Allergies  Meds         Reviewed and updated as needed this visit by Provider        Social History     Tobacco Use     Smoking status: Never Smoker     Smokeless tobacco: Never Used   Substance Use Topics     Alcohol use: Yes     Comment: wine                            Current providers sharing in care for this patient include:   Patient Care Team:  Jessica Rahman MD as PCP - General  Jessica Rahman MD as PCP - Assigned PCP    The following health maintenance items are reviewed  in Epic and correct as of today:  Health Maintenance   Topic Date Due     ZOSTER IMMUNIZATION (3 of 3) 07/24/2018     TSH W/ FREE T4 REFLEX Q1 YEAR  10/03/2018     FALL RISK ASSESSMENT  09/10/2019     PHQ-2 Q1 YR  09/10/2019     BMP Q1 YR  11/06/2019     EYE EXAM Q1 YEAR  12/09/2019     ADVANCE DIRECTIVE PLANNING Q5 YRS  11/13/2022     DTAP/TDAP/TD IMMUNIZATION (3 - Td) 07/25/2023     MIGRAINE ACTION PLAN  Completed     DEXA SCAN SCREENING (SYSTEM ASSIGNED)  Completed     INFLUENZA VACCINE  Completed     IPV IMMUNIZATION  Aged Out     MENINGITIS IMMUNIZATION  Aged Out     BP Readings from Last 3 Encounters:   01/28/19 130/71   12/20/18 127/69   11/06/18 148/76    Wt Readings from Last 3 Encounters:   01/28/19 63.6 kg (140 lb 4 oz)   12/20/18 63.8 kg (140 lb 9.6 oz)   11/06/18 63 kg (139 lb)                  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)     Premature beats     Premature atrial beats     Compression fracture of L1 lumbar vertebra, seen on CT     Lung nodule < 6cm on CT     Dupuytren's contracture of right hand     Hypothyroidism, unspecified type     Essential hypertension with goal blood pressure less than 140/90     Combined forms of age-related cataract, mild-mod, both eyes     Migraine without status migrainosus, not intractable, unspecified migraine type     Posterior vitreous detachment of left eye     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     Past Surgical History:   Procedure Laterality Date     APPENDECTOMY  1954     BLEPHAROPLASTY BILATERAL  10/2007    both eyes, upper lids     C LENGTHEN,TENDON,HAND/FINGER  1993    repair of dupytrons's contracture      COLONOSCOPY  6/02, 10/13    Q 5 years, polyps     D & C       HC REMOVAL GALLBLADDER  1992     REPAIR PTOSIS       SALPINGO OOPHORECTOMY,R/L/LUIS DANIEL      left ovary and tube     SMALL BOWEL RESECTION  1986    colon resection      TONSILLECTOMY & ADENOIDECTOMY      childhood     TUBAL LIGATION       UPPER GI ENDOSCOPY  6/02, 8/11       Social History     Tobacco Use     Smoking status: Never Smoker     Smokeless tobacco: Never Used   Substance Use Topics     Alcohol use: Yes     Comment: wine      Family History   Problem Relation Age of Onset     Hypertension Mother      Arthritis Mother      Cardiovascular Mother      Retinal detachment Mother      Migraines Mother      C.A.D. Father      Hypertension Father      Cerebrovascular Disease Father      Arthritis Father      Cardiovascular Father      Eye Disorder Father      Heart Disease Father      Glaucoma Father      Parkinsonism Father      Cardiovascular Brother      Peripheral Neuropathy Brother      Macular Degeneration No family hx of          Current Outpatient Medications   Medication Sig Dispense Refill     carboxymethylcellulose (REFRESH PLUS) 0.5 % SOLN Place 1 drop into both eyes 3 times daily as needed for dry eyes       HYZAAR 50-12.5 MG per tablet Take 1 tablet by mouth daily 90 tablet 4     latanoprost (XALATAN) 0.005 % ophthalmic solution Place 1 drop into both eyes At Bedtime 7.5 mL 3     levothyroxine (SYNTHROID/LEVOTHROID) 50 MCG tablet Take 1 tablet (50 mcg) by mouth daily 90 tablet 4     MELATONIN PO Take 3 mg by mouth nightly as needed        Multiple Vitamin (MULTI-VITAMIN) per tablet Take 1 tablet by mouth daily.         Allergies   Allergen Reactions     Diatrizoate Other (See Comments)     Bisphosphonates GI Disturbance     GI distress     Ivp Dye [Contrast Dye] Swelling     Lisinopril Cough     Losartan Hives     But can tolerate Hyzaar.      Morphine Hives     Morphine Sulfate Nausea and Vomiting     Prilosec [Omeprazole] Hives     Latex  "Rash         ROS:  Constitutional, HEENT, cardiovascular, pulmonary, gi and gu systems are negative, except as otherwise noted.    OBJECTIVE:   /71   Pulse 89   Temp 97.6  F (36.4  C) (Tympanic)   Ht 1.6 m (5' 3\")   Wt 63.6 kg (140 lb 4 oz)   LMP 10/01/1986   SpO2 97%   BMI 24.84 kg/m   Estimated body mass index is 24.84 kg/m  as calculated from the following:    Height as of this encounter: 1.6 m (5' 3\").    Weight as of this encounter: 63.6 kg (140 lb 4 oz).  EXAM:   GENERAL: healthy, alert and no distress  EYES: Eyes grossly normal to inspection, PERRL and conjunctivae and sclerae normal  HENT: ear canals and TM's normal, nose and mouth without ulcers or lesions  NECK: no adenopathy, no asymmetry, masses, or scars and thyroid normal to palpation  RESP: lungs clear to auscultation - no rales, rhonchi or wheezes  BREAST: normal without masses, tenderness or nipple discharge and no palpable axillary masses or adenopathy  CV: regular rate and rhythm, normal S1 S2, no S3 or S4, no murmur, click or rub, no peripheral edema and peripheral pulses strong  ABDOMEN: soft, nontender, no hepatosplenomegaly, no masses and bowel sounds normal  MS: no gross musculoskeletal defects noted, no edema  SKIN: no suspicious lesions or rashes. # 1 SK lesion on the left forearm, non-inflamed/non-irritated.  NEURO: Normal strength and tone, mentation intact and speech normal  PSYCH: mentation appears normal, affect normal/bright    Diagnostic Test Results:  Labs drawn and in process    ASSESSMENT / PLAN:   Hilda was seen today for medicare visit.    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  No refills required at this time.    Hypothyroidism, unspecified type, on Levothyroxine  -     TSH with " "free T4 reflex  No refills required at this time.    Seborrheic keratosis  Treatment options discussed, opted for watchful waiting.    Hyperlipidemia LDL goal <130  -     Lipid panel reflex to direct LDL Fasting          End of Life Planning:  Patient currently has an advanced directive: No.  I have verified the patient's ablity to prepare an advanced directive/make health care decisions.  Literature was provided to assist patient in preparing an advanced directive.    COUNSELING:  Reviewed preventive health counseling, as reflected in patient instructions       Regular exercise       Healthy diet/nutrition    BP Readings from Last 1 Encounters:   01/28/19 130/71     Estimated body mass index is 24.84 kg/m  as calculated from the following:    Height as of this encounter: 1.6 m (5' 3\").    Weight as of this encounter: 63.6 kg (140 lb 4 oz).           reports that  has never smoked. she has never used smokeless tobacco.      Appropriate preventive services were discussed with this patient, including applicable screening as appropriate for cardiovascular disease, diabetes, osteopenia/osteoporosis, and glaucoma.  As appropriate for age/gender, discussed screening for colorectal cancer, prostate cancer, breast cancer, and cervical cancer. Checklist reviewing preventive services available has been given to the patient.    Reviewed patients plan of care and provided an AVS. The Basic Care Plan (routine screening as documented in Health Maintenance) for Hilda meets the Care Plan requirement. This Care Plan has been established and reviewed with the Patient.    Counseling Resources:  ATP IV Guidelines  Pooled Cohorts Equation Calculator  Breast Cancer Risk Calculator  FRAX Risk Assessment  ICSI Preventive Guidelines  Dietary Guidelines for Americans, 2010  USDA's MyPlate  ASA Prophylaxis  Lung CA Screening    Follow up annually and as needed thoughout the year.    Ame Dobson MD  [facility]  "

## 2019-01-28 NOTE — PATIENT INSTRUCTIONS
Preventive Health Recommendations    See your health care provider every year to    Review health changes.     Discuss preventive care.      Review your medicines if your doctor has prescribed any.      You no longer need a yearly Pap test unless you've had an abnormal Pap test in the past 10 years. If you have vaginal symptoms, such as bleeding or discharge, be sure to talk with your provider about a Pap test.      Every 1 to 2 years, have a mammogram.  If you are over 69, talk with your health care provider about whether or not you want to continue having screening mammograms.      Every 10 years, have a colonoscopy. Or, have a yearly FIT test (stool test). These exams will check for colon cancer.       Have a cholesterol test every 5 years, or more often if your doctor advises it.       Have a diabetes test (fasting glucose) every three years. If you are at risk for diabetes, you should have this test more often.       At age 65, have a bone density scan (DEXA) to check for osteoporosis (brittle bone disease).    Shots:    Get a flu shot each year.    Get a tetanus shot every 10 years.    Talk to your doctor about your pneumonia vaccines. There are now two you should receive - Pneumovax (PPSV 23) and Prevnar (PCV 13).    Talk to your pharmacist about the shingles vaccine.    Talk to your doctor about the hepatitis B vaccine.    Nutrition:     Eat at least 5 servings of fruits and vegetables each day.      Eat whole-grain bread, whole-wheat pasta and brown rice instead of white grains and rice.      Get adequate Calcium and Vitamin D.     Lifestyle    Exercise at least 150 minutes a week (30 minutes a day, 5 days a week). This will help you control your weight and prevent disease.      Limit alcohol to one drink per day.      No smoking.       Wear sunscreen to prevent skin cancer.       See your dentist twice a year for an exam and cleaning.      See your eye doctor every 1 to 2 years to screen for conditions  such as glaucoma, macular degeneration and cataracts.    Personalized Prevention Plan  You are due for the preventive services outlined below.  Your care team is available to assist you in scheduling these services.  If you have already completed any of these items, please share that information with your care team to update in your medical record.  Health Maintenance Due   Topic Date Due     Zoster (Chicken Pox) Vaccine (3 of 3) 07/24/2018     Thyroid Function Lab (TSH) - yearly  10/03/2018     Patient Education     Understanding Seborrheic Keratosis  Seborrheic keratoses are wart-like growths on the skin. These growths are not cancer. Many people get them, especially after age 30.  How to say it  [id number]   What causes seborrheic keratoses?  Doctors do not know what causes seborrheic keratoses. They may run in families. In addition, they become more common as people get older.  What are the symptoms of seborrheic keratoses?  Here is what seborrheic keratoses often look like:    They tend to be round or oval in shape. They can be very small or about as big across as a quarter.    They can appear singly or in clusters.    They tend to be tan, brown, or black in color.    The edges may be scalloped or uneven-looking.    They can have a waxy or scaly look.    They can be a bit raised, appearing to be  stuck on  the skin.    They may become red and irritated if scratched or rubbed by clothing  Sebhorreic keratoses are not painful, but they may be itchy. They can become irritated if they are continually rubbed by skin or clothing. Seborrheic keratoses most often appear on the face, arms, chest, back, or belly.  How are seborrheic keratoses treated?  Seborrheic keratoses don t need to be treated unless they bother you. You may choose to have them removed because you find them unattractive. You may also want them removed because they get irritated and uncomfortable. A healthcare provider can remove  them in an office visit. Ways that seborrheic keratoses can be removed include:    Freezing them off with liquid nitrogen    Cutting them off with a scalpel    Burning them off with electricity  What are the complications of seborrheic keratoses?  Seborrheic keratoses are not cancer, but they can look like some types of skin cancer. So it s a good idea to ask your healthcare provider to check any new skin growths. Ask your healthcare provider about any skin problem that concerns you.  When should I call my healthcare provider?  Call your healthcare provider right away if any of these occur:    You develop a lot of seborrheic keratoses very quickly    You have a sore that does not heal within a few weeks, or heals and then comes back    You have a mole or skin growth that is changing in size, shape, or color    You have a mole or skin growth that looks different on one side from the other    You have a mole or skin growth that is not the same color throughout   Date Last Reviewed: 5/1/2016 2000-2018 [employer]. [address] [place] [address], [place] [address]. All rights reserved. This information is not intended as a substitute for professional medical care. Always follow your healthcare professional's instructions.

## 2019-04-05 ENCOUNTER — MEDICAL CORRESPONDENCE (OUTPATIENT)
Dept: HEALTH INFORMATION MANAGEMENT | Facility: CLINIC | Age: 84
End: 2019-04-05

## 2019-04-09 ENCOUNTER — OFFICE VISIT (OUTPATIENT)
Dept: NEUROLOGY | Facility: CLINIC | Age: 84
End: 2019-04-09
Payer: MEDICARE

## 2019-04-09 VITALS
DIASTOLIC BLOOD PRESSURE: 79 MMHG | RESPIRATION RATE: 12 BRPM | WEIGHT: 140 LBS | HEART RATE: 88 BPM | SYSTOLIC BLOOD PRESSURE: 139 MMHG | BODY MASS INDEX: 24.8 KG/M2 | TEMPERATURE: 97.4 F

## 2019-04-09 DIAGNOSIS — G43.109 MIGRAINE WITH AURA AND WITHOUT STATUS MIGRAINOSUS, NOT INTRACTABLE: Primary | ICD-10-CM

## 2019-04-09 DIAGNOSIS — M26.609 TEMPOROMANDIBULAR JOINT DISORDER: ICD-10-CM

## 2019-04-09 PROCEDURE — 99214 OFFICE O/P EST MOD 30 MIN: CPT | Performed by: PSYCHIATRY & NEUROLOGY

## 2019-04-09 ASSESSMENT — PAIN SCALES - GENERAL: PAINLEVEL: NO PAIN (0)

## 2019-04-09 NOTE — LETTER
"    4/9/2019         RE: Hilda Potter  [address]  [place] [address]        Dear Colleague,    Thank you for referring your patient, Hilda Potter, to the [facility]. Please see a copy of my visit note below.    ESTABLISHED PATIENT NEUROLOGY NOTE    DATE OF VISIT: 4/9/2019  MRN: [id number]  PATIENT NAME: Hilda Potter  YOB: 1935    Chief Complaint   Patient presents with     RECHECK     migraines     SUBJECTIVE:                                                      HISTORY OF PRESENT ILLNESS:  Hilda is here for follow up regarding migraine headaches and TMJ dysfunction. I met the patient for headache consultation a few months ago. She was achieving good headache control with Excedrin and not having frequent enough migraines to warrant preventative treatment at that time. No concern regarding analgesic-overuse at the time. I did refer her to the TMJ specialists and ordered an ESR. ESR was normal at 16. I do not see any notes from [facility] in her chart. MRI brain from 10.2018 was unremarkable.     Additional headache history as previously documented by me (12.20.18):  The patient tells me that the migraines always start with jagged lights in the Right eye. This lasts about 45 minutes. She takes Excedrin and rests in a dark room and then usually it dissipates. She says that she does not really have a lot of pain with the visual symptoms any longer but used to. She says when she did have pain in the past, it was Right-sided.     She has nausea and fatigue with the \"headaches.\" She says she feels tired for 4-5 hours afterwards, with continued nausea and sometimes dizziness. Please see my initial consultation note for additional historical information.     Today the patient tells me that the headaches seem to be less intense. They are still managed with the Excedrin and rest in a dark room for 2-3 hours. No new features of the headaches or visual " symptoms.    She continues to have some pressure-like pain around the jaw,teeth and temples attributed to her TMJ pain. She continues to see the chiropractor a couple of times per month and this seems to keep some of the tension at bay. He monitors the jaw for any locking/clicking. She decided to hold off on seeing the TMJ specialist for now, since the pain is intermittent. She does have regular dental check-ups at the [facility].     She has follow up with Dr. Roth regarding the lymphoma next month.     She does have some bladder problems, which she mentions are not new. Some leakage with coughing/laughing. She does not feel this is bad enough to warrant Urology consult at this time.     CURRENT MEDICATIONS:     Current Outpatient Medications on File Prior to Visit:  Aspirin-Acetaminophen-Caffeine (EXCEDRIN MIGRAINE PO) Take 1 tablet by mouth as needed   carboxymethylcellulose (REFRESH PLUS) 0.5 % SOLN Place 1 drop into both eyes 3 times daily as needed for dry eyes   HYZAAR 50-12.5 MG per tablet Take 1 tablet by mouth daily   latanoprost (XALATAN) 0.005 % ophthalmic solution Place 1 drop into both eyes At Bedtime   levothyroxine (SYNTHROID/LEVOTHROID) 50 MCG tablet Take 1 tablet (50 mcg) by mouth daily   MELATONIN PO Take 3 mg by mouth nightly as needed    Multiple Vitamin (MULTI-VITAMIN) per tablet Take 1 tablet by mouth daily.       No current facility-administered medications on file prior to visit.     RECENT DIAGNOSTIC STUDIES:   Labs:   Results for orders placed or performed in visit on 01/28/19   TSH with free T4 reflex   Result Value Ref Range    TSH 2.15 0.40 - 4.00 mU/L   Lipid panel reflex to direct LDL Fasting   Result Value Ref Range    Cholesterol 213 (H) <200 mg/dL    Triglycerides 113 <150 mg/dL    HDL Cholesterol 62 >49 mg/dL    LDL Cholesterol Calculated 128 (H) <100 mg/dL    Non HDL Cholesterol 151 (H) <130 mg/dL     REVIEW OF SYSTEMS:                                                       10-point review of systems is negative except as mentioned above in HPI.     EXAM:                                                      Physical Exam:   Vitals: /79 (BP Location: Right arm, Patient Position: Sitting, Cuff Size: Adult Regular)   Pulse 88   Temp 97.4  F (36.3  C) (Tympanic)   Resp 12   Wt 63.5 kg (140 lb)   LMP 10/01/1986   BMI 24.80 kg/m     BMI= Body mass index is 24.8 kg/m .  GENERAL: NAD. Appears younger than stated age.   HEENT: NC/AT. No TTP of scalp or neck.   CV: RRR. S1, S2.   NECK: No bruits.  Neurologic:  MENTAL STATUS: Alert, attentive. Speech is fluent. Normal comprehension. Normal concentration. Adequate fund of knowledge.   CRANIAL NERVES: Discs technically difficult to visualize. Visual fields intact to confrontation. Pupils equally, round and reactive to light. Facial sensation and movement normal. EOM full. Hearing intact to conversation. Trapezius strength intact. Palate moves symmetrically. Tongue midline.  MOTOR: 5/5 in proximal and distal muscle groups of upper and lower extremities. Tone and bulk normal.   DTRs: Intact and symmetric. Babinski down-going bilaterally.   SENSATION: Normal light touch throughout.   COORDINATION: Fine motor movements are normal.  STATION AND GAIT: Romberg  negative. Casual gait is normal.      ASSESSMENT and PLAN:                                                      Assessment and Plan:    ICD-10-CM    1. Migraine with aura and without status migrainosus, not intractable G43.109    2. Temporomandibular joint disorder M26.609         Ms. Potter is a pleasant 84 yo woman with long history of migraine headaches. We again reviewed the results of her MRI in clinic today. No structural cause for the headaches.      She continues to have good abortive control of her migraine headaches with Excedrin migraine, so I recommend she continue. No concern about MOH (analgesic overuse) with current use, which is supported by the completed headache  log she brought in for me today. She does not have frequent-enough headaches to warrant preventative treatment. She also feels that the TMJ related pain is well-controlled with chiropractic treatments but would like to hold on to the information regarding the [facility] in case she needs it in the future.      The patient and her  had good questions in clinic today, which were answered to the best of my ability. We will see Hilda back on an as needed basis.     Patient Instructions:  Continue the Excedrin as needed for your migraines.   Monitor the jaw pain. As we discussed, if it becomes more persistent, I recommend you see the [facility] (TM[facility]) specialists.    Let me know if the headaches worsen. We will follow-up as needed.     Total Time: 25 minutes were spent with the patient. More than 50% of the time spent on counseling (as described above in Assessment and Plan/Instructions) /coordinating the care.    Iliana Connor MD  Neurology                  Again, thank you for allowing me to participate in the care of your patient.        Sincerely,        Iliana Connor MD

## 2019-04-09 NOTE — PATIENT INSTRUCTIONS
Plan:    Continue the Excedrin as needed for your migraines.   Monitor the jaw pain. As we discussed, if it becomes more persistent, I recommend you see the [facility] (TMJ) specialists.    Let me know if the headaches worsen. We will follow-up as needed.   
no

## 2019-04-09 NOTE — PROGRESS NOTES
"ESTABLISHED PATIENT NEUROLOGY NOTE    DATE OF VISIT: 4/9/2019  MRN: [id number]  PATIENT NAME: Hilda Potter  YOB: 1935    Chief Complaint   Patient presents with     RECHECK     migraines     SUBJECTIVE:                                                      HISTORY OF PRESENT ILLNESS:  Hilda is here for follow up regarding migraine headaches and TMJ dysfunction. I met the patient for headache consultation a few months ago. She was achieving good headache control with Excedrin and not having frequent enough migraines to warrant preventative treatment at that time. No concern regarding analgesic-overuse at the time. I did refer her to the TMJ specialists and ordered an ESR. ESR was normal at 16. I do not see any notes from [facility] in her chart. MRI brain from 10.2018 was unremarkable.     Additional headache history as previously documented by me (12.20.18):  The patient tells me that the migraines always start with jagged lights in the Right eye. This lasts about 45 minutes. She takes Excedrin and rests in a dark room and then usually it dissipates. She says that she does not really have a lot of pain with the visual symptoms any longer but used to. She says when she did have pain in the past, it was Right-sided.     She has nausea and fatigue with the \"headaches.\" She says she feels tired for 4-5 hours afterwards, with continued nausea and sometimes dizziness. Please see my initial consultation note for additional historical information.     Today the patient tells me that the headaches seem to be less intense. They are still managed with the Excedrin and rest in a dark room for 2-3 hours. No new features of the headaches or visual symptoms.    She continues to have some pressure-like pain around the jaw,teeth and temples attributed to her TMJ pain. She continues to see the chiropractor a couple of times per month and this seems to keep some of the tension at bay. He monitors the jaw for " any locking/clicking. She decided to hold off on seeing the TMJ specialist for now, since the pain is intermittent. She does have regular dental check-ups at the [place].     She has follow up with Dr. Roth regarding the lymphoma next month.     She does have some bladder problems, which she mentions are not new. Some leakage with coughing/laughing. She does not feel this is bad enough to warrant Urology consult at this time.     CURRENT MEDICATIONS:     Current Outpatient Medications on File Prior to Visit:  Aspirin-Acetaminophen-Caffeine (EXCEDRIN MIGRAINE PO) Take 1 tablet by mouth as needed   carboxymethylcellulose (REFRESH PLUS) 0.5 % SOLN Place 1 drop into both eyes 3 times daily as needed for dry eyes   HYZAAR 50-12.5 MG per tablet Take 1 tablet by mouth daily   latanoprost (XALATAN) 0.005 % ophthalmic solution Place 1 drop into both eyes At Bedtime   levothyroxine (SYNTHROID/LEVOTHROID) 50 MCG tablet Take 1 tablet (50 mcg) by mouth daily   MELATONIN PO Take 3 mg by mouth nightly as needed    Multiple Vitamin (MULTI-VITAMIN) per tablet Take 1 tablet by mouth daily.       No current facility-administered medications on file prior to visit.     RECENT DIAGNOSTIC STUDIES:   Labs:   Results for orders placed or performed in visit on 01/28/19   TSH with free T4 reflex   Result Value Ref Range    TSH 2.15 0.40 - 4.00 mU/L   Lipid panel reflex to direct LDL Fasting   Result Value Ref Range    Cholesterol 213 (H) <200 mg/dL    Triglycerides 113 <150 mg/dL    HDL Cholesterol 62 >49 mg/dL    LDL Cholesterol Calculated 128 (H) <100 mg/dL    Non HDL Cholesterol 151 (H) <130 mg/dL     REVIEW OF SYSTEMS:                                                      10-point review of systems is negative except as mentioned above in HPI.     EXAM:                                                      Physical Exam:   Vitals: /79 (BP Location: Right arm, Patient Position: Sitting, Cuff Size: Adult Regular)   Pulse 88    Temp 97.4  F (36.3  C) (Tympanic)   Resp 12   Wt 63.5 kg (140 lb)   LMP 10/01/1986   BMI 24.80 kg/m    BMI= Body mass index is 24.8 kg/m .  GENERAL: NAD. Appears younger than stated age.   HEENT: NC/AT. No TTP of scalp or neck.   CV: RRR. S1, S2.   NECK: No bruits.  Neurologic:  MENTAL STATUS: Alert, attentive. Speech is fluent. Normal comprehension. Normal concentration. Adequate fund of knowledge.   CRANIAL NERVES: Discs technically difficult to visualize. Visual fields intact to confrontation. Pupils equally, round and reactive to light. Facial sensation and movement normal. EOM full. Hearing intact to conversation. Trapezius strength intact. Palate moves symmetrically. Tongue midline.  MOTOR: 5/5 in proximal and distal muscle groups of upper and lower extremities. Tone and bulk normal.   DTRs: Intact and symmetric. Babinski down-going bilaterally.   SENSATION: Normal light touch throughout.   COORDINATION: Fine motor movements are normal.  STATION AND GAIT: Romberg negative. Casual gait is normal.      ASSESSMENT and PLAN:                                                      Assessment and Plan:    ICD-10-CM    1. Migraine with aura and without status migrainosus, not intractable G43.109    2. Temporomandibular joint disorder M26.609         Ms. Potter is a pleasant 84 yo woman with long history of migraine headaches. We again reviewed the results of her MRI in clinic today. No structural cause for the headaches.      She continues to have good abortive control of her migraine headaches with Excedrin migraine, so I recommend she continue. No concern about MOH (analgesic overuse) with current use, which is supported by the completed headache log she brought in for me today. She does not have frequent-enough headaches to warrant preventative treatment. She also feels that the TMJ related pain is well-controlled with chiropractic treatments but would like to hold on to the information regarding the [facility] in case she needs it in the future.      The patient and her  had good questions in clinic today, which were answered to the best of my ability. We will see Hilda back on an as needed basis.     Patient Instructions:  Continue the Excedrin as needed for your migraines.   Monitor the jaw pain. As we discussed, if it becomes more persistent, I recommend you see the [facility] Ne[facility] (TMJ) specialists.    Let me know if the headaches worsen. We will follow-up as needed.     Total Time: 25 minutes were spent with the patient. More than 50% of the time spent on counseling (as described above in Assessment and Plan/Instructions) /coordinating the care.    Iliana Connor MD  Neurology

## 2019-05-07 ENCOUNTER — ONCOLOGY VISIT (OUTPATIENT)
Dept: ONCOLOGY | Facility: CLINIC | Age: 84
End: 2019-05-07
Payer: MEDICARE

## 2019-05-07 VITALS
OXYGEN SATURATION: 97 % | RESPIRATION RATE: 16 BRPM | BODY MASS INDEX: 24.52 KG/M2 | HEIGHT: 63 IN | DIASTOLIC BLOOD PRESSURE: 76 MMHG | HEART RATE: 86 BPM | TEMPERATURE: 98 F | WEIGHT: 138.38 LBS | SYSTOLIC BLOOD PRESSURE: 160 MMHG

## 2019-05-07 DIAGNOSIS — C82.00 GRADE I FOLLICULAR SMALL CLEAVED CELL LYMPHOMA (H): ICD-10-CM

## 2019-05-07 DIAGNOSIS — C82.00 GRADE I FOLLICULAR SMALL CLEAVED CELL LYMPHOMA (H): Primary | ICD-10-CM

## 2019-05-07 LAB
ALBUMIN SERPL-MCNC: 3.8 G/DL (ref 3.4–5)
ALP SERPL-CCNC: 79 U/L (ref 40–150)
ALT SERPL W P-5'-P-CCNC: 26 U/L (ref 0–50)
ANION GAP SERPL CALCULATED.3IONS-SCNC: 4 MMOL/L (ref 3–14)
AST SERPL W P-5'-P-CCNC: 19 U/L (ref 0–45)
BASOPHILS # BLD AUTO: 0 10E9/L (ref 0–0.2)
BASOPHILS NFR BLD AUTO: 0.2 %
BILIRUB SERPL-MCNC: 0.8 MG/DL (ref 0.2–1.3)
BUN SERPL-MCNC: 27 MG/DL (ref 7–30)
CALCIUM SERPL-MCNC: 9.6 MG/DL (ref 8.5–10.1)
CHLORIDE SERPL-SCNC: 103 MMOL/L (ref 94–109)
CO2 SERPL-SCNC: 32 MMOL/L (ref 20–32)
CREAT SERPL-MCNC: 0.93 MG/DL (ref 0.52–1.04)
DIFFERENTIAL METHOD BLD: NORMAL
EOSINOPHIL # BLD AUTO: 0 10E9/L (ref 0–0.7)
EOSINOPHIL NFR BLD AUTO: 0.6 %
ERYTHROCYTE [DISTWIDTH] IN BLOOD BY AUTOMATED COUNT: 13.1 % (ref 10–15)
GFR SERPL CREATININE-BSD FRML MDRD: 57 ML/MIN/{1.73_M2}
GLUCOSE SERPL-MCNC: 90 MG/DL (ref 70–99)
HCT VFR BLD AUTO: 40 % (ref 35–47)
HGB BLD-MCNC: 13 G/DL (ref 11.7–15.7)
IMM GRANULOCYTES # BLD: 0 10E9/L (ref 0–0.4)
IMM GRANULOCYTES NFR BLD: 0.2 %
LDH SERPL L TO P-CCNC: 173 U/L (ref 81–234)
LYMPHOCYTES # BLD AUTO: 1.3 10E9/L (ref 0.8–5.3)
LYMPHOCYTES NFR BLD AUTO: 27.3 %
MCH RBC QN AUTO: 29.2 PG (ref 26.5–33)
MCHC RBC AUTO-ENTMCNC: 32.5 G/DL (ref 31.5–36.5)
MCV RBC AUTO: 90 FL (ref 78–100)
MONOCYTES # BLD AUTO: 0.4 10E9/L (ref 0–1.3)
MONOCYTES NFR BLD AUTO: 9.2 %
NEUTROPHILS # BLD AUTO: 2.9 10E9/L (ref 1.6–8.3)
NEUTROPHILS NFR BLD AUTO: 62.5 %
PLATELET # BLD AUTO: 172 10E9/L (ref 150–450)
POTASSIUM SERPL-SCNC: 4.3 MMOL/L (ref 3.4–5.3)
PROT SERPL-MCNC: 7.4 G/DL (ref 6.8–8.8)
RBC # BLD AUTO: 4.45 10E12/L (ref 3.8–5.2)
SODIUM SERPL-SCNC: 139 MMOL/L (ref 133–144)
WBC # BLD AUTO: 4.7 10E9/L (ref 4–11)

## 2019-05-07 PROCEDURE — 83615 LACTATE (LD) (LDH) ENZYME: CPT | Performed by: INTERNAL MEDICINE

## 2019-05-07 PROCEDURE — 36415 COLL VENOUS BLD VENIPUNCTURE: CPT | Performed by: INTERNAL MEDICINE

## 2019-05-07 PROCEDURE — 85025 COMPLETE CBC W/AUTO DIFF WBC: CPT | Performed by: INTERNAL MEDICINE

## 2019-05-07 PROCEDURE — 99214 OFFICE O/P EST MOD 30 MIN: CPT | Performed by: INTERNAL MEDICINE

## 2019-05-07 PROCEDURE — 80053 COMPREHEN METABOLIC PANEL: CPT | Performed by: INTERNAL MEDICINE

## 2019-05-07 ASSESSMENT — MIFFLIN-ST. JEOR: SCORE: 1056.66

## 2019-05-07 ASSESSMENT — PAIN SCALES - GENERAL: PAINLEVEL: NO PAIN (0)

## 2019-05-07 NOTE — NURSING NOTE
"Oncology Rooming Note    May 7, 2019 12:27 PM   Hilda Potter is a 83 year old female who presents for:    Chief Complaint   Patient presents with     Oncology Clinic Visit     6 month follow up     Initial Vitals: /76 (BP Location: Right arm)   Pulse 86   Temp 98  F (36.7  C) (Oral)   Resp 16   Ht 1.6 m (5' 2.99\")   Wt 62.8 kg (138 lb 6 oz)   LMP 10/01/1986   SpO2 97%   BMI 24.52 kg/m   Estimated body mass index is 24.52 kg/m  as calculated from the following:    Height as of this encounter: 1.6 m (5' 2.99\").    Weight as of this encounter: 62.8 kg (138 lb 6 oz). Body surface area is 1.67 meters squared.  No Pain (0) Comment: Data Unavailable   Patient's last menstrual period was 10/01/1986.  Allergies reviewed: Yes  Medications reviewed: Yes    Medications: Medication refills not needed today.  Pharmacy name entered into [facility]:    [id number] - [facility] [address] AT [facility] - ONCOLOGY PHARMACY  [facility] [address] [place], [place] - [address] [place] [address]      Alena Guevara LPN            "

## 2019-05-07 NOTE — LETTER
5/7/2019         RE: Hilda Potter  [address]  [facility] [id number]        Dear Colleague,    Thank you for referring your patient, Hilda Potter, to the [facility]. Please see a copy of my visit note below.    Oncology Follow-up Visit:  May 7, 2019      PCP: Dr. Rahman  Diagnosis: Low grade, stage IVB (BM involvement, night sweats) follicular lymphoma.  FLIPI score is 3 (one for stage IV, one for age>60, and one for elevated LDH), associated with 52% median 5 year OS survival and 36% median 10 year overall survival.     Oncologic History:    presented with night sweats and lymphadenopathy. She developed drenching night sweats in 11/2011 which persisted. She had no other associated constitutional symptoms such as fevers or weight loss. She had a negative ID workup with Dr. Ruiz (for valley fever as she had traveled to [place] in the past).   As part of evaluation, CT scan was performed in April 2012 which demonstrated multiple borderline mildly enlarged retroperitoneal and mesenteric lymph nodes, with the largest size of 1.5 cm. The patient, however, persisted to have night sweats and underwent repeat CTCAP done without contrast (she is allergic to IV contrast and had an anaphylactic reaction to it) which demonstrated unchanged dilatation of common bile duct, likely related to postcholecystectomy state, and stable mildly enlarged retroperitoneal and mesenteric lymph nodes which were nonspecific.   We proceeded with CT guided biopsy of one of the intraabdominal lymph nodes and results c/w low grade follicular lymphoma.  Flow cytometry showed kappa monotypic CD10 positive B cells.  We proceeded with bone marrow biopsy to complete her staging. The results, as detailed below, revealed involvement of marrow by follicular lymphoma. Flow cytometry showed a rare population of CD10-positive, kappa-monotypic B cells (0.2%). Molecular diagnostics was negative for B-cell gene  "rearrangement. Cytogenetics revealed, of the interphase cells examined, 0.2% had a signal pattern indicative of an IGH/BCL2 gene fusion, a rate that falls just slightly above the normal control range (0-0.1%) for our laboratory; thus, these findings are suspicious for the presence of low-level bone marrow involvement by follicular lymphoma. However, this cannot be determined with certainty, in light of the few abnormal cells found by FISH. Of note, a G-banded chromosome study is still pending.   Hep B/C negative. EVE negative. No M-spike on SPEP.    Treatment: Weekly Rituxan x 4, completed 9/13/2012. During her first Rituxan infusion she has developed a reaction with tingling of upper lip and chin and feeling \"cold\" in her chest. She was given IV solumedrol and the infusion was slowed down and these symptoms have resolved. Since then she has been premedicated with solumedrol, and all infusions were uneventful since.    Interval History:  Ms. Potter is a 83 year old female diagnosed with follicular lymphoma present today for follow-up. She has completed 4 weekly doses of Rituxan in 09/2012.   Her night sweats have resolved subsequently and LDH has remained normal.    She had a screening mammogram in 12/2018 which was negative.   She has had no constitutional symptoms. She denies weight loss. She denies SOB.  She has had no trouble swallowing lately. She had a Schatzki's ring dilated during EGD in 06/2018. She has occasional am nausea but no vomiting. She has no other complaints. Weight has been stable at 138-139 lbs.  She is here with her . In addition, a complete 12 point  review of systems is negative.        Past medical, social, surgical, and family histories reviewed.  Breast Cancer screening - mammogram negative 11/2013.  Echocardiogram in 10/2014 for evaluation of benign palpitations showed left ventricular function is normal with an EF of 55-60%.  Left ventricular Doppler filling pattern consistent " "with abnormal relaxation.  DEXA scan on 5/12/2015 showed most neg T score of -2.2 in the lumbar spine. This was unchanged compared to 11/2010. She was on Boneva years ago but apparently developed L jaw tingling and is no longer on biphosphonates.       Allergies:  Allergies as of 05/07/2019 - Reviewed 05/07/2019   Allergen Reaction Noted     Diatrizoate Other (See Comments) 05/28/2008     Bisphosphonates GI Disturbance 10/01/2009     Ivp dye [contrast dye] Swelling 10/01/2009     Lisinopril Cough 06/24/2013     Losartan Hives 11/22/2010     Morphine Hives 06/06/2018     Morphine sulfate Nausea and Vomiting 08/16/2012     Prilosec [omeprazole] Hives 10/01/2009     Latex Rash 09/19/2014       Current Medications:  Current Outpatient Medications   Medication Sig Dispense Refill     Aspirin-Acetaminophen-Caffeine (EXCEDRIN MIGRAINE PO) Take 1 tablet by mouth as needed       carboxymethylcellulose (REFRESH PLUS) 0.5 % SOLN Place 1 drop into both eyes 3 times daily as needed for dry eyes       HYZAAR 50-12.5 MG per tablet Take 1 tablet by mouth daily 90 tablet 4     latanoprost (XALATAN) 0.005 % ophthalmic solution Place 1 drop into both eyes At Bedtime 7.5 mL 3     levothyroxine (SYNTHROID/LEVOTHROID) 50 MCG tablet Take 1 tablet (50 mcg) by mouth daily 90 tablet 4     MELATONIN PO Take 3 mg by mouth nightly as needed        Multiple Vitamin (MULTI-VITAMIN) per tablet Take 1 tablet by mouth daily.            Physical Exam:  /76 (BP Location: Right arm)   Pulse 86   Temp 98  F (36.7  C) (Oral)   Resp 16   Ht 1.6 m (5' 2.99\")   Wt 62.8 kg (138 lb 6 oz)   LMP 10/01/1986   SpO2 97%   BMI 24.52 kg/m         GENERAL APPEARANCE: Healthy, alert and in no acute distress.  HEENT: Sclerae anicteric. Oropharynx without ulcers, lesions, or thrush.  NECK: Supple. No asymmetry or masses.  LYMPHATICS: No palpable cervical, supraclavicular, axillary lymphadenopathy b/l  RESP: Lungs clear to auscultation bilaterally without " rales, rhonchi or wheezes.  CARDIOVASCULAR: Regular rate and rhythm. Normal S1, S2; no S3 or S4. No murmur, gallop, or rub.  ABDOMEN: Soft, nontender. Bowel sounds normal. No palpable organomegaly or masses.  MUSCULOSKELETAL: Extremities without gross deformities noted. No edema of bilateral lower extremities.  SKIN: No suspicious lesions or rashes.  NEURO: Alert and oriented x 3. Cranial nerves II-XII grossly intact.  PSYCHIATRIC: Mentation and affect appear normal.    Labs:  Orders Only on 05/07/2019   Component Date Value Ref Range Status     Lactate Dehydrogenase 05/07/2019 173  81 - 234 U/L Final     Sodium 05/07/2019 139  133 - 144 mmol/L Final     Potassium 05/07/2019 4.3  3.4 - 5.3 mmol/L Final     Chloride 05/07/2019 103  94 - 109 mmol/L Final     Carbon Dioxide 05/07/2019 32  20 - 32 mmol/L Final     Anion Gap 05/07/2019 4  3 - 14 mmol/L Final     Glucose 05/07/2019 90  70 - 99 mg/dL Final    Non Fasting     Urea Nitrogen 05/07/2019 27  7 - 30 mg/dL Final     Creatinine 05/07/2019 0.93  0.52 - 1.04 mg/dL Final     GFR Estimate 05/07/2019 57* >60 mL/min/[1.73_m2] Final    Comment: Non  GFR Calc  Starting 12/18/2018, serum creatinine based estimated GFR (eGFR) will be   calculated using the Chronic Kidney Disease Epidemiology Collaboration   (CKD-EPI) equation.       GFR Estimate If Black 05/07/2019 66  >60 mL/min/[1.73_m2] Final    Comment:  GFR Calc  Starting 12/18/2018, serum creatinine based estimated GFR (eGFR) will be   calculated using the Chronic Kidney Disease Epidemiology Collaboration   (CKD-EPI) equation.       Calcium 05/07/2019 9.6  8.5 - 10.1 mg/dL Final     Bilirubin Total 05/07/2019 0.8  0.2 - 1.3 mg/dL Final     Albumin 05/07/2019 3.8  3.4 - 5.0 g/dL Final     Protein Total 05/07/2019 7.4  6.8 - 8.8 g/dL Final     Alkaline Phosphatase 05/07/2019 79  40 - 150 U/L Final     ALT 05/07/2019 26  0 - 50 U/L Final     AST 05/07/2019 19  0 - 45 U/L Final     WBC  05/07/2019 4.7  4.0 - 11.0 10e9/L Final     RBC Count 05/07/2019 4.45  3.8 - 5.2 10e12/L Final     Hemoglobin 05/07/2019 13.0  11.7 - 15.7 g/dL Final     Hematocrit 05/07/2019 40.0  35.0 - 47.0 % Final     MCV 05/07/2019 90  78 - 100 fl Final     MCH 05/07/2019 29.2  26.5 - 33.0 pg Final     MCHC 05/07/2019 32.5  31.5 - 36.5 g/dL Final     RDW 05/07/2019 13.1  10.0 - 15.0 % Final     Platelet Count 05/07/2019 172  150 - 450 10e9/L Final     Diff Method 05/07/2019 Automated Method   Final     % Neutrophils 05/07/2019 62.5  % Final     % Lymphocytes 05/07/2019 27.3  % Final     % Monocytes 05/07/2019 9.2  % Final     % Eosinophils 05/07/2019 0.6  % Final     % Basophils 05/07/2019 0.2  % Final     % Immature Granulocytes 05/07/2019 0.2  % Final     Absolute Neutrophil 05/07/2019 2.9  1.6 - 8.3 10e9/L Final     Absolute Lymphocytes 05/07/2019 1.3  0.8 - 5.3 10e9/L Final     Absolute Monocytes 05/07/2019 0.4  0.0 - 1.3 10e9/L Final     Absolute Eosinophils 05/07/2019 0.0  0.0 - 0.7 10e9/L Final     Absolute Basophils 05/07/2019 0.0  0.0 - 0.2 10e9/L Final     Abs Immature Granulocytes 05/07/2019 0.0  0 - 0.4 10e9/L Final       ASSESSMENT/PLAN:  Hilda is a very pleasant 83 year-old woman recently diagnosed with stage IV follicular lymphoma, FLIPI score is 3 (one for stage IV, one for age>60, and one for elevated LDH), associated with 52% median 5 year OS survival and 36% median 10 year overall survival. Most recent CT of the chest, abdomen and pelvis on 10/24/2017 showed no e/o lymphoma recurrence with stable posttreatment changes of lymphoma. There was no significant change in mesenteric fat stranding from prior. There were stable tiny pulmonary nodules from 4/26/2016, including 2 mm nodule in the right base.     1. NHL.  S/p Rituxan x4, completed in 09/2012. Responded to treatment with resolution of night sweats and decrease in lymphadenopathy. We'll continue to observe her from now on. Clinically, she continued to be  in remission from NHL.   We'll see her back in 6 months with labs - cbcd, cmp, LDH, uric acid. Per updated NCCN guidelines, at this time, a CT of the chest, abdomen and pelvis is recommended no more often than annually in Hilda's situation. We'll plan repeat CT of the chest, abdomen and pelvis in October 2019, with our next visit with her. Her last CT imaging was in  November 2017.    2. Immunizations - She is uptodate with  Pneumovax, Prevnar vaccinations. Annual influenza vaccination is recommended going forward.    3. Breast cancer screening Screening mammogram negative 12/2018. Recommended annual screening mammograms.    4. 2mm nodule in the right lung base seen on CT chest in 11/2017, stable compared to 04/2016. She is a never smoker. We'll f/up on CT chest included in CT of the chest, abdomen and pelvis for lymphoma surveillance, at our next visit.     At the end of our visit the patient verbalized understanding, denied any further questions, and concurred with the plan of care.                Again, thank you for allowing me to participate in the care of your patient.        Sincerely,        Jane Roth MD, MD

## 2019-05-07 NOTE — PROGRESS NOTES
Oncology Follow-up Visit:  May 7, 2019      PCP: Dr. Rahman  Diagnosis: Low grade, stage IVB (BM involvement, night sweats) follicular lymphoma.  FLIPI score is 3 (one for stage IV, one for age>60, and one for elevated LDH), associated with 52% median 5 year OS survival and 36% median 10 year overall survival.     Oncologic History:    presented with night sweats and lymphadenopathy. She developed drenching night sweats in 11/2011 which persisted. She had no other associated constitutional symptoms such as fevers or weight loss. She had a negative ID workup with Dr. Ruiz (for valley fever as she had traveled to [place] in the past).   As part of evaluation, CT scan was performed in April 2012 which demonstrated multiple borderline mildly enlarged retroperitoneal and mesenteric lymph nodes, with the largest size of 1.5 cm. The patient, however, persisted to have night sweats and underwent repeat CTCAP done without contrast (she is allergic to IV contrast and had an anaphylactic reaction to it) which demonstrated unchanged dilatation of common bile duct, likely related to postcholecystectomy state, and stable mildly enlarged retroperitoneal and mesenteric lymph nodes which were nonspecific.   We proceeded with CT guided biopsy of one of the intraabdominal lymph nodes and results c/w low grade follicular lymphoma.  Flow cytometry showed kappa monotypic CD10 positive B cells.  We proceeded with bone marrow biopsy to complete her staging. The results, as detailed below, revealed involvement of marrow by follicular lymphoma. Flow cytometry showed a rare population of CD10-positive, kappa-monotypic B cells (0.2%). Molecular diagnostics was negative for B-cell gene rearrangement. Cytogenetics revealed, of the interphase cells examined, 0.2% had a signal pattern indicative of an IGH/BCL2 gene fusion, a rate that falls just slightly above the normal control range (0-0.1%) for our laboratory; thus, these findings are  "suspicious for the presence of low-level bone marrow involvement by follicular lymphoma. However, this cannot be determined with certainty, in light of the few abnormal cells found by FISH. Of note, a G-banded chromosome study is still pending.   Hep B/C negative. EVE negative. No M-spike on SPEP.    Treatment: Weekly Rituxan x 4, completed 9/13/2012. During her first Rituxan infusion she has developed a reaction with tingling of upper lip and chin and feeling \"cold\" in her chest. She was given IV solumedrol and the infusion was slowed down and these symptoms have resolved. Since then she has been premedicated with solumedrol, and all infusions were uneventful since.    Interval History:  Ms. Potter is a 83 year old female diagnosed with follicular lymphoma present today for follow-up. She has completed 4 weekly doses of Rituxan in 09/2012.   Her night sweats have resolved subsequently and LDH has remained normal.    She had a screening mammogram in 12/2018 which was negative.   She has had no constitutional symptoms. She denies weight loss. She denies SOB.  She has had no trouble swallowing lately. She had a Schatzki's ring dilated during EGD in 06/2018. She has occasional am nausea but no vomiting. She has no other complaints. Weight has been stable at 138-139 lbs.  She is here with her . In addition, a complete 12 point  review of systems is negative.        Past medical, social, surgical, and family histories reviewed.  Breast Cancer screening - mammogram negative 11/2013.  Echocardiogram in 10/2014 for evaluation of benign palpitations showed left ventricular function is normal with an EF of 55-60%.  Left ventricular Doppler filling pattern consistent with abnormal relaxation.  DEXA scan on 5/12/2015 showed most neg T score of -2.2 in the lumbar spine. This was unchanged compared to 11/2010. She was on Boneva years ago but apparently developed L jaw tingling and is no longer on biphosphonates. " "      Allergies:  Allergies as of 05/07/2019 - Reviewed 05/07/2019   Allergen Reaction Noted     Diatrizoate Other (See Comments) 05/28/2008     Bisphosphonates GI Disturbance 10/01/2009     Ivp dye [contrast dye] Swelling 10/01/2009     Lisinopril Cough 06/24/2013     Losartan Hives 11/22/2010     Morphine Hives 06/06/2018     Morphine sulfate Nausea and Vomiting 08/16/2012     Prilosec [omeprazole] Hives 10/01/2009     Latex Rash 09/19/2014       Current Medications:  Current Outpatient Medications   Medication Sig Dispense Refill     Aspirin-Acetaminophen-Caffeine (EXCEDRIN MIGRAINE PO) Take 1 tablet by mouth as needed       carboxymethylcellulose (REFRESH PLUS) 0.5 % SOLN Place 1 drop into both eyes 3 times daily as needed for dry eyes       HYZAAR 50-12.5 MG per tablet Take 1 tablet by mouth daily 90 tablet 4     latanoprost (XALATAN) 0.005 % ophthalmic solution Place 1 drop into both eyes At Bedtime 7.5 mL 3     levothyroxine (SYNTHROID/LEVOTHROID) 50 MCG tablet Take 1 tablet (50 mcg) by mouth daily 90 tablet 4     MELATONIN PO Take 3 mg by mouth nightly as needed        Multiple Vitamin (MULTI-VITAMIN) per tablet Take 1 tablet by mouth daily.            Physical Exam:  /76 (BP Location: Right arm)   Pulse 86   Temp 98  F (36.7  C) (Oral)   Resp 16   Ht 1.6 m (5' 2.99\")   Wt 62.8 kg (138 lb 6 oz)   LMP 10/01/1986   SpO2 97%   BMI 24.52 kg/m        GENERAL APPEARANCE: Healthy, alert and in no acute distress.  HEENT: Sclerae anicteric. Oropharynx without ulcers, lesions, or thrush.  NECK: Supple. No asymmetry or masses.  LYMPHATICS: No palpable cervical, supraclavicular, axillary lymphadenopathy b/l  RESP: Lungs clear to auscultation bilaterally without rales, rhonchi or wheezes.  CARDIOVASCULAR: Regular rate and rhythm. Normal S1, S2; no S3 or S4. No murmur, gallop, or rub.  ABDOMEN: Soft, nontender. Bowel sounds normal. No palpable organomegaly or masses.  MUSCULOSKELETAL: Extremities without " gross deformities noted. No edema of bilateral lower extremities.  SKIN: No suspicious lesions or rashes.  NEURO: Alert and oriented x 3. Cranial nerves II-XII grossly intact.  PSYCHIATRIC: Mentation and affect appear normal.    Labs:  Orders Only on 05/07/2019   Component Date Value Ref Range Status     Lactate Dehydrogenase 05/07/2019 173  81 - 234 U/L Final     Sodium 05/07/2019 139  133 - 144 mmol/L Final     Potassium 05/07/2019 4.3  3.4 - 5.3 mmol/L Final     Chloride 05/07/2019 103  94 - 109 mmol/L Final     Carbon Dioxide 05/07/2019 32  20 - 32 mmol/L Final     Anion Gap 05/07/2019 4  3 - 14 mmol/L Final     Glucose 05/07/2019 90  70 - 99 mg/dL Final    Non Fasting     Urea Nitrogen 05/07/2019 27  7 - 30 mg/dL Final     Creatinine 05/07/2019 0.93  0.52 - 1.04 mg/dL Final     GFR Estimate 05/07/2019 57* >60 mL/min/[1.73_m2] Final    Comment: Non  GFR Calc  Starting 12/18/2018, serum creatinine based estimated GFR (eGFR) will be   calculated using the Chronic Kidney Disease Epidemiology Collaboration   (CKD-EPI) equation.       GFR Estimate If Black 05/07/2019 66  >60 mL/min/[1.73_m2] Final    Comment:  GFR Calc  Starting 12/18/2018, serum creatinine based estimated GFR (eGFR) will be   calculated using the Chronic Kidney Disease Epidemiology Collaboration   (CKD-EPI) equation.       Calcium 05/07/2019 9.6  8.5 - 10.1 mg/dL Final     Bilirubin Total 05/07/2019 0.8  0.2 - 1.3 mg/dL Final     Albumin 05/07/2019 3.8  3.4 - 5.0 g/dL Final     Protein Total 05/07/2019 7.4  6.8 - 8.8 g/dL Final     Alkaline Phosphatase 05/07/2019 79  40 - 150 U/L Final     ALT 05/07/2019 26  0 - 50 U/L Final     AST 05/07/2019 19  0 - 45 U/L Final     WBC 05/07/2019 4.7  4.0 - 11.0 10e9/L Final     RBC Count 05/07/2019 4.45  3.8 - 5.2 10e12/L Final     Hemoglobin 05/07/2019 13.0  11.7 - 15.7 g/dL Final     Hematocrit 05/07/2019 40.0  35.0 - 47.0 % Final     MCV 05/07/2019 90  78 - 100 fl Final     MCH  05/07/2019 29.2  26.5 - 33.0 pg Final     MCHC 05/07/2019 32.5  31.5 - 36.5 g/dL Final     RDW 05/07/2019 13.1  10.0 - 15.0 % Final     Platelet Count 05/07/2019 172  150 - 450 10e9/L Final     Diff Method 05/07/2019 Automated Method   Final     % Neutrophils 05/07/2019 62.5  % Final     % Lymphocytes 05/07/2019 27.3  % Final     % Monocytes 05/07/2019 9.2  % Final     % Eosinophils 05/07/2019 0.6  % Final     % Basophils 05/07/2019 0.2  % Final     % Immature Granulocytes 05/07/2019 0.2  % Final     Absolute Neutrophil 05/07/2019 2.9  1.6 - 8.3 10e9/L Final     Absolute Lymphocytes 05/07/2019 1.3  0.8 - 5.3 10e9/L Final     Absolute Monocytes 05/07/2019 0.4  0.0 - 1.3 10e9/L Final     Absolute Eosinophils 05/07/2019 0.0  0.0 - 0.7 10e9/L Final     Absolute Basophils 05/07/2019 0.0  0.0 - 0.2 10e9/L Final     Abs Immature Granulocytes 05/07/2019 0.0  0 - 0.4 10e9/L Final       ASSESSMENT/PLAN:  Hilda is a very pleasant 83 year-old woman recently diagnosed with stage IV follicular lymphoma, FLIPI score is 3 (one for stage IV, one for age>60, and one for elevated LDH), associated with 52% median 5 year OS survival and 36% median 10 year overall survival. Most recent CT of the chest, abdomen and pelvis on 10/24/2017 showed no e/o lymphoma recurrence with stable posttreatment changes of lymphoma. There was no significant change in mesenteric fat stranding from prior. There were stable tiny pulmonary nodules from 4/26/2016, including 2 mm nodule in the right base.     1. NHL.  S/p Rituxan x4, completed in 09/2012. Responded to treatment with resolution of night sweats and decrease in lymphadenopathy. We'll continue to observe her from now on. Clinically, she continued to be in remission from NHL.   We'll see her back in 6 months with labs - cbcd, cmp, LDH, uric acid. Per updated NCCN guidelines, at this time, a CT of the chest, abdomen and pelvis is recommended no more often than annually in Hilda's situation. We'll plan  repeat CT of the chest, abdomen and pelvis in October 2019, with our next visit with her. Her last CT imaging was in  November 2017.    2. Immunizations - She is uptodate with  Pneumovax, Prevnar vaccinations. Annual influenza vaccination is recommended going forward.    3. Breast cancer screening Screening mammogram negative 12/2018. Recommended annual screening mammograms.    4. 2mm nodule in the right lung base seen on CT chest in 11/2017, stable compared to 04/2016. She is a never smoker. We'll f/up on CT chest included in CT of the chest, abdomen and pelvis for lymphoma surveillance, at our next visit.     At the end of our visit the patient verbalized understanding, denied any further questions, and concurred with the plan of care.

## 2019-06-06 ENCOUNTER — DOCUMENTATION ONLY (OUTPATIENT)
Dept: OPHTHALMOLOGY | Facility: CLINIC | Age: 84
End: 2019-06-06

## 2019-06-26 ENCOUNTER — ANCILLARY PROCEDURE (OUTPATIENT)
Dept: GENERAL RADIOLOGY | Facility: CLINIC | Age: 84
End: 2019-06-26
Attending: NURSE PRACTITIONER
Payer: MEDICARE

## 2019-06-26 ENCOUNTER — OFFICE VISIT (OUTPATIENT)
Dept: URGENT CARE | Facility: URGENT CARE | Age: 84
End: 2019-06-26
Payer: MEDICARE

## 2019-06-26 ENCOUNTER — OFFICE VISIT (OUTPATIENT)
Dept: ORTHOPEDICS | Facility: CLINIC | Age: 84
End: 2019-06-26
Payer: MEDICARE

## 2019-06-26 VITALS — SYSTOLIC BLOOD PRESSURE: 134 MMHG | DIASTOLIC BLOOD PRESSURE: 76 MMHG | OXYGEN SATURATION: 96 % | HEART RATE: 82 BPM

## 2019-06-26 VITALS
TEMPERATURE: 98.3 F | SYSTOLIC BLOOD PRESSURE: 135 MMHG | BODY MASS INDEX: 24.84 KG/M2 | DIASTOLIC BLOOD PRESSURE: 68 MMHG | HEART RATE: 84 BPM | WEIGHT: 140.2 LBS | OXYGEN SATURATION: 98 %

## 2019-06-26 DIAGNOSIS — S99.912A ANKLE INJURY, LEFT, INITIAL ENCOUNTER: Primary | ICD-10-CM

## 2019-06-26 DIAGNOSIS — S82.892A CLOSED FRACTURE OF LEFT ANKLE, INITIAL ENCOUNTER: Primary | ICD-10-CM

## 2019-06-26 DIAGNOSIS — S82.892A CLOSED AVULSION FRACTURE OF LEFT ANKLE, INITIAL ENCOUNTER: ICD-10-CM

## 2019-06-26 PROCEDURE — 99213 OFFICE O/P EST LOW 20 MIN: CPT | Performed by: NURSE PRACTITIONER

## 2019-06-26 PROCEDURE — 73610 X-RAY EXAM OF ANKLE: CPT | Mod: LT

## 2019-06-26 PROCEDURE — 27786 TREATMENT OF ANKLE FRACTURE: CPT | Performed by: ORTHOPAEDIC SURGERY

## 2019-06-26 ASSESSMENT — ENCOUNTER SYMPTOMS
VOMITING: 0
SHORTNESS OF BREATH: 0
CHILLS: 0
HEADACHES: 0
COUGH: 0
FEVER: 0
SORE THROAT: 0
DIARRHEA: 0
NAUSEA: 0
RHINORRHEA: 0

## 2019-06-26 ASSESSMENT — PAIN SCALES - GENERAL: PAINLEVEL: MILD PAIN (3)

## 2019-06-26 NOTE — PATIENT INSTRUCTIONS
Patient Education     Understanding an Ankle Fracture    The ankle is formed by bones in the lower leg (tibia and fibula) and the bone on top of the foot (talus). When you have a fracture of the ankle, it means that one or more of the bones in the ankle are broken. The bone may be cracked, broken into two or more pieces, or even shattered. The pieces of bone may be lined up or they may have moved out of place. Sometimes, the bone may break through the skin. Nearby ligaments may also be damaged. Depending on how badly the bone is broken, healing may take a few months or longer.   What causes an ankle fracture?  Ankle fractures are often caused from severely twisting or rolling the ankle. They may also be caused from a fall, blow, accident, or sports injury.  Symptoms of an ankle fracture  Symptoms can include pain, swelling, and bruising. If the bone breaks through the skin, bleeding at the site can also occur. The ankle may look crooked, deformed, or bent. Also, it may be hard to move or use the ankle and foot as you would normally.  Treating an ankle fracture  Treatment for an ankle fracture depends on where the bone is broken and how serious the break is. If needed, the bone is put back into place. This may be done with or without surgery. If surgery is needed, the surgeon may use devices such as pins, plates, or screws to hold the bone together. Usually, you will wear a splint, brace, or short leg cast to keep the bone in place and protect it from injury during healing. Other treatments may also be used to help reduce symptoms or regain function. These include:    Rest. You may need to avoid walking or putting any weight on the broken ankle for a period of time. Severe fractures need a longer limit on weight-bearing activities.    Cold packs. Putting an ice pack over the injured area may help reduce swelling and pain.    Compression. An elastic bandage may be wrapped around the ankle to help reduce  swelling.    Elevation. Propping up the ankle so that it is above your heart may ease swelling.    Pain medicines. Prescription or over-the-counter pain medicines may help reduce pain and swelling. If needed, stronger pain medicines may be prescribed.    Exercises. Your healthcare provider may give you certain exercises to do at home or with a physical therapist. These help restore strength, flexibility, and range of motion in the ankle and foot.  Possible complications of an ankle fracture  These can include:    Poor healing of the bone    Weakness, stiffness, or loss of range of motion in the ankle    Osteoarthritis in the ankle  When to call your healthcare provider  Call your healthcare provider right away if you have any of these:    Fever of 100.4 F (38 C) or higher, or as directed    Symptoms that don t get better with treatment, or get worse    Numbness, tingling, or coldness in your foot or toes    Toenails that turn blue or grey in color    A splint, brace, or cast that is damaged or feels too tight or loose    Unusual redness, warmth, swelling, bleeding, or drainage from any wounds or incision sites    New symptoms   Date Last Reviewed: 3/10/2016    0895-4881 [employer]. [address] [place] [address]. All rights reserved. This information is not intended as a substitute for professional medical care. Always follow your healthcare professional's instructions.                                                              IMPRESSION: There are small ossific fragments at the distal tip of the  lateral malleolus consistent with age-indeterminate avulsion fracture.  No other fracture or dislocation. Lateral soft tissue swelling.

## 2019-06-26 NOTE — PROGRESS NOTES
SUBJECTIVE:   Hilda Potter is a 84 year old female who is seen in consultation at the request of Laura Newton NP for evaluation of a left ankle injury that occurred this morning.     Present symptoms: pain and swelling        Treatments tried to this point: short boot.    Orthopedic PMH: no previous ankle injuries    Review of Systems:  Constitutional:  NEGATIVE for fever, chills, change in weight  Integumentary/Skin:  NEGATIVE for worrisome rashes, moles or lesions  Eyes:  NEGATIVE for vision changes or irritation  ENT/Mouth:  NEGATIVE for ear, mouth and throat problems  Resp:  NEGATIVE for significant cough or SOB  Breast:  NEGATIVE for masses, tenderness or discharge  CV:  NEGATIVE for chest pain, palpitations or peripheral edema  GI:  NEGATIVE for nausea, abdominal pain, heartburn, or change in bowel habits  :  Negative   Musculoskeletal:  See HPI above  Neuro:  NEGATIVE for weakness, dizziness or paresthesias  Endocrine:  NEGATIVE for temperature intolerance, skin/hair changes  Heme/allergy/immune:  NEGATIVE for bleeding problems  Psychiatric:  NEGATIVE for changes in mood or affect    Past Medical History:   Past Medical History:   Diagnosis Date     AR (allergic rhinitis)      Atrophic vaginitis      Compression fracture of L1 lumbar vertebra (H) 4/2015     HTN (hypertension)      Migraine      Nonsenile cataract      Osteoporosis      Reflux esophagitis      Thyroid disease      Past Surgical History:   Past Surgical History:   Procedure Laterality Date     APPENDECTOMY  1954     BLEPHAROPLASTY BILATERAL  10/2007    both eyes, upper lids     C LENGTHEN,TENDON,HAND/FINGER  1993    repair of dupytrons's contracture     COLONOSCOPY  6/02, 10/13    Q 5 years, polyps     D & C       HC REMOVAL GALLBLADDER  1992     REPAIR PTOSIS       SALPINGO OOPHORECTOMY,R/L/LUIS DANIEL      left ovary and tube     SMALL BOWEL RESECTION  1986    colon resection      TONSILLECTOMY & ADENOIDECTOMY      childhood     TUBAL LIGATION        UPPER GI ENDOSCOPY  6/02, 8/11     Family History:   Family History   Problem Relation Age of Onset     Hypertension Mother      Arthritis Mother      Cardiovascular Mother      Retinal detachment Mother      Migraines Mother      C.A.D. Father      Hypertension Father      Cerebrovascular Disease Father      Arthritis Father      Cardiovascular Father      Eye Disorder Father      Heart Disease Father      Glaucoma Father      Parkinsonism Father      Cardiovascular Brother      Peripheral Neuropathy Brother      Macular Degeneration No family hx of      Social History:   Social History     Tobacco Use     Smoking status: Never Smoker     Smokeless tobacco: Never Used   Substance Use Topics     Alcohol use: Yes     Comment: wine      OBJECTIVE:  Physical Exam:  /76 (BP Location: Left arm, Patient Position: Sitting, Cuff Size: Adult Regular)   Pulse 82   LMP 10/01/1986   SpO2 96%   General Appearance: healthy, alert and no distress   Skin: no suspicious lesions or rashes  Neuro: Normal strength and tone, mentation intact and speech normal  Vascular: good pulses, and cappillary refill   Lymph: no lymphadenopathy   Psych:  mentation appears normal and affect normal/bright  Resp: no increased work of breathing     Left Ankle Exam:  Local lateral swelling  Tender at fibular tip  Lateral stability intact  Early bruising  Pain with range of motion.    X-rays:  Obtained today of the left ankle: 3-views, reviewed in the office with the patient by myself today and show a small fibular tip avulsion.  Otherwise normal      ASSESSMENT:   Small acute fibular avulsion fracture     PLAN:   The short fracture boot is not ideal, but she has it all ready, and should be sufficient.  Weight bearing as tolerated in boot.  Use boot for weight bearing x 4 weeks   Work on range of motion   Elevate, ice.  Wrap as needed.    Return to clinic: 4wks, start physical therapy then.    RANGEL Lombardo MD  Dept. Orthopedic  Surgery  [facility]

## 2019-06-26 NOTE — PROGRESS NOTES
SUBJECTIVE:   Hilda Potter is a 84 year old female presenting with a chief complaint of   Chief Complaint   Patient presents with     Fall     Patient had fall and injured left foot       She is an established patient of [facility].      S: Hilda Potter is a 84 year old female who complains of inversion injury to the left ankle 8 hours ago. She does not remember how it happened.  There is pain and swelling at the lateral aspect of that ankle. The patient was able to bear weight directly after the injury.She has been elevating and iced. Pain is mild        Review of Systems   Constitutional: Negative for chills and fever.   HENT: Negative for congestion, ear pain, rhinorrhea and sore throat.    Respiratory: Negative for cough and shortness of breath.    Gastrointestinal: Negative for diarrhea, nausea and vomiting.   Musculoskeletal:        Left ankle injury   Neurological: Negative for headaches.   All other systems reviewed and are negative.      Past Medical History:   Diagnosis Date     AR (allergic rhinitis)      Atrophic vaginitis      Compression fracture of L1 lumbar vertebra (H) 4/2015     HTN (hypertension)      Migraine      Nonsenile cataract      Osteoporosis      Reflux esophagitis      Thyroid disease      Family History   Problem Relation Age of Onset     Hypertension Mother      Arthritis Mother      Cardiovascular Mother      Retinal detachment Mother      Migraines Mother      C.A.D. Father      Hypertension Father      Cerebrovascular Disease Father      Arthritis Father      Cardiovascular Father      Eye Disorder Father      Heart Disease Father      Glaucoma Father      Parkinsonism Father      Cardiovascular Brother      Peripheral Neuropathy Brother      Macular Degeneration No family hx of      Current Outpatient Medications   Medication Sig Dispense Refill     Aspirin-Acetaminophen-Caffeine (EXCEDRIN MIGRAINE PO) Take 1 tablet by mouth as needed       carboxymethylcellulose (REFRESH  PLUS) 0.5 % SOLN Place 1 drop into both eyes 3 times daily as needed for dry eyes       HYZAAR 50-12.5 MG per tablet Take 1 tablet by mouth daily 90 tablet 4     latanoprost (XALATAN) 0.005 % ophthalmic solution Place 1 drop into both eyes At Bedtime 7.5 mL 3     levothyroxine (SYNTHROID/LEVOTHROID) 50 MCG tablet Take 1 tablet (50 mcg) by mouth daily 90 tablet 4     MELATONIN PO Take 3 mg by mouth nightly as needed        Multiple Vitamin (MULTI-VITAMIN) per tablet Take 1 tablet by mouth daily.         order for DME 1 aircast boot 1 Device 0     Social History     Tobacco Use     Smoking status: Never Smoker     Smokeless tobacco: Never Used   Substance Use Topics     Alcohol use: Yes     Comment: wine        OBJECTIVE  /68 (BP Location: Left arm, Patient Position: Chair, Cuff Size: Adult Regular)   Pulse 84   Temp 98.3  F (36.8  C) (Oral)   Wt 63.6 kg (140 lb 3.2 oz)   LMP 10/01/1986   SpO2 98%   BMI 24.84 kg/m      Physical Exam   Constitutional: No distress.   HENT:   Right Ear: Tympanic membrane normal.   Left Ear: Tympanic membrane normal.   Neck: Normal range of motion. Neck supple.   Cardiovascular: Normal rate.   Pulmonary/Chest: Effort normal and breath sounds normal. No respiratory distress.   Abdominal: Soft. Bowel sounds are normal.   Musculoskeletal:   There is tenderness and swelling of left lateral malleolus. Tenderness is worse with inversion of foot. Able to bear weight.  Neurovascular examination is normal   Lymphadenopathy:     She has no cervical adenopathy.   Neurological: She is alert. No cranial nerve deficit.   Skin: Skin is warm and dry. She is not diaphoretic.   Psychiatric: She has a normal mood and affect.   Nursing note and vitals reviewed.    Results for orders placed or performed in visit on 06/26/19   XR Ankle Left G/E 3 Views    Narrative    LEFT ANKLE THREE OR MORE VIEWS  6/26/2019 1:12 PM      HISTORY:  Twisted left ankle 8 hours ago in the house, now swollen on  left  lateral malleolus.    COMPARISON: None.      Impression    IMPRESSION: There are small ossific fragments at the distal tip of the  lateral malleolus consistent with age-indeterminate avulsion fracture.  No other fracture or dislocation. Lateral soft tissue swelling.         ASSESSMENT:      ICD-10-CM    1. Ankle injury, left, initial encounter S99.912A XR Ankle Left G/E 3 Views   2. Closed avulsion fracture of left ankle, initial encounter S82.892A order for DME     ORTHOPEDICS ADULT REFERRAL        PLAN:  I discussed xray results with the patient.  Rest and elevate the injured ankle, apply ice intermittently.  Air cast boot applied.  Referral to orthopedic made. Tylenol for pain    Patient Instructions       Patient Education     Understanding an Ankle Fracture    The ankle is formed by bones in the lower leg (tibia and fibula) and the bone on top of the foot (talus). When you have a fracture of the ankle, it means that one or more of the bones in the ankle are broken. The bone may be cracked, broken into two or more pieces, or even shattered. The pieces of bone may be lined up or they may have moved out of place. Sometimes, the bone may break through the skin. Nearby ligaments may also be damaged. Depending on how badly the bone is broken, healing may take a few months or longer.   What causes an ankle fracture?  Ankle fractures are often caused from severely twisting or rolling the ankle. They may also be caused from a fall, blow, accident, or sports injury.  Symptoms of an ankle fracture  Symptoms can include pain, swelling, and bruising. If the bone breaks through the skin, bleeding at the site can also occur. The ankle may look crooked, deformed, or bent. Also, it may be hard to move or use the ankle and foot as you would normally.  Treating an ankle fracture  Treatment for an ankle fracture depends on where the bone is broken and how serious the break is. If needed, the bone is put back into place. This may  be done with or without surgery. If surgery is needed, the surgeon may use devices such as pins, plates, or screws to hold the bone together. Usually, you will wear a splint, brace, or short leg cast to keep the bone in place and protect it from injury during healing. Other treatments may also be used to help reduce symptoms or regain function. These include:    Rest. You may need to avoid walking or putting any weight on the broken ankle for a period of time. Severe fractures need a longer limit on weight-bearing activities.    Cold packs. Putting an ice pack over the injured area may help reduce swelling and pain.    Compression. An elastic bandage may be wrapped around the ankle to help reduce swelling.    Elevation. Propping up the ankle so that it is above your heart may ease swelling.    Pain medicines. Prescription or over-the-counter pain medicines may help reduce pain and swelling. If needed, stronger pain medicines may be prescribed.    Exercises. Your healthcare provider may give you certain exercises to do at home or with a physical therapist. These help restore strength, flexibility, and range of motion in the ankle and foot.  Possible complications of an ankle fracture  These can include:    Poor healing of the bone    Weakness, stiffness, or loss of range of motion in the ankle    Osteoarthritis in the ankle  When to call your healthcare provider  Call your healthcare provider right away if you have any of these:    Fever of 100.4 F (38 C) or higher, or as directed    Symptoms that don t get better with treatment, or get worse    Numbness, tingling, or coldness in your foot or toes    Toenails that turn blue or grey in color    A splint, brace, or cast that is damaged or feels too tight or loose    Unusual redness, warmth, swelling, bleeding, or drainage from any wounds or incision sites    New symptoms   Date Last Reviewed: 3/10/2016    0183-3470 [employer]. [address],  [place] [address]. All rights reserved. This information is not intended as a substitute for professional medical care. Always follow your healthcare professional's instructions.                                                              IMPRESSION: There are small ossific fragments at the distal tip of the  lateral malleolus consistent with age-indeterminate avulsion fracture.  No other fracture or dislocation. Lateral soft tissue swelling.

## 2019-06-26 NOTE — LETTER
6/26/2019         RE: Hilda Potter  [address]  [place] [address]        Dear Colleague,    Thank you for referring your patient, Hilda Potter, to the [facility]. Please see a copy of my visit note below.    SUBJECTIVE:   Hilda Potter is a 84 year old female who is seen in consultation at the request of Laura Newton NP for evaluation of a left ankle injury that occurred this morning.     Present symptoms: pain and swelling        Treatments tried to this point: short boot.    Orthopedic PMH: no previous ankle injuries    Review of Systems:  Constitutional:  NEGATIVE for fever, chills, change in weight  Integumentary/Skin:  NEGATIVE for worrisome rashes, moles or lesions  Eyes:  NEGATIVE for vision changes or irritation  ENT/Mouth:  NEGATIVE for ear, mouth and throat problems  Resp:  NEGATIVE for significant cough or SOB  Breast:  NEGATIVE for masses, tenderness or discharge  CV:  NEGATIVE for chest pain, palpitations or peripheral edema  GI:  NEGATIVE for nausea, abdominal pain, heartburn, or change in bowel habits  :  Negative   Musculoskeletal:  See HPI above  Neuro:  NEGATIVE for weakness, dizziness or paresthesias  Endocrine:  NEGATIVE for temperature intolerance, skin/hair changes  Heme/allergy/immune:  NEGATIVE for bleeding problems  Psychiatric:  NEGATIVE for changes in mood or affect    Past Medical History:   Past Medical History:   Diagnosis Date     AR (allergic rhinitis)      Atrophic vaginitis      Compression fracture of L1 lumbar vertebra (H) 4/2015     HTN (hypertension)      Migraine      Nonsenile cataract      Osteoporosis      Reflux esophagitis      Thyroid disease      Past Surgical History:   Past Surgical History:   Procedure Laterality Date     APPENDECTOMY  1954     BLEPHAROPLASTY BILATERAL  10/2007    both eyes, upper lids     C LENGTHEN,TENDON,HAND/FINGER  1993    repair of dupytrons's contracture     COLONOSCOPY  6/02, 10/13    Q 5  years, polyps     D & C       HC REMOVAL GALLBLADDER  1992     REPAIR PTOSIS       SALPINGO OOPHORECTOMY,R/L/LUIS DANIEL      left ovary and tube     SMALL BOWEL RESECTION  1986    colon resection      TONSILLECTOMY & ADENOIDECTOMY      childhood     TUBAL LIGATION       UPPER GI ENDOSCOPY  6/02, 8/11     Family History:   Family History   Problem Relation Age of Onset     Hypertension Mother      Arthritis Mother      Cardiovascular Mother      Retinal detachment Mother      Migraines Mother      C.A.D. Father      Hypertension Father      Cerebrovascular Disease Father      Arthritis Father      Cardiovascular Father      Eye Disorder Father      Heart Disease Father      Glaucoma Father      Parkinsonism Father      Cardiovascular Brother      Peripheral Neuropathy Brother      Macular Degeneration No family hx of      Social History:   Social History     Tobacco Use     Smoking status: Never Smoker     Smokeless tobacco: Never Used   Substance Use Topics     Alcohol use: Yes     Comment: wine      OBJECTIVE:  Physical Exam:  /76 (BP Location: Left arm, Patient Position: Sitting, Cuff Size: Adult Regular)   Pulse 82   LMP 10/01/1986   SpO2 96%   General Appearance: healthy, alert and no distress   Skin: no suspicious lesions or rashes  Neuro: Normal strength and tone, mentation intact and speech normal  Vascular: good pulses, and cappillary refill   Lymph: no lymphadenopathy   Psych:  mentation appears normal and affect normal/bright  Resp: no increased work of breathing     Left Ankle Exam:  Local lateral swelling  Tender at fibular tip  Lateral stability intact  Early bruising  Pain with range of motion.    X-rays:  Obtained today of the left ankle: 3-views, reviewed in the office with the patient by myself today and show a small fibular tip avulsion.  Otherwise normal      ASSESSMENT:   Small acute fibular avulsion fracture     PLAN:   The short fracture boot is not ideal, but she has it all ready, and should  be sufficient.  Weight bearing as tolerated in boot.  Use boot for weight bearing x 4 weeks   Work on range of motion   Elevate, ice.  Wrap as needed.    Return to clinic: 4wks, start physical therapy then.    RANGEL Lombardo MD  Dept. Orthopedic Surgery  [facility]           Again, thank you for allowing me to participate in the care of your patient.        Sincerely,        Des Lombardo MD

## 2019-07-10 ENCOUNTER — TELEPHONE (OUTPATIENT)
Dept: FAMILY MEDICINE | Facility: CLINIC | Age: 84
End: 2019-07-10

## 2019-07-10 NOTE — TELEPHONE ENCOUNTER
Hilda Potter is a 84 year old female who calls with her blood pressure she is on Hyzaar 50/12.5 1 tablet daily.  Lately it has been low and irratic x 3 weeks. 117/55 7/9/19 or 111/50 today   But she was out weeding at this time when it was 159/70.  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  She did have a fall 1 week ago and foot in a walking boot.  When they are low she feels nauseated and loss of appetite and weak.  Her normal is 125-139/70-80. She had a Migraine yesterday and last Saturday 7/6/19.  Pulse is between  usually the 80s.  She denies:  Cool/pale/moist skin, drowsiness/confusion/ hot/dry skin with rapid pulse, shoulder or abdominal pain, rapid pulse, fainting, fever.   NURSING PLAN: Nursing advice to patient : Due to he recent fall injury to her leg, the decreased blood pressures, migraines, age, nausea she is to seek emergency care now.  I asked that she report to the E.R of choice immediately.  Patient/parent verbalizes good understanding, agrees with plan and states she needs no further support. Cindy Ellsworth R.N.    RECOMMENDED DISPOSITION:   Will comply with recommendation: Yes  If further questions/concerns or if symptoms do not improve, worsen or new symptoms develop, call your PCP or [facility] as soon as possible.    Guideline used:  Telephone Triage Protocols for Nurses, Fifth Edition, Daly Mcneal P. 353    Cindy Ellsworth RN

## 2019-07-17 ENCOUNTER — OFFICE VISIT (OUTPATIENT)
Dept: OPHTHALMOLOGY | Facility: CLINIC | Age: 84
End: 2019-07-17
Payer: MEDICARE

## 2019-07-17 DIAGNOSIS — H40.003 GLAUCOMA SUSPECT OF BOTH EYES: ICD-10-CM

## 2019-07-17 PROCEDURE — 92133 CPTRZD OPH DX IMG PST SGM ON: CPT | Performed by: OPHTHALMOLOGY

## 2019-07-17 PROCEDURE — 92083 EXTENDED VISUAL FIELD XM: CPT | Performed by: OPHTHALMOLOGY

## 2019-07-17 PROCEDURE — 92012 INTRM OPH EXAM EST PATIENT: CPT | Performed by: OPHTHALMOLOGY

## 2019-07-17 RX ORDER — LATANOPROST 50 UG/ML
1 SOLUTION/ DROPS OPHTHALMIC AT BEDTIME
Qty: 7.5 ML | Refills: 3 | Status: CANCELLED | OUTPATIENT
Start: 2019-07-17

## 2019-07-17 RX ORDER — LATANOPROST 50 UG/ML
1 SOLUTION/ DROPS OPHTHALMIC AT BEDTIME
Qty: 7.5 ML | Refills: 3 | Status: SHIPPED | OUTPATIENT
Start: 2019-07-17 | End: 2019-12-03

## 2019-07-17 ASSESSMENT — EXTERNAL EXAM - LEFT EYE: OS_EXAM: NORMAL

## 2019-07-17 ASSESSMENT — VISUAL ACUITY
OD_CC: 20/20-1
METHOD: SNELLEN - LINEAR
OS_CC: 20/20-2

## 2019-07-17 ASSESSMENT — EXTERNAL EXAM - RIGHT EYE: OD_EXAM: NORMAL

## 2019-07-17 ASSESSMENT — TONOMETRY
OS_IOP_MMHG: 18
OD_IOP_MMHG: 18
IOP_METHOD: APPLANATION

## 2019-07-17 NOTE — LETTER
7/17/2019         RE: Hilda Potter  [address]  [place] [facility] [facility] [address]        Dear Colleague,    Thank you for referring your patient, Hilda Potter, to the [facility]. Please see a copy of my visit note below.     Current Eye Medications: latanoprost nightly , refresh tears three times a day. Drops a 10 pm . Pt report will be starting last bottle of drops will need refill renewal( I put in order).     Subjective: 6 mo Iop.oct and hvf  Pt reports is seeing well , eyes feel dry quite often.     Objective:  See Ophthalmology Exam.       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      Plan:  Continue Latanoprost at bedtime both eyes   Use artificial tears up to 4 times daily both eyes. (Refresh Tears, Systane Ultra/Balance, or Theratears)   Return visit 6 months for a complete exam.    Andi Bay M.D.  [phone]         Again, thank you for allowing me to participate in the care of your patient.        Sincerely,        Andi Bay MD

## 2019-07-17 NOTE — PROGRESS NOTES
Current Eye Medications: latanoprost nightly , refresh tears three times a day. Drops a 10 pm . Pt report will be starting last bottle of drops will need refill renewal( I put in order).     Subjective: 6 mo Iop.oct and hvf  Pt reports is seeing well , eyes feel dry quite often.     Objective:  See Ophthalmology Exam.       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      Plan:  Continue Latanoprost at bedtime both eyes   Use artificial tears up to 4 times daily both eyes. (Refresh Tears, Systane Ultra/Balance, or Theratears)   Return visit 6 months for a complete exam.    Andi Bay M.D.  [phone]

## 2019-07-17 NOTE — PATIENT INSTRUCTIONS
Continue Latanoprost at bedtime both eyes   Use artificial tears up to 4 times daily both eyes. (Refresh Tears, Systane Ultra/Balance, or Theratears)   Return visit 6 months for a complete exam.    Andi Bay M.D.  [phone]

## 2019-07-22 ENCOUNTER — OFFICE VISIT (OUTPATIENT)
Dept: FAMILY MEDICINE | Facility: CLINIC | Age: 84
End: 2019-07-22
Payer: MEDICARE

## 2019-07-22 VITALS
HEART RATE: 86 BPM | WEIGHT: 143 LBS | TEMPERATURE: 97 F | DIASTOLIC BLOOD PRESSURE: 65 MMHG | OXYGEN SATURATION: 97 % | HEIGHT: 62 IN | SYSTOLIC BLOOD PRESSURE: 130 MMHG | RESPIRATION RATE: 16 BRPM | BODY MASS INDEX: 26.31 KG/M2

## 2019-07-22 DIAGNOSIS — I10 ESSENTIAL HYPERTENSION WITH GOAL BLOOD PRESSURE LESS THAN 140/90: Primary | ICD-10-CM

## 2019-07-22 PROCEDURE — 99213 OFFICE O/P EST LOW 20 MIN: CPT | Performed by: FAMILY MEDICINE

## 2019-07-22 ASSESSMENT — MIFFLIN-ST. JEOR: SCORE: 1056.89

## 2019-07-22 NOTE — PROGRESS NOTES
Subjective     Hilda Potter is a 84 year old female who presents to clinic today for the following health issues:    HPI     Hypertension Follow-up      Do you check your blood pressure regularly outside of the clinic? Yes     Are you following a low salt diet? Yes    Are your blood pressures ever more than 140 on the top number (systolic) OR more   than 90 on the bottom number (diastolic), for example 140/90? Yes     -Blood pressure has been ranging from 116-153/ 55-78.    -Would also like to know if she should be taking an aspirin every day.       Amount of exercise or physical activity: None    Problems taking medications regularly: No    Medication side effects: none    Diet: regular (no restrictions)    Ear Problem- Bilateral  Noticed some fullness in her ears the other week. No symptoms at this time.   Reporting improvements but would like to have them looked at.       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)     Premature beats     Premature atrial beats     Compression fracture of L1 lumbar vertebra, seen on CT     Lung nodule < 6cm on CT     Dupuytren's contracture of right hand     Hypothyroidism, unspecified type     Essential hypertension with goal blood pressure less than 140/90     Combined forms of age-related cataract, mild-mod, both eyes     Migraine without status migrainosus, not intractable, unspecified migraine type     Posterior vitreous detachment of left eye     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     Past Surgical History:   Procedure Laterality Date     APPENDECTOMY  1954     BLEPHAROPLASTY BILATERAL  10/2007    both eyes, upper lids     C  LENGTHEN,TENDON,HAND/FINGER  1993    repair of dupytrons's contracture     COLONOSCOPY  6/02, 10/13    Q 5 years, polyps     D & C       HC REMOVAL GALLBLADDER  1992     REPAIR PTOSIS       SALPINGO OOPHORECTOMY,R/L/LUIS DANIEL      left ovary and tube     SMALL BOWEL RESECTION  1986    colon resection      TONSILLECTOMY & ADENOIDECTOMY      childhood     TUBAL LIGATION       UPPER GI ENDOSCOPY  6/02, 8/11       Social History     Tobacco Use     Smoking status: Never Smoker     Smokeless tobacco: Never Used   Substance Use Topics     Alcohol use: Yes     Comment: wine      Family History   Problem Relation Age of Onset     Hypertension Mother      Arthritis Mother      Cardiovascular Mother      Retinal detachment Mother      Migraines Mother      C.A.D. Father      Hypertension Father      Cerebrovascular Disease Father      Arthritis Father      Cardiovascular Father      Eye Disorder Father      Heart Disease Father      Glaucoma Father      Parkinsonism Father      Cardiovascular Brother      Peripheral Neuropathy Brother      Macular Degeneration No family hx of          Current Outpatient Medications   Medication Sig Dispense Refill     aspirin (ASA) 81 MG tablet Take 1 tablet (81 mg) by mouth daily       Aspirin-Acetaminophen-Caffeine (EXCEDRIN MIGRAINE PO) Take 1 tablet by mouth as needed       carboxymethylcellulose (REFRESH PLUS) 0.5 % SOLN Place 1 drop into both eyes 3 times daily as needed for dry eyes       HYZAAR 50-12.5 MG per tablet Take 1 tablet by mouth daily 90 tablet 4     latanoprost (XALATAN) 0.005 % ophthalmic solution Place 1 drop into both eyes At Bedtime 7.5 mL 3     levothyroxine (SYNTHROID/LEVOTHROID) 50 MCG tablet Take 1 tablet (50 mcg) by mouth daily 90 tablet 4     MELATONIN PO Take 3 mg by mouth nightly as needed        Multiple Vitamin (MULTI-VITAMIN) per tablet Take 1 tablet by mouth daily.         order for DME 1 aircast boot 1 Device 0     Allergies   Allergen Reactions     Diatrizoate  "Other (See Comments)     Bisphosphonates GI Disturbance     GI distress     Ivp Dye [Contrast Dye] Swelling     Lisinopril Cough     Losartan Hives     But can tolerate Hyzaar.      Morphine Hives     Morphine Sulfate Nausea and Vomiting     Prilosec [Omeprazole] Hives     Latex Rash       Reviewed and updated as needed this visit by Provider       Review of Systems   ROS COMP: Constitutional, HEENT, cardiovascular, pulmonary, gi and gu systems are negative, except as otherwise noted.      Objective    /65   Pulse 86   Temp 97  F (36.1  C) (Tympanic)   Resp 16   Ht 1.575 m (5' 2\")   Wt 64.9 kg (143 lb)   LMP 10/01/1986   SpO2 97%   Breastfeeding? No   BMI 26.16 kg/m    Body mass index is 26.16 kg/m .  Physical Exam   GENERAL: healthy, alert and no distress  HENT: ear canals and TM's normal, nose and mouth without ulcers or lesions. No cerumen impaction/signs of any infection at this time.   RESP: lungs clear to auscultation - no rales, rhonchi or wheezes  CV: regular rate and rhythm, normal S1 S2, no S3 or S4, no murmur, click or rub, no peripheral edema and peripheral pulses strong      Diagnostic Test Results:  Labs reviewed in Epic        Assessment & Plan     Hilda was seen today for hypertension and ear problem.    Diagnoses and all orders for this visit:    Essential hypertension with goal blood pressure less than 140/90, controlled  -     aspirin (ASA) 81 MG tablet; Take 1 tablet (81 mg) by mouth daily      BMI:   Estimated body mass index is 26.16 kg/m  as calculated from the following:    Height as of this encounter: 1.575 m (5' 2\").    Weight as of this encounter: 64.9 kg (143 lb).   Weight management plan: Discussed healthy diet and exercise guidelines      Return in about 2 weeks (around 8/5/2019) for BP Recheck (Ancillary Visit) with home BP machine. .    Next Annual Wellness Visit due in 1/2020.      Ame Dobson MD  [facility]      "

## 2019-07-23 NOTE — PROGRESS NOTES
"SUBJECTIVE:    Hilda Potter is a 84 year old female who is here today for follow up for her small acute fibular avulsion fracture from 6/26/19.  Has had very little pain. No fevers or chills.    OBJECTIVE:      /65 (BP Location: Left arm, Patient Position: Sitting, Cuff Size: Adult Regular)   Pulse 86   Ht 1.575 m (5' 2\")   Wt 64.9 kg (143 lb)   LMP 10/01/1986   SpO2 98%   BMI 26.16 kg/m     Patient appears to be alert and in no apparent distress.  Skin intact.    Neurovascularly Intact.    ROM: very good  Tenderness:  over the ATFL    X-rays : none    ASSESSMENT:      ICD-10-CM    1. Closed fracture of left ankle, initial encounter S82.892A [facility] PT, HAND, AND CHIROPRACTIC REFERRAL      Doing well    PLAN:    Weight Bearing: OK to start weight bearing as tolerated without a boot.  Gel splint given, wear as needed.  Suggest use on uneven ground as needed   Rehab: PT ordered  Work: no issues  Medications: none    Return to clinic as needed .      RANGEL Lombardo MD  Dept. Orthopedic Surgery  [facility]      "

## 2019-07-24 ENCOUNTER — OFFICE VISIT (OUTPATIENT)
Dept: ORTHOPEDICS | Facility: CLINIC | Age: 84
End: 2019-07-24
Payer: MEDICARE

## 2019-07-24 VITALS
DIASTOLIC BLOOD PRESSURE: 65 MMHG | SYSTOLIC BLOOD PRESSURE: 119 MMHG | HEART RATE: 86 BPM | HEIGHT: 62 IN | BODY MASS INDEX: 26.31 KG/M2 | WEIGHT: 143 LBS | OXYGEN SATURATION: 98 %

## 2019-07-24 DIAGNOSIS — S82.892D CLOSED FRACTURE OF LEFT ANKLE WITH ROUTINE HEALING, SUBSEQUENT ENCOUNTER: Primary | ICD-10-CM

## 2019-07-24 PROCEDURE — 99207 ZZC FRACTURE CARE IN GLOBAL PERIOD: CPT | Performed by: ORTHOPAEDIC SURGERY

## 2019-07-24 ASSESSMENT — MIFFLIN-ST. JEOR: SCORE: 1056.89

## 2019-07-24 NOTE — LETTER
"    7/24/2019         RE: Hilda Potter  [address] [facility] [id number]        Dear Colleague,    Thank you for referring your patient, Hilda Potter, to the [facility]. Please see a copy of my visit note below.    SUBJECTIVE:    Hilda Potter is a 84 year old female who is here today for follow up for her small acute fibular avulsion fracture from 6/26/19.   Has had very little pain. No fevers or chills.    OBJECTIVE:      /65 (BP Location: Left arm, Patient Position: Sitting, Cuff Size: Adult Regular)   Pulse 86   Ht 1.575 m (5' 2\")   Wt 64.9 kg (143 lb)   LMP 10/01/1986   SpO2 98%   BMI 26.16 kg/m      Patient appears to be alert and in no apparent distress.  Skin intact.    Neurovascularly Intact.    ROM: very good  Tenderness:  over the ATFL    X-rays : none    ASSESSMENT:      ICD-10-CM    1. Closed fracture of left ankle, initial encounter S82.892A [facility] PT, HAND, AND CHIROPRACTIC REFERRAL      Doing well    PLAN:    Weight Bearing: OK to start weight bearing as tolerated without a boot.  Gel splint given, wear as needed.  Suggest use on uneven ground as needed   Rehab: PT ordered  Work: no issues  Medications: none    Return to clinic as needed .      RANGEL Lombardo MD  Dept. Orthopedic Surgery  [facility]        Again, thank you for allowing me to participate in the care of your patient.        Sincerely,        Des Lombardo MD    "

## 2019-08-06 ENCOUNTER — ALLIED HEALTH/NURSE VISIT (OUTPATIENT)
Dept: NURSING | Facility: CLINIC | Age: 84
End: 2019-08-06
Payer: MEDICARE

## 2019-08-06 VITALS — SYSTOLIC BLOOD PRESSURE: 123 MMHG | HEART RATE: 79 BPM | DIASTOLIC BLOOD PRESSURE: 62 MMHG

## 2019-08-06 DIAGNOSIS — I10 ESSENTIAL HYPERTENSION WITH GOAL BLOOD PRESSURE LESS THAN 140/90: Primary | ICD-10-CM

## 2019-08-06 PROCEDURE — 99207 ZZC NO CHARGE NURSE ONLY: CPT

## 2019-08-06 NOTE — PROGRESS NOTES
Hilda Potter is a 84 year old patient who comes in today for a Blood Pressure check.  Initial BP:  /62   Pulse 79   LMP 10/01/1986        Disposition: results routed to provider    Home BP machine was brought for comparison readings.  Home machine is a [employer] unit.  BP read 122/69.  The reading was back to back with our machine, both readings on left arm.    Debby Boss, CMA

## 2019-08-09 ENCOUNTER — THERAPY VISIT (OUTPATIENT)
Dept: PHYSICAL THERAPY | Facility: CLINIC | Age: 84
End: 2019-08-09
Attending: ORTHOPAEDIC SURGERY
Payer: MEDICARE

## 2019-08-09 DIAGNOSIS — S82.892D CLOSED FRACTURE OF LEFT ANKLE WITH ROUTINE HEALING, SUBSEQUENT ENCOUNTER: ICD-10-CM

## 2019-08-09 DIAGNOSIS — M25.572 PAIN IN JOINT INVOLVING ANKLE AND FOOT, LEFT: ICD-10-CM

## 2019-08-09 PROCEDURE — 97110 THERAPEUTIC EXERCISES: CPT | Mod: GP | Performed by: PHYSICAL THERAPIST

## 2019-08-09 PROCEDURE — 97161 PT EVAL LOW COMPLEX 20 MIN: CPT | Mod: GP | Performed by: PHYSICAL THERAPIST

## 2019-08-09 NOTE — PROGRESS NOTES
[facility] Initial Evaluation  Subjective:  The history is provided by the patient. No  was used.   Hilda Potter being seen for ankle fx.   Problem began 6/26/2019. Problem occurred: foot had fallen asleep and trip and rolled her ankle  and reported as 3/10 on pain scale. General health as reported by patient is good. Pertinent medical history includes:  Cancer, high blood pressure, thyroid problems and migraines/headaches.  Medical allergies: latex.  Surgeries include:  Other. Other surgery history details: gall bladder.  Current medications:  Thyroid medication and high blood pressure medication.     Pain is described as aching and is intermittent. Pain is worse in the P.M.. Since onset symptoms are gradually improving. Special tests:  X-ray (fibular avulsion fx).        Barriers include:  None as reported by patient.  Red flags:  None as reported by patient.  Type of problem:  Left ankle   Condition occurred with:  A fall/slip. This is a new condition   Problem details: Was getting up from bed and foot/leg was asleep and lost her balance and rolled the ankle.  Went to  and then saw ortho..   Patient reports pain:  Lateral. Radiates to:  No radiation. Associated symptoms:  Loss of motion/stiffness, loss of strength and edema. Symptoms are exacerbated by walking and descending stairs and relieved by ice and bracing/immobilizing.                      Objective:    Gait:  Arrived wearing CAM but stated she was gardening and just wanted to play it safe.  Can walk without it otherwise  Assistive Devices:  None  Deviations:  Ankle:  Push off decr L          Ankle/Foot Evaluation  ROM:  AROM is normal.PROM is normal.      Strength:    Dorsiflexion:  Left: 5/5      Pain:-   Right: 5/5    Pain:-  Plantarflexion: Left: 3/5    Pain:+   Right: 5/5   Pain:-  Inversion:Left: 5/5   Pain:-     Right: 5/5   Pain:-  Eversion:Left: 4+/5   Pain:+  Right: 5/5    Pain:-                        EDEMA: Edema ankle: mild localized edema at L sinus tarsi.                                                                  General Evaluation:                      Balance:    Single Leg Stance--Eyes Open:  Left: 2/30 sec    Right: 3/30 sec  Single Leg Stance--Eyes Closed:  Left: 0/30 sec    Right: 0/30 sec                                                   ROS    Assessment/Plan:    Patient is a 84 year old female with left ankle complaints.    Patient has the following significant findings with corresponding treatment plan.                Diagnosis 1:  S/p fibular avulsion fx   Pain -  self management, education and home program  Decreased strength - therapeutic exercise, therapeutic activities and home program  Impaired balance - neuro re-education, therapeutic activities and home program  Impaired gait - gait training and home program      Previous and current functional limitations:  (See Goal Flow Sheet for this information)    Short term and Long term goals: (See Goal Flow Sheet for this information)     Communication ability:  Patient appears to be able to clearly communicate and understand verbal and written communication and follow directions correctly.  Treatment Explanation - The following has been discussed with the patient:   RX ordered/plan of care  Anticipated outcomes  Possible risks and side effects  This patient would benefit from PT intervention to resume normal activities.   Rehab potential is good.    Frequency:  1 X week, once daily  Duration:  for 6 weeks  Discharge Plan:  Achieve all LTG.  Independent in home treatment program.  Reach maximal therapeutic benefit.    Please refer to the daily flowsheet for treatment today, total treatment time and time spent performing 1:1 timed codes.

## 2019-08-09 NOTE — LETTER
DEPARTMENT OF HEALTH AND HUMAN SERVICES  CENTERS FOR MEDICARE & MEDICAID SERVICES    PLAN/UPDATED PLAN OF PROGRESS FOR OUTPATIENT REHABILITATION    PATIENTS NAME:  Hilda Potter   : 1935  PROVIDER NUMBER:    [id number]  HICN:  [id number]  PROVIDER NAME: [facility]  MEDICAL RECORD NUMBER: [id number]   START OF CARE DATE:  SOC Date: 19   TYPE:  PT  PRIMARY/TREATMENT DIAGNOSIS: (Pertinent Medical Diagnosis)  Closed fracture of left ankle with routine healing, subsequent encounter  Pain in joint involving ankle and foot, left  VISITS FROM START OF CARE:  Rxs Used: 1     [facility] Initial Evaluation  Subjective:  The history is provided by the patient. No  was used.   Hilda Potter being seen for ankle fx.   Problem began 2019. Problem occurred: foot had fallen asleep and trip and rolled her ankle  and reported as 3/10 on pain scale. General health as reported by patient is good. Pertinent medical history includes:  Cancer, high blood pressure, thyroid problems and migraines/headaches.  Medical allergies: latex.  Surgeries include:  Other. Other surgery history details: gall bladder.  Current medications:  Thyroid medication and high blood pressure medication.     Pain is described as aching and is intermittent. Pain is worse in the P.M.. Since onset symptoms are gradually improving. Special tests:  X-ray (fibular avulsion fx).        Barriers include:  None as reported by patient.  Red flags:  None as reported by patient.  Type of problem:  Left ankle   Condition occurred with:  A fall/slip. This is a new condition   Problem details: Was getting up from bed and foot/leg was asleep and lost her balance and rolled the ankle.  Went to  and then saw ortho..   Patient reports pain:  Lateral. Radiates to:  No radiation. Associated symptoms:  Loss of motion/stiffness, loss of strength and edema. Symptoms are exacerbated by  walking and descending stairs and relieved by ice and bracing/immobilizing.             Objective:  Gait:  Arrived wearing CAM but stated she was gardening and just wanted to play it safe.  Can walk without it otherwise  Assistive Devices:  None  Deviations:  Ankle:  Push off decr L  Ankle/Foot Evaluation  ROM:  AROM is normal.PROM is normal.  Strength:    Dorsiflexion:  Left: 5/5      Pain:-   Right: 5/5    Pain:-  Plantarflexion: Left: 3/5    Pain:+   Right: 5/5   Pain:-  Inversion:Left: 5/5   Pain:-     Right: 5/5   Pain:-  Eversion:Left: 4+/5   Pain:+  Right: 5/5   Pain:-  EDEMA: Edema ankle: mild localized edema at L sinus tarsi.  General Evaluation:    PATIENTS NAME:  Hilda Potter   : 1935    Balance:    Single Leg Stance--Eyes Open:  Left: 2/30 sec    Right: 3/30 sec  Single Leg Stance--Eyes Closed:  Left: 0/30 sec    Right: 0/30 sec    Assessment/Plan:    Patient is a 84 year old female with left ankle complaints.    Patient has the following significant findings with corresponding treatment plan.                Diagnosis 1:  S/p fibular avulsion fx   Pain -  self management, education and home program  Decreased strength - therapeutic exercise, therapeutic activities and home program  Impaired balance - neuro re-education, therapeutic activities and home program  Impaired gait - gait training and home program  Previous and current functional limitations:  (See Goal Flow Sheet for this information)    Short term and Long term goals: (See Goal Flow Sheet for this information)   Communication ability:  Patient appears to be able to clearly communicate and understand verbal and written communication and follow directions correctly.  Treatment Explanation - The following has been discussed with the patient:   RX ordered/plan of care  Anticipated outcomes  Possible risks and side effects  This patient would benefit from PT intervention to resume normal activities.   Rehab potential is  "good.  Frequency:  1 X week, once daily  Duration:  for 6 weeks  Discharge Plan:  Achieve all LTG.  Independent in home treatment program.  Reach maximal therapeutic benefit.  Please refer to the daily flowsheet for treatment today, total treatment time and time spent performing 1:1 timed codes.         Caregiver Signature/Credentials _____________________________ Date ________      Treating Provider: Miguel Angel Stafford PT   I have reviewed and certified the need for these services and plan of treatment while under my care.        PHYSICIAN'S SIGNATURE:   _________________________________________  Date___________   Des Lombardo M.D.    Certification period:  Beginning of Cert date period: 08/09/19 to  End of Cert period date: 10/07/19     Functional Level Progress Report: Please see attached \"Goal Flow sheet for Functional level.\"    ____X____ Continue Services or       ________ DC Services                Service dates: From  SOC Date: 08/09/19 date to present                         "

## 2019-08-16 ENCOUNTER — THERAPY VISIT (OUTPATIENT)
Dept: PHYSICAL THERAPY | Facility: CLINIC | Age: 84
End: 2019-08-16
Payer: MEDICARE

## 2019-08-16 DIAGNOSIS — M25.572 PAIN IN JOINT INVOLVING ANKLE AND FOOT, LEFT: ICD-10-CM

## 2019-08-16 PROCEDURE — 97110 THERAPEUTIC EXERCISES: CPT | Mod: GP | Performed by: PHYSICAL THERAPIST

## 2019-08-16 PROCEDURE — 97112 NEUROMUSCULAR REEDUCATION: CPT | Mod: GP | Performed by: PHYSICAL THERAPIST

## 2019-08-23 ENCOUNTER — THERAPY VISIT (OUTPATIENT)
Dept: PHYSICAL THERAPY | Facility: CLINIC | Age: 84
End: 2019-08-23
Payer: MEDICARE

## 2019-08-23 DIAGNOSIS — M25.572 PAIN IN JOINT INVOLVING ANKLE AND FOOT, LEFT: ICD-10-CM

## 2019-08-23 PROCEDURE — 97110 THERAPEUTIC EXERCISES: CPT | Mod: GP | Performed by: PHYSICAL THERAPY ASSISTANT

## 2019-08-23 PROCEDURE — 97112 NEUROMUSCULAR REEDUCATION: CPT | Mod: GP | Performed by: PHYSICAL THERAPY ASSISTANT

## 2019-08-23 NOTE — PROGRESS NOTES
"SUBJECTIVE  Subjective changes as noted by pt:  Pt states she has had no trips/falls outside. Although she wears the ankle brace (for the left) at times \"for mind support\".    Current pain level: 1/10   Changes in function:  Yes (See Goal flowsheet attached for changes in current functional level)     Adverse reaction to treatment or activity:  None    OBJECTIVE  Changes in objective findings: Good heel to toe pattern on the L ankle with level surface walks, up/down flight of stairs. (good heel strike to toe off pattern). Mild soreness along the left top/outer ankle.      ASSESSMENT  Hilda continues to require intervention to meet STG and LTG's: PT  Patient's symptoms are resolving.  Patient is progressing well and is ready to decrease frequency of treatment in the clinic.  Response to therapy has shown an improvement in  pain level and function  Progress made towards STG/LTG?  Yes (See Goal flowsheet attached for updates on achievement of STG and LTG)    PLAN  Current treatment program is being advanced to more complex exercises.    PTA/ATC plan:  Will continue with present plan of care.    Please refer to the daily flowsheet for treatment today, total treatment time and time spent performing 1:1 timed codes.      "

## 2019-08-30 ENCOUNTER — THERAPY VISIT (OUTPATIENT)
Dept: PHYSICAL THERAPY | Facility: CLINIC | Age: 84
End: 2019-08-30
Payer: MEDICARE

## 2019-08-30 DIAGNOSIS — M25.572 PAIN IN JOINT INVOLVING ANKLE AND FOOT, LEFT: ICD-10-CM

## 2019-08-30 PROCEDURE — 97110 THERAPEUTIC EXERCISES: CPT | Mod: GP | Performed by: PHYSICAL THERAPIST

## 2019-08-30 PROCEDURE — 97112 NEUROMUSCULAR REEDUCATION: CPT | Mod: GP | Performed by: PHYSICAL THERAPIST

## 2019-08-30 NOTE — PROGRESS NOTES
DISCHARGE REPORT    Progress reporting period is from 8/9/19 to 8/30/19.       SUBJECTIVE  Subjective: Only gets pain with really long walks, otherwise it feels great.  Balance is doing really well    Current Pain level: 0/10.     Initial Pain level: 3/10.   Changes in function:  Yes (See Goal flowsheet attached for changes in current functional level)  Adverse reaction to treatment or activity: None    OBJECTIVE  Objective: SLS R: 14 sec, L: 11 sec.  Normal gait pattern.  5/5 L ankle strength     ASSESSMENT/PLAN  Updated problem list and treatment plan: Diagnosis 1:  S/p ankle fx    STG/LTGs have been met or progress has been made towards goals:  Yes (See Goal flow sheet completed today.)  Assessment of Progress: The patient's condition is improving.  The patient has met all of their long term goals.  Self Management Plans:  Patient is independent in a home treatment program.  Hilda continues to require the following intervention to meet STG and LTG's:  PT intervention is no longer required to meet STG/LTG.    Recommendations:  This patient is ready to be discharged from therapy and continue their home treatment program.      Please refer to the daily flowsheet for treatment today, total treatment time and time spent performing 1:1 timed codes.

## 2019-09-29 NOTE — PROGRESS NOTES
Oncology Follow-up Visit:  Oct 1, 2019      PCP: Dr. Rahman  Diagnosis: Low grade, stage IVB (BM involvement, night sweats) follicular lymphoma.  FLIPI score is 3 (one for stage IV, one for age>60, and one for elevated LDH), associated with 52% median 5 year OS survival and 36% median 10 year overall survival.     Oncologic History:    presented with night sweats and lymphadenopathy. She developed drenching night sweats in 11/2011 which persisted. She had no other associated constitutional symptoms such as fevers or weight loss. She had a negative ID workup with Dr. Ruiz (for valley fever as she had traveled to [place] in the past).   As part of evaluation, CT scan was performed in April 2012 which demonstrated multiple borderline mildly enlarged retroperitoneal and mesenteric lymph nodes, with the largest size of 1.5 cm. The patient, however, persisted to have night sweats and underwent repeat CTCAP done without contrast (she is allergic to IV contrast and had an anaphylactic reaction to it) which demonstrated unchanged dilatation of common bile duct, likely related to postcholecystectomy state, and stable mildly enlarged retroperitoneal and mesenteric lymph nodes which were nonspecific.   We proceeded with CT guided biopsy of one of the intraabdominal lymph nodes and results c/w low grade follicular lymphoma.  Flow cytometry showed kappa monotypic CD10 positive B cells.  We proceeded with bone marrow biopsy to complete her staging. The results, as detailed below, revealed involvement of marrow by follicular lymphoma. Flow cytometry showed a rare population of CD10-positive, kappa-monotypic B cells (0.2%). Molecular diagnostics was negative for B-cell gene rearrangement. Cytogenetics revealed, of the interphase cells examined, 0.2% had a signal pattern indicative of an IGH/BCL2 gene fusion, a rate that falls just slightly above the normal control range (0-0.1%) for our laboratory; thus, these findings are  "suspicious for the presence of low-level bone marrow involvement by follicular lymphoma. However, this cannot be determined with certainty, in light of the few abnormal cells found by FISH. Of note, a G-banded chromosome study is still pending.   Hep B/C negative. EVE negative. No M-spike on SPEP.    Treatment: Weekly Rituxan x 4, completed 9/13/2012. During her first Rituxan infusion she has developed a reaction with tingling of upper lip and chin and feeling \"cold\" in her chest. She was given IV solumedrol and the infusion was slowed down and these symptoms have resolved. Since then she has been premedicated with solumedrol, and all infusions were uneventful since.    Interval History:  Ms. Potter is a 84 year old female diagnosed with follicular lymphoma present today for follow-up. She has completed 4 weekly doses of Rituxan in 09/2012.   Her night sweats have resolved subsequently and LDH has remained normal.    She had a screening mammogram in 12/2018 which was negative.  CT of the chest, abdomen and pelvis on 9/30/2019 showed:  1. No new adenopathy identified in the chest, abdomen or pelvis. Small  scattered mesenteric lymph nodes are stable with haziness of the  mesenteric fat.  2. New small cluster of pulmonary nodularity at the right lower lobe.  This may just relate to an infectious or inflammatory etiology.  Neoplastic etiology not entirely excluded but felt to be somewhat less  Likely.  She has had no fevers or chills and no respiratory symptoms such as cough or SOB.    She has had no constitutional symptoms. She denies weight loss. She denies SOB.  She has had no trouble swallowing lately. She had a Schatzki's ring dilated during EGD in 06/2018. Her main complaint is daily nausea without vomiting. She has no other complaints. Weight has been stable at 140 lbs.  She is not sure if she and her  are going to leave the state for the winter.   In addition, a complete 12 point  review of systems is " "negative.        Past medical, social, surgical, and family histories reviewed.  Echocardiogram in 10/2014 for evaluation of benign palpitations showed left ventricular function is normal with an EF of 55-60%.  Left ventricular Doppler filling pattern consistent with abnormal relaxation.  DEXA scan on 5/12/2015 showed most neg T score of -2.2 in the lumbar spine. This was unchanged compared to 11/2010. She was on Boneva years ago but apparently developed L jaw tingling and is no longer on biphosphonates.       Allergies:  Allergies as of 10/01/2019 - Reviewed 07/24/2019   Allergen Reaction Noted     Diatrizoate Other (See Comments) 05/28/2008     Bisphosphonates GI Disturbance 10/01/2009     Ivp dye [contrast dye] Swelling 10/01/2009     Lisinopril Cough 06/24/2013     Losartan Hives 11/22/2010     Morphine Hives 06/06/2018     Morphine sulfate Nausea and Vomiting 08/16/2012     Prilosec [omeprazole] Hives 10/01/2009     Latex Rash 09/19/2014       Current Medications:  Current Outpatient Medications   Medication Sig Dispense Refill     aspirin (ASA) 81 MG tablet Take 1 tablet (81 mg) by mouth daily       Aspirin-Acetaminophen-Caffeine (EXCEDRIN MIGRAINE PO) Take 1 tablet by mouth as needed       carboxymethylcellulose (REFRESH PLUS) 0.5 % SOLN Place 1 drop into both eyes 3 times daily as needed for dry eyes       HYZAAR 50-12.5 MG per tablet Take 1 tablet by mouth daily 90 tablet 4     latanoprost (XALATAN) 0.005 % ophthalmic solution Place 1 drop into both eyes At Bedtime 7.5 mL 3     levothyroxine (SYNTHROID/LEVOTHROID) 50 MCG tablet Take 1 tablet (50 mcg) by mouth daily 90 tablet 4     MELATONIN PO Take 3 mg by mouth nightly as needed        Multiple Vitamin (MULTI-VITAMIN) per tablet Take 1 tablet by mouth daily.         order for DME 1 aircast boot 1 Device 0        Physical Exam:  /72 (BP Location: Right arm)   Pulse 86   Temp 98.1  F (36.7  C) (Oral)   Resp 16   Ht 1.575 m (5' 2.01\")   Wt 63.9 kg " (140 lb 14.4 oz)   LMP 10/01/1986   SpO2 98%   BMI 25.76 kg/m      LMP 10/01/1986       GENERAL APPEARANCE: alert and in no acute distress.  HEENT: Sclerae anicteric.   NECK: Supple. No asymmetry or masses.  LYMPHATICS: No palpable cervical, supraclavicular, axillary lymphadenopathy b/l  RESP: Lungs clear to auscultation bilaterally without rales, rhonchi or wheezes.  CARDIOVASCULAR: Regular rate and rhythm. Normal S1, S2; no S3 or S4. No murmur, gallop, or rub.  ABDOMEN: Soft, nontender. Bowel sounds normal. No palpable organomegaly or masses.  MUSCULOSKELETAL: Extremities without gross deformities noted. No edema of bilateral lower extremities.  SKIN: No suspicious lesions or rashes.  NEURO: Alert and oriented x 3. Cranial nerves II-XII grossly intact.  PSYCHIATRIC: Mentation and affect appear normal.    Labs:  Component      Latest Ref Rng & Units 9/30/2019   WBC      4.0 - 11.0 10e9/L 4.6   RBC Count      3.8 - 5.2 10e12/L 4.47   Hemoglobin      11.7 - 15.7 g/dL 13.2   Hematocrit      35.0 - 47.0 % 40.1   MCV      78 - 100 fl 90   MCH      26.5 - 33.0 pg 29.5   MCHC      31.5 - 36.5 g/dL 32.9   RDW      10.0 - 15.0 % 12.9   Platelet Count      150 - 450 10e9/L 181   Diff Method       Automated Method   % Neutrophils      % 57.9   % Lymphocytes      % 30.2   % Monocytes      % 10.4   % Eosinophils      % 1.1   % Basophils      % 0.2   % Immature Granulocytes      % 0.2   Absolute Neutrophil      1.6 - 8.3 10e9/L 2.7   Absolute Lymphocytes      0.8 - 5.3 10e9/L 1.4   Absolute Monocytes      0.0 - 1.3 10e9/L 0.5   Absolute Eosinophils      0.0 - 0.7 10e9/L 0.1   Absolute Basophils      0.0 - 0.2 10e9/L 0.0   Abs Immature Granulocytes      0 - 0.4 10e9/L 0.0   Sodium      133 - 144 mmol/L 141   Potassium      3.4 - 5.3 mmol/L 3.9   Chloride      94 - 109 mmol/L 106   Carbon Dioxide      20 - 32 mmol/L 32   Anion Gap      3 - 14 mmol/L 3   Glucose      70 - 99 mg/dL 109 (H)   Urea Nitrogen      7 - 30 mg/dL 20    Creatinine      0.52 - 1.04 mg/dL 0.86   GFR Estimate      >60 mL/min/1.73:m2 62   GFR Estimate If Black      >60 mL/min/1.73:m2 72   Calcium      8.5 - 10.1 mg/dL 9.1   Bilirubin Total      0.2 - 1.3 mg/dL 0.6   Albumin      3.4 - 5.0 g/dL 3.5   Protein Total      6.8 - 8.8 g/dL 7.2   Alkaline Phosphatase      40 - 150 U/L 78   ALT      0 - 50 U/L 20   AST      0 - 45 U/L 17   Lactate Dehydrogenase      81 - 234 U/L 176       ASSESSMENT/PLAN:  Hilda is a very pleasant 84 year-old woman recently diagnosed with stage IV follicular lymphoma, FLIPI score is 3 (one for stage IV, one for age>60, and one for elevated LDH), associated with 52% median 5 year OS survival and 36% median 10 year overall survival. Most recent CT of the chest, abdomen and pelvis on 9/30/2019 showed no e/o lymphoma recurrence with stable posttreatment changes of lymphoma. There was no significant change in prominent mesenteric. There were stable tiny pulmonary nodules from 4/26/2016, including 2 mm nodule in the right base.     1. NHL.  S/p Rituxan x4, completed in 09/2012. Responded to treatment with resolution of night sweats and decrease in lymphadenopathy. We'll continue to observe her from now on. Clinically, she continued to be in remission from NHL, with stable shotty lymphadenopathy in the mesentery radiographically.   We'll see her back in 6 months with labs - cbcd, cmp, LDH, uric acid. Per updated NCCN guidelines, at this time, a CT of the chest, abdomen and pelvis is recommended no more often than annually in Hilda's situation. We'll plan repeat CT of the chest, abdomen and pelvis in October 2020. She had anaphylactic allergy to CT contrast dye in the past and her CTs are ordered without contrast. If there is a e/o disease progression on future CT without contrast, consider PET/CT without contrast for further evaluation.     2. Immunizations - She is uptodate with  Pneumovax, Prevnar vaccinations. Annual influenza vaccination is  recommended today. She will get it from the pharmacy upstairs.    3. Persistent nausea, Hx of Schatzki's ring dilated during EGD in 06/2018- refer to GI for further evaluation and possible repeat EGD.    4. Hx of pulmonary nodules:  2mm nodule in the right lung base seen on CT chest in 11/2017, stable compared to 04/2016. She is a never smoker.   CT chest on 9/30/2019 showed a new small cluster of pulmonary nodularity at the right lower lobe.  This may just relate to an infectious or inflammatory etiology.  We'll f/up on CT chest in one year (plan October 2020) included in CT of the chest, abdomen and pelvis for lymphoma surveillance.    5. Breast cancer screening Screening mammogram negative 12/2018. Recommended annual screening mammograms.  At the end of our visit the patient verbalized understanding, denied any further questions, and concurred with the plan of care.

## 2019-09-30 ENCOUNTER — ANCILLARY PROCEDURE (OUTPATIENT)
Dept: CT IMAGING | Facility: CLINIC | Age: 84
End: 2019-09-30
Attending: INTERNAL MEDICINE
Payer: MEDICARE

## 2019-09-30 DIAGNOSIS — C82.00 GRADE I FOLLICULAR SMALL CLEAVED CELL LYMPHOMA (H): ICD-10-CM

## 2019-09-30 LAB
ALBUMIN SERPL-MCNC: 3.5 G/DL (ref 3.4–5)
ALP SERPL-CCNC: 78 U/L (ref 40–150)
ALT SERPL W P-5'-P-CCNC: 20 U/L (ref 0–50)
ANION GAP SERPL CALCULATED.3IONS-SCNC: 3 MMOL/L (ref 3–14)
AST SERPL W P-5'-P-CCNC: 17 U/L (ref 0–45)
BASOPHILS # BLD AUTO: 0 10E9/L (ref 0–0.2)
BASOPHILS NFR BLD AUTO: 0.2 %
BILIRUB SERPL-MCNC: 0.6 MG/DL (ref 0.2–1.3)
BUN SERPL-MCNC: 20 MG/DL (ref 7–30)
CALCIUM SERPL-MCNC: 9.1 MG/DL (ref 8.5–10.1)
CHLORIDE SERPL-SCNC: 106 MMOL/L (ref 94–109)
CO2 SERPL-SCNC: 32 MMOL/L (ref 20–32)
CREAT SERPL-MCNC: 0.86 MG/DL (ref 0.52–1.04)
DIFFERENTIAL METHOD BLD: NORMAL
EOSINOPHIL # BLD AUTO: 0.1 10E9/L (ref 0–0.7)
EOSINOPHIL NFR BLD AUTO: 1.1 %
ERYTHROCYTE [DISTWIDTH] IN BLOOD BY AUTOMATED COUNT: 12.9 % (ref 10–15)
GFR SERPL CREATININE-BSD FRML MDRD: 62 ML/MIN/{1.73_M2}
GLUCOSE SERPL-MCNC: 109 MG/DL (ref 70–99)
HCT VFR BLD AUTO: 40.1 % (ref 35–47)
HGB BLD-MCNC: 13.2 G/DL (ref 11.7–15.7)
IMM GRANULOCYTES # BLD: 0 10E9/L (ref 0–0.4)
IMM GRANULOCYTES NFR BLD: 0.2 %
LDH SERPL L TO P-CCNC: 176 U/L (ref 81–234)
LYMPHOCYTES # BLD AUTO: 1.4 10E9/L (ref 0.8–5.3)
LYMPHOCYTES NFR BLD AUTO: 30.2 %
MCH RBC QN AUTO: 29.5 PG (ref 26.5–33)
MCHC RBC AUTO-ENTMCNC: 32.9 G/DL (ref 31.5–36.5)
MCV RBC AUTO: 90 FL (ref 78–100)
MONOCYTES # BLD AUTO: 0.5 10E9/L (ref 0–1.3)
MONOCYTES NFR BLD AUTO: 10.4 %
NEUTROPHILS # BLD AUTO: 2.7 10E9/L (ref 1.6–8.3)
NEUTROPHILS NFR BLD AUTO: 57.9 %
PLATELET # BLD AUTO: 181 10E9/L (ref 150–450)
POTASSIUM SERPL-SCNC: 3.9 MMOL/L (ref 3.4–5.3)
PROT SERPL-MCNC: 7.2 G/DL (ref 6.8–8.8)
RBC # BLD AUTO: 4.47 10E12/L (ref 3.8–5.2)
SODIUM SERPL-SCNC: 141 MMOL/L (ref 133–144)
WBC # BLD AUTO: 4.6 10E9/L (ref 4–11)

## 2019-09-30 PROCEDURE — 80053 COMPREHEN METABOLIC PANEL: CPT | Performed by: INTERNAL MEDICINE

## 2019-09-30 PROCEDURE — 71250 CT THORAX DX C-: CPT | Performed by: RADIOLOGY

## 2019-09-30 PROCEDURE — 83615 LACTATE (LD) (LDH) ENZYME: CPT | Performed by: INTERNAL MEDICINE

## 2019-09-30 PROCEDURE — 85025 COMPLETE CBC W/AUTO DIFF WBC: CPT | Performed by: INTERNAL MEDICINE

## 2019-09-30 PROCEDURE — 36415 COLL VENOUS BLD VENIPUNCTURE: CPT | Performed by: INTERNAL MEDICINE

## 2019-09-30 PROCEDURE — 74176 CT ABD & PELVIS W/O CONTRAST: CPT | Performed by: RADIOLOGY

## 2019-10-01 ENCOUNTER — ONCOLOGY VISIT (OUTPATIENT)
Dept: ONCOLOGY | Facility: CLINIC | Age: 84
End: 2019-10-01
Attending: INTERNAL MEDICINE
Payer: MEDICARE

## 2019-10-01 VITALS
OXYGEN SATURATION: 98 % | HEART RATE: 86 BPM | HEIGHT: 62 IN | DIASTOLIC BLOOD PRESSURE: 72 MMHG | RESPIRATION RATE: 16 BRPM | BODY MASS INDEX: 25.93 KG/M2 | SYSTOLIC BLOOD PRESSURE: 130 MMHG | WEIGHT: 140.9 LBS | TEMPERATURE: 98.1 F

## 2019-10-01 DIAGNOSIS — R11.0 NAUSEA: Primary | ICD-10-CM

## 2019-10-01 DIAGNOSIS — C82.00 GRADE I FOLLICULAR SMALL CLEAVED CELL LYMPHOMA (H): ICD-10-CM

## 2019-10-01 PROCEDURE — 99214 OFFICE O/P EST MOD 30 MIN: CPT | Performed by: INTERNAL MEDICINE

## 2019-10-01 ASSESSMENT — MIFFLIN-ST. JEOR: SCORE: 1047.5

## 2019-10-01 ASSESSMENT — PAIN SCALES - GENERAL: PAINLEVEL: NO PAIN (0)

## 2019-10-01 NOTE — NURSING NOTE
"Oncology Rooming Note    October 1, 2019 2:36 PM   Hilda Potter is a 84 year old female who presents for:    Chief Complaint   Patient presents with     Oncology Clinic Visit     Follow Up     Initial Vitals: /72 (BP Location: Right arm)   Pulse 86   Temp 98.1  F (36.7  C) (Oral)   Resp 16   Ht 1.575 m (5' 2.01\")   Wt 63.9 kg (140 lb 14.4 oz)   LMP 10/01/1986   SpO2 98%   BMI 25.76 kg/m   Estimated body mass index is 25.76 kg/m  as calculated from the following:    Height as of this encounter: 1.575 m (5' 2.01\").    Weight as of this encounter: 63.9 kg (140 lb 14.4 oz). Body surface area is 1.67 meters squared.  No Pain (0) Comment: Data Unavailable   Patient's last menstrual period was 10/01/1986.  Allergies reviewed: Yes  Medications reviewed: Yes    Medications: Medication refills not needed today.  Pharmacy name entered into [facility]:    [id number] - [place], [place] - [address] AT [facility]  [facility] - [place], [place] - [address] [place] [address]    Kecia Pelaez LPN              "

## 2019-10-01 NOTE — LETTER
10/1/2019         RE: Hilda Potter  [address]  [place] [address]        Dear Colleague,    Thank you for referring your patient, Hilda Potter, to the [facility]. Please see a copy of my visit note below.    Oncology Follow-up Visit:  Oct 1, 2019      PCP: Dr. Rahman  Diagnosis: Low grade, stage IVB (BM involvement, night sweats) follicular lymphoma.  FLIPI score is 3 (one for stage IV, one for age>60, and one for elevated LDH), associated with 52% median 5 year OS survival and 36% median 10 year overall survival.     Oncologic History:    presented with night sweats and lymphadenopathy. She developed drenching night sweats in 11/2011 which persisted. She had no other associated constitutional symptoms such as fevers or weight loss. She had a negative ID workup with Dr. Ruiz (for valley fever as she had traveled to [place] in the past).   As part of evaluation, CT scan was performed in April 2012 which demonstrated multiple borderline mildly enlarged retroperitoneal and mesenteric lymph nodes, with the largest size of 1.5 cm. The patient, however, persisted to have night sweats and underwent repeat CTCAP done without contrast (she is allergic to IV contrast and had an anaphylactic reaction to it) which demonstrated unchanged dilatation of common bile duct, likely related to postcholecystectomy state, and stable mildly enlarged retroperitoneal and mesenteric lymph nodes which were nonspecific.   We proceeded with CT guided biopsy of one of the intraabdominal lymph nodes and results c/w low grade follicular lymphoma.  Flow cytometry showed kappa monotypic CD10 positive B cells.  We proceeded with bone marrow biopsy to complete her staging. The results, as detailed below, revealed involvement of marrow by follicular lymphoma. Flow cytometry showed a rare population of CD10-positive, kappa-monotypic B cells (0.2%). Molecular diagnostics was negative for B-cell gene  "rearrangement. Cytogenetics revealed, of the interphase cells examined, 0.2% had a signal pattern indicative of an IGH/BCL2 gene fusion, a rate that falls just slightly above the normal control range (0-0.1%) for our laboratory; thus, these findings are suspicious for the presence of low-level bone marrow involvement by follicular lymphoma. However, this cannot be determined with certainty, in light of the few abnormal cells found by FISH. Of note, a G-banded chromosome study is still pending.   Hep B/C negative. EVE negative. No M-spike on SPEP.    Treatment: Weekly Rituxan x 4, completed 9/13/2012. During her first Rituxan infusion she has developed a reaction with tingling of upper lip and chin and feeling \"cold\" in her chest. She was given IV solumedrol and the infusion was slowed down and these symptoms have resolved. Since then she has been premedicated with solumedrol, and all infusions were uneventful since.    Interval History:  Ms. Potter is a 84 year old female diagnosed with follicular lymphoma present today for follow-up. She has completed 4 weekly doses of Rituxan in 09/2012.   Her night sweats have resolved subsequently and LDH has remained normal.    She had a screening mammogram in 12/2018 which was negative.  CT of the chest, abdomen and pelvis on 9/30/2019 showed:  1. No new adenopathy identified in the chest, abdomen or pelvis. Small  scattered mesenteric lymph nodes are stable with haziness of the  mesenteric fat.  2. New small cluster of pulmonary nodularity at the right lower lobe.  This may just relate to an infectious or inflammatory etiology.  Neoplastic etiology not entirely excluded but felt to be somewhat less  Likely.  She has had no fevers or chills and no respiratory symptoms such as cough or SOB.    She has had no constitutional symptoms. She denies weight loss. She denies SOB.  She has had no trouble swallowing lately. She had a Schatzki's ring dilated during EGD in 06/2018. Her " main complaint is daily nausea without vomiting. She has no other complaints. Weight has been stable at 140 lbs.  She is not sure if she and her  are going to leave the state for the winter.   In addition, a complete 12 point  review of systems is negative.        Past medical, social, surgical, and family histories reviewed.  Echocardiogram in 10/2014 for evaluation of benign palpitations showed left ventricular function is normal with an EF of 55-60%.  Left ventricular Doppler filling pattern consistent with abnormal relaxation.  DEXA scan on 5/12/2015 showed most neg T score of -2.2 in the lumbar spine. This was unchanged compared to 11/2010. She was on Boneva years ago but apparently developed L jaw tingling and is no longer on biphosphonates.       Allergies:  Allergies as of 10/01/2019 - Reviewed 07/24/2019   Allergen Reaction Noted     Diatrizoate Other (See Comments) 05/28/2008     Bisphosphonates GI Disturbance 10/01/2009     Ivp dye [contrast dye] Swelling 10/01/2009     Lisinopril Cough 06/24/2013     Losartan Hives 11/22/2010     Morphine Hives 06/06/2018     Morphine sulfate Nausea and Vomiting 08/16/2012     Prilosec [omeprazole] Hives 10/01/2009     Latex Rash 09/19/2014       Current Medications:  Current Outpatient Medications   Medication Sig Dispense Refill     aspirin (ASA) 81 MG tablet Take 1 tablet (81 mg) by mouth daily       Aspirin-Acetaminophen-Caffeine (EXCEDRIN MIGRAINE PO) Take 1 tablet by mouth as needed       carboxymethylcellulose (REFRESH PLUS) 0.5 % SOLN Place 1 drop into both eyes 3 times daily as needed for dry eyes       HYZAAR 50-12.5 MG per tablet Take 1 tablet by mouth daily 90 tablet 4     latanoprost (XALATAN) 0.005 % ophthalmic solution Place 1 drop into both eyes At Bedtime 7.5 mL 3     levothyroxine (SYNTHROID/LEVOTHROID) 50 MCG tablet Take 1 tablet (50 mcg) by mouth daily 90 tablet 4     MELATONIN PO Take 3 mg by mouth nightly as needed        Multiple Vitamin  "(MULTI-VITAMIN) per tablet Take 1 tablet by mouth daily.         order for DME 1 aircast boot 1 Device 0        Physical Exam:  /72 (BP Location: Right arm)   Pulse 86   Temp 98.1  F (36.7  C) (Oral)   Resp 16   Ht 1.575 m (5' 2.01\")   Wt 63.9 kg (140 lb 14.4 oz)   LMP 10/01/1986   SpO2 98%   BMI 25.76 kg/m       LMP 10/01/1986       GENERAL APPEARANCE: alert and in no acute distress.  HEENT: Sclerae anicteric.   NECK: Supple. No asymmetry or masses.  LYMPHATICS: No palpable cervical, supraclavicular, axillary lymphadenopathy b/l  RESP: Lungs clear to auscultation bilaterally without rales, rhonchi or wheezes.  CARDIOVASCULAR: Regular rate and rhythm. Normal S1, S2; no S3 or S4. No murmur, gallop, or rub.  ABDOMEN: Soft, nontender. Bowel sounds normal. No palpable organomegaly or masses.  MUSCULOSKELETAL: Extremities without gross deformities noted. No edema of bilateral lower extremities.  SKIN: No suspicious lesions or rashes.  NEURO: Alert and oriented x 3. Cranial nerves II-XII grossly intact.  PSYCHIATRIC: Mentation and affect appear normal.    Labs:  Component      Latest Ref Rng & Units 9/30/2019   WBC      4.0 - 11.0 10e9/L 4.6   RBC Count      3.8 - 5.2 10e12/L 4.47   Hemoglobin      11.7 - 15.7 g/dL 13.2   Hematocrit      35.0 - 47.0 % 40.1   MCV      78 - 100 fl 90   MCH      26.5 - 33.0 pg 29.5   MCHC      31.5 - 36.5 g/dL 32.9   RDW      10.0 - 15.0 % 12.9   Platelet Count      150 - 450 10e9/L 181   Diff Method       Automated Method   % Neutrophils      % 57.9   % Lymphocytes      % 30.2   % Monocytes      % 10.4   % Eosinophils      % 1.1   % Basophils      % 0.2   % Immature Granulocytes      % 0.2   Absolute Neutrophil      1.6 - 8.3 10e9/L 2.7   Absolute Lymphocytes      0.8 - 5.3 10e9/L 1.4   Absolute Monocytes      0.0 - 1.3 10e9/L 0.5   Absolute Eosinophils      0.0 - 0.7 10e9/L 0.1   Absolute Basophils      0.0 - 0.2 10e9/L 0.0   Abs Immature Granulocytes      0 - 0.4 10e9/L 0.0 "   Sodium      133 - 144 mmol/L 141   Potassium      3.4 - 5.3 mmol/L 3.9   Chloride      94 - 109 mmol/L 106   Carbon Dioxide      20 - 32 mmol/L 32   Anion Gap      3 - 14 mmol/L 3   Glucose      70 - 99 mg/dL 109 (H)   Urea Nitrogen      7 - 30 mg/dL 20   Creatinine      0.52 - 1.04 mg/dL 0.86   GFR Estimate      >60 mL/min/1.73:m2 62   GFR Estimate If Black      >60 mL/min/1.73:m2 72   Calcium      8.5 - 10.1 mg/dL 9.1   Bilirubin Total      0.2 - 1.3 mg/dL 0.6   Albumin      3.4 - 5.0 g/dL 3.5   Protein Total      6.8 - 8.8 g/dL 7.2   Alkaline Phosphatase      40 - 150 U/L 78   ALT      0 - 50 U/L 20   AST      0 - 45 U/L 17   Lactate Dehydrogenase      81 - 234 U/L 176       ASSESSMENT/PLAN:  Hilda is a very pleasant 84 year-old woman recently diagnosed with stage IV follicular lymphoma, FLIPI score is 3 (one for stage IV, one for age>60, and one for elevated LDH), associated with 52% median 5 year OS survival and 36% median 10 year overall survival. Most recent CT of the chest, abdomen and pelvis on 9/30/2019 showed no e/o lymphoma recurrence with stable posttreatment changes of lymphoma. There was no significant change in prominent mesenteric. There were stable tiny pulmonary nodules from 4/26/2016, including 2 mm nodule in the right base.     1. NHL.  S/p Rituxan x4, completed in 09/2012. Responded to treatment with resolution of night sweats and decrease in lymphadenopathy. We'll continue to observe her from now on. Clinically, she continued to be in remission from NHL, with stable shotty lymphadenopathy in the mesentery radiographically.   We'll see her back in 6 months with labs - cbcd, cmp, LDH, uric acid. Per updated NCCN guidelines, at this time, a CT of the chest, abdomen and pelvis is recommended no more often than annually in Hilda's situation. We'll plan repeat CT of the chest, abdomen and pelvis in October 2020. She had anaphylactic allergy to CT contrast dye in the past and her CTs are ordered  without contrast. If there is a e/o disease progression on future CT without contrast, consider PET/CT without contrast for further evaluation.     2. Immunizations - She is uptodate with  Pneumovax, Prevnar vaccinations. Annual influenza vaccination is recommended today. She will get it from the pharmacy upstairs.    3. Persistent nausea, Hx of Schatzki's ring dilated during EGD in 06/2018- refer to GI for further evaluation and possible repeat EGD.    4. Hx of pulmonary nodules:  2mm nodule in the right lung base seen on CT chest in 11/2017, stable compared to 04/2016. She is a never smoker.   CT chest on 9/30/2019 showed a new small cluster of pulmonary nodularity at the right lower lobe.  This may just relate to an infectious or inflammatory etiology.  We'll f/up on CT chest in one year (plan October 2020) included in CT of the chest, abdomen and pelvis for lymphoma surveillance.    5. Breast cancer screening Screening mammogram negative 12/2018. Recommended annual screening mammograms.  At the end of our visit the patient verbalized understanding, denied any further questions, and concurred with the plan of care.                Again, thank you for allowing me to participate in the care of your patient.        Sincerely,        Jane Roth MD, MD

## 2019-10-04 ENCOUNTER — ALLIED HEALTH/NURSE VISIT (OUTPATIENT)
Dept: NURSING | Facility: CLINIC | Age: 84
End: 2019-10-04
Payer: MEDICARE

## 2019-10-04 VITALS — TEMPERATURE: 98.2 F

## 2019-10-04 DIAGNOSIS — Z23 ENCOUNTER FOR VACCINATION: Primary | ICD-10-CM

## 2019-10-04 PROCEDURE — 99207 ZZC NO CHARGE NURSE ONLY: CPT

## 2019-10-04 PROCEDURE — G0008 ADMIN INFLUENZA VIRUS VAC: HCPCS

## 2019-10-04 PROCEDURE — 90662 IIV NO PRSV INCREASED AG IM: CPT

## 2019-10-21 DIAGNOSIS — E03.9 HYPOTHYROIDISM, UNSPECIFIED TYPE: ICD-10-CM

## 2019-10-21 NOTE — TELEPHONE ENCOUNTER
Requested Prescriptions   Pending Prescriptions Disp Refills     levothyroxine (SYNTHROID/LEVOTHROID) 50 MCG tablet 90 tablet 4     Sig: Take 1 tablet (50 mcg) by mouth daily       There is no refill protocol information for this order        Last Written Prescription Date:  09/10/2018  Last Fill Quantity: 90,  # refills: 4   Last office visit: 7/22/2019 with prescribing provider:     Future Office Visit:

## 2019-10-22 RX ORDER — LEVOTHYROXINE SODIUM 50 UG/1
50 TABLET ORAL DAILY
Qty: 90 TABLET | Refills: 0 | Status: SHIPPED | OUTPATIENT
Start: 2019-10-22 | End: 2020-01-02

## 2019-10-30 ENCOUNTER — HOSPITAL ENCOUNTER (OUTPATIENT)
Facility: AMBULATORY SURGERY CENTER | Age: 84
Discharge: HOME | End: 2019-10-30
Attending: INTERNAL MEDICINE | Admitting: INTERNAL MEDICINE
Payer: MEDICARE

## 2019-10-30 VITALS
TEMPERATURE: 98.4 F | OXYGEN SATURATION: 97 % | RESPIRATION RATE: 16 BRPM | DIASTOLIC BLOOD PRESSURE: 86 MMHG | SYSTOLIC BLOOD PRESSURE: 163 MMHG

## 2019-10-30 RX ORDER — LIDOCAINE 40 MG/G
CREAM TOPICAL
Status: DISCONTINUED | OUTPATIENT
Start: 2019-10-30 | End: 2019-10-31 | Stop reason: HOSPADM

## 2019-10-30 RX ORDER — ONDANSETRON 2 MG/ML
4 INJECTION INTRAMUSCULAR; INTRAVENOUS
Status: DISCONTINUED | OUTPATIENT
Start: 2019-10-30 | End: 2019-10-31 | Stop reason: HOSPADM

## 2019-10-30 NOTE — PROGRESS NOTES
"Patient on EGD schedule today.  Notes from oncologist, Dr. Roth, suggests GI consult \"with possible EGD\" for evaluation of nausea.  I note that she had an EGD one year ago which was normal except for mild Schatzke's ring that was dilated.  Biopsies also normal.  Her swallowing is currently OK.  After discussion of procedure risks and the fact that it would not be done under [facility] (as was the one last year).  We decided to hold off on EGD today.  I recommended that she try taking Prilosec 20 mg a day for a month.  If symptoms persist or change,, EGD could be re-scheduled or perhaps better yet she could be referred to GI clinic for symptom evaluation.  "

## 2019-11-01 ENCOUNTER — DOCUMENTATION ONLY (OUTPATIENT)
Dept: OTHER | Facility: CLINIC | Age: 84
End: 2019-11-01

## 2019-12-03 ENCOUNTER — OFFICE VISIT (OUTPATIENT)
Dept: OPHTHALMOLOGY | Facility: CLINIC | Age: 84
End: 2019-12-03
Payer: MEDICARE

## 2019-12-03 ENCOUNTER — TELEPHONE (OUTPATIENT)
Dept: OPHTHALMOLOGY | Facility: CLINIC | Age: 84
End: 2019-12-03

## 2019-12-03 DIAGNOSIS — H40.003 GLAUCOMA SUSPECT OF BOTH EYES: ICD-10-CM

## 2019-12-03 DIAGNOSIS — H25.813 COMBINED FORMS OF AGE-RELATED CATARACT OF BOTH EYES: Primary | ICD-10-CM

## 2019-12-03 DIAGNOSIS — H52.4 PRESBYOPIA: ICD-10-CM

## 2019-12-03 DIAGNOSIS — H43.812 POSTERIOR VITREOUS DETACHMENT OF LEFT EYE: ICD-10-CM

## 2019-12-03 DIAGNOSIS — Z01.00 EXAMINATION OF EYES AND VISION: ICD-10-CM

## 2019-12-03 PROCEDURE — 92014 COMPRE OPH EXAM EST PT 1/>: CPT | Performed by: OPHTHALMOLOGY

## 2019-12-03 PROCEDURE — 92015 DETERMINE REFRACTIVE STATE: CPT | Mod: GY | Performed by: OPHTHALMOLOGY

## 2019-12-03 RX ORDER — LATANOPROST 50 UG/ML
1 SOLUTION/ DROPS OPHTHALMIC AT BEDTIME
Qty: 10 ML | Refills: 3 | Status: SHIPPED | OUTPATIENT
Start: 2019-12-03 | End: 2020-09-28

## 2019-12-03 RX ORDER — LATANOPROST 50 UG/ML
1 SOLUTION/ DROPS OPHTHALMIC AT BEDTIME
Qty: 7.5 ML | Refills: 4 | Status: SHIPPED | OUTPATIENT
Start: 2019-12-03 | End: 2019-12-03

## 2019-12-03 ASSESSMENT — REFRACTION_MANIFEST
OD_CYLINDER: +2.00
OS_SPHERE: +3.00
OS_AXIS: 176
OS_CYLINDER: +1.50
OS_ADD: +2.75
OD_ADD: +2.75
OD_SPHERE: +2.75
OD_AXIS: 176

## 2019-12-03 ASSESSMENT — CUP TO DISC RATIO
OS_RATIO: 0.6
OD_RATIO: 0.7

## 2019-12-03 ASSESSMENT — REFRACTION_WEARINGRX
OS_CYLINDER: +1.50
OS_ADD: +2.75
SPECS_TYPE: PAL
OS_AXIS: 180
OD_AXIS: 166
OD_ADD: +2.75
OS_SPHERE: +3.00
OD_SPHERE: +2.75
OD_CYLINDER: +1.50

## 2019-12-03 ASSESSMENT — VISUAL ACUITY
OS_CC: 20/25
OS_CC+: -2+2
CORRECTION_TYPE: GLASSES
OD_CC+: -2+1
OD_CC: 20/30
METHOD: SNELLEN - LINEAR

## 2019-12-03 ASSESSMENT — EXTERNAL EXAM - RIGHT EYE: OD_EXAM: NORMAL

## 2019-12-03 ASSESSMENT — CONF VISUAL FIELD
OD_NORMAL: 1
OS_NORMAL: 1

## 2019-12-03 ASSESSMENT — TONOMETRY
IOP_METHOD: APPLANATION
OD_IOP_MMHG: 19
OS_IOP_MMHG: 18

## 2019-12-03 ASSESSMENT — EXTERNAL EXAM - LEFT EYE: OS_EXAM: NORMAL

## 2019-12-03 NOTE — PATIENT INSTRUCTIONS
Glasses Rx given - optional.  Continue using Latanoprost both eyes at bedtime.   Use artificial tears up to 4 times daily both eyes.  (Refresh Tears, Systane Ultra/Balance, or Theratears)  Possible posterior vitreous detachment (sudden onset large floater and/or flashing lights) right eye discussed.   Return visit in 6 months for intraocular pressure check, glaucoma OCT and Enciso Visual Field.     Andi Bay M.D.  [phone]

## 2019-12-03 NOTE — LETTER
12/3/2019         RE: Hilda Potter  [address]  [facility] [address]        Dear Colleague,    Thank you for referring your patient, Hilda Potter, to the [facility]. Please see a copy of my visit note below.     Current Eye Medications:  Latanoprost at bedtime both eyes     Subjective:  Here for complete. Needs refills sent. Doing very well.      Objective:  See Ophthalmology Exam.       Assessment:  Stable eye exam.      Plan:  Glasses Rx given - optional.  Continue using Latanoprost both eyes at bedtime.   Use artificial tears up to 4 times daily both eyes.  (Refresh Tears, Systane Ultra/Balance, or Theratears)  Possible posterior vitreous detachment (sudden onset large floater and/or flashing lights) right eye discussed.   Return visit in 6 months for intraocular pressure check, glaucoma OCT and Enciso Visual Field.     Andi Bay M.D.  [phone]         Again, thank you for allowing me to participate in the care of your patient.        Sincerely,        Andi Bay MD

## 2019-12-03 NOTE — PROGRESS NOTES
Current Eye Medications:  Latanoprost at bedtime both eyes     Subjective:  Here for complete. Needs refills sent. Doing very well.      Objective:  See Ophthalmology Exam.       Assessment:  Stable eye exam.      Plan:  Glasses Rx given - optional.  Continue using Latanoprost both eyes at bedtime.   Use artificial tears up to 4 times daily both eyes.  (Refresh Tears, Systane Ultra/Balance, or Theratears)  Possible posterior vitreous detachment (sudden onset large floater and/or flashing lights) right eye discussed.   Return visit in 6 months for intraocular pressure check, glaucoma OCT and Enciso Visual Field.     Andi Bay M.D.  [phone]

## 2019-12-06 PROBLEM — M25.572 PAIN IN JOINT INVOLVING ANKLE AND FOOT, LEFT: Status: RESOLVED | Noted: 2019-08-09 | Resolved: 2019-12-06

## 2019-12-11 ENCOUNTER — ANCILLARY PROCEDURE (OUTPATIENT)
Dept: MAMMOGRAPHY | Facility: CLINIC | Age: 84
End: 2019-12-11
Payer: MEDICARE

## 2019-12-11 DIAGNOSIS — Z12.31 VISIT FOR SCREENING MAMMOGRAM: ICD-10-CM

## 2019-12-11 PROCEDURE — 77067 SCR MAMMO BI INCL CAD: CPT | Mod: TC

## 2019-12-16 ENCOUNTER — TELEPHONE (OUTPATIENT)
Dept: FAMILY MEDICINE | Facility: CLINIC | Age: 84
End: 2019-12-16

## 2019-12-16 DIAGNOSIS — I10 ESSENTIAL HYPERTENSION WITH GOAL BLOOD PRESSURE LESS THAN 140/90: ICD-10-CM

## 2019-12-16 RX ORDER — LOSARTAN/HYDROCHLOROTHIAZIDE 50-12.5 MG
1 TABLET ORAL DAILY
Qty: 30 TABLET | Refills: 1 | Status: SHIPPED | OUTPATIENT
Start: 2019-12-16 | End: 2019-12-20

## 2019-12-16 NOTE — TELEPHONE ENCOUNTER
Patient informed of the directives from Dr. Dobson and she verbalized a good  understanding.     Hilda is scheduled for a BP check (Ancillary) on 12/23/19. She states that due to her Medicare, she is unable to do her annual physical until after January 13.     She is planning to leave for [place] on January 5 and will return in late April. She is willing to do her physical once she returns.     Daly Blue RN BSN

## 2019-12-16 NOTE — TELEPHONE ENCOUNTER
Rx completed.  Noted recent elevated blood pressure  BP Readings from Last 3 Encounters:   10/30/19 (!) 163/86   10/01/19 130/72   08/06/19 123/62     Please have patient schedule BP check in 2 weeks (Ancillary) and a Wellness Visit with labs in 1/2020. Thanks

## 2019-12-16 NOTE — TELEPHONE ENCOUNTER
Patient states she would like a refill on Hyzaar 5-12-5 tablets.  She has approval to have Hyzaar from [facility], not the generic.    Thank you.

## 2019-12-16 NOTE — TELEPHONE ENCOUNTER
Routing refill request to provider for review/approval because:  Drug interaction warning  Last written 9/10/18.  Last OV 7/22/19 with plan for Well Exam January 2020.   No future appointment scheduled.     BP Readings from Last 3 Encounters:   10/30/19 (!) 163/86   10/01/19 130/72   08/06/19 123/62     Daly Blue RN BSN

## 2019-12-20 ENCOUNTER — TELEPHONE (OUTPATIENT)
Dept: FAMILY MEDICINE | Facility: CLINIC | Age: 84
End: 2019-12-20

## 2019-12-20 DIAGNOSIS — I10 ESSENTIAL HYPERTENSION WITH GOAL BLOOD PRESSURE LESS THAN 140/90: Primary | ICD-10-CM

## 2019-12-20 RX ORDER — LOSARTAN/HYDROCHLOROTHIAZIDE 50-12.5 MG
1 TABLET ORAL DAILY
Qty: 90 TABLET | Refills: 0 | Status: CANCELLED | OUTPATIENT
Start: 2019-12-20

## 2019-12-20 RX ORDER — LOSARTAN POTASSIUM 50 MG/1
50 TABLET ORAL DAILY
Qty: 90 TABLET | Refills: 0 | Status: CANCELLED | OUTPATIENT
Start: 2019-12-20

## 2019-12-20 RX ORDER — HYDROCHLOROTHIAZIDE 12.5 MG/1
25 TABLET ORAL DAILY
Qty: 90 TABLET | Refills: 0 | Status: CANCELLED | OUTPATIENT
Start: 2019-12-20

## 2019-12-20 RX ORDER — LOSARTAN/HYDROCHLOROTHIAZIDE 50-12.5 MG
1 TABLET ORAL DAILY
Qty: 90 TABLET | Refills: 1 | Status: SHIPPED | OUTPATIENT
Start: 2019-12-20 | End: 2020-01-02

## 2019-12-20 NOTE — TELEPHONE ENCOUNTER
Patient will be out of state until April 2019. (will have physical at this time)    Asking for 90 day of Hyzaar.  Patient can ONLY take Hyzaar (per patient)    Patient states the pharmacist has to order this in special to the pharmacy for her - she will contact pharmacy to confirm this.     Patient states she will talk with pharmacy and pharmacist as she knows them personally and has gone through this before with previous PCP (Dr. Rahman).     - please approve on 90 day supply (med pended)  - no further clinic action needed     Nabila Gage, RN, BSN, PHN

## 2019-12-20 NOTE — TELEPHONE ENCOUNTER
Noted.   Appears from Allergy list- Patient was allergic to Losartan (hives) but was able to tolerate Hyzaar?  Please clarify with her and ask if she is willing to try the Losartan again?     Let me know and I will send it Rx. Thanks.

## 2019-12-20 NOTE — TELEPHONE ENCOUNTER
Hyzaar 50/12.5  Tabs  Per pharmacy they would like authorization to rewrite Hyzaar RX to generic for cost savings. Thanks  Marcos-ph:[phone] fax: [phone]

## 2019-12-23 ENCOUNTER — ALLIED HEALTH/NURSE VISIT (OUTPATIENT)
Dept: NURSING | Facility: CLINIC | Age: 84
End: 2019-12-23
Payer: MEDICARE

## 2019-12-23 VITALS — DIASTOLIC BLOOD PRESSURE: 68 MMHG | HEART RATE: 64 BPM | SYSTOLIC BLOOD PRESSURE: 140 MMHG

## 2019-12-23 DIAGNOSIS — I10 ESSENTIAL HYPERTENSION WITH GOAL BLOOD PRESSURE LESS THAN 140/90: Primary | ICD-10-CM

## 2019-12-23 PROCEDURE — 99207 ZZC NO CHARGE NURSE ONLY: CPT

## 2019-12-23 NOTE — PROGRESS NOTES
Hilda Potter is a 84 year old patient who comes in today for a Blood Pressure check.  Initial  BP (!) 140/68   Pulse 64   LMP 10/01/1986      2nd /68. Pulse 64  Disposition: results routed to provider    Katie Zapata CMA      Panel Management Review    Summary:    Patient is due/failing the following:   Shingles vaccine    Health Maintenance Due   Topic Date Due     ZOSTER IMMUNIZATION (3 of 3) 07/24/2018     PHQ-2  01/01/2019        Type of outreach:    Verabl. Spoke to patient at ancillary appt      Patient said she had a reaction from the shingles vaccine and will not get another one.      She had a bursa and couldn't walk for 5 hours.                                                                                                                           Katie Zapata CMA      Chart routed to Care Team .

## 2019-12-23 NOTE — Clinical Note
Please see BP at ancillary appoitmernt. Patient said she's going to [place] and needs you to call in RX for Hyzaar. She needs 60 more of the 50/12.5. [facility] on [address]. Told patient you are not in the clinic until Thursday.

## 2019-12-30 DIAGNOSIS — E03.9 HYPOTHYROIDISM, UNSPECIFIED TYPE: ICD-10-CM

## 2019-12-30 NOTE — TELEPHONE ENCOUNTER
"Requested Prescriptions   Pending Prescriptions Disp Refills     levothyroxine (SYNTHROID/LEVOTHROID) 50 MCG tablet [Pharmacy Med Name: LEVOTHYROXINE 0.05MG (50MCG) TAB] 90 tablet 0     Sig: TAKE 1 TABLET BY MOUTH DAILY   Last Written Prescription Date:  12-30-19  Last Fill Quantity: 90,  # refills: 0   Last office visit: 7/22/2019 with prescribing provider:  7-22-19   Future Office Visit:      Thyroid Protocol Passed - 12/30/2019  8:40 AM        Passed - Patient is 12 years or older        Passed - Recent (12 mo) or future (30 days) visit within the authorizing provider's specialty     Patient has had an office visit with the authorizing provider or a provider within the authorizing providers department within the previous 12 mos or has a future within next 30 days. See \"Patient Info\" tab in inbasket, or \"Choose Columns\" in Meds & Orders section of the refill encounter.              Passed - Medication is active on med list        Passed - Normal TSH on file in past 12 months     Recent Labs   Lab Test 01/28/19  0930   TSH 2.15              Passed - No active pregnancy on record     If patient is pregnant or has had a positive pregnancy test, please check TSH.          Passed - No positive pregnancy test in past 12 months     If patient is pregnant or has had a positive pregnancy test, please check TSH.          "

## 2020-01-02 ENCOUNTER — TELEPHONE (OUTPATIENT)
Dept: FAMILY MEDICINE | Facility: CLINIC | Age: 85
End: 2020-01-02

## 2020-01-02 DIAGNOSIS — I10 ESSENTIAL HYPERTENSION WITH GOAL BLOOD PRESSURE LESS THAN 140/90: ICD-10-CM

## 2020-01-02 RX ORDER — LOSARTAN/HYDROCHLOROTHIAZIDE 50-12.5 MG
1 TABLET ORAL DAILY
Qty: 90 TABLET | Refills: 1 | Status: SHIPPED | OUTPATIENT
Start: 2020-01-02 | End: 2020-06-12

## 2020-01-02 RX ORDER — LEVOTHYROXINE SODIUM 50 UG/1
TABLET ORAL
Qty: 90 TABLET | Refills: 0 | Status: SHIPPED | OUTPATIENT
Start: 2020-01-02 | End: 2020-03-30

## 2020-01-02 NOTE — TELEPHONE ENCOUNTER
Patient calling to check on status of refill request of her Benjamínzajosh, also will be out of her Levothyroxine, leaving Sunday am for [place] and will be gone until Middle of April would like a 3 month supply of both. Please call to advise.

## 2020-01-02 NOTE — TELEPHONE ENCOUNTER
See Encounter from 1/2/20.     Levothyroxine filled today and Hyzaar was last filled on 12/20/19, however there was a computer issue. See Below:     E-Prescribing Status: Transmission to pharmacy failed (12/20/2019 12:08 PM CST)    Daly Blue RN BSN

## 2020-01-02 NOTE — TELEPHONE ENCOUNTER
Prescription approved per [facility].    90 day supply approved.     Daly Blue RN BSN

## 2020-01-02 NOTE — TELEPHONE ENCOUNTER
Hyzaar 50-12.5 mg authorized by Dr. Dobson on 12/20/19, however there was a computer issue:    E-Prescribing Status: Transmission to pharmacy failed (12/20/2019 12:08 PM CST)    Prescription e-prescribed again today.   E-Prescribing Status: Receipt confirmed by pharmacy (1/2/2020  9:58 AM CST)    Provider notes:  Patient unable to tolerate Losartan alone (hives) but able to tolerate Hyzaar.    Patient notified and voiced understanding and agreement.      Daly Blue RN BSN

## 2020-03-27 DIAGNOSIS — E03.9 HYPOTHYROIDISM, UNSPECIFIED TYPE: ICD-10-CM

## 2020-03-27 NOTE — TELEPHONE ENCOUNTER
"Requested Prescriptions   Pending Prescriptions Disp Refills     levothyroxine (SYNTHROID/LEVOTHROID) 50 MCG tablet [Pharmacy Med Name: LEVOTHYROXINE 0.05MG (50MCG) TAB] 90 tablet 0     Sig: TAKE 1 TABLET BY MOUTH ONCE DAILY   Last Written Prescription Date:  01/02/20  Last Fill Quantity: 90,  # refills: 0   Last office visit: 7/22/2019 with prescribing provider:     Future Office Visit:   Next 5 appointments (look out 90 days)    Apr 30, 2020  2:00 PM CDT  Return Visit with Jane Roth MD  [facility] ([facility] [address] [place] [id number]  [phone]   Jun 09, 2020  2:15 PM CDT  Return Visit with Andi Bay MD  [facility] [address] [place] [id number]  [phone]             Thyroid Protocol Failed - 3/27/2020  1:31 PM        Failed - Normal TSH on file in past 12 months     Recent Labs   Lab Test 01/28/19  0930   TSH 2.15              Passed - Patient is 12 years or older        Passed - Recent (12 mo) or future (30 days) visit within the authorizing provider's specialty     Patient has had an office visit with the authorizing provider or a provider within the authorizing providers department within the previous 12 mos or has a future within next 30 days. See \"Patient Info\" tab in inbasket, or \"Choose Columns\" in Meds & Orders section of the refill encounter.              Passed - Medication is active on med list        Passed - No active pregnancy on record     If patient is pregnant or has had a positive pregnancy test, please check TSH.          Passed - No positive pregnancy test in past 12 months     If patient is pregnant or has had a positive pregnancy test, please check TSH.               "

## 2020-03-27 NOTE — TELEPHONE ENCOUNTER
TSH   Date Value Ref Range Status   01/28/2019 2.15 0.40 - 4.00 mU/L Final     Routing refill request to provider for review/approval because:  Labs not current:  TSH    Last OV with Dr. Dobson: 7/22/2019 with advised F/U in 2 weeks for BP check and yearly for exam.    Please advise.    Juany Das, RN, BSN

## 2020-03-30 RX ORDER — LEVOTHYROXINE SODIUM 50 UG/1
TABLET ORAL
Qty: 90 TABLET | Refills: 0 | Status: SHIPPED | OUTPATIENT
Start: 2020-03-30 | End: 2020-06-15

## 2020-04-27 DIAGNOSIS — C82.00 GRADE I FOLLICULAR SMALL CLEAVED CELL LYMPHOMA (H): ICD-10-CM

## 2020-04-27 LAB
ALBUMIN SERPL-MCNC: 3.6 G/DL (ref 3.4–5)
ALP SERPL-CCNC: 73 U/L (ref 40–150)
ALT SERPL W P-5'-P-CCNC: 21 U/L (ref 0–50)
ANION GAP SERPL CALCULATED.3IONS-SCNC: 2 MMOL/L (ref 3–14)
AST SERPL W P-5'-P-CCNC: 19 U/L (ref 0–45)
BASOPHILS # BLD AUTO: 0 10E9/L (ref 0–0.2)
BASOPHILS NFR BLD AUTO: 0.2 %
BILIRUB SERPL-MCNC: 0.7 MG/DL (ref 0.2–1.3)
BUN SERPL-MCNC: 22 MG/DL (ref 7–30)
CALCIUM SERPL-MCNC: 9.2 MG/DL (ref 8.5–10.1)
CHLORIDE SERPL-SCNC: 107 MMOL/L (ref 94–109)
CO2 SERPL-SCNC: 31 MMOL/L (ref 20–32)
CREAT SERPL-MCNC: 1.02 MG/DL (ref 0.52–1.04)
DIFFERENTIAL METHOD BLD: NORMAL
EOSINOPHIL # BLD AUTO: 0.1 10E9/L (ref 0–0.7)
EOSINOPHIL NFR BLD AUTO: 1 %
ERYTHROCYTE [DISTWIDTH] IN BLOOD BY AUTOMATED COUNT: 12.7 % (ref 10–15)
GFR SERPL CREATININE-BSD FRML MDRD: 51 ML/MIN/{1.73_M2}
GLUCOSE SERPL-MCNC: 105 MG/DL (ref 70–99)
HCT VFR BLD AUTO: 38.2 % (ref 35–47)
HGB BLD-MCNC: 12.6 G/DL (ref 11.7–15.7)
IMM GRANULOCYTES # BLD: 0 10E9/L (ref 0–0.4)
IMM GRANULOCYTES NFR BLD: 0.4 %
LDH SERPL L TO P-CCNC: 173 U/L (ref 81–234)
LYMPHOCYTES # BLD AUTO: 1.6 10E9/L (ref 0.8–5.3)
LYMPHOCYTES NFR BLD AUTO: 32.2 %
MCH RBC QN AUTO: 29.6 PG (ref 26.5–33)
MCHC RBC AUTO-ENTMCNC: 33 G/DL (ref 31.5–36.5)
MCV RBC AUTO: 90 FL (ref 78–100)
MONOCYTES # BLD AUTO: 0.4 10E9/L (ref 0–1.3)
MONOCYTES NFR BLD AUTO: 8.6 %
NEUTROPHILS # BLD AUTO: 2.8 10E9/L (ref 1.6–8.3)
NEUTROPHILS NFR BLD AUTO: 57.6 %
PLATELET # BLD AUTO: 169 10E9/L (ref 150–450)
POTASSIUM SERPL-SCNC: 4.3 MMOL/L (ref 3.4–5.3)
PROT SERPL-MCNC: 7.2 G/DL (ref 6.8–8.8)
RBC # BLD AUTO: 4.26 10E12/L (ref 3.8–5.2)
SODIUM SERPL-SCNC: 140 MMOL/L (ref 133–144)
WBC # BLD AUTO: 4.9 10E9/L (ref 4–11)

## 2020-04-27 PROCEDURE — 85025 COMPLETE CBC W/AUTO DIFF WBC: CPT | Performed by: INTERNAL MEDICINE

## 2020-04-27 PROCEDURE — 83615 LACTATE (LD) (LDH) ENZYME: CPT | Performed by: INTERNAL MEDICINE

## 2020-04-27 PROCEDURE — 80053 COMPREHEN METABOLIC PANEL: CPT | Performed by: INTERNAL MEDICINE

## 2020-04-27 PROCEDURE — 36415 COLL VENOUS BLD VENIPUNCTURE: CPT | Performed by: INTERNAL MEDICINE

## 2020-04-30 ENCOUNTER — VIRTUAL VISIT (OUTPATIENT)
Dept: ONCOLOGY | Facility: CLINIC | Age: 85
End: 2020-04-30
Attending: INTERNAL MEDICINE
Payer: MEDICARE

## 2020-04-30 VITALS — BODY MASS INDEX: 25.87 KG/M2 | WEIGHT: 146 LBS | HEIGHT: 63 IN

## 2020-04-30 DIAGNOSIS — C82.00 GRADE I FOLLICULAR SMALL CLEAVED CELL LYMPHOMA (H): ICD-10-CM

## 2020-04-30 DIAGNOSIS — R91.8 PULMONARY NODULES: Primary | ICD-10-CM

## 2020-04-30 DIAGNOSIS — M72.0 DUPUYTREN'S CONTRACTURE OF RIGHT HAND: ICD-10-CM

## 2020-04-30 PROCEDURE — 99442 ZZC PHYSICIAN TELEPHONE EVALUATION 11-20 MIN: CPT | Performed by: INTERNAL MEDICINE

## 2020-04-30 ASSESSMENT — PAIN SCALES - GENERAL: PAINLEVEL: NO PAIN (0)

## 2020-04-30 ASSESSMENT — MIFFLIN-ST. JEOR: SCORE: 1086.38

## 2020-04-30 NOTE — PROGRESS NOTES
"Hilda Potter is a 84 year old female who is being evaluated via a billable telephone visit.      The patient has been notified of following:     \"This telephone visit will be conducted via a call between you and your physician/provider. We have found that certain health care needs can be provided without the need for a physical exam.  This service lets us provide the care you need with a short phone conversation.  If a prescription is necessary we can send it directly to your pharmacy.  If lab work is needed we can place an order for that and you can then stop by our lab to have the test done at a later time.    Telephone visits are billed at different rates depending on your insurance coverage. During this emergency period, for some insurers they may be billed the same as an in-person visit.  Please reach out to your insurance provider with any questions.    If during the course of the call the physician/provider feels a telephone visit is not appropriate, you will not be charged for this service.\"    Patient has given verbal consent for Telephone visit?  Yes    How would you like to obtain your AVS? Mail a copy    Phone call duration:14 minutes    Jane Roth MD, MD    Oncology Follow-up Visit:  Apr 30, 2020      PCP: Dr. Rahman  Diagnosis: Low grade, stage IVB (BM involvement, night sweats) follicular lymphoma.  FLIPI score is 3 (one for stage IV, one for age>60, and one for elevated LDH), associated with 52% median 5 year OS survival and 36% median 10 year overall survival.     Oncologic History:    presented with night sweats and lymphadenopathy. She developed drenching night sweats in 11/2011 which persisted. She had no other associated constitutional symptoms such as fevers or weight loss. She had a negative ID workup with Dr. Ruiz (for valley fever as she had traveled to [place] in the past).   As part of evaluation, CT scan was performed in April 2012 which demonstrated multiple borderline mildly " "enlarged retroperitoneal and mesenteric lymph nodes, with the largest size of 1.5 cm. The patient, however, persisted to have night sweats and underwent repeat CTCAP done without contrast (she is allergic to IV contrast and had an anaphylactic reaction to it) which demonstrated unchanged dilatation of common bile duct, likely related to postcholecystectomy state, and stable mildly enlarged retroperitoneal and mesenteric lymph nodes which were nonspecific.   We proceeded with CT guided biopsy of one of the intraabdominal lymph nodes and results c/w low grade follicular lymphoma.  Flow cytometry showed kappa monotypic CD10 positive B cells.  We proceeded with bone marrow biopsy to complete her staging. The results, as detailed below, revealed involvement of marrow by follicular lymphoma. Flow cytometry showed a rare population of CD10-positive, kappa-monotypic B cells (0.2%). Molecular diagnostics was negative for B-cell gene rearrangement. Cytogenetics revealed, of the interphase cells examined, 0.2% had a signal pattern indicative of an IGH/BCL2 gene fusion, a rate that falls just slightly above the normal control range (0-0.1%) for our laboratory; thus, these findings are suspicious for the presence of low-level bone marrow involvement by follicular lymphoma. However, this cannot be determined with certainty, in light of the few abnormal cells found by FISH. Of note, a G-banded chromosome study is still pending.   Hep B/C negative. EVE negative. No M-spike on SPEP.    Treatment: Weekly Rituxan x 4, completed 9/13/2012. During her first Rituxan infusion she has developed a reaction with tingling of upper lip and chin and feeling \"cold\" in her chest. She was given IV solumedrol and the infusion was slowed down and these symptoms have resolved. Since then she has been premedicated with solumedrol, and all infusions were uneventful since.    Interval History:  Ms. Potter is a 84 year old female diagnosed with " follicular lymphoma present today for follow-up. She has completed 4 weekly doses of Rituxan in 09/2012.   Her night sweats have resolved subsequently and LDH has remained normal.    She had a screening mammogram in 12/2019 which was negative.  CT of the chest, abdomen and pelvis on 9/30/2019 showed:  1. No new adenopathy identified in the chest, abdomen or pelvis. Small  scattered mesenteric lymph nodes are stable with haziness of the  mesenteric fat.  2. New small cluster of pulmonary nodularity at the right lower lobe.  She has had no fevers or chills and no respiratory symptoms such as cough or SOB.    She has had no constitutional symptoms. Her nausea has resolved.  She has Dupuytren's contractures in the little and ring fingers of her right hand. She was previously seen at [facility] for recurrent  Dupuytren's contractures but currently would like to see an orthopedic surgeon in [facility]. She has no other complaints.  She has gained a few pounds in weight is up to 146 pounds.  She suffers from chronic migraines and going to a chiropractor and neck adjustments have worked best for her in the past.  She is on Excedrin as needed which controls her migraine headaches.  She noted that this past winter her headaches were more frequent but she has not been able to get to a chiropractor due to COVID-19.  She and her  spend the winter in [place] and just returned in April.  She was previously evaluated by a neurologist in [place] 2 years ago and reports having a negative brain MRI.  TMJ was felt to be contributing to her headaches.   In addition, a complete 12 point  review of systems is negative.        Past medical, social, surgical, and family histories reviewed.  Echocardiogram in 10/2014 for evaluation of benign palpitations showed left ventricular function is normal with an EF of 55-60%.  Left ventricular Doppler filling pattern consistent with abnormal relaxation.  DEXA scan on 5/12/2015  "showed most neg T score of -2.2 in the lumbar spine. This was unchanged compared to 11/2010. She was on Boneva years ago but apparently developed L jaw tingling and is no longer on biphosphonates.       Allergies:  Allergies as of 04/30/2020 - Reviewed 04/30/2020   Allergen Reaction Noted     Diatrizoate Other (See Comments) 05/28/2008     Bisphosphonates GI Disturbance 10/01/2009     Ivp dye [contrast dye] Swelling 10/01/2009     Lisinopril Cough 06/24/2013     Losartan Hives 11/22/2010     Morphine Hives 06/06/2018     Morphine sulfate Nausea and Vomiting 08/16/2012     Prilosec [omeprazole] Hives 10/01/2009     Latex Rash 09/19/2014       Current Medications:  Current Outpatient Medications   Medication Sig Dispense Refill     Aspirin-Acetaminophen-Caffeine (EXCEDRIN MIGRAINE PO) Take 1 tablet by mouth as needed       carboxymethylcellulose (REFRESH PLUS) 0.5 % SOLN Place 1 drop into both eyes 3 times daily as needed for dry eyes       HYZAAR 50-12.5 MG tablet Take 1 tablet by mouth daily 90 tablet 1     latanoprost (XALATAN) 0.005 % ophthalmic solution Place 1 drop into both eyes At Bedtime 10 mL 3     levothyroxine (SYNTHROID/LEVOTHROID) 50 MCG tablet TAKE 1 TABLET BY MOUTH ONCE DAILY 90 tablet 0     Multiple Vitamin (MULTI-VITAMIN) per tablet Take 1 tablet by mouth daily.         MELATONIN PO Take 3 mg by mouth nightly as needed        order for DME 1 aircast boot 1 Device 0        Physical Exam:  Ht 1.6 m (5' 3\")   Wt 66.2 kg (146 lb)   LMP 10/01/1986   BMI 25.86 kg/m     Wt Readings from Last 5 Encounters:   04/30/20 66.2 kg (146 lb)   10/01/19 63.9 kg (140 lb 14.4 oz)   07/24/19 64.9 kg (143 lb)   07/22/19 64.9 kg (143 lb)   06/26/19 63.6 kg (140 lb 3.2 oz)       Physical Exam:  Constitutional: alert and in no apparent distress  PSYCH: Alert and oriented times 3; coherent speech, normal   rate and volume, able to articulate logical thoughts, able   to abstract reason, no tangential thoughts, no " hallucinations   or delusions  His affect is normal  RESP: No cough, no audible wheezing, able to talk in full sentences  Remainder of exam unable to be completed due to telephone visits  The rest the comprehensive physical examination is deferred due to public health emergency     Labs:  Component      Latest Ref Rng & Units 4/27/2020   WBC      4.0 - 11.0 10e9/L 4.9   RBC Count      3.8 - 5.2 10e12/L 4.26   Hemoglobin      11.7 - 15.7 g/dL 12.6   Hematocrit      35.0 - 47.0 % 38.2   MCV      78 - 100 fl 90   MCH      26.5 - 33.0 pg 29.6   MCHC      31.5 - 36.5 g/dL 33.0   RDW      10.0 - 15.0 % 12.7   Platelet Count      150 - 450 10e9/L 169   Diff Method       Automated Method   % Neutrophils      % 57.6   % Lymphocytes      % 32.2   % Monocytes      % 8.6   % Eosinophils      % 1.0   % Basophils      % 0.2   % Immature Granulocytes      % 0.4   Absolute Neutrophil      1.6 - 8.3 10e9/L 2.8   Absolute Lymphocytes      0.8 - 5.3 10e9/L 1.6   Absolute Monocytes      0.0 - 1.3 10e9/L 0.4   Absolute Eosinophils      0.0 - 0.7 10e9/L 0.1   Absolute Basophils      0.0 - 0.2 10e9/L 0.0   Abs Immature Granulocytes      0 - 0.4 10e9/L 0.0   Sodium      133 - 144 mmol/L 140   Potassium      3.4 - 5.3 mmol/L 4.3   Chloride      94 - 109 mmol/L 107   Carbon Dioxide      20 - 32 mmol/L 31   Anion Gap      3 - 14 mmol/L 2 (L)   Glucose      70 - 99 mg/dL 105 (H)   Urea Nitrogen      7 - 30 mg/dL 22   Creatinine      0.52 - 1.04 mg/dL 1.02   GFR Estimate      >60 mL/min/1.73:m2 51 (L)   GFR Estimate If Black      >60 mL/min/1.73:m2 59 (L)   Calcium      8.5 - 10.1 mg/dL 9.2   Bilirubin Total      0.2 - 1.3 mg/dL 0.7   Albumin      3.4 - 5.0 g/dL 3.6   Protein Total      6.8 - 8.8 g/dL 7.2   Alkaline Phosphatase      40 - 150 U/L 73   ALT      0 - 50 U/L 21   AST      0 - 45 U/L 19   Lactate Dehydrogenase      81 - 234 U/L 173   Results for BRITTANIE KIRKLAND (MRN [id number]) as of 4/30/2020 14:16   Ref. Range 4/27/2018 13:51  11/6/2018 11:33 5/7/2019 11:50 9/30/2019 10:31 4/27/2020 11:26   Creatinine Latest Ref Range: 0.52 - 1.04 mg/dL 0.91 0.95 0.93 0.86 1.02     ASSESSMENT/PLAN:  Hilda is a very pleasant 84 year-old woman recently diagnosed with stage IV follicular lymphoma, FLIPI score is 3 (one for stage IV, one for age>60, and one for elevated LDH), associated with 52% median 5 year OS survival and 36% median 10 year overall survival. Most recent CT of the chest, abdomen and pelvis on 9/30/2019 showed no e/o lymphoma recurrence with stable posttreatment changes of lymphoma. There was no significant change in prominent mesenteric. There were stable tiny pulmonary nodules from 4/26/2016, including 2 mm nodule in the right base.     1. NHL.  S/p Rituxan x4, completed in 09/2012. Responded to treatment with resolution of night sweats and decrease in lymphadenopathy. We'll continue to observe her from now on. Clinically, she continued to be in remission from NHL, with stable shotty lymphadenopathy in the mesentery radiographically.   We'll see her back in 6 months with labs - cbcd, cmp, LDH, and a repeat CT of the chest, abdomen and pelvis in October 2020. She had anaphylactic allergy to CT contrast dye in the past and her CTs are ordered without contrast.  If there is no evidence on CT imaging in October 2020 we will stretch out her CT surveillance interval to  2 years.    If there is a e/o disease progression on future CT without contrast, consider PET/CT without contrast for further evaluation.     2. Immunizations - She is uptodate with  Pneumovax, Prevnar vaccinations. Annual influenza vaccination recommended.    3.  Dupuytren's contractures right hand-refer therapeutic surgery.    4. Hx of pulmonary nodules:  2mm nodule in the right lung base seen on CT chest in 11/2017, stable compared to 04/2016. She is a never smoker.   CT chest on 9/30/2019 showed a new small cluster of pulmonary nodularity at the right lower lobe.  This may  just relate to an infectious or inflammatory etiology.  We'll f/up on CT chest in one year (plan October 2020) included in CT of the chest, abdomen and pelvis for lymphoma surveillance.    5. Breast cancer screening Screening mammogram negative 12/2019. Recommended annual screening mammograms.  At the end of our visit the patient verbalized understanding, denied any further questions, and concurred with the plan of care.

## 2020-04-30 NOTE — NURSING NOTE
SYMPTOM ASSESSMENT    Pain: no    Nausea/Vomiting: no    Mouth Sores: no    Shortness of Breath: no    Smoking: no    Fever or Chills: no    Hard Stools: no    Soft Stools: no    Weight Loss: no    Weakness: no    Burning, numbness or tingling in hands or feet: no    Problems with skin or swelling: Dupuytrens contracture at right hand (3rd, 4th, 5th fingers) is worsening.     Memory Loss: no    Anxiety or Depression: no    Trouble Sleeping: yes, takes melatonin when needed.    Tanya Conner LPN on 4/30/2020 at 1:29 PM

## 2020-06-12 ENCOUNTER — OFFICE VISIT (OUTPATIENT)
Dept: FAMILY MEDICINE | Facility: CLINIC | Age: 85
End: 2020-06-12
Payer: MEDICARE

## 2020-06-12 VITALS
BODY MASS INDEX: 25.05 KG/M2 | SYSTOLIC BLOOD PRESSURE: 118 MMHG | DIASTOLIC BLOOD PRESSURE: 64 MMHG | WEIGHT: 141.4 LBS | HEART RATE: 83 BPM | RESPIRATION RATE: 16 BRPM | TEMPERATURE: 97.4 F | HEIGHT: 63 IN | OXYGEN SATURATION: 96 %

## 2020-06-12 DIAGNOSIS — N28.9 RENAL INSUFFICIENCY: ICD-10-CM

## 2020-06-12 DIAGNOSIS — I10 ESSENTIAL HYPERTENSION WITH GOAL BLOOD PRESSURE LESS THAN 140/90: ICD-10-CM

## 2020-06-12 DIAGNOSIS — Z00.00 ENCOUNTER FOR MEDICARE ANNUAL WELLNESS EXAM: Primary | ICD-10-CM

## 2020-06-12 DIAGNOSIS — Z13.1 SCREENING FOR DIABETES MELLITUS: ICD-10-CM

## 2020-06-12 DIAGNOSIS — E03.9 HYPOTHYROIDISM, UNSPECIFIED TYPE: ICD-10-CM

## 2020-06-12 DIAGNOSIS — E78.5 HYPERLIPIDEMIA LDL GOAL <130: ICD-10-CM

## 2020-06-12 LAB
CREAT SERPL-MCNC: 0.87 MG/DL (ref 0.52–1.04)
GFR SERPL CREATININE-BSD FRML MDRD: 61 ML/MIN/{1.73_M2}
TSH SERPL DL<=0.005 MIU/L-ACNC: 1.47 MU/L (ref 0.4–4)

## 2020-06-12 PROCEDURE — 84443 ASSAY THYROID STIM HORMONE: CPT | Performed by: FAMILY MEDICINE

## 2020-06-12 PROCEDURE — G0439 PPPS, SUBSEQ VISIT: HCPCS | Performed by: FAMILY MEDICINE

## 2020-06-12 PROCEDURE — 82565 ASSAY OF CREATININE: CPT | Performed by: FAMILY MEDICINE

## 2020-06-12 PROCEDURE — 99213 OFFICE O/P EST LOW 20 MIN: CPT | Mod: 25 | Performed by: FAMILY MEDICINE

## 2020-06-12 PROCEDURE — 36415 COLL VENOUS BLD VENIPUNCTURE: CPT | Performed by: FAMILY MEDICINE

## 2020-06-12 RX ORDER — LOSARTAN/HYDROCHLOROTHIAZIDE 50-12.5 MG
1 TABLET ORAL DAILY
Qty: 90 TABLET | Refills: 3 | Status: SHIPPED | OUTPATIENT
Start: 2020-06-12 | End: 2021-06-11

## 2020-06-12 ASSESSMENT — MIFFLIN-ST. JEOR: SCORE: 1057.58

## 2020-06-12 NOTE — PATIENT INSTRUCTIONS
Patient Education   Personalized Prevention Plan  You are due for the preventive services outlined below.  Your care team is available to assist you in scheduling these services.  If you have already completed any of these items, please share that information with your care team to update in your medical record.  Health Maintenance Due   Topic Date Due     Zoster (Shingles) Vaccine (3 of 3) 07/24/2018     PHQ-2  01/01/2020     Annual Wellness Visit  01/28/2020     Thyroid Function Lab  01/28/2020     FALL RISK ASSESSMENT  01/28/2020        Preventive Health Recommendations    See your health care provider every year to    Review health changes.     Discuss preventive care.      Review your medicines if your doctor has prescribed any.      You no longer need a yearly Pap test unless you've had an abnormal Pap test in the past 10 years. If you have vaginal symptoms, such as bleeding or discharge, be sure to talk with your provider about a Pap test.      Every 1 to 2 years, have a mammogram.  If you are over 69, talk with your health care provider about whether or not you want to continue having screening mammograms.      Every 10 years, have a colonoscopy. Or, have a yearly FIT test (stool test). These exams will check for colon cancer.       Have a cholesterol test every 5 years, or more often if your doctor advises it.       Have a diabetes test (fasting glucose) every three years. If you are at risk for diabetes, you should have this test more often.       At age 65, have a bone density scan (DEXA) to check for osteoporosis (brittle bone disease).    Shots:    Get a flu shot each year.    Get a tetanus shot every 10 years.    Talk to your doctor about your pneumonia vaccines. There are now two you should receive - Pneumovax (PPSV 23) and Prevnar (PCV 13).    Talk to your pharmacist about the shingles vaccine.    Talk to your doctor about the hepatitis B vaccine.    Nutrition:     Eat at least 5 servings of fruits  and vegetables each day.      Eat whole-grain bread, whole-wheat pasta and brown rice instead of white grains and rice.      Get adequate about Calcium and Vitamin D.     Lifestyle    Exercise at least 150 minutes a week (30 minutes a day, 5 days a week). This will help you control your weight and prevent disease.      Limit alcohol to one drink per day.      No smoking.       Wear sunscreen to prevent skin cancer.       See your dentist twice a year for an exam and cleaning.      See your eye doctor every 1 to 2 years to screen for conditions such as glaucoma, macular degeneration, cataracts, etc.    Personalized Prevention Plan  You are due for the preventive services outlined below.  Your care team is available to assist you in scheduling these services.  If you have already completed any of these items, please share that information with your care team to update in your medical record.    Health Maintenance Due   Topic Date Due     Zoster (Shingles) Vaccine (3 of 3) 07/24/2018     PHQ-2  01/01/2020     Annual Wellness Visit  01/28/2020     Thyroid Function Lab  01/28/2020     FALL RISK ASSESSMENT  01/28/2020

## 2020-06-12 NOTE — PROGRESS NOTES
"  SUBJECTIVE:   Hilda Potter is a 84 year old female who presents for Preventive Visit.      Are you in the first 12 months of your Medicare Part B coverage?  No    Physical Health:    In general, how would you rate your overall physical health? good    Outside of work, how many days during the week do you exercise? 4-5 days/week    Outside of work, approximately how many minutes a day do you exercise?30-45 minutes    If you drink alcohol do you typically have >3 drinks per day or >7 drinks per week? No    Do you usually eat at least 4 servings of fruit and vegetables a day, include whole grains & fiber and avoid regularly eating high fat or \"junk\" foods? Yes    Do you have any problems taking medications regularly?  No    Do you have any side effects from medications? none    Needs assistance for the following daily activities: no assistance needed    Which of the following safety concerns are present in your home?  none identified     Hearing impairment: No    In the past 6 months, have you been bothered by leaking of urine? yes    Mental Health:    In general, how would you rate your overall mental or emotional health? Good.      PHQ-2 Score:    PHQ-2 Score:     PHQ-2 ( 1999 Pfizer) 6/12/2020 6/12/2020   Q1: Little interest or pleasure in doing things 0 0   Q2: Feeling down, depressed or hopeless 0 0   PHQ-2 Score 0 0   Q1: Little interest or pleasure in doing things - -   Q2: Feeling down, depressed or hopeless - -   PHQ-2 Score - -       Do you feel safe in your environment? Yes    Have you ever done Advance Care Planning? (For example, a Health Directive, POLST, or a discussion with a medical provider or your loved ones about your wishes): Yes, patient states has an Advance Care Planning document and will bring a copy to the clinic.    Additional concerns to address?  Patient informed that anything we discuss that is not related to preventative medicine, may be billed for; patient verbalizes " understanding.      Needing refills and/or labs for:    Hypertension  Is blood pressure being checked at home? Yes  Medication side effects? No   Currently on Hyzaar 50-12.5 mg/day. Tolerating well.       Hypothyroidism   Any hyper or hypo-thyroid symptoms? No   Currently on levothyroxine.  Last TSH  TSH   Date Value Ref Range Status   2019 2.15 0.40 - 4.00 mU/L Final       Recent labs from Oncology revealed some renal insufficiency.   Will repeat labs.         Fall risk:  Fallen 2 or more times in the past year?: No  Any fall with injury in the past year?: No    Cognitive Screenin) Repeat 3 items (Leader, Season, Table)    2) Clock draw: NORMAL  3) 3 item recall: Recalls 3 objects  Results: 3 items recalled: COGNITIVE IMPAIRMENT LESS LIKELY    Mini-CogTM Copyright S Radha. Licensed by the author for use in [facility]; reprinted with permission (arben@[email or web address]). All rights reserved.      Do you have sleep apnea, excessive snoring or daytime drowsiness?: yes      Would like to discuss adjusting her BP medication.     Patient informed that anything we discuss that is not related to preventative medicine, may be billed for; patient verbalizes understanding.      Reviewed and updated as needed this visit by clinical staff  Tobacco  Allergies  Meds  Med Hx  Surg Hx  Soc Hx        Reviewed and updated as needed this visit by Provider  Tobacco  Med Hx  Surg Hx  Soc Hx       Social History     Tobacco Use     Smoking status: Never Smoker     Smokeless tobacco: Never Used   Substance Use Topics     Alcohol use: Yes     Comment: wine occasionally                           Current providers sharing in care for this patient include:   Patient Care Team:  Ame Dobson MD as PCP - General (Family Practice)  Ame Dobson MD as Assigned PCP    The following health maintenance items are reviewed in Epic and correct as of today:  Health Maintenance   Topic Date Due     ZOSTER IMMUNIZATION  (3 of 3) 07/24/2018     PHQ-2  01/01/2020     MEDICARE ANNUAL WELLNESS VISIT  01/28/2020     TSH W/FREE T4 REFLEX  01/28/2020     FALL RISK ASSESSMENT  01/28/2020     EYE EXAM  12/03/2020     BMP  04/27/2021     DTAP/TDAP/TD IMMUNIZATION (3 - Td) 07/25/2023     ADVANCE CARE PLANNING  11/01/2024     DEXA  Completed     MIGRAINE ACTION PLAN  Completed     INFLUENZA VACCINE  Completed     PNEUMOCOCCAL IMMUNIZATION 65+ HIGH/HIGHEST RISK  Completed     IPV IMMUNIZATION  Aged Out     MENINGITIS IMMUNIZATION  Aged Out     Lab work is in process  Labs reviewed in EPIC  BP Readings from Last 3 Encounters:   06/12/20 118/64   12/23/19 (!) 140/68   10/30/19 (!) 163/86    Wt Readings from Last 3 Encounters:   06/12/20 64.1 kg (141 lb 6.4 oz)   04/30/20 66.2 kg (146 lb)   10/01/19 63.9 kg (140 lb 14.4 oz)                  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)     Premature beats     Premature atrial beats     Compression fracture of L1 lumbar vertebra, seen on CT     Lung nodule < 6cm on CT     Dupuytren's contracture of right hand     Hypothyroidism, unspecified type     Essential hypertension with goal blood pressure less than 140/90     Combined forms of age-related cataract, mild-mod, both eyes     Migraine without status migrainosus, not intractable, unspecified migraine type     Posterior vitreous detachment of left eye     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     Past Surgical History:   Procedure Laterality Date     APPENDECTOMY  1954     BLEPHAROPLASTY BILATERAL  10/2007    both eyes, upper lids     C LENGTHEN,TENDON,HAND/FINGER  1993    repair of dupytrons's contracture      COLONOSCOPY  6/02, 10/13    Q 5 years, polyps     D & C       HC REMOVAL GALLBLADDER  1992     REPAIR PTOSIS       SALPINGO OOPHORECTOMY,R/L/LUIS DANIEL      left ovary and tube     SMALL BOWEL RESECTION  1986    colon resection      TONSILLECTOMY & ADENOIDECTOMY      childhood     TUBAL LIGATION       UPPER GI ENDOSCOPY  6/02, 8/11       Social History     Tobacco Use     Smoking status: Never Smoker     Smokeless tobacco: Never Used   Substance Use Topics     Alcohol use: Yes     Comment: wine occasionally     Family History   Problem Relation Age of Onset     Hypertension Mother      Arthritis Mother      Cardiovascular Mother      Retinal detachment Mother      Migraines Mother      C.A.D. Father      Hypertension Father      Cerebrovascular Disease Father      Arthritis Father      Cardiovascular Father      Eye Disorder Father      Heart Disease Father      Glaucoma Father      Parkinsonism Father      Cardiovascular Brother      Glaucoma Brother      Peripheral Neuropathy Brother      Glaucoma Other      Macular Degeneration No family hx of          Current Outpatient Medications   Medication Sig Dispense Refill     Aspirin-Acetaminophen-Caffeine (EXCEDRIN MIGRAINE PO) Take 1 tablet by mouth as needed       carboxymethylcellulose (REFRESH PLUS) 0.5 % SOLN Place 1 drop into both eyes 3 times daily as needed for dry eyes       HYZAAR 50-12.5 MG tablet Take 1 tablet by mouth daily 90 tablet 3     latanoprost (XALATAN) 0.005 % ophthalmic solution Place 1 drop into both eyes At Bedtime 10 mL 3     levothyroxine (SYNTHROID/LEVOTHROID) 50 MCG tablet TAKE 1 TABLET BY MOUTH ONCE DAILY 90 tablet 0     MELATONIN PO Take 3 mg by mouth nightly as needed        Multiple Vitamin (MULTI-VITAMIN) per tablet Take 1 tablet by mouth daily.         Allergies   Allergen Reactions     Diatrizoate Other (See Comments)     Bisphosphonates GI Disturbance     GI distress     Ivp Dye [Contrast Dye] Swelling     Lisinopril Cough     Losartan  "Hives     But can tolerate Hyzaar.      Morphine Hives     Morphine Sulfate Nausea and Vomiting     Prilosec [Omeprazole] Hives     Latex Rash         ROS:  Constitutional, HEENT, cardiovascular, pulmonary, gi and gu systems are negative, except as otherwise noted.    OBJECTIVE:   /64   Pulse 83   Temp 97.4  F (36.3  C) (Tympanic)   Resp 16   Ht 1.588 m (5' 2.5\")   Wt 64.1 kg (141 lb 6.4 oz)   LMP 10/01/1986   SpO2 96%   Breastfeeding No   BMI 25.45 kg/m   Estimated body mass index is 25.45 kg/m  as calculated from the following:    Height as of this encounter: 1.588 m (5' 2.5\").    Weight as of this encounter: 64.1 kg (141 lb 6.4 oz).  EXAM:   GENERAL: healthy, alert and no distress  EYES: Eyes grossly normal to inspection, PERRL and conjunctivae and sclerae normal  HENT: ear canals and TM's normal, nose and mouth without ulcers or lesions  NECK: no adenopathy, no asymmetry, masses, or scars and thyroid normal to palpation  RESP: lungs clear to auscultation - no rales, rhonchi or wheezes  CV: regular rate and rhythm, normal S1 S2, no S3 or S4, no murmur, click or rub, no peripheral edema and peripheral pulses strong  ABDOMEN: soft, nontender, no hepatosplenomegaly, no masses and bowel sounds normal  MS: no gross musculoskeletal defects noted, no edema  PSYCH: mentation appears normal, affect normal/bright    Diagnostic Test Results:  Labs reviewed in Epic    ASSESSMENT / PLAN:   Hilda was seen today for hypertension, thyroid problem and physical.    Diagnoses and all orders for this visit:    Encounter for Medicare annual wellness exam    Screening for diabetes mellitus  -     Glucose; Future    Essential hypertension with goal blood pressure less than 140/90, controlled  -     Refill: HYZAAR 50-12.5 MG tablet; Take 1 tablet by mouth daily    Hypothyroidism, unspecified type, on Levothroxine  -     TSH WITH FREE T4 REFLEX  -     Will refill Levothyroxine after labs.    Hyperlipidemia LDL goal <130  - " "    Lipid Profile; Future    Renal insufficiency  -     Repeat: Creatinine        COUNSELING:  Reviewed preventive health counseling, as reflected in patient instructions       Regular exercise       Healthy diet/nutrition    Estimated body mass index is 25.45 kg/m  as calculated from the following:    Height as of this encounter: 1.588 m (5' 2.5\").    Weight as of this encounter: 64.1 kg (141 lb 6.4 oz).    Weight management plan: Discussed healthy diet and exercise guidelines     reports that she has never smoked. She has never used smokeless tobacco.      Appropriate preventive services were discussed with this patient, including applicable screening as appropriate for cardiovascular disease, diabetes, osteopenia/osteoporosis, and glaucoma.  As appropriate for age/gender, discussed screening for colorectal cancer, prostate cancer, breast cancer, and cervical cancer. Checklist reviewing preventive services available has been given to the patient.    Reviewed patients plan of care and provided an AVS. The Basic Care Plan (routine screening as documented in Health Maintenance) for Hilda meets the Care Plan requirement. This Care Plan has been established and reviewed with the Patient.    Counseling Resources:  ATP IV Guidelines  Pooled Cohorts Equation Calculator  Breast Cancer Risk Calculator  FRAX Risk Assessment  ICSI Preventive Guidelines  Dietary Guidelines for Americans, 2010  USDA's MyPlate  ASA Prophylaxis  Lung CA Screening    Follow up in 6 months- med check.   Next Annual Wellness Visit in 6 /2021      Ame Dobson MD  [facility]  "

## 2020-06-12 NOTE — PROGRESS NOTES
"Subjective     Hilda Potter is a 84 year old female who presents to clinic today for the following health issues:    HPI   Hypertension Follow-up      Do you check your blood pressure regularly outside of the clinic? { :711746}     Are you following a low salt diet? { :013481}    Are your blood pressures ever more than 140 on the top number (systolic) OR more   than 90 on the bottom number (diastolic), for example 140/90? { :493735}    Hypothyroidism Follow-up      Since last visit, patient describes the following symptoms: { :226667::\"Weight stable, no hair loss, no skin changes, no constipation, no loose stools\"}      How many servings of fruits and vegetables do you eat daily?  { :479073}    On average, how many sweetened beverages do you drink each day (Examples: soda, juice, sweet tea, etc.  Do NOT count diet or artificially sweetened beverages)?   { 1-11:357505}    How many days per week do you exercise enough to make your heart beat faster? { :656843}    How many minutes a day do you exercise enough to make your heart beat faster? { :501023}    How many days per week do you miss taking your medication? {0-7 :857898}    {additonal problems for provider to add (Optional):672782}    {HIST REVIEW/ LINKS 2 (Optional):762795}    {Additional problems for the provider to add (optional):470363}  Reviewed and updated as needed this visit by Provider         Review of Systems   {ROS COMP (Optional):251334}      Objective    LMP 10/01/1986   There is no height or weight on file to calculate BMI.  Physical Exam   {Exam List (Optional):423863}    {Diagnostic Test Results (Optional):317480::\"Diagnostic Test Results:\",\"Labs reviewed in Epic\"}        {PROVIDER CHARTING PREFERENCE:460462}      "

## 2020-06-12 NOTE — LETTER
June 16, 2020      Hilda Potter  [address] [address]        Dear ,    We are writing to inform you of your test results.    Your recent labs were normal.   Kidney function is back to normal.   Thyroid function is within normal range. Continue current dose of Levothyroxine.   Refills sent.   Resulted Orders   TSH WITH FREE T4 REFLEX   Result Value Ref Range    TSH 1.47 0.40 - 4.00 mU/L   Creatinine   Result Value Ref Range    Creatinine 0.87 0.52 - 1.04 mg/dL    GFR Estimate 61 >60 mL/min/[1.73_m2]      Comment:      Non  GFR Calc  Starting 12/18/2018, serum creatinine based estimated GFR (eGFR) will be   calculated using the Chronic Kidney Disease Epidemiology Collaboration   (CKD-EPI) equation.      GFR Estimate If Black 71 >60 mL/min/[1.73_m2]      Comment:       GFR Calc  Starting 12/18/2018, serum creatinine based estimated GFR (eGFR) will be   calculated using the Chronic Kidney Disease Epidemiology Collaboration   (CKD-EPI) equation.       If you have any questions or concerns, please call the clinic at the number listed above.     Sincerely,      Ame Dobson MD/ty

## 2020-06-15 DIAGNOSIS — E03.9 HYPOTHYROIDISM, UNSPECIFIED TYPE: ICD-10-CM

## 2020-06-15 RX ORDER — LEVOTHYROXINE SODIUM 50 UG/1
50 TABLET ORAL DAILY
Qty: 90 TABLET | Refills: 3 | Status: SHIPPED | OUTPATIENT
Start: 2020-06-15 | End: 2021-06-20

## 2020-06-16 DIAGNOSIS — E78.5 HYPERLIPIDEMIA LDL GOAL <130: ICD-10-CM

## 2020-06-16 DIAGNOSIS — Z13.1 SCREENING FOR DIABETES MELLITUS: ICD-10-CM

## 2020-06-16 DIAGNOSIS — B37.0 ORAL THRUSH: Primary | ICD-10-CM

## 2020-06-16 LAB
CHOLEST SERPL-MCNC: 215 MG/DL
GLUCOSE SERPL-MCNC: 110 MG/DL (ref 70–99)
HDLC SERPL-MCNC: 59 MG/DL
LDLC SERPL CALC-MCNC: 131 MG/DL
NONHDLC SERPL-MCNC: 156 MG/DL
TRIGL SERPL-MCNC: 124 MG/DL

## 2020-06-16 PROCEDURE — 80061 LIPID PANEL: CPT | Performed by: FAMILY MEDICINE

## 2020-06-16 PROCEDURE — 82947 ASSAY GLUCOSE BLOOD QUANT: CPT | Performed by: FAMILY MEDICINE

## 2020-06-16 PROCEDURE — 36415 COLL VENOUS BLD VENIPUNCTURE: CPT | Performed by: FAMILY MEDICINE

## 2020-06-16 NOTE — LETTER
June 22, 2020      Hilda Potter  [address] [address]        Dear ,    We are writing to inform you of your test results.    Your recent labs showed-     Glucose was slightly elevated- this means that you have no diabetes but you could be at risk (prediabetes).   Cholesterol was slightly elevated but not at the point where cholesterol lowering medications are recommended at this time.   I recommend that you eat a well balanced heart healthy diet and exercise regularly at least 30 minutes 5 days a week being careful not to fall.   Will repeat lipid panel in 6-12 months.     Resulted Orders   Glucose   Result Value Ref Range    Glucose 110 (H) 70 - 99 mg/dL      Comment:      Fasting specimen   Lipid Profile   Result Value Ref Range    Cholesterol 215 (H) <200 mg/dL      Comment:      Desirable:       <200 mg/dl    Triglycerides 124 <150 mg/dL      Comment:      Fasting specimen    HDL Cholesterol 59 >49 mg/dL    LDL Cholesterol Calculated 131 (H) <100 mg/dL      Comment:      Above desirable:  100-129 mg/dl  Borderline High:  130-159 mg/dL  High:             160-189 mg/dL  Very high:       >189 mg/dl      Non HDL Cholesterol 156 (H) <130 mg/dL      Comment:      Above Desirable:  130-159 mg/dl  Borderline high:  160-189 mg/dl  High:             190-219 mg/dl  Very high:       >219 mg/dl         If you have any questions or concerns, please call the clinic at the number listed above.       Sincerely,    Ame Dobson MD/ellyno

## 2020-06-18 ENCOUNTER — TELEPHONE (OUTPATIENT)
Dept: SURGERY | Facility: CLINIC | Age: 85
End: 2020-06-18

## 2020-06-18 NOTE — TELEPHONE ENCOUNTER
Do you have any of the following symptoms:  a)      Fever (or reported chills) No  b)      Shortness of Breath No  c)      Rash No  d)      Cough in the last 14 days No    If a patient reports yes to any of these symptoms, obtain direction from the provider and call the patient back to let them know if they can come in or not.  1.    Provider needs to determine if this patient should still be seen in clinic.  2.    If decision to not see in clinic, call patient back and refer them to COVID- 19 [email or web address] or schedule COVID-19 phone visit.  3.    Turn in-person visit into telephone visit (FOR RETURN PATIENTS ONLY)    Remind patients that visitors are not allowed on site. Only one legal guardian who screens negative to the above questions will be allowed to accompany patients. If a patient indicates that they will be bringing a legal guardian with them to the appointment please make sure to screen both the patient and the legal guardian for symptoms using the tool above.      Krysten Do LPN

## 2020-06-19 ENCOUNTER — TELEPHONE (OUTPATIENT)
Dept: ORTHOPEDICS | Facility: CLINIC | Age: 85
End: 2020-06-19

## 2020-06-19 ENCOUNTER — OFFICE VISIT (OUTPATIENT)
Dept: SURGERY | Facility: CLINIC | Age: 85
End: 2020-06-19
Attending: INTERNAL MEDICINE
Payer: MEDICARE

## 2020-06-19 DIAGNOSIS — M72.0 DUPUYTREN'S CONTRACTURE OF RIGHT HAND: ICD-10-CM

## 2020-06-19 PROCEDURE — 99203 OFFICE O/P NEW LOW 30 MIN: CPT | Performed by: PLASTIC SURGERY

## 2020-06-19 NOTE — NURSING NOTE
Hilda Potter's goals for this visit include:   Chief Complaint   Patient presents with     Consult     Right 4th/5th Dupuytren's contractures       She requests these members of her care team be copied on today's visit information: no    PCP: Ame Dobson    Referring Provider:  Jane Roth MD  [id number] [place] [address]    [facility] 10/[birth year]/1986     Do you need any medication refills at today's visit? No    Krysten Do LPN

## 2020-06-19 NOTE — LETTER
6/19/2020         RE: Hilda Potter  [address]  [facility] [address]        Dear Colleague,    Thank you for referring your patient, Hilda Potter, to the [facility]. Please see a copy of my visit note below.          Service Date: 06/19/2020      CONSULT NOTE      REFERRING PROVIDER:  Jane Roth MD      PRESENTING COMPLAINT:  Consultation for Dupuytren disease.      HISTORY OF PRESENTING COMPLAINT:  Ms. Potter is 84 years old.  She is right hand dominant.  She has had Dupuytren contracture of both hands for many years, started back in the early 2000s.  She has had surgery done to both hands, most recently on the right hand.  Has had aponeurotomies in 2013, along with surgeries in the past to deal with the little finger and ring finger Dupuytren contracture of the MP and PIP joints.  The little finger and ring finger on the right side is now causing her problems again due to recurrence.  The left hand is fine.  Here to have it looked at.      PAST MEDICAL HISTORY:  Hypothyroidism, chronic lymphoma, essential hypertension.      PAST SURGICAL HISTORY:  Blepharoplasty, Dupuytren disease surgery, cholecystectomy, small bowel resection, appendectomy, D&C, ptosis repair, oophorectomy, tonsillectomy, tubal ligation.      MEDICATIONS:  Hyzaar, levothyroxine.      ALLERGIES:  Diatrizoate, bisphosphonates, IVP dye, lisinopril, losartan, morphine, Prilosec, latex.        SOCIAL HISTORY:  Does not smoke, socially drinks.  Lives in [place].  Is a retired .      REVIEW OF SYSTEMS:  Denies chest pain, shortness of breath, MI, CVA, DVT and PE.      PHYSICAL EXAMINATION:  Vital signs are stable.  She is afebrile, in no obvious distress.  She is 5 feet 2-1/2 inches, 140 pounds, BMI of 25 kg/m2.  On examination of her right hand, she has MP joint contracture of the little finger of about 30 degrees, PIP joint contracture of about 90 degrees.  Long finger MP  joint contracture of about 30 degrees, PIP joint contracture of about 10-20 degrees.  She has pretendinous cords and spiral cords.      ASSESSMENT AND PLAN:  Based on above findings, a diagnosis of right hand Dupuytren contracture affecting day-to-day activities and recurrence was made.  I had a krissy discussion with the patient about her findings.  I think Xiaflex injections would be the way to proceed.  We could positively affect her ring finger as well as the little finger MP joint.  My concern is the PIP joint of her little finger.  I think she has a joint contracture that is probably partly originating in the joint.  I do not feel a krissy cord I can inject the medicine into for that joint.  Therefore, we will try to attack the other joints and cords, and if that helps tremendously, then that is all that she will require.  If, however, she wants her PIP joint of the little finger treated, then she will require surgery.  She understood.  She wants to think about it and let me know if she wants to proceed.  If she does, we will plan Xiaflex injection.  She understood.  All questions were answered.  She was happy with the visit.      Total time spent with the patient was 30 minutes, more than half counseling.      cc:   Jane Roth MD   [facility]   [address] [place]  [address]         RASHMI NEELY MD             D: 2020   T: 2020   MT: sage      Name:     BRITTANIE KIRKLAND   MRN:      [id number]        Account:      [id number]   :      1935           Service Date: 2020      Document: [id number]       Again, thank you for allowing me to participate in the care of your patient.        Sincerely,        RASHMI Neely MD

## 2020-06-19 NOTE — PROGRESS NOTES
Service Date: 06/19/2020      CONSULT NOTE      REFERRING PROVIDER:  Jane Roth MD      PRESENTING COMPLAINT:  Consultation for Dupuytren disease.      HISTORY OF PRESENTING COMPLAINT:  Ms. Potter is 84 years old.  She is right hand dominant.  She has had Dupuytren contracture of both hands for many years, started back in the early 2000s.  She has had surgery done to both hands, most recently on the right hand.  Has had aponeurotomies in 2013, along with surgeries in the past to deal with the little finger and ring finger Dupuytren contracture of the MP and PIP joints.  The little finger and ring finger on the right side is now causing her problems again due to recurrence.  The left hand is fine.  Here to have it looked at.      PAST MEDICAL HISTORY:  Hypothyroidism, chronic lymphoma, essential hypertension.      PAST SURGICAL HISTORY:  Blepharoplasty, Dupuytren disease surgery, cholecystectomy, small bowel resection, appendectomy, D&C, ptosis repair, oophorectomy, tonsillectomy, tubal ligation.      MEDICATIONS:  Hyzaar, levothyroxine.      ALLERGIES:  Diatrizoate, bisphosphonates, IVP dye, lisinopril, losartan, morphine, Prilosec, latex.        SOCIAL HISTORY:  Does not smoke, socially drinks.  Lives in [place].  Is a retired .      REVIEW OF SYSTEMS:  Denies chest pain, shortness of breath, MI, CVA, DVT and PE.      PHYSICAL EXAMINATION:  Vital signs are stable.  She is afebrile, in no obvious distress.  She is 5 feet 2-1/2 inches, 140 pounds, BMI of 25 kg/m2.  On examination of her right hand, she has MP joint contracture of the little finger of about 30 degrees, PIP joint contracture of about 90 degrees.  Long finger MP joint contracture of about 30 degrees, PIP joint contracture of about 10-20 degrees.  She has pretendinous cords and spiral cords.      ASSESSMENT AND PLAN:  Based on above findings, a diagnosis of right hand Dupuytren contracture affecting day-to-day activities and  recurrence was made.  I had a krissy discussion with the patient about her findings.  I think Xiaflex injections would be the way to proceed.  We could positively affect her ring finger as well as the little finger MP joint.  My concern is the PIP joint of her little finger.  I think she has a joint contracture that is probably partly originating in the joint.  I do not feel a krissy cord I can inject the medicine into for that joint.  Therefore, we will try to attack the other joints and cords, and if that helps tremendously, then that is all that she will require.  If, however, she wants her PIP joint of the little finger treated, then she will require surgery.  She understood.  She wants to think about it and let me know if she wants to proceed.  If she does, we will plan Xiaflex injection.  She understood.  All questions were answered.  She was happy with the visit.      Total time spent with the patient was 30 minutes, more than half counseling.      cc:   Jane Roth MD   [facility]   [address] [place]  [address]          RAVIN NAVARRETE MD             D: 2020   T: 2020   MT: sage      Name:     BRITTANIE KIRKLAND   MRN:      [id number]        Account:      [id number]   :      1935           Service Date: 2020      Document: [id number]

## 2020-06-19 NOTE — TELEPHONE ENCOUNTER
Financial Counselor Review:    Procedure to be performed (include CPT code):Yes   - Initial appt: HC INJECTION ENZYME PALMAR FASCIAL CORD (68855)   - 2nd appt: HC MANIP PALMAR FASCIAL CORD POST INJ SINGLE CORD (00368)    Diagnosis code (include ICD-10 code):    - Dupuytren's contracture (M72.0) - right 4th and 5th fingers    Medication order (include J code):Yes    - 2 vials of Xiaflex (collagenase clostridium histolycticum) 0.9 mg Single-use Vial (0.58 mg to be administered, 0.32 mg to be wasted):     Coverage and patient financial responsibility information:YES    Does patient need to be contacted by Financial Counselor:NO    Note: Do not use abbreviations and route encounter to [facility]AN [facility]OWLEY [[id number]]    Mahin Rodriguez RN

## 2020-06-22 NOTE — TELEPHONE ENCOUNTER
PB DOS: TBD  Type of Procedure: Xiaflex  CPT Codes: 79110, 69002,   ICD10 Codes: Dupuytren's contracture (M72.0) - right 4th and 5th fingers  Surgeon/Ordering provider: Marc  Pre-cert/Authorization completed:  Yes, no pa needed  Payer: [facility]  Date Checked: 6/22/2020  Spoke to online- cms.gov, [employer]  Ref. # [id number]   / Auth # n/a  Valid Dates: n/a

## 2020-06-23 NOTE — TELEPHONE ENCOUNTER
6/23 Called and spoke to patient. Patient wants to wait till fall 2020 to go have it done. Patient will call to schedule.     Maya Duarte   Procedure    Ortho/Sports Med/Ent/Eye   [facility]   [phone]

## 2020-07-27 ENCOUNTER — TELEPHONE (OUTPATIENT)
Dept: FAMILY MEDICINE | Facility: CLINIC | Age: 85
End: 2020-07-27

## 2020-07-27 DIAGNOSIS — I10 ESSENTIAL HYPERTENSION WITH GOAL BLOOD PRESSURE LESS THAN 140/90: ICD-10-CM

## 2020-07-27 NOTE — TELEPHONE ENCOUNTER
Reason for Call:  Other prescription    Detailed comments: patient would like to speak with pcp about her BP stated she has been monitoring this and wondering if she should be taking the Hyzaar 1/2 tablet every day instead of 1 every other day due to her reading. Patient stated her bottom number has been in the 50's   Please call to discuss  Thank you    Phone Number Patient can be reached at: Home number on file [phone] (home)    Best Time: any    Can we leave a detailed message on this number? YES    Call taken on 7/27/2020 at 3:22 PM by Bita Lawton

## 2020-07-27 NOTE — TELEPHONE ENCOUNTER
Spoke with patient and per her provider (Dr Dobson) and her discussed maybe trying to cut her hyzaar in half.  Patient states she takes her BP periodically at different times.  Readings below, patients states unsure of exact time  129/68  101/56  109/55  134/66  Patient states she feels sluggish but no other symptoms.  Patient asking if she should cut her hyzaar in half or should she do every other day.  Please advise.  Pills are not scored but patient has a pill splitter.

## 2020-08-03 ENCOUNTER — TELEPHONE (OUTPATIENT)
Dept: FAMILY MEDICINE | Facility: CLINIC | Age: 85
End: 2020-08-03

## 2020-08-03 DIAGNOSIS — I10 ESSENTIAL HYPERTENSION WITH GOAL BLOOD PRESSURE LESS THAN 140/90: ICD-10-CM

## 2020-08-03 NOTE — TELEPHONE ENCOUNTER
Noted.   BPs look good- continue current dose of Hyzaar( 1/2 pill).  She should have plenty of supply before next refill request.

## 2020-08-03 NOTE — TELEPHONE ENCOUNTER
Patient informed per Dr Dobson, see below read word for word.  Patient verbalized understanding and had no further concerns.

## 2020-08-03 NOTE — TELEPHONE ENCOUNTER
Patient reporting BP readings:    7-28-20 119/73  7-29-20 112/62  7-30-20 120/75  7-31-20 109/71  8-1-20 125/74  8-2-20 114/79  8-3-20 120/66    All taking at 8:30 am each morning before breakfast.    Thank you.

## 2020-09-28 ENCOUNTER — OFFICE VISIT (OUTPATIENT)
Dept: OPHTHALMOLOGY | Facility: CLINIC | Age: 85
End: 2020-09-28
Payer: MEDICARE

## 2020-09-28 DIAGNOSIS — H40.003 GLAUCOMA SUSPECT OF BOTH EYES: ICD-10-CM

## 2020-09-28 PROCEDURE — 92012 INTRM OPH EXAM EST PATIENT: CPT | Performed by: OPHTHALMOLOGY

## 2020-09-28 PROCEDURE — 92133 CPTRZD OPH DX IMG PST SGM ON: CPT | Performed by: OPHTHALMOLOGY

## 2020-09-28 PROCEDURE — 92083 EXTENDED VISUAL FIELD XM: CPT | Performed by: OPHTHALMOLOGY

## 2020-09-28 RX ORDER — LATANOPROST 50 UG/ML
1 SOLUTION/ DROPS OPHTHALMIC AT BEDTIME
Qty: 10 ML | Refills: 3 | Status: SHIPPED | OUTPATIENT
Start: 2020-09-28 | End: 2021-03-29

## 2020-09-28 ASSESSMENT — VISUAL ACUITY
METHOD: SNELLEN - LINEAR
CORRECTION_TYPE: GLASSES
OD_CC+: -1
OS_CC: 20/20
OD_CC: 20/20

## 2020-09-28 ASSESSMENT — TONOMETRY
OD_IOP_MMHG: 18
OS_IOP_MMHG: 17
IOP_METHOD: APPLANATION

## 2020-09-28 NOTE — PATIENT INSTRUCTIONS
Continue Latanoprost drops both eyes daily.  Use artificial tears up to 4 times daily both eyes. (Refresh Tears, Systane Ultra/Balance, or Theratears)   Return visit 6 months for a complete exam.  Andi Bay M.D.  [phone]

## 2020-09-28 NOTE — PROGRESS NOTES
Current Eye Medications:  Latanoprost both eyes every evening.  Last drops:  10pm     Subjective:  Follow up glaucoma suspect.  Patient is here for a pressure check, OCT, and visual field. Vision appears to be about the same in each eye, but her eyes tire quickly with reading.      Objective:  See Ophthalmology Exam.       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      Plan:  Continue Latanoprost drops both eyes daily.  Use artificial tears up to 4 times daily both eyes. (Refresh Tears, Systane Ultra/Balance, or Theratears)   Return visit 6 months for a complete exam.  Andi Bay M.D.  [phone]

## 2020-09-28 NOTE — LETTER
9/28/2020         RE: Hilda Potter  [address]  [facility] [address]        Dear Colleague,    Thank you for referring your patient, Hilda Potter, to the [facility]. Please see a copy of my visit note below.     Current Eye Medications:  Latanoprost both eyes every evening.  Last drops:  10pm     Subjective:  Follow up glaucoma suspect.  Patient is here for a pressure check, OCT, and visual field. Vision appears to be about the same in each eye, but her eyes tire quickly with reading.      Objective:  See Ophthalmology Exam.       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      Plan:  Continue Latanoprost drops both eyes daily.  Use artificial tears up to 4 times daily both eyes. (Refresh Tears, Systane Ultra/Balance, or Theratears)   Return visit 6 months for a complete exam.  Andi Bay M.D.  [phone]          Again, thank you for allowing me to participate in the care of your patient.        Sincerely,        nAdi Bay MD

## 2020-09-29 ASSESSMENT — EXTERNAL EXAM - RIGHT EYE: OD_EXAM: NORMAL

## 2020-09-29 ASSESSMENT — EXTERNAL EXAM - LEFT EYE: OS_EXAM: NORMAL

## 2020-10-05 ENCOUNTER — TELEPHONE (OUTPATIENT)
Dept: FAMILY MEDICINE | Facility: CLINIC | Age: 85
End: 2020-10-05

## 2020-10-05 NOTE — TELEPHONE ENCOUNTER
Patient cut her b/p med, Hyzaar, in half.  Daily she is taking 50 mg/12.5 mg in half. On average her b/p is running the same as before she cut her meds in half. Approx, 117/62.  Please call to advise if this is ok. Ok to leave a detailed message.

## 2020-10-06 ENCOUNTER — PATIENT OUTREACH (OUTPATIENT)
Dept: ONCOLOGY | Facility: CLINIC | Age: 85
End: 2020-10-06

## 2020-10-06 RX ORDER — NYSTATIN 100000/ML
500000 SUSPENSION, ORAL (FINAL DOSE FORM) ORAL 4 TIMES DAILY
Qty: 400 ML | Refills: 1 | Status: SHIPPED | OUTPATIENT
Start: 2020-10-06 | End: 2021-01-05

## 2020-10-06 NOTE — PROGRESS NOTES
Call placed to patient to communicate Dr. Roth refilled her Nystatin per patient's request. Message and contact number left.

## 2020-10-06 NOTE — TELEPHONE ENCOUNTER
Noted. Current BP looks good. Continue current BP meds.   If BP drops, we can consider discontinuing BP med and then will reassess.   Remind patient that she is due for a med check visit in 12/2020, sooner if needed.

## 2020-10-06 NOTE — TELEPHONE ENCOUNTER
Left detailed message per patient request with below info from Dr Vazquez.  See below read word for word.  Patient advised to call back if she has further concerns.

## 2020-11-03 ENCOUNTER — ANCILLARY PROCEDURE (OUTPATIENT)
Dept: CT IMAGING | Facility: CLINIC | Age: 85
End: 2020-11-03
Attending: INTERNAL MEDICINE
Payer: MEDICARE

## 2020-11-03 DIAGNOSIS — R91.8 PULMONARY NODULES: ICD-10-CM

## 2020-11-03 DIAGNOSIS — C82.00 GRADE I FOLLICULAR SMALL CLEAVED CELL LYMPHOMA (H): ICD-10-CM

## 2020-11-03 LAB
ALBUMIN SERPL-MCNC: 3.5 G/DL (ref 3.4–5)
ALP SERPL-CCNC: 74 U/L (ref 40–150)
ALT SERPL W P-5'-P-CCNC: 21 U/L (ref 0–50)
ANION GAP SERPL CALCULATED.3IONS-SCNC: 4 MMOL/L (ref 3–14)
AST SERPL W P-5'-P-CCNC: 16 U/L (ref 0–45)
BASOPHILS # BLD AUTO: 0 10E9/L (ref 0–0.2)
BASOPHILS NFR BLD AUTO: 0.2 %
BILIRUB SERPL-MCNC: 0.7 MG/DL (ref 0.2–1.3)
BUN SERPL-MCNC: 27 MG/DL (ref 7–30)
CALCIUM SERPL-MCNC: 9.1 MG/DL (ref 8.5–10.1)
CHLORIDE SERPL-SCNC: 108 MMOL/L (ref 94–109)
CO2 SERPL-SCNC: 30 MMOL/L (ref 20–32)
CREAT SERPL-MCNC: 0.9 MG/DL (ref 0.52–1.04)
DIFFERENTIAL METHOD BLD: NORMAL
EOSINOPHIL # BLD AUTO: 0.1 10E9/L (ref 0–0.7)
EOSINOPHIL NFR BLD AUTO: 1.1 %
ERYTHROCYTE [DISTWIDTH] IN BLOOD BY AUTOMATED COUNT: 12.9 % (ref 10–15)
GFR SERPL CREATININE-BSD FRML MDRD: 59 ML/MIN/{1.73_M2}
GLUCOSE SERPL-MCNC: 115 MG/DL (ref 70–99)
HCT VFR BLD AUTO: 38.7 % (ref 35–47)
HGB BLD-MCNC: 12.8 G/DL (ref 11.7–15.7)
IMM GRANULOCYTES # BLD: 0 10E9/L (ref 0–0.4)
IMM GRANULOCYTES NFR BLD: 0.2 %
LDH SERPL L TO P-CCNC: 166 U/L (ref 81–234)
LYMPHOCYTES # BLD AUTO: 1.5 10E9/L (ref 0.8–5.3)
LYMPHOCYTES NFR BLD AUTO: 33.6 %
MCH RBC QN AUTO: 29.2 PG (ref 26.5–33)
MCHC RBC AUTO-ENTMCNC: 33.1 G/DL (ref 31.5–36.5)
MCV RBC AUTO: 88 FL (ref 78–100)
MONOCYTES # BLD AUTO: 0.4 10E9/L (ref 0–1.3)
MONOCYTES NFR BLD AUTO: 9.4 %
NEUTROPHILS # BLD AUTO: 2.5 10E9/L (ref 1.6–8.3)
NEUTROPHILS NFR BLD AUTO: 55.5 %
PLATELET # BLD AUTO: 156 10E9/L (ref 150–450)
POTASSIUM SERPL-SCNC: 4.2 MMOL/L (ref 3.4–5.3)
PROT SERPL-MCNC: 7 G/DL (ref 6.8–8.8)
RBC # BLD AUTO: 4.38 10E12/L (ref 3.8–5.2)
SODIUM SERPL-SCNC: 142 MMOL/L (ref 133–144)
WBC # BLD AUTO: 4.5 10E9/L (ref 4–11)

## 2020-11-03 PROCEDURE — 83615 LACTATE (LD) (LDH) ENZYME: CPT | Performed by: INTERNAL MEDICINE

## 2020-11-03 PROCEDURE — 85025 COMPLETE CBC W/AUTO DIFF WBC: CPT | Performed by: INTERNAL MEDICINE

## 2020-11-03 PROCEDURE — 80053 COMPREHEN METABOLIC PANEL: CPT | Performed by: INTERNAL MEDICINE

## 2020-11-03 PROCEDURE — 83036 HEMOGLOBIN GLYCOSYLATED A1C: CPT | Performed by: INTERNAL MEDICINE

## 2020-11-03 PROCEDURE — 71250 CT THORAX DX C-: CPT | Performed by: RADIOLOGY

## 2020-11-03 PROCEDURE — 74176 CT ABD & PELVIS W/O CONTRAST: CPT | Performed by: RADIOLOGY

## 2020-11-03 PROCEDURE — 36415 COLL VENOUS BLD VENIPUNCTURE: CPT | Performed by: INTERNAL MEDICINE

## 2020-11-05 NOTE — PROGRESS NOTES
Oncology Follow-up Visit:  Nov 6, 2020      PCP: Dr. Rahman  Diagnosis: Low grade, stage IVB (BM involvement, night sweats) follicular lymphoma.  FLIPI score is 3 (one for stage IV, one for age>60, and one for elevated LDH), associated with 52% median 5 year OS survival and 36% median 10 year overall survival.     Oncologic History:    presented with night sweats and lymphadenopathy. She developed drenching night sweats in 11/2011 which persisted. She had no other associated constitutional symptoms such as fevers or weight loss. She had a negative ID workup with Dr. Ruiz (for valley fever as she had traveled to [place] in the past).   As part of evaluation, CT scan was performed in April 2012 which demonstrated multiple borderline mildly enlarged retroperitoneal and mesenteric lymph nodes, with the largest size of 1.5 cm. The patient, however, persisted to have night sweats and underwent repeat CTCAP done without contrast (she is allergic to IV contrast and had an anaphylactic reaction to it) which demonstrated unchanged dilatation of common bile duct, likely related to postcholecystectomy state, and stable mildly enlarged retroperitoneal and mesenteric lymph nodes which were nonspecific.   We proceeded with CT guided biopsy of one of the intraabdominal lymph nodes and results c/w low grade follicular lymphoma.  Flow cytometry showed kappa monotypic CD10 positive B cells.  We proceeded with bone marrow biopsy to complete her staging. The results, as detailed below, revealed involvement of marrow by follicular lymphoma. Flow cytometry showed a rare population of CD10-positive, kappa-monotypic B cells (0.2%). Molecular diagnostics was negative for B-cell gene rearrangement. Cytogenetics revealed, of the interphase cells examined, 0.2% had a signal pattern indicative of an IGH/BCL2 gene fusion, a rate that falls just slightly above the normal control range (0-0.1%) for our laboratory; thus, these findings are  "suspicious for the presence of low-level bone marrow involvement by follicular lymphoma. However, this cannot be determined with certainty, in light of the few abnormal cells found by FISH. Of note, a G-banded chromosome study is still pending.   Hep B/C negative. EVE negative. No M-spike on SPEP.    Treatment: Weekly Rituxan x 4, completed 9/13/2012. During her first Rituxan infusion she has developed a reaction with tingling of upper lip and chin and feeling \"cold\" in her chest. She was given IV solumedrol and the infusion was slowed down and these symptoms have resolved. Since then she has been premedicated with solumedrol, and all infusions were uneventful since.    Interval History:  Ms. Potter is a 85 year old female diagnosed with follicular lymphoma present today for follow-up. She has completed 4 weekly doses of Rituxan in 09/2012.   Her night sweats have resolved subsequently and LDH has remained normal.    She had a screening mammogram in 12/2019 which was negative.  CT of the chest, abdomen and pelvis on November 2020 showed:  1. No new adenopathy identified in the chest abdomen and pelvis.  Stable scattered mesenteric lymph nodes with adjacent haziness of the  mesenteric fat.  2. Resolution of previously described pulmonary nodularity at the  right lower lobe.  3. Diverticulosis without evidence of diverticulitis.  We have reviewed CT findings and I have reviewed CT images.   She has had no constitutional symptoms.  She has Dupuytren's contractures in the little and ring fingers of her right hand.  She has also had increased frequency of migraine headaches and I recommended she see a neurologist for further evaluation.  She has had midepigastric abdominal pain and tenderness and occasional right-sided pelvic pain.  She has lost about 5 to 6 pounds in the last 6 months.   In addition, a complete 12 point  review of systems is negative.        Past medical, social, surgical, and family histories " "reviewed.  Echocardiogram in 10/2014 for evaluation of benign palpitations showed left ventricular function is normal with an EF of 55-60%.  Left ventricular Doppler filling pattern consistent with abnormal relaxation.  DEXA scan on 5/12/2015 showed most neg T score of -2.2 in the lumbar spine. This was unchanged compared to 11/2010. She was on Boneva years ago but apparently developed L jaw tingling and is no longer on biphosphonates.       Allergies:  Allergies as of 11/06/2020 - Reviewed 09/29/2020   Allergen Reaction Noted     Diatrizoate Other (See Comments) 05/28/2008     Bisphosphonates GI Disturbance 10/01/2009     Ivp dye [contrast dye] Swelling 10/01/2009     Lisinopril Cough 06/24/2013     Losartan Hives 11/22/2010     Morphine Hives 06/06/2018     Morphine sulfate Nausea and Vomiting 08/16/2012     Prilosec [omeprazole] Hives 10/01/2009     Latex Rash 09/19/2014       Current Medications:  Current Outpatient Medications   Medication Sig Dispense Refill     Aspirin-Acetaminophen-Caffeine (EXCEDRIN MIGRAINE PO) Take 1 tablet by mouth as needed       carboxymethylcellulose (REFRESH PLUS) 0.5 % SOLN Place 1 drop into both eyes 3 times daily as needed for dry eyes       HYZAAR 50-12.5 MG tablet Take 1 tablet by mouth daily 90 tablet 3     latanoprost (XALATAN) 0.005 % ophthalmic solution Place 1 drop into both eyes At Bedtime 10 mL 3     levothyroxine (SYNTHROID/LEVOTHROID) 50 MCG tablet Take 1 tablet (50 mcg) by mouth daily 90 tablet 3     MELATONIN PO Take 3 mg by mouth nightly as needed        Multiple Vitamin (MULTI-VITAMIN) per tablet Take 1 tablet by mouth daily.         nystatin (MYCOSTATIN) 839866 UNIT/ML suspension Take 5 mLs (500,000 Units) by mouth 4 times daily Swish and spit with 5 ml four times a day 400 mL 1        Physical Exam:  BP (!) 163/77 (BP Location: Right arm, Patient Position: Chair, Cuff Size: Adult Small)   Pulse 84   Temp 98.6  F (37  C) (Oral)   Ht 1.588 m (5' 2.5\")   Wt 61.2 " kg (135 lb)   LMP 10/01/1986   SpO2 96%   BMI 24.30 kg/m    Wt Readings from Last 5 Encounters:   11/06/20 61.2 kg (135 lb)   06/12/20 64.1 kg (141 lb 6.4 oz)   04/30/20 66.2 kg (146 lb)   10/01/19 63.9 kg (140 lb 14.4 oz)   07/24/19 64.9 kg (143 lb)         GENERAL APPEARANCE: healthy, alert and no distress  HEENT: PEERLA, no neck asymmetry or masses  Lymphatics: No cervical, supraclavicular, axillary lymphadenopathy bilaterally.  No inguinal lymphadenopathy bilaterally    CV: S1S2 reg no murmur  Respiratory: CTA b/l  GI: soft,  BS+, NT, ND  Extremities: no edema.    SKIN: no suspicious lesions or rashes    PSYCHIATRIC: mentation appears normal and affect normal  Neuro: gait intact. axox3    Labs:  Orders Only on 11/03/2020   Component Date Value Ref Range Status     WBC 11/03/2020 4.5  4.0 - 11.0 10e9/L Final     RBC Count 11/03/2020 4.38  3.8 - 5.2 10e12/L Final     Hemoglobin 11/03/2020 12.8  11.7 - 15.7 g/dL Final     Hematocrit 11/03/2020 38.7  35.0 - 47.0 % Final     MCV 11/03/2020 88  78 - 100 fl Final     MCH 11/03/2020 29.2  26.5 - 33.0 pg Final     MCHC 11/03/2020 33.1  31.5 - 36.5 g/dL Final     RDW 11/03/2020 12.9  10.0 - 15.0 % Final     Platelet Count 11/03/2020 156  150 - 450 10e9/L Final     Diff Method 11/03/2020 Automated Method   Final     % Neutrophils 11/03/2020 55.5  % Final     % Lymphocytes 11/03/2020 33.6  % Final     % Monocytes 11/03/2020 9.4  % Final     % Eosinophils 11/03/2020 1.1  % Final     % Basophils 11/03/2020 0.2  % Final     % Immature Granulocytes 11/03/2020 0.2  % Final     Absolute Neutrophil 11/03/2020 2.5  1.6 - 8.3 10e9/L Final     Absolute Lymphocytes 11/03/2020 1.5  0.8 - 5.3 10e9/L Final     Absolute Monocytes 11/03/2020 0.4  0.0 - 1.3 10e9/L Final     Absolute Eosinophils 11/03/2020 0.1  0.0 - 0.7 10e9/L Final     Absolute Basophils 11/03/2020 0.0  0.0 - 0.2 10e9/L Final     Abs Immature Granulocytes 11/03/2020 0.0  0 - 0.4 10e9/L Final     Sodium 11/03/2020 142   133 - 144 mmol/L Final     Potassium 11/03/2020 4.2  3.4 - 5.3 mmol/L Final     Chloride 11/03/2020 108  94 - 109 mmol/L Final     Carbon Dioxide 11/03/2020 30  20 - 32 mmol/L Final     Anion Gap 11/03/2020 4  3 - 14 mmol/L Final     Glucose 11/03/2020 115* 70 - 99 mg/dL Final     Urea Nitrogen 11/03/2020 27  7 - 30 mg/dL Final     Creatinine 11/03/2020 0.90  0.52 - 1.04 mg/dL Final     GFR Estimate 11/03/2020 59* >60 mL/min/[1.73_m2] Final    Comment: Non  GFR Calc  Starting 12/18/2018, serum creatinine based estimated GFR (eGFR) will be   calculated using the Chronic Kidney Disease Epidemiology Collaboration   (CKD-EPI) equation.       GFR Estimate If Black 11/03/2020 68  >60 mL/min/[1.73_m2] Final    Comment:  GFR Calc  Starting 12/18/2018, serum creatinine based estimated GFR (eGFR) will be   calculated using the Chronic Kidney Disease Epidemiology Collaboration   (CKD-EPI) equation.       Calcium 11/03/2020 9.1  8.5 - 10.1 mg/dL Final     Bilirubin Total 11/03/2020 0.7  0.2 - 1.3 mg/dL Final     Albumin 11/03/2020 3.5  3.4 - 5.0 g/dL Final     Protein Total 11/03/2020 7.0  6.8 - 8.8 g/dL Final     Alkaline Phosphatase 11/03/2020 74  40 - 150 U/L Final     ALT 11/03/2020 21  0 - 50 U/L Final     AST 11/03/2020 16  0 - 45 U/L Final     Lactate Dehydrogenase 11/03/2020 166  81 - 234 U/L Final         ASSESSMENT/PLAN:  Hilda is a very pleasant 85 year old woman  with stage IV follicular lymphoma, FLIPI score is 3 (one for stage IV, one for age>60, and one for elevated LDH), associated with 52% median 5 year OS survival and 36% median 10 year overall survival. Most recent CT of the chest, abdomen and pelvis on 9/30/2019 showed no e/o lymphoma recurrence with stable posttreatment changes of lymphoma. There was no significant change in prominent mesenteric. There were stable tiny pulmonary nodules from 4/26/2016, including 2 mm nodule in the right base.     1. NHL.  S/p Rituxan x4,  completed in 09/2012. Responded to treatment with resolution of night sweats and decrease in lymphadenopathy. We'll continue to observe her from now on. Clinically and radiographically based on CT scan from November 3, 2020, she continued to be in remission from NHL, with stable shotty lymphadenopathy in the mesentery radiographically.   We'll see her back in 6 months with labs - cbcd, cmp, LDH. She had anaphylactic allergy to CT contrast dye in the past and her CTs are ordered without contrast.  I feel with no evidence of disease progression for years on her CT imaging, we can stretch out her CT surveillance interval to  2 years (next due in November 2022).    2. Immunizations - She is uptodate with  Pneumovax, Prevnar vaccinations.  She has already received annual influenza vaccination.    3.  Dupuytren's contractures right hand-she will be following up with orthopedic surgery.    4.  Hyperglycemia-fasting blood glucose of 115 on November 3, 2020.  We proceeded with hemoglobin A1c testing and it was 5.4%.  She does not have diabetes.  Recommending limiting carbohydrate intake in her diet.      5. Breast cancer screening Screening mammogram negative 12/2019. Recommended annual screening mammograms.  Next mammogram due in December 2020 and orders were placed.  At the end of our visit the patient verbalized understanding, denied any further questions, and concurred with the plan of care.

## 2020-11-06 ENCOUNTER — ONCOLOGY VISIT (OUTPATIENT)
Dept: ONCOLOGY | Facility: CLINIC | Age: 85
End: 2020-11-06
Attending: INTERNAL MEDICINE
Payer: MEDICARE

## 2020-11-06 VITALS
HEIGHT: 63 IN | DIASTOLIC BLOOD PRESSURE: 77 MMHG | BODY MASS INDEX: 23.92 KG/M2 | SYSTOLIC BLOOD PRESSURE: 163 MMHG | TEMPERATURE: 98.6 F | HEART RATE: 84 BPM | OXYGEN SATURATION: 96 % | WEIGHT: 135 LBS

## 2020-11-06 DIAGNOSIS — Z12.31 VISIT FOR SCREENING MAMMOGRAM: ICD-10-CM

## 2020-11-06 DIAGNOSIS — R73.9 HYPERGLYCEMIA: Primary | ICD-10-CM

## 2020-11-06 DIAGNOSIS — C82.00 GRADE I FOLLICULAR SMALL CLEAVED CELL LYMPHOMA (H): ICD-10-CM

## 2020-11-06 LAB — HBA1C MFR BLD: 5.4 % (ref 0–5.6)

## 2020-11-06 PROCEDURE — 99214 OFFICE O/P EST MOD 30 MIN: CPT | Performed by: INTERNAL MEDICINE

## 2020-11-06 ASSESSMENT — PAIN SCALES - GENERAL: PAINLEVEL: NO PAIN (0)

## 2020-11-06 ASSESSMENT — MIFFLIN-ST. JEOR: SCORE: 1023.55

## 2020-11-06 NOTE — NURSING NOTE
"Oncology Rooming Note    November 6, 2020 2:44 PM   Hilda Potter is a 85 year old female who presents for:    Chief Complaint   Patient presents with     Oncology Clinic Visit     follow up     Initial Vitals: BP (!) 163/77 (BP Location: Right arm, Patient Position: Chair, Cuff Size: Adult Small)   Pulse 84   Temp 98.6  F (37  C) (Oral)   Ht 1.588 m (5' 2.5\")   Wt 61.2 kg (135 lb)   LMP 10/01/1986   SpO2 96%   BMI 24.30 kg/m   Estimated body mass index is 24.3 kg/m  as calculated from the following:    Height as of this encounter: 1.588 m (5' 2.5\").    Weight as of this encounter: 61.2 kg (135 lb). Body surface area is 1.64 meters squared.  No Pain (0) Comment: Data Unavailable   Patient's last menstrual period was 10/01/1986.  Allergies reviewed: Yes  Medications reviewed: Yes    Medications: Medication refills not needed today.  Pharmacy name entered into Psychiatric:    [id number] - [place] [id number] AT [facility] [place] [facility] ONCOLOG[facility] PHARMACY  [facility] - [place], [place] - [address] [place] [address]  [id number] - [place], [place] - [address] AT [facility] [address]    Clinical concerns: Yes Dr. Roth was notified.      Cecilia Blanco CMA              "

## 2020-11-06 NOTE — LETTER
11/6/2020         RE: Hilda Potter  [address]  [facility] [address]        Dear Colleague,    Thank you for referring your patient, Hilda Potter, to the [facility]. Please see a copy of my visit note below.    Oncology Follow-up Visit:  Nov 6, 2020      PCP: Dr. Rahman  Diagnosis: Low grade, stage IVB (BM involvement, night sweats) follicular lymphoma.  FLIPI score is 3 (one for stage IV, one for age>60, and one for elevated LDH), associated with 52% median 5 year OS survival and 36% median 10 year overall survival.     Oncologic History:    presented with night sweats and lymphadenopathy. She developed drenching night sweats in 11/2011 which persisted. She had no other associated constitutional symptoms such as fevers or weight loss. She had a negative ID workup with Dr. Ruiz (for valley fever as she had traveled to [place] in the past).   As part of evaluation, CT scan was performed in April 2012 which demonstrated multiple borderline mildly enlarged retroperitoneal and mesenteric lymph nodes, with the largest size of 1.5 cm. The patient, however, persisted to have night sweats and underwent repeat CTCAP done without contrast (she is allergic to IV contrast and had an anaphylactic reaction to it) which demonstrated unchanged dilatation of common bile duct, likely related to postcholecystectomy state, and stable mildly enlarged retroperitoneal and mesenteric lymph nodes which were nonspecific.   We proceeded with CT guided biopsy of one of the intraabdominal lymph nodes and results c/w low grade follicular lymphoma.  Flow cytometry showed kappa monotypic CD10 positive B cells.  We proceeded with bone marrow biopsy to complete her staging. The results, as detailed below, revealed involvement of marrow by follicular lymphoma. Flow cytometry showed a rare population of CD10-positive, kappa-monotypic B cells (0.2%). Molecular diagnostics was negative for  "B-cell gene rearrangement. Cytogenetics revealed, of the interphase cells examined, 0.2% had a signal pattern indicative of an IGH/BCL2 gene fusion, a rate that falls just slightly above the normal control range (0-0.1%) for our laboratory; thus, these findings are suspicious for the presence of low-level bone marrow involvement by follicular lymphoma. However, this cannot be determined with certainty, in light of the few abnormal cells found by FISH. Of note, a G-banded chromosome study is still pending.   Hep B/C negative. EVE negative. No M-spike on SPEP.    Treatment: Weekly Rituxan x 4, completed 9/13/2012. During her first Rituxan infusion she has developed a reaction with tingling of upper lip and chin and feeling \"cold\" in her chest. She was given IV solumedrol and the infusion was slowed down and these symptoms have resolved. Since then she has been premedicated with solumedrol, and all infusions were uneventful since.    Interval History:  Ms. Potter is a 85 year old female diagnosed with follicular lymphoma present today for follow-up. She has completed 4 weekly doses of Rituxan in 09/2012.   Her night sweats have resolved subsequently and LDH has remained normal.    She had a screening mammogram in 12/2019 which was negative.  CT of the chest, abdomen and pelvis on November 2020 showed:  1. No new adenopathy identified in the chest abdomen and pelvis.  Stable scattered mesenteric lymph nodes with adjacent haziness of the  mesenteric fat.  2. Resolution of previously described pulmonary nodularity at the  right lower lobe.  3. Diverticulosis without evidence of diverticulitis.  We have reviewed CT findings and I have reviewed CT images.   She has had no constitutional symptoms.  She has Dupuytren's contractures in the little and ring fingers of her right hand.  She has also had increased frequency of migraine headaches and I recommended she see a neurologist for further evaluation.  She has had " midepigastric abdominal pain and tenderness and occasional right-sided pelvic pain.  She has lost about 5 to 6 pounds in the last 6 months.   In addition, a complete 12 point  review of systems is negative.        Past medical, social, surgical, and family histories reviewed.  Echocardiogram in 10/2014 for evaluation of benign palpitations showed left ventricular function is normal with an EF of 55-60%.  Left ventricular Doppler filling pattern consistent with abnormal relaxation.  DEXA scan on 5/12/2015 showed most neg T score of -2.2 in the lumbar spine. This was unchanged compared to 11/2010. She was on Boneva years ago but apparently developed L jaw tingling and is no longer on biphosphonates.       Allergies:  Allergies as of 11/06/2020 - Reviewed 09/29/2020   Allergen Reaction Noted     Diatrizoate Other (See Comments) 05/28/2008     Bisphosphonates GI Disturbance 10/01/2009     Ivp dye [contrast dye] Swelling 10/01/2009     Lisinopril Cough 06/24/2013     Losartan Hives 11/22/2010     Morphine Hives 06/06/2018     Morphine sulfate Nausea and Vomiting 08/16/2012     Prilosec [omeprazole] Hives 10/01/2009     Latex Rash 09/19/2014       Current Medications:  Current Outpatient Medications   Medication Sig Dispense Refill     Aspirin-Acetaminophen-Caffeine (EXCEDRIN MIGRAINE PO) Take 1 tablet by mouth as needed       carboxymethylcellulose (REFRESH PLUS) 0.5 % SOLN Place 1 drop into both eyes 3 times daily as needed for dry eyes       HYZAAR 50-12.5 MG tablet Take 1 tablet by mouth daily 90 tablet 3     latanoprost (XALATAN) 0.005 % ophthalmic solution Place 1 drop into both eyes At Bedtime 10 mL 3     levothyroxine (SYNTHROID/LEVOTHROID) 50 MCG tablet Take 1 tablet (50 mcg) by mouth daily 90 tablet 3     MELATONIN PO Take 3 mg by mouth nightly as needed        Multiple Vitamin (MULTI-VITAMIN) per tablet Take 1 tablet by mouth daily.         nystatin (MYCOSTATIN) 448133 UNIT/ML suspension Take 5 mLs (500,000  "Units) by mouth 4 times daily Swish and spit with 5 ml four times a day 400 mL 1        Physical Exam:  BP (!) 163/77 (BP Location: Right arm, Patient Position: Chair, Cuff Size: Adult Small)   Pulse 84   Temp 98.6  F (37  C) (Oral)   Ht 1.588 m (5' 2.5\")   Wt 61.2 kg (135 lb)   LMP 10/01/1986   SpO2 96%   BMI 24.30 kg/m    Wt Readings from Last 5 Encounters:   11/06/20 61.2 kg (135 lb)   06/12/20 64.1 kg (141 lb 6.4 oz)   04/30/20 66.2 kg (146 lb)   10/01/19 63.9 kg (140 lb 14.4 oz)   07/24/19 64.9 kg (143 lb)         GENERAL APPEARANCE: healthy, alert and no distress  HEENT: PEERLA, no neck asymmetry or masses  Lymphatics: No cervical, supraclavicular, axillary lymphadenopathy bilaterally.  No inguinal lymphadenopathy bilaterally    CV: S1S2 reg no murmur  Respiratory: CTA b/l  GI: soft,  BS+, NT, ND  Extremities: no edema.    SKIN: no suspicious lesions or rashes    PSYCHIATRIC: mentation appears normal and affect normal  Neuro: gait intact. axox3    Labs:  Orders Only on 11/03/2020   Component Date Value Ref Range Status     WBC 11/03/2020 4.5  4.0 - 11.0 10e9/L Final     RBC Count 11/03/2020 4.38  3.8 - 5.2 10e12/L Final     Hemoglobin 11/03/2020 12.8  11.7 - 15.7 g/dL Final     Hematocrit 11/03/2020 38.7  35.0 - 47.0 % Final     MCV 11/03/2020 88  78 - 100 fl Final     MCH 11/03/2020 29.2  26.5 - 33.0 pg Final     MCHC 11/03/2020 33.1  31.5 - 36.5 g/dL Final     RDW 11/03/2020 12.9  10.0 - 15.0 % Final     Platelet Count 11/03/2020 156  150 - 450 10e9/L Final     Diff Method 11/03/2020 Automated Method   Final     % Neutrophils 11/03/2020 55.5  % Final     % Lymphocytes 11/03/2020 33.6  % Final     % Monocytes 11/03/2020 9.4  % Final     % Eosinophils 11/03/2020 1.1  % Final     % Basophils 11/03/2020 0.2  % Final     % Immature Granulocytes 11/03/2020 0.2  % Final     Absolute Neutrophil 11/03/2020 2.5  1.6 - 8.3 10e9/L Final     Absolute Lymphocytes 11/03/2020 1.5  0.8 - 5.3 10e9/L Final     Absolute " Monocytes 11/03/2020 0.4  0.0 - 1.3 10e9/L Final     Absolute Eosinophils 11/03/2020 0.1  0.0 - 0.7 10e9/L Final     Absolute Basophils 11/03/2020 0.0  0.0 - 0.2 10e9/L Final     Abs Immature Granulocytes 11/03/2020 0.0  0 - 0.4 10e9/L Final     Sodium 11/03/2020 142  133 - 144 mmol/L Final     Potassium 11/03/2020 4.2  3.4 - 5.3 mmol/L Final     Chloride 11/03/2020 108  94 - 109 mmol/L Final     Carbon Dioxide 11/03/2020 30  20 - 32 mmol/L Final     Anion Gap 11/03/2020 4  3 - 14 mmol/L Final     Glucose 11/03/2020 115* 70 - 99 mg/dL Final     Urea Nitrogen 11/03/2020 27  7 - 30 mg/dL Final     Creatinine 11/03/2020 0.90  0.52 - 1.04 mg/dL Final     GFR Estimate 11/03/2020 59* >60 mL/min/[1.73_m2] Final    Comment: Non  GFR Calc  Starting 12/18/2018, serum creatinine based estimated GFR (eGFR) will be   calculated using the Chronic Kidney Disease Epidemiology Collaboration   (CKD-EPI) equation.       GFR Estimate If Black 11/03/2020 68  >60 mL/min/[1.73_m2] Final    Comment:  GFR Calc  Starting 12/18/2018, serum creatinine based estimated GFR (eGFR) will be   calculated using the Chronic Kidney Disease Epidemiology Collaboration   (CKD-EPI) equation.       Calcium 11/03/2020 9.1  8.5 - 10.1 mg/dL Final     Bilirubin Total 11/03/2020 0.7  0.2 - 1.3 mg/dL Final     Albumin 11/03/2020 3.5  3.4 - 5.0 g/dL Final     Protein Total 11/03/2020 7.0  6.8 - 8.8 g/dL Final     Alkaline Phosphatase 11/03/2020 74  40 - 150 U/L Final     ALT 11/03/2020 21  0 - 50 U/L Final     AST 11/03/2020 16  0 - 45 U/L Final     Lactate Dehydrogenase 11/03/2020 166  81 - 234 U/L Final         ASSESSMENT/PLAN:  Hilda is a very pleasant 85 year old woman  with stage IV follicular lymphoma, FLIPI score is 3 (one for stage IV, one for age>60, and one for elevated LDH), associated with 52% median 5 year OS survival and 36% median 10 year overall survival. Most recent CT of the chest, abdomen and pelvis on 9/30/2019  showed no e/o lymphoma recurrence with stable posttreatment changes of lymphoma. There was no significant change in prominent mesenteric. There were stable tiny pulmonary nodules from 4/26/2016, including 2 mm nodule in the right base.     1. NHL.  S/p Rituxan x4, completed in 09/2012. Responded to treatment with resolution of night sweats and decrease in lymphadenopathy. We'll continue to observe her from now on. Clinically and radiographically based on CT scan from November 3, 2020, she continued to be in remission from NHL, with stable shotty lymphadenopathy in the mesentery radiographically.   We'll see her back in 6 months with labs - cbcd, cmp, LDH. She had anaphylactic allergy to CT contrast dye in the past and her CTs are ordered without contrast.  I feel with no evidence of disease progression for years on her CT imaging, we can stretch out her CT surveillance interval to  2 years (next due in November 2022).    2. Immunizations - She is uptodate with  Pneumovax, Prevnar vaccinations.  She has already received annual influenza vaccination.    3.  Dupuytren's contractures right hand-she will be following up with orthopedic surgery.    4.  Hyperglycemia-fasting blood glucose of 115 on November 3, 2020.  We proceeded with hemoglobin A1c testing and it was 5.4%.  She does not have diabetes.  Recommending limiting carbohydrate intake in her diet.      5. Breast cancer screening Screening mammogram negative 12/2019. Recommended annual screening mammograms.  Next mammogram due in December 2020 and orders were placed.  At the end of our visit the patient verbalized understanding, denied any further questions, and concurred with the plan of care.                      Again, thank you for allowing me to participate in the care of your patient.        Sincerely,        Jane Roth MD, MD

## 2020-12-14 ENCOUNTER — ANCILLARY PROCEDURE (OUTPATIENT)
Dept: MAMMOGRAPHY | Facility: CLINIC | Age: 85
End: 2020-12-14
Attending: INTERNAL MEDICINE
Payer: MEDICARE

## 2020-12-14 DIAGNOSIS — Z12.31 VISIT FOR SCREENING MAMMOGRAM: ICD-10-CM

## 2020-12-14 PROCEDURE — 77067 SCR MAMMO BI INCL CAD: CPT | Performed by: RADIOLOGY

## 2020-12-22 ENCOUNTER — TRANSFERRED RECORDS (OUTPATIENT)
Dept: HEALTH INFORMATION MANAGEMENT | Facility: CLINIC | Age: 85
End: 2020-12-22

## 2020-12-30 ENCOUNTER — TELEPHONE (OUTPATIENT)
Dept: ONCOLOGY | Facility: CLINIC | Age: 85
End: 2020-12-30

## 2020-12-30 NOTE — TELEPHONE ENCOUNTER
Received vm from patient. She would like Dr Roth's recommendation if she should receive the covid vaccine. She has a history of lymphoma and anaphylactic reaction to IVP dye. Also had a reaction to shingles vaccine.   Daly Cleaning  RN, BSN, OCN    Update 1/7/21  Left vm with recommendation per Dr Roth. She advised patient should receive COVID vaccine. Provided call back number if she has any further questions.  Daly Cleaning RN, BSN, OCN

## 2021-01-05 ENCOUNTER — OFFICE VISIT (OUTPATIENT)
Dept: FAMILY MEDICINE | Facility: CLINIC | Age: 86
End: 2021-01-05
Payer: MEDICARE

## 2021-01-05 VITALS
WEIGHT: 138 LBS | RESPIRATION RATE: 16 BRPM | SYSTOLIC BLOOD PRESSURE: 138 MMHG | TEMPERATURE: 97.5 F | DIASTOLIC BLOOD PRESSURE: 74 MMHG | OXYGEN SATURATION: 97 % | BODY MASS INDEX: 24.84 KG/M2 | HEART RATE: 85 BPM

## 2021-01-05 DIAGNOSIS — M72.0 DUPUYTREN'S CONTRACTURE OF RIGHT HAND: ICD-10-CM

## 2021-01-05 DIAGNOSIS — I10 ESSENTIAL HYPERTENSION WITH GOAL BLOOD PRESSURE LESS THAN 140/90: ICD-10-CM

## 2021-01-05 DIAGNOSIS — Z01.818 PREOP GENERAL PHYSICAL EXAM: Primary | ICD-10-CM

## 2021-01-05 LAB
ANION GAP SERPL CALCULATED.3IONS-SCNC: 6 MMOL/L (ref 3–14)
BUN SERPL-MCNC: 23 MG/DL (ref 7–30)
CALCIUM SERPL-MCNC: 9.3 MG/DL (ref 8.5–10.1)
CHLORIDE SERPL-SCNC: 105 MMOL/L (ref 94–109)
CO2 SERPL-SCNC: 29 MMOL/L (ref 20–32)
CREAT SERPL-MCNC: 0.94 MG/DL (ref 0.52–1.04)
ERYTHROCYTE [DISTWIDTH] IN BLOOD BY AUTOMATED COUNT: 13.1 % (ref 10–15)
GFR SERPL CREATININE-BSD FRML MDRD: 56 ML/MIN/{1.73_M2}
GLUCOSE SERPL-MCNC: 110 MG/DL (ref 70–99)
HCT VFR BLD AUTO: 38.5 % (ref 35–47)
HGB BLD-MCNC: 12.7 G/DL (ref 11.7–15.7)
MCH RBC QN AUTO: 30.1 PG (ref 26.5–33)
MCHC RBC AUTO-ENTMCNC: 33 G/DL (ref 31.5–36.5)
MCV RBC AUTO: 91 FL (ref 78–100)
PLATELET # BLD AUTO: 161 10E9/L (ref 150–450)
POTASSIUM SERPL-SCNC: 4.2 MMOL/L (ref 3.4–5.3)
RBC # BLD AUTO: 4.22 10E12/L (ref 3.8–5.2)
SODIUM SERPL-SCNC: 140 MMOL/L (ref 133–144)
WBC # BLD AUTO: 4.5 10E9/L (ref 4–11)

## 2021-01-05 PROCEDURE — 36415 COLL VENOUS BLD VENIPUNCTURE: CPT | Performed by: FAMILY MEDICINE

## 2021-01-05 PROCEDURE — 93000 ELECTROCARDIOGRAM COMPLETE: CPT | Performed by: FAMILY MEDICINE

## 2021-01-05 PROCEDURE — 85027 COMPLETE CBC AUTOMATED: CPT | Performed by: FAMILY MEDICINE

## 2021-01-05 PROCEDURE — 99214 OFFICE O/P EST MOD 30 MIN: CPT | Performed by: FAMILY MEDICINE

## 2021-01-05 PROCEDURE — 80048 BASIC METABOLIC PNL TOTAL CA: CPT | Performed by: FAMILY MEDICINE

## 2021-01-05 NOTE — PROGRESS NOTES
[facility] [place]  [address]  [place] [address]  Phone: [phone]  Primary Provider: Larry Dobson  Pre-op Performing Provider: LARRY DOBSON    PREOPERATIVE EVALUATION:  Today's date: 1/5/2021      Hilda Potter is a 85 year old female who presents for a preoperative evaluation.    Surgical Information:  Surgery/Procedure: Dupuytren Contracture - right hand   Surgery Location: [facility]   Surgeon: Dr. Pantoja  Surgery Date: 1/14/21  Time of Surgery: TBD  Where patient plans to recover: At home with family  Fax number for surgical facility: [phone]    Type of Anesthesia Anticipated: General    Subjective     HPI related to upcoming procedure:     85 year old female here for a Pre-op exam.   She has Dupuytren Contracture - right hand. She has been seen and evaluated by Ortho and agreed to proceed with surgical treatment option.   States that she understands the risks and benefits of the procedure and wishes to proceed.     Denies having any concerns today. No recent/current infections.       Preop Questions 1/5/2021   1. Have you ever had a heart attack or stroke? No   2. Have you ever had surgery on your heart or blood vessels, such as a stent placement, a coronary artery bypass, or surgery on an artery in your head, neck, heart, or legs? No   3. Do you have chest pain with activity? No   4. Do you have a history of  heart failure? No   5. Do you currently have a cold, bronchitis or symptoms of other infection? No   6. Do you have a cough, shortness of breath, or wheezing? No   7. Do you or anyone in your family have previous history of blood clots? No   8. Do you or does anyone in your family have a serious bleeding problem such as prolonged bleeding following surgeries or cuts? No   9. Have you ever had problems with anemia or been told to take iron pills? No   10. Have you had any abnormal blood loss such as black, tarry or bloody stools, or  abnormal vaginal bleeding? No   11. Have you ever had a blood transfusion? No   12. Are you willing to have a blood transfusion if it is medically needed before, during, or after your surgery? Yes   13. Have you or any of your relatives ever had problems with anesthesia? No   14. Do you have sleep apnea, excessive snoring or daytime drowsiness? No   15. Do you have any artifical heart valves or other implanted medical devices like a pacemaker, defibrillator, or continuous glucose monitor? No   16. Do you have artificial joints? No   17. Are you allergic to latex? No     Health Care Directive:  Patient has a Health Care Directive on file      Preoperative Review of :   reviewed - no record of controlled substances prescribed.      Status of Chronic Conditions:  HYPERTENSION - Patient has longstanding history of HTN , currently denies any symptoms referable to elevated blood pressure. Specifically denies chest pain, palpitations, dyspnea, orthopnea, PND or peripheral edema. Blood pressure is elevated in the clinic today, states that she takes 1/2 tablet of Hyzaar 50-12.5 mg/day. Reports occasional lightheadedness with the full dose.        Review of Systems  Constitutional, neuro, ENT, endocrine, pulmonary, cardiac, gastrointestinal, genitourinary, musculoskeletal, integument and psychiatric systems are negative, except as otherwise noted.    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 11/04/2018     Priority: Medium     Posterior vitreous detachment of left eye 10/12/2018     Priority: Medium     Migraine without status migrainosus, not intractable, unspecified migraine type 09/10/2018     Priority: Medium     Combined forms of age-related cataract, mild-mod, both eyes 11/30/2017     Priority: Medium     Essential hypertension with goal blood pressure less than 140/90 10/03/2017     Priority: Medium     Hypothyroidism, unspecified type 09/16/2016     Priority:  Medium     Dupuytren's contracture of right hand 11/09/2015     Priority: Medium     Lung nodule < 6cm on CT 05/15/2015     Priority: Medium     Compression fracture of L1 lumbar vertebra, seen on CT 04/01/2015     Priority: Medium     Premature beats 10/29/2014     Priority: Medium     Problem list name updated by automated process. 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  Document previously scanned on 4-1-13.  Validation form completed and sent to be scanned.  Code Status needs to be updated to reflect choices in most recent ACP document-full code.  Confirmed/documented designated decision maker(s). See permanent comments section of demographics in clinical tab. View document(s) and details by clicking on code status.   Added by Rhiannon Sales RN System ACP Coordinator on 4/3/2013.    Advance Care Planning 11/13/2017: ACP Review of Chart / Resources Provided:  Reviewed chart for advance care plan.  Hilda Potter has an advance care plan which needs to be updated. Patient states presence of new/updated ACP document. Copy requested  Added by Madyson Matthews                     Patient states has Advance Directive and will bring in a copy to clinic.        GERD (gastroesophageal reflux disease) 06/02/2011     Priority: Medium     CARDIOVASCULAR SCREENING; LDL GOAL LESS THAN 130 05/26/2011     Priority: Medium     Osteoporosis 10/01/2009     Priority: Medium     AR (allergic rhinitis)      Priority: Medium     Reflux Esophagitis      Priority: Medium      Atrophic vaginitis      Priority: Medium      Past Medical History:   Diagnosis Date     AR (allergic rhinitis)      Atrophic vaginitis      Compression fracture of L1 lumbar vertebra (H) 4/2015     Glaucoma (increased eye pressure)      HTN (hypertension)      Migraine      Nonsenile cataract      Osteoporosis      Reflux esophagitis      Thyroid disease      Past Surgical History:   Procedure Laterality Date     APPENDECTOMY OPEN  1954     BLEPHAROPLASTY BILATERAL  10/2007    both eyes, upper lids     C LENGTHEN,TENDON,HAND/FINGER  1993    repair of dupytrons's contracture     CHOLECYSTECTOMY, OPEN  1992     COLONOSCOPY  6/02, 10/13    Q 5 years, polyps     D & C       REPAIR PTOSIS       SALPINGO OOPHORECTOMY,R/L/LUIS DANIEL      left ovary and tube     SMALL BOWEL RESECTION  1986    colon resection      TONSILLECTOMY & ADENOIDECTOMY      childhood     TUBAL LIGATION       UPPER GI ENDOSCOPY  6/02, 8/11     Current Outpatient Medications   Medication Sig Dispense Refill     Aspirin-Acetaminophen-Caffeine (EXCEDRIN MIGRAINE PO) Take 1 tablet by mouth as needed       carboxymethylcellulose (REFRESH PLUS) 0.5 % SOLN Place 1 drop into both eyes 3 times daily as needed for dry eyes       HYZAAR 50-12.5 MG tablet Take 1 tablet by mouth daily 90 tablet 3     latanoprost (XALATAN) 0.005 % ophthalmic solution Place 1 drop into both eyes At Bedtime 10 mL 3     levothyroxine (SYNTHROID/LEVOTHROID) 50 MCG tablet Take 1 tablet (50 mcg) by mouth daily 90 tablet 3     MELATONIN PO Take 3 mg by mouth nightly as needed        Multiple Vitamin (MULTI-VITAMIN) per tablet Take 1 tablet by mouth daily.           Allergies   Allergen Reactions     Diatrizoate Other (See Comments)     Bisphosphonates GI Disturbance     GI distress     Ivp Dye [Contrast Dye] Swelling     Lisinopril Cough     Losartan Hives     But can tolerate Hyzaar.      Morphine Hives     Morphine Sulfate Nausea and Vomiting     Prilosec [Omeprazole] Hives     Latex Rash         Social History     Tobacco Use     Smoking status: Never Smoker     Smokeless tobacco: Never Used   Substance Use Topics     Alcohol use: Yes     Comment: wine occasionally     Family History   Problem Relation Age of Onset     Hypertension Mother      Arthritis Mother      Cardiovascular Mother      Retinal detachment Mother      Migraines Mother      C.A.D. Father      Hypertension Father      Cerebrovascular Disease Father      Arthritis Father      Cardiovascular Father      Eye Disorder Father      Heart Disease Father      Glaucoma Father      Parkinsonism Father      Cardiovascular Brother      Glaucoma Brother      Peripheral Neuropathy Brother      Glaucoma Other      Macular Degeneration No family hx of      Bleeding Disorder No family hx of      Anesthesia Reaction No family hx of      History   Drug Use No         Objective     /74   Pulse 85   Temp 97.5  F (36.4  C) (Tympanic)   Resp 16   Wt 62.6 kg (138 lb)   LMP 10/01/1986   SpO2 97%   Breastfeeding No   BMI 24.84 kg/m      Physical Exam    GENERAL APPEARANCE: healthy, alert and no distress     EYES: EOMI, PERRL     HENT: ear canals and TM's normal and nose and mouth without ulcers or lesions     NECK: no adenopathy, no asymmetry, masses, or scars and thyroid normal to palpation     RESP: lungs clear to auscultation - no rales, rhonchi or wheezes     CV: regular rates and rhythm, normal S1 S2, no S3 or S4 and no murmur, click or rub     ABDOMEN:  soft, nontender, no HSM or masses and bowel sounds normal     MS: extremities normal- no gross deformities noted, no evidence of inflammation in joints, FROM in all extremities.     SKIN: no suspicious lesions or rashes     NEURO: Normal strength and tone, sensory exam grossly normal, mentation intact and speech normal     PSYCH: mentation appears normal. and affect normal/bright     LYMPHATICS: No cervical adenopathy    Recent Labs   Lab Test 11/03/20  0912 06/12/20  1348 04/27/20  1126    HGB 12.8  --  12.6     --  169     --  140   POTASSIUM 4.2  --  4.3   CR 0.90 0.87 1.02   A1C 5.4  --   --         Diagnostics:  Labs pending at this time.  Results will be reviewed when available.   EKG: appears normal, NSR, normal axis, normal intervals, no acute ST/T changes c/w ischemia, no LVH by voltage criteria, unchanged from previous tracings    Revised Cardiac Risk Index (RCRI):  The patient has the following serious cardiovascular risks for perioperative complications:   - No serious cardiac risks = 0 points     RCRI Interpretation: 0 points: Class I (very low risk - 0.4% complication rate)           Assessment & Plan   The proposed surgical procedure is considered INTERMEDIATE risk.    Hilda was seen today for pre-op exam.    Diagnoses and all orders for this visit:    Preop general physical exam  -     EKG 12-lead complete w/read - Clinics  -     CBC with platelets  -     Basic metabolic panel    Dupuytren's contracture of right hand    Essential hypertension with goal blood pressure less than 140/90  Repeat BP at the end of the visit was within goal.   Continue current management.      Risks and Recommendations:  The patient has the following additional risks and recommendations for perioperative complications:   - No identified additional risk factors other than previously addressed    Medication Instructions:   - ACE/ARB: HOLD due to exceptional risk of hypotension during surgery.     RECOMMENDATION:  APPROVAL GIVEN to proceed with proposed procedure, without further diagnostic evaluation.    Signed Electronically by: Ame Dobson MD    Copy of this evaluation report is provided to requesting physician.    [facility] Preop Guidelines    Revised Cardiac Risk Index

## 2021-01-05 NOTE — PATIENT INSTRUCTIONS

## 2021-01-05 NOTE — LETTER
January 8, 2021      Hilda LUCIA Tariq  [address] [facility] [address]        Dear ,    We are writing to inform you of your test results.    Your Pre-op labs were normal.   Wishing you the very best with the upcoming procedure.     Resulted Orders   CBC with platelets   Result Value Ref Range    WBC 4.5 4.0 - 11.0 10e9/L    RBC Count 4.22 3.8 - 5.2 10e12/L    Hemoglobin 12.7 11.7 - 15.7 g/dL    Hematocrit 38.5 35.0 - 47.0 %    MCV 91 78 - 100 fl    MCH 30.1 26.5 - 33.0 pg    MCHC 33.0 31.5 - 36.5 g/dL    RDW 13.1 10.0 - 15.0 %    Platelet Count 161 150 - 450 10e9/L   Basic metabolic panel   Result Value Ref Range    Sodium 140 133 - 144 mmol/L    Potassium 4.2 3.4 - 5.3 mmol/L    Chloride 105 94 - 109 mmol/L    Carbon Dioxide 29 20 - 32 mmol/L    Anion Gap 6 3 - 14 mmol/L    Glucose 110 (H) 70 - 99 mg/dL    Urea Nitrogen 23 7 - 30 mg/dL    Creatinine 0.94 0.52 - 1.04 mg/dL    GFR Estimate 56 (L) >60 mL/min/[1.73_m2]      Comment:      Non  GFR Calc  Starting 12/18/2018, serum creatinine based estimated GFR (eGFR) will be   calculated using the Chronic Kidney Disease Epidemiology Collaboration   (CKD-EPI) equation.      GFR Estimate If Black 64 >60 mL/min/[1.73_m2]      Comment:       GFR Calc  Starting 12/18/2018, serum creatinine based estimated GFR (eGFR) will be   calculated using the Chronic Kidney Disease Epidemiology Collaboration   (CKD-EPI) equation.      Calcium 9.3 8.5 - 10.1 mg/dL       If you have any questions or concerns, please call the clinic at the number listed above.       Sincerely,      Ame Dobson MD/ty

## 2021-01-26 ENCOUNTER — TRANSFERRED RECORDS (OUTPATIENT)
Dept: HEALTH INFORMATION MANAGEMENT | Facility: CLINIC | Age: 86
End: 2021-01-26

## 2021-01-30 ENCOUNTER — IMMUNIZATION (OUTPATIENT)
Dept: NURSING | Facility: CLINIC | Age: 86
End: 2021-01-30
Payer: MEDICARE

## 2021-01-30 PROCEDURE — 91300 PR COVID VAC PFIZER DIL RECON 30 MCG/0.3 ML IM: CPT

## 2021-01-30 PROCEDURE — 0001A PR COVID VAC PFIZER DIL RECON 30 MCG/0.3 ML IM: CPT

## 2021-02-20 ENCOUNTER — IMMUNIZATION (OUTPATIENT)
Dept: NURSING | Facility: CLINIC | Age: 86
End: 2021-02-20
Payer: MEDICARE

## 2021-02-20 PROCEDURE — 91300 PR COVID VAC PFIZER DIL RECON 30 MCG/0.3 ML IM: CPT

## 2021-02-20 PROCEDURE — 0002A PR COVID VAC PFIZER DIL RECON 30 MCG/0.3 ML IM: CPT

## 2021-02-23 ENCOUNTER — TRANSFERRED RECORDS (OUTPATIENT)
Dept: HEALTH INFORMATION MANAGEMENT | Facility: CLINIC | Age: 86
End: 2021-02-23

## 2021-03-10 ENCOUNTER — OFFICE VISIT (OUTPATIENT)
Dept: FAMILY MEDICINE | Facility: CLINIC | Age: 86
End: 2021-03-10
Payer: MEDICARE

## 2021-03-10 VITALS
TEMPERATURE: 96.7 F | BODY MASS INDEX: 24.73 KG/M2 | WEIGHT: 137.4 LBS | RESPIRATION RATE: 16 BRPM | HEART RATE: 73 BPM | OXYGEN SATURATION: 99 % | DIASTOLIC BLOOD PRESSURE: 74 MMHG | SYSTOLIC BLOOD PRESSURE: 139 MMHG

## 2021-03-10 DIAGNOSIS — H69.92 DYSFUNCTION OF LEFT EUSTACHIAN TUBE: Primary | ICD-10-CM

## 2021-03-10 PROCEDURE — 99214 OFFICE O/P EST MOD 30 MIN: CPT | Performed by: PHYSICIAN ASSISTANT

## 2021-03-10 RX ORDER — FLUTICASONE PROPIONATE 50 MCG
1 SPRAY, SUSPENSION (ML) NASAL DAILY PRN
Qty: 18.2 ML | Refills: 1 | Status: SHIPPED | OUTPATIENT
Start: 2021-03-10 | End: 2021-04-28

## 2021-03-10 NOTE — PATIENT INSTRUCTIONS
Anneliese Stevens,    Thank you for allowing [facility] to manage your care.    I am unsure of the cause of your symptoms, but your exam is reassuring. This could be Eustachian tube dysfunction.    If you develop worsening/changing symptoms at any time, please see us again or go to urgent care/the ER.    I sent your prescriptions to your pharmacy.    If you have any questions or concerns, please feel free to call us at [phone]    Sincerely,    Aubrey Mensah PA-C    Did you know?      You can schedule a video visit for follow-up appointments as well as future appointments for certain conditions.  Please see the below link.     [email or web address]    If you have not already done so,  I encourage you to sign up for Mychart ([email or web address]).  This will allow you to review your results, securely communicate with a provider, and schedule virtual visits as well.      Patient Education     Earache, No Infection (Adult)   Earaches can happen without an infection. They can occur when air and fluid build up behind the eardrum. They may cause a feeling of fullness and discomfort. They may also impair hearing. This is called otitis media with effusion (OME) or serous otitis media. It means there is fluid in the middle ear. It is not the same as acute otitis media, which is often from an infection.  OME can happen when you have a cold if congestion blocks the passage that drains the middle ear. This passage is called the eustachian tube. OME may also occur with nasal allergies or after a bacterial infection in the middle ear. Other causes are:    Trauma    Improper cleaning of wax from the ear    Bacterial infection of the mastoid bone (mastoiditis)    Tumor    Jaw pain    Changes in pressure, such as from flying or scuba diving    The pain or discomfort may come and go. You may hear clicking or popping sounds when you chew or swallow. You may feel that your  balance is off. Or you may hear ringing in the ear.  It often takes from several weeks up to 3 months for the fluid to clear on its own. Oral pain relievers and ear drops help if there is pain. Decongestants and antihistamines sometimes help. Antibiotics don't help since there is no infection. Your healthcare provider may give you a nasal spray to help reduce swelling in the nose and eustachian tube. This can allow the ear to drain.  If your OME doesn't get better after 3 months, surgery may be used to drain the fluid. A small tube may also be put in the eardrum to help with drainage.  Because the middle ear fluid can become infected, watch for signs of an infection. These may develop later. They may include increased ear pain, fever, or drainage from the ear.  Home care  These home-care tips will help you take care of yourself:    You may use over-the-counter medicine as directed by your healthcare provider to control pain, unless medicine was prescribed. If you have chronic liver or kidney disease or ever had a stomach ulcer or GI bleeding, talk with your healthcare provider before using any medicines.    Aspirin should never be used in anyone younger than age 18 who has a fever. It may cause severe liver damage.    Ask your healthcare provider if you may use over-the-counter decongestants such as phenylephrine or pseudoephedrine. Keep in mind they are not always helpful.    Talk with your healthcare provider about using nasal spray decongestants. Don't use them for more than 3 days, or as directed by your healthcare provider. Longer use can make congestion worse. Prescription nasal sprays from your healthcare provider don't often have such restrictions.    Antihistamines may help if you are also having allergy symptoms.    You may use medicines such as guaifenesin to thin mucus and help with drainage.  Follow-up care  Follow up with your healthcare provider or as advised if you are not feeling better after 3  days.  When to seek medical advice  Call your healthcare provider right away if any of these occur:    Ear pain that gets worse or that does not start to get better     Fever of 100.4 F (38 C) or higher, or as directed by your healthcare provider    Fluid or blood draining from the ear    Headache or sinus pain    Stiff neck    Unusual drowsiness or confusion  Qasim last reviewed this educational content on 12/1/2019 2000-2020 [employer]. All rights reserved. This information is not intended as a substitute for professional medical care. Always follow your healthcare professional's instructions.

## 2021-03-10 NOTE — PROGRESS NOTES
Assessment & Plan   Problem List Items Addressed This Visit     None      Visit Diagnoses     Dysfunction of left eustachian tube    -  Primary    Relevant Medications    fluticasone (FLONASE) 50 MCG/ACT nasal spray         Impression is left ear pain from unknown etiology. Could be eustachian tube dysfunction.  No signs of acute otitis media, mastoiditis, otitis externa or other infection on exam.  She states that her headaches on the left side of her head are consistent with previous migraine headaches and not worrisome to her.  Plan to treat with fluticasone nasal spray, over-the-counter antihistamines and follow-up with us in 2 weeks if not improving.    Complete history and physical exam as below. AF with normal VS.    DDx and Dx discussed with and explained to the pt to their satisfaction.  All questions were answered at this time. Pt expressed understanding of and agreement with this dx, tx, and plan. No further workup warranted and standard medication warnings given. I have given the patient a list of pertinent indications for re-evaluation. Will go to the Emergency Department if symptoms worsen or new concerning symptoms arise. Patient left in no apparent distress.     31 minutes spent on the date of the encounter doing chart review, history and exam, documentation and further activities as noted above     See Patient Instructions    Return for a recheck of your symptoms if not improving, or go to an ER anytime if worsening/changing..    KWAN Rodriguez  [facility] JEREMY Stevens is a 85 year old who presents for the following health issues:    HPI     Left sided migraines in the last week and she feels that this may be related to her left ear 4 days.   Concern - Ear pain  Onset: 4 days  Description: left ear, pressure, pain  Intensity: moderate  Progression of Symptoms:  improving and intermittent  Accompanying Signs & Symptoms: sinusitis drainage  Previous history of  similar problem: No  Precipitating factors:        Worsened by: No  Alleviating factors:        Improved by: No  Therapies tried and outcome: Tylneol    Review of Systems   Constitutional, HEENT, derm, neuro systems are negative, except as otherwise noted.      Objective    /74   Pulse 73   Temp 96.7  F (35.9  C) (Tympanic)   Resp 16   Wt 62.3 kg (137 lb 6.4 oz)   LMP 10/01/1986   SpO2 99%   BMI 24.73 kg/m    Body mass index is 24.73 kg/m .  Physical Exam  Vitals signs and nursing note reviewed.   Constitutional:       General: She is not in acute distress.     Appearance: Normal appearance. She is not diaphoretic.   HENT:      Head: Normocephalic and atraumatic.      Right Ear: Tympanic membrane, ear canal and external ear normal.      Left Ear: Tympanic membrane, ear canal and external ear normal.      Ears:      Comments: Mastoids non-tender.     Nose: Nose normal.      Mouth/Throat:      Mouth: Mucous membranes are moist.      Pharynx: Oropharynx is clear.   Eyes:      Conjunctiva/sclera: Conjunctivae normal.   Pulmonary:      Effort: Pulmonary effort is normal. No respiratory distress.   Skin:     General: Skin is dry.      Coloration: Skin is not jaundiced or pale.   Neurological:      General: No focal deficit present.      Mental Status: She is alert. Mental status is at baseline.   Psychiatric:         Mood and Affect: Mood normal.         Behavior: Behavior normal.

## 2021-03-29 ENCOUNTER — OFFICE VISIT (OUTPATIENT)
Dept: OPHTHALMOLOGY | Facility: CLINIC | Age: 86
End: 2021-03-29
Payer: MEDICARE

## 2021-03-29 DIAGNOSIS — H25.813 COMBINED FORMS OF AGE-RELATED CATARACT OF BOTH EYES: ICD-10-CM

## 2021-03-29 DIAGNOSIS — H40.003 GLAUCOMA SUSPECT OF BOTH EYES: Primary | ICD-10-CM

## 2021-03-29 DIAGNOSIS — Z98.890 HISTORY OF BLEPHAROPLASTY: ICD-10-CM

## 2021-03-29 DIAGNOSIS — H52.4 PRESBYOPIA: ICD-10-CM

## 2021-03-29 DIAGNOSIS — H43.813 POSTERIOR VITREOUS DETACHMENT OF BOTH EYES: ICD-10-CM

## 2021-03-29 DIAGNOSIS — Z01.01 ENCOUNTER FOR EXAMINATION OF EYES AND VISION WITH ABNORMAL FINDINGS: ICD-10-CM

## 2021-03-29 PROBLEM — H43.812 POSTERIOR VITREOUS DETACHMENT OF LEFT EYE: Status: RESOLVED | Noted: 2018-10-12 | Resolved: 2021-03-29

## 2021-03-29 PROCEDURE — 92014 COMPRE OPH EXAM EST PT 1/>: CPT | Performed by: OPHTHALMOLOGY

## 2021-03-29 PROCEDURE — 92015 DETERMINE REFRACTIVE STATE: CPT | Mod: GY | Performed by: OPHTHALMOLOGY

## 2021-03-29 RX ORDER — LATANOPROST 50 UG/ML
1 SOLUTION/ DROPS OPHTHALMIC AT BEDTIME
Qty: 10 ML | Refills: 3 | Status: SHIPPED | OUTPATIENT
Start: 2021-03-29 | End: 2021-10-13

## 2021-03-29 ASSESSMENT — VISUAL ACUITY
OS_CC+: -2
OS_CC: 20/20
OD_CC: 20/20
OD_CC+: -2
CORRECTION_TYPE: GLASSES
METHOD: SNELLEN - LINEAR

## 2021-03-29 ASSESSMENT — TONOMETRY
OS_IOP_MMHG: 20
IOP_METHOD: APPLANATION
OD_IOP_MMHG: 18

## 2021-03-29 ASSESSMENT — REFRACTION_WEARINGRX
OS_SPHERE: +3.00
OS_AXIS: 180
OD_SPHERE: +2.75
OD_CYLINDER: +1.75
OS_ADD: +2.75
SPECS_TYPE: PAL
OD_ADD: +2.75
OD_AXIS: 173
OS_CYLINDER: +1.50

## 2021-03-29 ASSESSMENT — EXTERNAL EXAM - LEFT EYE: OS_EXAM: NORMAL

## 2021-03-29 ASSESSMENT — CUP TO DISC RATIO
OD_RATIO: 0.7
OS_RATIO: 0.6

## 2021-03-29 ASSESSMENT — CONF VISUAL FIELD
METHOD: COUNTING FINGERS
OS_NORMAL: 1
OD_NORMAL: 1

## 2021-03-29 ASSESSMENT — REFRACTION_MANIFEST
OS_SPHERE: +3.00
OD_AXIS: 180
OD_SPHERE: +3.00
OS_CYLINDER: +1.75
OS_ADD: +2.75
OD_CYLINDER: +2.25
OS_AXIS: 175
OD_ADD: +2.75

## 2021-03-29 ASSESSMENT — EXTERNAL EXAM - RIGHT EYE: OD_EXAM: NORMAL

## 2021-03-29 NOTE — LETTER
3/29/2021         RE: Hilda Potter  [address]  [place] [facility] [address]        Dear Colleague,    Thank you for referring your patient, Hilda Potter, to the [facility]. Please see a copy of my visit note below.     Current Eye Medications: Xalatan at bedtime both eyes     Subjective:  Here for complete today. Needs refills sent today. Has been seeing very well with these glasses      Objective:  See Ophthalmology Exam.       Assessment: Possible subtle disc worsening in patient who is a treated glaucoma suspect.      ICD-10-CM    1. 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 Combined forms of age-related cataract, mild-mod, both eyes  H25.813    3. History of blepharoplasty, upper lids, ou  Z98.890    4. Posterior vitreous detachment of both eyes  H43.813    5. Encounter for examination of eyes and vision with abnormal findings  Z01.01    6. Presbyopia  H52.4         Plan:  Glasses Rx given - optional  Continue same medication.  Use artificial tears up to 4 times daily both eyes as needed (Refresh Tears, Systane Ultra/Balance, or Theratears)/  Return visit 6 months for an intraocular pressure check, glaucoma OCT, retinal OCT, and Enciso Visual Field.  Mentioned possible additional treatment if changes.  Andi Bay M.D.  [phone]             Again, thank you for allowing me to participate in the care of your patient.        Sincerely,        Andi Bay MD

## 2021-03-29 NOTE — PATIENT INSTRUCTIONS
Glasses Rx given - optional  Continue same medication.  Use artificial tears up to 4 times daily both eyes as needed (Refresh Tears, Systane Ultra/Balance, or Theratears)/  Return visit 6 months for an intraocular pressure check, glaucoma OCT, retinal OCT, and Enciso Visual Field.  Andi Bay M.D.  [phone]

## 2021-03-29 NOTE — PROGRESS NOTES
Current Eye Medications: Xalatan at bedtime both eyes     Subjective:  Here for complete today. Needs refills sent today. Has been seeing very well with these glasses      Objective:  See Ophthalmology Exam.       Assessment: Possible subtle disc worsening in patient who is a treated glaucoma suspect.      ICD-10-CM    1. 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 Combined forms of age-related cataract, mild-mod, both eyes  H25.813    3. History of blepharoplasty, upper lids, ou  Z98.890    4. Posterior vitreous detachment of both eyes  H43.813    5. Encounter for examination of eyes and vision with abnormal findings  Z01.01    6. Presbyopia  H52.4         Plan:  Glasses Rx given - optional  Continue same medication.  Use artificial tears up to 4 times daily both eyes as needed (Refresh Tears, Systane Ultra/Balance, or Theratears)/  Return visit 6 months for an intraocular pressure check, glaucoma OCT, retinal OCT, and Enciso Visual Field.  Mentioned possible additional treatment if changes.  Andi Bay M.D.  [phone]

## 2021-04-06 ENCOUNTER — OFFICE VISIT (OUTPATIENT)
Dept: NEUROLOGY | Facility: CLINIC | Age: 86
End: 2021-04-06
Payer: MEDICARE

## 2021-04-06 VITALS — SYSTOLIC BLOOD PRESSURE: 136 MMHG | HEART RATE: 79 BPM | TEMPERATURE: 97.5 F | DIASTOLIC BLOOD PRESSURE: 83 MMHG

## 2021-04-06 DIAGNOSIS — M54.2 CERVICALGIA: ICD-10-CM

## 2021-04-06 DIAGNOSIS — G43.109 MIGRAINE WITH AURA AND WITHOUT STATUS MIGRAINOSUS, NOT INTRACTABLE: Primary | ICD-10-CM

## 2021-04-06 PROCEDURE — 99214 OFFICE O/P EST MOD 30 MIN: CPT | Performed by: PSYCHIATRY & NEUROLOGY

## 2021-04-06 NOTE — PROGRESS NOTES
ESTABLISHED PATIENT NEUROLOGY NOTE    DATE OF VISIT: 4/6/2021  MRN: [id number]  PATIENT NAME: Hilda Potter  YOB: 1935    Chief Complaint   Patient presents with     RECHECK     Migraines.  Worsening recently to average of 4/ month     SUBJECTIVE:                                                      HISTORY OF PRESENT ILLNESS:  Hilda is here for follow up regarding migraine headaches. She has history of TMJ dysfunction as well. I have not seen the patient for almost 2 years. Her migraines have been described as starting with jagged lights in the Right eye, sometimes followed by pain, nausea and dizziness. Rest and Excedrin migraine were effective in managing her headaches when we last met, and they were not frequent enough to warrant preventative treatment at that time. Brain MRI in 2018 was unremarkable, ESR normal. I have referred Hilda to Head and Neck clinic for the TMJ dysfunction in the past, but I do not see any notes indicating she has been seen in that clinic. She was following with a chiropractor for the TMJ symptoms in the past.     Hilda is here alone today.  She tells me that in recent months her headaches have become a little bit more frequent in that she has about 4 of her typical migraines per month now.  These still respond to just 1 dose of either Excedrin or Tylenol.  She continues to have the aura in her right eye, and then sometimes she develops migratory pain and nausea which are typical for her.  She says that the Excedrin tends to do a little bit better job in calming the nausea.  She sometimes feels fatigued in the days following a typical migraine.  No new headache characteristics.  She has been working on dietary changes to see if this helps.   Raw tomatoes seem to be a trigger. She avoids red wine.    She mentions that she also has left posterior neck pain, and one muscle that sometimes is a harbinger for an oncoming migraine.    She had a massage therapist up until the  Covid pandemic started, and it is in these months thereafter that she has noticed an increase in the headaches.  She has not tried acupuncture in the past.  She would like to avoid a preventative medication at this point.    She sees her eye doctor regularly because she has developed glaucoma.  She has not noticed any problems with her current eyeglasses or major changes in her vision otherwise.    She does continue to have some left jaw pain.  She says that the chiropractic treatments no longer seemed to be helpful for this.      CURRENT MEDICATIONS:   Aspirin-Acetaminophen-Caffeine (EXCEDRIN MIGRAINE PO), Take 1 tablet by mouth as needed  carboxymethylcellulose (REFRESH PLUS) 0.5 % SOLN, Place 1 drop into both eyes 3 times daily as needed for dry eyes  HYZAAR 50-12.5 MG tablet, Take 1 tablet by mouth daily  latanoprost (XALATAN) 0.005 % ophthalmic solution, Place 1 drop into both eyes At Bedtime  levothyroxine (SYNTHROID/LEVOTHROID) 50 MCG tablet, Take 1 tablet (50 mcg) by mouth daily  MELATONIN PO, Take 3 mg by mouth nightly as needed   Multiple Vitamin (MULTI-VITAMIN) per tablet, Take 1 tablet by mouth daily.    fluticasone (FLONASE) 50 MCG/ACT nasal spray, Spray 1 spray into both nostrils daily as needed (Sinus and ear congestion.)    No current facility-administered medications on file prior to visit.       RECENT DIAGNOSTIC STUDIES:   Labs: No results found for any visits on 04/06/21.    REVIEW OF SYSTEMS:                                                      10-point review of systems is negative except as mentioned above in HPI.    EXAM:                                                      Physical Exam:   Vitals: /83 (BP Location: Left arm, Patient Position: Sitting, Cuff Size: Adult Regular)   Pulse 79   Temp 97.5  F (36.4  C) (Tympanic)   LMP 10/01/1986   BMI= There is no height or weight on file to calculate BMI.  GENERAL: NAD. No TTP of neck or scalp.  HEENT: NC/AT.  CV: RRR. S1, S2.   NECK: No  bruits.  PULM: Non-labored breathing.   Focused Neurologic:  MENTAL STATUS: Alert, attentive. Speech is fluent. Normal comprehension. Normal concentration. Adequate fund of knowledge.   CRANIAL NERVES: Discs technically difficult to visualize. Visual fields intact to confrontation. Pupils equally, round and reactive to light. Facial sensation and movement normal. EOM full. Hearing intact to conversation. Trapezius strength intact. Palate moves symmetrically. Tongue midline.  MOTOR: 5/5 in proximal and distal muscle groups of upper and lower extremities. Tone and bulk normal.   DTRs: Intact and symmetric. Babinski down-going bilaterally.   SENSATION: Normal light touch throughout.   COORDINATION: Finger-nose-finger normal.  Finger tapping normal.  STATION AND GAIT: Romberg negative. Casual gait is normal.     ASSESSMENT and PLAN:                                                      Assessment:    ICD-10-CM    1. Migraine with aura and without status migrainosus, not intractable  G43.109 PHYSICAL THERAPY REFERRAL     PAIN MANAGEMENT REFERRAL   2. Cervicalgia  M54.2 PHYSICAL THERAPY REFERRAL     PAIN MANAGEMENT REFERRAL        Ms. Potter is a pleasant 85-year-old woman with long history of migraine headaches.  She reports an increase in her migraine frequency in recent months.  This seems to be in the setting of her previous massage therapist no longer being available for treatments.  I think it would be reasonable to get her back into see somebody for massages.  We also discussed acupuncture as a preventative option for migraine headaches.  She would like to avoid a daily or even monthly medication for prevention at this point, which I think is reasonable.  I do not have any concerns about Excedrin or Tylenol overuse in her case.  Because her headaches have not changed in terms of the characteristics, I do not think we need updated imaging at this time.  We will plan to follow-up again in 6 months.  Hilda  understands and agrees with the plan    Plan:   -- Referral to physical therapy for massage.  -- Referral to acupuncture.  -- Continue current as-needed medications for acute headache treatment.  -- Return to Neurology clinic in 6 months.  -- Please let us know if any concerns arise in the meantime.    Total Time: 25 minutes were spent with the patient and in chart review/documentation (as described above in Assessment and Plan)/coordinating the care.    Iliana Connor MD  Neurology    Dragon software used in the dictation of this note.

## 2021-04-06 NOTE — PATIENT INSTRUCTIONS
Plan:  -- Referral to physical therapy for massage.  -- Referral to acupuncture.  -- Continue current as-needed medications for acute headache treatment.  -- Return to Neurology clinic in 6 months.  -- Please let us know if any concerns arise in the meantime.

## 2021-04-06 NOTE — LETTER
4/6/2021         RE: Hilda Potter  [address]  [facility] [address]        Dear Colleague,    Thank you for referring your patient, Hilda Potter, to the [facility] [place]. Please see a copy of my visit note below.    ESTABLISHED PATIENT NEUROLOGY NOTE    DATE OF VISIT: 4/6/2021  MRN: [id number]  PATIENT NAME: Hilda Potter  YOB: 1935    Chief Complaint   Patient presents with     RECHECK     Migraines.  Worsening recently to average of 4/ month     SUBJECTIVE:                                                      HISTORY OF PRESENT ILLNESS:  Hilda is here for follow up regarding migraine headaches. She has history of TMJ dysfunction as well. I have not seen the patient for almost 2 years. Her migraines have been described as starting with jagged lights in the Right eye, sometimes followed by pain, nausea and dizziness. Rest and Excedrin migraine were effective in managing her headaches when we last met, and they were not frequent enough to warrant preventative treatment at that time. Brain MRI in 2018 was unremarkable, ESR normal. I have referred Hilda to [facility] clinic for the TMJ dysfunction in the past, but I do not see any notes indicating she has been seen in that clinic. She was following with a chiropractor for the TMJ symptoms in the past.     Hilda is here alone today.  She tells me that in recent months her headaches have become a little bit more frequent in that she has about 4 of her typical migraines per month now.  These still respond to just 1 dose of either Excedrin or Tylenol.  She continues to have the aura in her right eye, and then sometimes she develops migratory pain and nausea which are typical for her.  She says that the Excedrin tends to do a little bit better job in calming the nausea.  She sometimes feels fatigued in the days following a typical migraine.  No new headache characteristics.  She has been working on  dietary changes to see if this helps.   Raw tomatoes seem to be a trigger. She avoids red wine.    She mentions that she also has left posterior neck pain, and one muscle that sometimes is a harbinger for an oncoming migraine.    She had a massage therapist up until the Covid pandemic started, and it is in these months thereafter that she has noticed an increase in the headaches.  She has not tried acupuncture in the past.  She would like to avoid a preventative medication at this point.    She sees her eye doctor regularly because she has developed glaucoma.  She has not noticed any problems with her current eyeglasses or major changes in her vision otherwise.    She does continue to have some left jaw pain.  She says that the chiropractic treatments no longer seemed to be helpful for this.      CURRENT MEDICATIONS:   Aspirin-Acetaminophen-Caffeine (EXCEDRIN MIGRAINE PO), Take 1 tablet by mouth as needed  carboxymethylcellulose (REFRESH PLUS) 0.5 % SOLN, Place 1 drop into both eyes 3 times daily as needed for dry eyes  HYZAAR 50-12.5 MG tablet, Take 1 tablet by mouth daily  latanoprost (XALATAN) 0.005 % ophthalmic solution, Place 1 drop into both eyes At Bedtime  levothyroxine (SYNTHROID/LEVOTHROID) 50 MCG tablet, Take 1 tablet (50 mcg) by mouth daily  MELATONIN PO, Take 3 mg by mouth nightly as needed   Multiple Vitamin (MULTI-VITAMIN) per tablet, Take 1 tablet by mouth daily.    fluticasone (FLONASE) 50 MCG/ACT nasal spray, Spray 1 spray into both nostrils daily as needed (Sinus and ear congestion.)    No current facility-administered medications on file prior to visit.       RECENT DIAGNOSTIC STUDIES:   Labs: No results found for any visits on 04/06/21.    REVIEW OF SYSTEMS:                                                      10-point review of systems is negative except as mentioned above in HPI.    EXAM:                                                      Physical Exam:   Vitals: /83 (BP Location: Left  arm, Patient Position: Sitting, Cuff Size: Adult Regular)   Pulse 79   Temp 97.5  F (36.4  C) (Tympanic)   LMP 10/01/1986   BMI= There is no height or weight on file to calculate BMI.  GENERAL: NAD. No TTP of neck or scalp.  HEENT: NC/AT.  CV: RRR. S1, S2.   NECK: No bruits.  PULM: Non-labored breathing.   Focused Neurologic:  MENTAL STATUS: Alert, attentive. Speech is fluent. Normal comprehension. Normal concentration. Adequate fund of knowledge.   CRANIAL NERVES: Discs technically difficult to visualize. Visual fields intact to confrontation. Pupils equally, round and reactive to light. Facial sensation and movement normal. EOM full. Hearing intact to conversation. Trapezius strength intact. Palate moves symmetrically. Tongue midline.  MOTOR: 5/5 in proximal and distal muscle groups of upper and lower extremities. Tone and bulk normal.   DTRs: Intact and symmetric. Babinski down-going bilaterally.   SENSATION: Normal light touch throughout.   COORDINATION: Finger-nose-finger normal.  Finger tapping normal.  STATION AND GAIT: Romberg negative. Casual gait is normal.     ASSESSMENT and PLAN:                                                      Assessment:    ICD-10-CM    1. Migraine with aura and without status migrainosus, not intractable  G43.109 PHYSICAL THERAPY REFERRAL     PAIN MANAGEMENT REFERRAL   2. Cervicalgia  M54.2 PHYSICAL THERAPY REFERRAL     PAIN MANAGEMENT REFERRAL        Ms. Potter is a pleasant 85-year-old woman with long history of migraine headaches.  She reports an increase in her migraine frequency in recent months.  This seems to be in the setting of her previous massage therapist no longer being available for treatments.  I think it would be reasonable to get her back into see somebody for massages.  We also discussed acupuncture as a preventative option for migraine headaches.  She would like to avoid a daily or even monthly medication for prevention at this point, which I think is  reasonable.  I do not have any concerns about Excedrin or Tylenol overuse in her case.  Because her headaches have not changed in terms of the characteristics, I do not think we need updated imaging at this time.  We will plan to follow-up again in 6 months.  Hilda understands and agrees with the plan    Plan:   -- Referral to physical therapy for massage.  -- Referral to acupuncture.  -- Continue current as-needed medications for acute headache treatment.  -- Return to Neurology clinic in 6 months.  -- Please let us know if any concerns arise in the meantime.    Total Time: 25 minutes were spent with the patient and in chart review/documentation (as described above in Assessment and Plan)/coordinating the care.    Iliana Connor MD  Neurology    Dragon software used in the dictation of this note.                  Again, thank you for allowing me to participate in the care of your patient.        Sincerely,        Iliana Connor MD

## 2021-04-28 ENCOUNTER — TELEPHONE (OUTPATIENT)
Dept: FAMILY MEDICINE | Facility: CLINIC | Age: 86
End: 2021-04-28

## 2021-04-28 ENCOUNTER — VIRTUAL VISIT (OUTPATIENT)
Dept: FAMILY MEDICINE | Facility: CLINIC | Age: 86
End: 2021-04-28
Payer: MEDICARE

## 2021-04-28 VITALS
SYSTOLIC BLOOD PRESSURE: 162 MMHG | HEIGHT: 63 IN | DIASTOLIC BLOOD PRESSURE: 71 MMHG | TEMPERATURE: 98 F | OXYGEN SATURATION: 96 % | RESPIRATION RATE: 20 BRPM | BODY MASS INDEX: 23.92 KG/M2 | WEIGHT: 135 LBS | HEART RATE: 74 BPM

## 2021-04-28 DIAGNOSIS — R35.0 URINARY FREQUENCY: Primary | ICD-10-CM

## 2021-04-28 DIAGNOSIS — I10 ESSENTIAL HYPERTENSION WITH GOAL BLOOD PRESSURE LESS THAN 140/90: ICD-10-CM

## 2021-04-28 DIAGNOSIS — C82.00 GRADE I FOLLICULAR SMALL CLEAVED CELL LYMPHOMA (H): ICD-10-CM

## 2021-04-28 DIAGNOSIS — R10.2 SUPRAPUBIC PAIN: ICD-10-CM

## 2021-04-28 DIAGNOSIS — R30.0 DYSURIA: ICD-10-CM

## 2021-04-28 DIAGNOSIS — R30.0 DYSURIA: Primary | ICD-10-CM

## 2021-04-28 LAB
ALBUMIN UR-MCNC: NEGATIVE MG/DL
APPEARANCE UR: CLEAR
BILIRUB UR QL STRIP: NEGATIVE
COLOR UR AUTO: YELLOW
GLUCOSE UR STRIP-MCNC: NEGATIVE MG/DL
HGB UR QL STRIP: ABNORMAL
KETONES UR STRIP-MCNC: NEGATIVE MG/DL
LEUKOCYTE ESTERASE UR QL STRIP: NEGATIVE
NITRATE UR QL: NEGATIVE
PH UR STRIP: 5 PH (ref 5–7)
RBC #/AREA URNS AUTO: NORMAL /HPF
SOURCE: ABNORMAL
SP GR UR STRIP: 1.01 (ref 1–1.03)
UROBILINOGEN UR STRIP-ACNC: 0.2 EU/DL (ref 0.2–1)
WBC #/AREA URNS AUTO: NORMAL /HPF

## 2021-04-28 PROCEDURE — 87086 URINE CULTURE/COLONY COUNT: CPT | Performed by: FAMILY MEDICINE

## 2021-04-28 PROCEDURE — 99214 OFFICE O/P EST MOD 30 MIN: CPT | Mod: 95 | Performed by: FAMILY MEDICINE

## 2021-04-28 PROCEDURE — 81001 URINALYSIS AUTO W/SCOPE: CPT | Performed by: FAMILY MEDICINE

## 2021-04-28 ASSESSMENT — MIFFLIN-ST. JEOR: SCORE: 1023.55

## 2021-04-28 NOTE — TELEPHONE ENCOUNTER
Spoke with patient, lab schedulled, she is on her way for UA.  Will have ruy schedule on Dr. Dobson' schedule.  Melinda Flores RN  [facility]

## 2021-04-28 NOTE — TELEPHONE ENCOUNTER
Noted. Ok to Double book for a Tele visit today.   Please have her schedule a lab only appointment. Orders completed.

## 2021-04-28 NOTE — PROGRESS NOTES
"Hilda is a 85 year old who is being evaluated via a billable telephone visit.      What phone number would you like to be contacted at? [phone]  How would you like to obtain your AVS? MyChart    {PROVIDER CHARTING PREFERENCE:280139}    Subjective   Hilda is a 85 year old who presents for the following health issues {ACCOMPANIED BY STATEMENT (Optional):851176}    HPI     {SUPERLIST (Optional):180912}  {additonal problems for provider to add (Optional):239654}    Review of Systems   {ROS COMP (Optional):888178}      Objective           Vitals:  No vitals were obtained today due to virtual visit.    Physical Exam   {GENERAL APPEARANCE:50::\"healthy\",\"alert\",\"no distress\"}  PSYCH: Alert and oriented times 3; coherent speech, normal   rate and volume, able to articulate logical thoughts, able   to abstract reason, no tangential thoughts, no hallucinations   or delusions  Her affect is { :7446180::\"normal\"}  RESP: No cough, no audible wheezing, able to talk in full sentences  Remainder of exam unable to be completed due to telephone visits    {Diagnostic Test Results (Optional):714114}    {AMBULATORY ATTESTATION (Optional):608871}        Phone call duration: *** minutes  "

## 2021-04-28 NOTE — TELEPHONE ENCOUNTER
Spoke with patient she reports she believes she has aUTI.  She complains of dysuria and bladder pressure starting yesterday.  She denies fever, no hematuria, no flank pain.  Asking if she could do a UA and can she be treated.  No openings at [facility] today.  Melinda Flores RN  [facility]

## 2021-04-28 NOTE — PROGRESS NOTES
"    Assessment & Plan     Urinary frequency with suprapubic pain  - UA with reflex with microscopic and culture- urinalysis is unremarkable.   Await urine culture results.       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  Due for labs and imaging in the near future. Next visit scheduled on 5/7/2021.    Essential hypertension with goal blood pressure less than 140/90, elevated today.  Patient is currently taking 1/2 tablet of Hyzaar 50-12.5 mg every other day.  Recommended taking BP med daily and continue home BP monitoring.       Return in about 2 days (around 4/30/2021), or if symptoms worsen or fail to improve.    Ame Dobson MD  [facility] JEREMY Stevens is a 85 year old who presents for the following health issues     HPI     Concerned about a possible UTI    Genitourinary - Female  Onset/Duration: 2 days  Description:   Painful urination (Dysuria): no           Frequency: YES  Blood in urine (Hematuria): no  Delay in urine (Hesitency): no  Intensity: moderate  Progression of Symptoms:  constant  Accompanying Signs & Symptoms:  Fever/chills: no  Flank pain: no  Nausea and vomiting: YES- nausea   Vaginal symptoms: itching  Abdominal/Pelvic Pain: YES  History:   History of frequent UTI s: no  History of kidney stones: no  Sexually Active: no  Possibility of pregnancy: No      Patient has a known history of 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   She has completed 4 weekly doses of Rituxan in 09/2012.  Following with Oncology. Next appointment scheduled on 5/7/2021    Blood pressure is elevated in the clinic today.   is currently taking 1/2 tablet of Hyzaar 50-12.5 mg every other day.        Review of Systems   Constitutional, HEENT, cardiovascular, pulmonary, gi and gu systems are negative, except as otherwise noted.      Objective    BP (!) 162/71   Pulse 74   Temp 98  F (36.7  C) (Tympanic)   Resp 20   Ht 1.588 m (5' 2.5\")   Wt " 61.2 kg (135 lb)   LMP 10/01/1986   SpO2 96%   Breastfeeding No   BMI 24.30 kg/m    Body mass index is 24.3 kg/m .  Physical Exam   GENERAL: healthy, alert and no distress  ABDOMEN: soft, supra-pubic tenderness, no hepatosplenomegaly, no masses and bowel sounds normal    DATA  Reviewed and discussed with patient prior to discharge.  Results for orders placed or performed in visit on 04/28/21   *UA reflex to Microscopic and Culture ([place] and [facility] (except [place] and [place])     Status: Abnormal    Specimen: Midstream Urine   Result Value Ref Range    Color Urine Yellow     Appearance Urine Clear     Glucose Urine Negative NEG^Negative mg/dL    Bilirubin Urine Negative NEG^Negative    Ketones Urine Negative NEG^Negative mg/dL    Specific Gravity Urine 1.015 1.003 - 1.035    Blood Urine Trace (A) NEG^Negative    pH Urine 5.0 5.0 - 7.0 pH    Protein Albumin Urine Negative NEG^Negative mg/dL    Urobilinogen Urine 0.2 0.2 - 1.0 EU/dL    Nitrite Urine Negative NEG^Negative    Leukocyte Esterase Urine Negative NEG^Negative    Source Midstream Urine    Urine Microscopic     Status: None   Result Value Ref Range    WBC Urine 0 - 5 OTO5^0 - 5 /HPF    RBC Urine O - 2 OTO2^O - 2 /HPF   Urine Culture Aerobic Bacterial     Status: None (Preliminary result)    Specimen: Midstream Urine   Result Value Ref Range    Specimen Description Midstream Urine     Special Requests Specimen received in preservative     Culture Micro PENDING

## 2021-04-28 NOTE — TELEPHONE ENCOUNTER
Spoke with patient to verify, she is to do the lab appt and then Dr. Dobson staff will call her and then she will do a telephone visit with . she voiced understanding  Melinda Flores RN  [facility]

## 2021-04-30 LAB
BACTERIA SPEC CULT: NO GROWTH
Lab: NORMAL
SPECIMEN SOURCE: NORMAL

## 2021-05-07 ENCOUNTER — ONCOLOGY VISIT (OUTPATIENT)
Dept: ONCOLOGY | Facility: CLINIC | Age: 86
End: 2021-05-07
Attending: INTERNAL MEDICINE
Payer: MEDICARE

## 2021-05-07 VITALS
TEMPERATURE: 97.1 F | SYSTOLIC BLOOD PRESSURE: 140 MMHG | OXYGEN SATURATION: 99 % | HEIGHT: 63 IN | BODY MASS INDEX: 24.04 KG/M2 | HEART RATE: 72 BPM | DIASTOLIC BLOOD PRESSURE: 80 MMHG | WEIGHT: 135.7 LBS

## 2021-05-07 DIAGNOSIS — C82.00 GRADE I FOLLICULAR SMALL CLEAVED CELL LYMPHOMA (H): ICD-10-CM

## 2021-05-07 DIAGNOSIS — C82.00 GRADE I FOLLICULAR SMALL CLEAVED CELL LYMPHOMA (H): Primary | ICD-10-CM

## 2021-05-07 LAB
ALBUMIN SERPL-MCNC: 3.7 G/DL (ref 3.4–5)
ALP SERPL-CCNC: 70 U/L (ref 40–150)
ALT SERPL W P-5'-P-CCNC: 25 U/L (ref 0–50)
ANION GAP SERPL CALCULATED.3IONS-SCNC: 3 MMOL/L (ref 3–14)
AST SERPL W P-5'-P-CCNC: 17 U/L (ref 0–45)
BASOPHILS # BLD AUTO: 0 10E9/L (ref 0–0.2)
BASOPHILS NFR BLD AUTO: 0.4 %
BILIRUB SERPL-MCNC: 0.6 MG/DL (ref 0.2–1.3)
BUN SERPL-MCNC: 23 MG/DL (ref 7–30)
CALCIUM SERPL-MCNC: 9 MG/DL (ref 8.5–10.1)
CHLORIDE SERPL-SCNC: 106 MMOL/L (ref 94–109)
CO2 SERPL-SCNC: 32 MMOL/L (ref 20–32)
CREAT SERPL-MCNC: 0.97 MG/DL (ref 0.52–1.04)
DIFFERENTIAL METHOD BLD: NORMAL
EOSINOPHIL # BLD AUTO: 0.1 10E9/L (ref 0–0.7)
EOSINOPHIL NFR BLD AUTO: 1 %
ERYTHROCYTE [DISTWIDTH] IN BLOOD BY AUTOMATED COUNT: 12.8 % (ref 10–15)
GFR SERPL CREATININE-BSD FRML MDRD: 54 ML/MIN/{1.73_M2}
GLUCOSE SERPL-MCNC: 86 MG/DL (ref 70–99)
HCT VFR BLD AUTO: 39.5 % (ref 35–47)
HGB BLD-MCNC: 12.8 G/DL (ref 11.7–15.7)
IMM GRANULOCYTES # BLD: 0 10E9/L (ref 0–0.4)
IMM GRANULOCYTES NFR BLD: 0.2 %
LDH SERPL L TO P-CCNC: 181 U/L (ref 81–234)
LYMPHOCYTES # BLD AUTO: 1.3 10E9/L (ref 0.8–5.3)
LYMPHOCYTES NFR BLD AUTO: 26.3 %
MCH RBC QN AUTO: 29.3 PG (ref 26.5–33)
MCHC RBC AUTO-ENTMCNC: 32.4 G/DL (ref 31.5–36.5)
MCV RBC AUTO: 90 FL (ref 78–100)
MONOCYTES # BLD AUTO: 0.5 10E9/L (ref 0–1.3)
MONOCYTES NFR BLD AUTO: 9.8 %
NEUTROPHILS # BLD AUTO: 3 10E9/L (ref 1.6–8.3)
NEUTROPHILS NFR BLD AUTO: 62.3 %
PLATELET # BLD AUTO: 179 10E9/L (ref 150–450)
POTASSIUM SERPL-SCNC: 4 MMOL/L (ref 3.4–5.3)
PROT SERPL-MCNC: 7.1 G/DL (ref 6.8–8.8)
RBC # BLD AUTO: 4.37 10E12/L (ref 3.8–5.2)
SODIUM SERPL-SCNC: 141 MMOL/L (ref 133–144)
WBC # BLD AUTO: 4.8 10E9/L (ref 4–11)

## 2021-05-07 PROCEDURE — 83615 LACTATE (LD) (LDH) ENZYME: CPT | Performed by: INTERNAL MEDICINE

## 2021-05-07 PROCEDURE — 36415 COLL VENOUS BLD VENIPUNCTURE: CPT | Performed by: INTERNAL MEDICINE

## 2021-05-07 PROCEDURE — 99214 OFFICE O/P EST MOD 30 MIN: CPT | Performed by: INTERNAL MEDICINE

## 2021-05-07 PROCEDURE — 85025 COMPLETE CBC W/AUTO DIFF WBC: CPT | Performed by: INTERNAL MEDICINE

## 2021-05-07 PROCEDURE — 80053 COMPREHEN METABOLIC PANEL: CPT | Performed by: INTERNAL MEDICINE

## 2021-05-07 ASSESSMENT — MIFFLIN-ST. JEOR: SCORE: 1026.72

## 2021-05-07 ASSESSMENT — PAIN SCALES - GENERAL: PAINLEVEL: NO PAIN (0)

## 2021-05-07 NOTE — PROGRESS NOTES
Oncology Follow-up Visit:  May 7, 2021      PCP: Dr. Rahman  Diagnosis: Low grade, stage IVB (BM involvement, night sweats) follicular lymphoma.  FLIPI score is 3 (one for stage IV, one for age>60, and one for elevated LDH), associated with 52% median 5 year OS survival and 36% median 10 year overall survival.     Oncologic History:    presented with night sweats and lymphadenopathy. She developed drenching night sweats in 11/2011 which persisted. She had no other associated constitutional symptoms such as fevers or weight loss. She had a negative ID workup with Dr. Ruiz (for valley fever as she had traveled to [place] in the past).   As part of evaluation, CT scan was performed in April 2012 which demonstrated multiple borderline mildly enlarged retroperitoneal and mesenteric lymph nodes, with the largest size of 1.5 cm. The patient, however, persisted to have night sweats and underwent repeat CTCAP done without contrast (she is allergic to IV contrast and had an anaphylactic reaction to it) which demonstrated unchanged dilatation of common bile duct, likely related to postcholecystectomy state, and stable mildly enlarged retroperitoneal and mesenteric lymph nodes which were nonspecific.   We proceeded with CT guided biopsy of one of the intraabdominal lymph nodes and results c/w low grade follicular lymphoma.  Flow cytometry showed kappa monotypic CD10 positive B cells.  We proceeded with bone marrow biopsy to complete her staging. The results, as detailed below, revealed involvement of marrow by follicular lymphoma. Flow cytometry showed a rare population of CD10-positive, kappa-monotypic B cells (0.2%). Molecular diagnostics was negative for B-cell gene rearrangement. Cytogenetics revealed, of the interphase cells examined, 0.2% had a signal pattern indicative of an IGH/BCL2 gene fusion, a rate that falls just slightly above the normal control range (0-0.1%) for our laboratory; thus, these findings are  "suspicious for the presence of low-level bone marrow involvement by follicular lymphoma. However, this cannot be determined with certainty, in light of the few abnormal cells found by FISH. Of note, a G-banded chromosome study is still pending.   Hep B/C negative. EVE negative. No M-spike on SPEP.    Treatment: Weekly Rituxan x 4, completed 9/13/2012. During her first Rituxan infusion she has developed a reaction with tingling of upper lip and chin and feeling \"cold\" in her chest. She was given IV solumedrol and the infusion was slowed down and these symptoms have resolved. Since then she has been premedicated with solumedrol, and all infusions were uneventful since.    Interval History:  Ms. Potter is a 85 year old female diagnosed with follicular lymphoma present today for follow-up. She has completed 4 weekly doses of Rituxan in 09/2012.   Her night sweats have resolved subsequently and LDH has remained normal.    She had a screening mammogram in 12/2020 which was negative.  CT of the chest, abdomen and pelvis on November 2020 showed no new adenopathy identified in the chest abdomen and pelvis. There were stable scattered mesenteric lymph nodes with adjacent haziness of the  mesenteric fat.   She has had no constitutional symptoms.  She has seasonal allergies and is planning to take Claritin as needed.  Weight has been stable in the last 6 months with 135 pounds.  In addition, a complete 12 point  review of systems is negative.        Past medical, social, surgical, and family histories reviewed.  Echocardiogram in 10/2014 for evaluation of benign palpitations showed left ventricular function is normal with an EF of 55-60%.  Left ventricular Doppler filling pattern consistent with abnormal relaxation.  DEXA scan on 5/12/2015 showed most neg T score of -2.2 in the lumbar spine. This was unchanged compared to 11/2010. She was on Boneva years ago but apparently developed L jaw tingling and is no longer on " "biphosphonates.       Allergies:  Allergies as of 05/07/2021 - Reviewed 04/28/2021   Allergen Reaction Noted     Diatrizoate Other (See Comments) 05/28/2008     Bisphosphonates GI Disturbance 10/01/2009     Ivp dye [contrast dye] Swelling 10/01/2009     Lisinopril Cough 06/24/2013     Losartan Hives 11/22/2010     Morphine Hives 06/06/2018     Morphine sulfate Nausea and Vomiting 08/16/2012     Prilosec [omeprazole] Hives 10/01/2009     Latex Rash 09/19/2014       Current Medications:  Current Outpatient Medications   Medication Sig Dispense Refill     Aspirin-Acetaminophen-Caffeine (EXCEDRIN MIGRAINE PO) Take 1 tablet by mouth as needed       carboxymethylcellulose (REFRESH PLUS) 0.5 % SOLN Place 1 drop into both eyes 3 times daily as needed for dry eyes       HYZAAR 50-12.5 MG tablet Take 1 tablet by mouth daily 90 tablet 3     latanoprost (XALATAN) 0.005 % ophthalmic solution Place 1 drop into both eyes At Bedtime 10 mL 3     levothyroxine (SYNTHROID/LEVOTHROID) 50 MCG tablet Take 1 tablet (50 mcg) by mouth daily 90 tablet 3     MELATONIN PO Take 3 mg by mouth nightly as needed        Multiple Vitamin (MULTI-VITAMIN) per tablet Take 1 tablet by mouth daily.            Physical Exam:  BP (!) 140/80 (BP Location: Left arm)   Pulse 72   Temp 97.1  F (36.2  C) (Oral)   Ht 1.588 m (5' 2.5\")   Wt 61.6 kg (135 lb 11.2 oz)   LMP 10/01/1986   SpO2 99%   BMI 24.42 kg/m      LMP 10/01/1986   Wt Readings from Last 5 Encounters:   04/28/21 61.2 kg (135 lb)   03/10/21 62.3 kg (137 lb 6.4 oz)   01/05/21 62.6 kg (138 lb)   11/06/20 61.2 kg (135 lb)   06/12/20 64.1 kg (141 lb 6.4 oz)         GENERAL APPEARANCE:  alert and no distress  HEENT: PEERLA, no neck asymmetry or masses  Lymphatics: No cervical, supraclavicular, axillary lymphadenopathy bilaterally.  No inguinal lymphadenopathy bilaterally    CV: S1S2 reg no murmur  Respiratory: CTA b/l  GI: soft,  BS+, NT, ND  Extremities: no edema.    SKIN: no suspicious lesions " or rashes    PSYCHIATRIC: mentation appears normal and affect normal  Neuro: gait intact. axox3    Labs:  Orders Only on 05/07/2021   Component Date Value Ref Range Status     Lactate Dehydrogenase 05/07/2021 181  81 - 234 U/L Final     Sodium 05/07/2021 141  133 - 144 mmol/L Final     Potassium 05/07/2021 4.0  3.4 - 5.3 mmol/L Final     Chloride 05/07/2021 106  94 - 109 mmol/L Final     Carbon Dioxide 05/07/2021 32  20 - 32 mmol/L Final     Anion Gap 05/07/2021 3  3 - 14 mmol/L Final     Glucose 05/07/2021 86  70 - 99 mg/dL Final     Urea Nitrogen 05/07/2021 23  7 - 30 mg/dL Final     Creatinine 05/07/2021 0.97  0.52 - 1.04 mg/dL Final     GFR Estimate 05/07/2021 54* >60 mL/min/[1.73_m2] Final    Comment: Non  GFR Calc  Starting 12/18/2018, serum creatinine based estimated GFR (eGFR) will be   calculated using the Chronic Kidney Disease Epidemiology Collaboration   (CKD-EPI) equation.       GFR Estimate If Black 05/07/2021 62  >60 mL/min/[1.73_m2] Final    Comment:  GFR Calc  Starting 12/18/2018, serum creatinine based estimated GFR (eGFR) will be   calculated using the Chronic Kidney Disease Epidemiology Collaboration   (CKD-EPI) equation.       Calcium 05/07/2021 9.0  8.5 - 10.1 mg/dL Final     Bilirubin Total 05/07/2021 0.6  0.2 - 1.3 mg/dL Final     Albumin 05/07/2021 3.7  3.4 - 5.0 g/dL Final     Protein Total 05/07/2021 7.1  6.8 - 8.8 g/dL Final     Alkaline Phosphatase 05/07/2021 70  40 - 150 U/L Final     ALT 05/07/2021 25  0 - 50 U/L Final     AST 05/07/2021 17  0 - 45 U/L Final     WBC 05/07/2021 4.8  4.0 - 11.0 10e9/L Final     RBC Count 05/07/2021 4.37  3.8 - 5.2 10e12/L Final     Hemoglobin 05/07/2021 12.8  11.7 - 15.7 g/dL Final     Hematocrit 05/07/2021 39.5  35.0 - 47.0 % Final     MCV 05/07/2021 90  78 - 100 fl Final     MCH 05/07/2021 29.3  26.5 - 33.0 pg Final     MCHC 05/07/2021 32.4  31.5 - 36.5 g/dL Final     RDW 05/07/2021 12.8  10.0 - 15.0 % Final     Platelet  Count 05/07/2021 179  150 - 450 10e9/L Final     Diff Method 05/07/2021 Automated Method   Final     % Neutrophils 05/07/2021 62.3  % Final     % Lymphocytes 05/07/2021 26.3  % Final     % Monocytes 05/07/2021 9.8  % Final     % Eosinophils 05/07/2021 1.0  % Final     % Basophils 05/07/2021 0.4  % Final     % Immature Granulocytes 05/07/2021 0.2  % Final     Absolute Neutrophil 05/07/2021 3.0  1.6 - 8.3 10e9/L Final     Absolute Lymphocytes 05/07/2021 1.3  0.8 - 5.3 10e9/L Final     Absolute Monocytes 05/07/2021 0.5  0.0 - 1.3 10e9/L Final     Absolute Eosinophils 05/07/2021 0.1  0.0 - 0.7 10e9/L Final     Absolute Basophils 05/07/2021 0.0  0.0 - 0.2 10e9/L Final     Abs Immature Granulocytes 05/07/2021 0.0  0 - 0.4 10e9/L Final       ASSESSMENT/PLAN:  Hilda is a very pleasant 85 year old woman  with stage IV follicular lymphoma, FLIPI score is 3 (one for stage IV, one for age>60, and one for elevated LDH), associated with 52% median 5 year OS survival and 36% median 10 year overall survival. Most recent CT of the chest, abdomen and pelvis on 9/30/2019 showed no e/o lymphoma recurrence with stable posttreatment changes of lymphoma. There was no significant change in prominent mesenteric. There were stable tiny pulmonary nodules from 4/26/2016, including 2 mm nodule in the right base.     1. NHL.  S/p Rituxan x4, completed in 09/2012. Responded to treatment with resolution of night sweats and decrease in lymphadenopathy. We'll continue to observe her from now on. Clinically and radiographically based on CT scan from November 3, 2020, she has continued to be in remission from NHL, with stable shotty lymphadenopathy in the mesentery radiographically.   We'll see her back in 6 months with labs - cbcd, cmp, LDH. She had anaphylactic allergy to CT contrast dye in the past and her CTs are ordered without contrast.  I feel with no evidence of disease progression for years on her CT imaging, we can stretch out her CT  surveillance interval to  2 years (next due in November 2022).    2. Immunizations - She has received coronavirus 19 vaccinations.  Influenza vaccination is recommended annually.    3. Breast cancer screening Screening mammogram negative 12/2020. Recommended annual screening mammograms.  Next mammogram due in December 2021.  At the end of our visit the patient verbalized understanding, denied any further questions, and concurred with the plan of care.

## 2021-05-07 NOTE — NURSING NOTE
"Oncology Rooming Note    May 7, 2021 3:57 PM   Hilda Potter is a 85 year old female who presents for:    Chief Complaint   Patient presents with     Oncology Clinic Visit     Follow up     Initial Vitals: BP (!) 140/80 (BP Location: Left arm)   Pulse 72   Temp 97.1  F (36.2  C) (Oral)   Ht 1.588 m (5' 2.5\")   Wt 61.6 kg (135 lb 11.2 oz)   LMP 10/01/1986   SpO2 99%   BMI 24.42 kg/m   Estimated body mass index is 24.42 kg/m  as calculated from the following:    Height as of this encounter: 1.588 m (5' 2.5\").    Weight as of this encounter: 61.6 kg (135 lb 11.2 oz). Body surface area is 1.65 meters squared.  No Pain (0) Comment: Data Unavailable   Patient's last menstrual period was 10/01/1986.  Allergies reviewed: Yes  Medications reviewed: Yes    Medications: Medication refills not needed today.  Pharmacy name entered into [facility]:    [id number] - [place] [id number] AT [facility] ONCOLOGY PHARMACY  [facility] - [place], [place] - [address] [place] [address]  [facility] #[id number] - [place], [place] - [address] AT [facility] [address]    Clinical concerns: Can she take Claritin Dr. Roth was notified.      Carolina Talamantes, [facility]            "

## 2021-05-07 NOTE — LETTER
5/7/2021         RE: Hilda Potter  [address]  [place] [address]        Dear Colleague,    Thank you for referring your patient, Hilda Potter, to the [facility]. Please see a copy of my visit note below.    Oncology Follow-up Visit:  May 7, 2021      PCP: Dr. Rahman  Diagnosis: Low grade, stage IVB (BM involvement, night sweats) follicular lymphoma.  FLIPI score is 3 (one for stage IV, one for age>60, and one for elevated LDH), associated with 52% median 5 year OS survival and 36% median 10 year overall survival.     Oncologic History:    presented with night sweats and lymphadenopathy. She developed drenching night sweats in 11/2011 which persisted. She had no other associated constitutional symptoms such as fevers or weight loss. She had a negative ID workup with Dr. Ruiz (for valley fever as she had traveled to [place] in the past).   As part of evaluation, CT scan was performed in April 2012 which demonstrated multiple borderline mildly enlarged retroperitoneal and mesenteric lymph nodes, with the largest size of 1.5 cm. The patient, however, persisted to have night sweats and underwent repeat CTCAP done without contrast (she is allergic to IV contrast and had an anaphylactic reaction to it) which demonstrated unchanged dilatation of common bile duct, likely related to postcholecystectomy state, and stable mildly enlarged retroperitoneal and mesenteric lymph nodes which were nonspecific.   We proceeded with CT guided biopsy of one of the intraabdominal lymph nodes and results c/w low grade follicular lymphoma.  Flow cytometry showed kappa monotypic CD10 positive B cells.  We proceeded with bone marrow biopsy to complete her staging. The results, as detailed below, revealed involvement of marrow by follicular lymphoma. Flow cytometry showed a rare population of CD10-positive, kappa-monotypic B cells (0.2%). Molecular diagnostics was negative for  "B-cell gene rearrangement. Cytogenetics revealed, of the interphase cells examined, 0.2% had a signal pattern indicative of an IGH/BCL2 gene fusion, a rate that falls just slightly above the normal control range (0-0.1%) for our laboratory; thus, these findings are suspicious for the presence of low-level bone marrow involvement by follicular lymphoma. However, this cannot be determined with certainty, in light of the few abnormal cells found by FISH. Of note, a G-banded chromosome study is still pending.   Hep B/C negative. EVE negative. No M-spike on SPEP.    Treatment: Weekly Rituxan x 4, completed 9/13/2012. During her first Rituxan infusion she has developed a reaction with tingling of upper lip and chin and feeling \"cold\" in her chest. She was given IV solumedrol and the infusion was slowed down and these symptoms have resolved. Since then she has been premedicated with solumedrol, and all infusions were uneventful since.    Interval History:  Ms. Potter is a 85 year old female diagnosed with follicular lymphoma present today for follow-up. She has completed 4 weekly doses of Rituxan in 09/2012.   Her night sweats have resolved subsequently and LDH has remained normal.    She had a screening mammogram in 12/2020 which was negative.  CT of the chest, abdomen and pelvis on November 2020 showed no new adenopathy identified in the chest abdomen and pelvis. There were stable scattered mesenteric lymph nodes with adjacent haziness of the  mesenteric fat.   She has had no constitutional symptoms.  She has seasonal allergies and is planning to take Claritin as needed.  Weight has been stable in the last 6 months with 135 pounds.  In addition, a complete 12 point  review of systems is negative.        Past medical, social, surgical, and family histories reviewed.  Echocardiogram in 10/2014 for evaluation of benign palpitations showed left ventricular function is normal with an EF of 55-60%.  Left ventricular Doppler " "filling pattern consistent with abnormal relaxation.  DEXA scan on 5/12/2015 showed most neg T score of -2.2 in the lumbar spine. This was unchanged compared to 11/2010. She was on Boneva years ago but apparently developed L jaw tingling and is no longer on biphosphonates.       Allergies:  Allergies as of 05/07/2021 - Reviewed 04/28/2021   Allergen Reaction Noted     Diatrizoate Other (See Comments) 05/28/2008     Bisphosphonates GI Disturbance 10/01/2009     Ivp dye [contrast dye] Swelling 10/01/2009     Lisinopril Cough 06/24/2013     Losartan Hives 11/22/2010     Morphine Hives 06/06/2018     Morphine sulfate Nausea and Vomiting 08/16/2012     Prilosec [omeprazole] Hives 10/01/2009     Latex Rash 09/19/2014       Current Medications:  Current Outpatient Medications   Medication Sig Dispense Refill     Aspirin-Acetaminophen-Caffeine (EXCEDRIN MIGRAINE PO) Take 1 tablet by mouth as needed       carboxymethylcellulose (REFRESH PLUS) 0.5 % SOLN Place 1 drop into both eyes 3 times daily as needed for dry eyes       HYZAAR 50-12.5 MG tablet Take 1 tablet by mouth daily 90 tablet 3     latanoprost (XALATAN) 0.005 % ophthalmic solution Place 1 drop into both eyes At Bedtime 10 mL 3     levothyroxine (SYNTHROID/LEVOTHROID) 50 MCG tablet Take 1 tablet (50 mcg) by mouth daily 90 tablet 3     MELATONIN PO Take 3 mg by mouth nightly as needed        Multiple Vitamin (MULTI-VITAMIN) per tablet Take 1 tablet by mouth daily.            Physical Exam:  BP (!) 140/80 (BP Location: Left arm)   Pulse 72   Temp 97.1  F (36.2  C) (Oral)   Ht 1.588 m (5' 2.5\")   Wt 61.6 kg (135 lb 11.2 oz)   LMP 10/01/1986   SpO2 99%   BMI 24.42 kg/m      LMP 10/01/1986   Wt Readings from Last 5 Encounters:   04/28/21 61.2 kg (135 lb)   03/10/21 62.3 kg (137 lb 6.4 oz)   01/05/21 62.6 kg (138 lb)   11/06/20 61.2 kg (135 lb)   06/12/20 64.1 kg (141 lb 6.4 oz)         GENERAL APPEARANCE:  alert and no distress  HEENT: PEERLA, no neck asymmetry or " masses  Lymphatics: No cervical, supraclavicular, axillary lymphadenopathy bilaterally.  No inguinal lymphadenopathy bilaterally    CV: S1S2 reg no murmur  Respiratory: CTA b/l  GI: soft,  BS+, NT, ND  Extremities: no edema.    SKIN: no suspicious lesions or rashes    PSYCHIATRIC: mentation appears normal and affect normal  Neuro: gait intact. axox3    Labs:  Orders Only on 05/07/2021   Component Date Value Ref Range Status     Lactate Dehydrogenase 05/07/2021 181  81 - 234 U/L Final     Sodium 05/07/2021 141  133 - 144 mmol/L Final     Potassium 05/07/2021 4.0  3.4 - 5.3 mmol/L Final     Chloride 05/07/2021 106  94 - 109 mmol/L Final     Carbon Dioxide 05/07/2021 32  20 - 32 mmol/L Final     Anion Gap 05/07/2021 3  3 - 14 mmol/L Final     Glucose 05/07/2021 86  70 - 99 mg/dL Final     Urea Nitrogen 05/07/2021 23  7 - 30 mg/dL Final     Creatinine 05/07/2021 0.97  0.52 - 1.04 mg/dL Final     GFR Estimate 05/07/2021 54* >60 mL/min/[1.73_m2] Final    Comment: Non  GFR Calc  Starting 12/18/2018, serum creatinine based estimated GFR (eGFR) will be   calculated using the Chronic Kidney Disease Epidemiology Collaboration   (CKD-EPI) equation.       GFR Estimate If Black 05/07/2021 62  >60 mL/min/[1.73_m2] Final    Comment:  GFR Calc  Starting 12/18/2018, serum creatinine based estimated GFR (eGFR) will be   calculated using the Chronic Kidney Disease Epidemiology Collaboration   (CKD-EPI) equation.       Calcium 05/07/2021 9.0  8.5 - 10.1 mg/dL Final     Bilirubin Total 05/07/2021 0.6  0.2 - 1.3 mg/dL Final     Albumin 05/07/2021 3.7  3.4 - 5.0 g/dL Final     Protein Total 05/07/2021 7.1  6.8 - 8.8 g/dL Final     Alkaline Phosphatase 05/07/2021 70  40 - 150 U/L Final     ALT 05/07/2021 25  0 - 50 U/L Final     AST 05/07/2021 17  0 - 45 U/L Final     WBC 05/07/2021 4.8  4.0 - 11.0 10e9/L Final     RBC Count 05/07/2021 4.37  3.8 - 5.2 10e12/L Final     Hemoglobin 05/07/2021 12.8  11.7 - 15.7  g/dL Final     Hematocrit 05/07/2021 39.5  35.0 - 47.0 % Final     MCV 05/07/2021 90  78 - 100 fl Final     MCH 05/07/2021 29.3  26.5 - 33.0 pg Final     MCHC 05/07/2021 32.4  31.5 - 36.5 g/dL Final     RDW 05/07/2021 12.8  10.0 - 15.0 % Final     Platelet Count 05/07/2021 179  150 - 450 10e9/L Final     Diff Method 05/07/2021 Automated Method   Final     % Neutrophils 05/07/2021 62.3  % Final     % Lymphocytes 05/07/2021 26.3  % Final     % Monocytes 05/07/2021 9.8  % Final     % Eosinophils 05/07/2021 1.0  % Final     % Basophils 05/07/2021 0.4  % Final     % Immature Granulocytes 05/07/2021 0.2  % Final     Absolute Neutrophil 05/07/2021 3.0  1.6 - 8.3 10e9/L Final     Absolute Lymphocytes 05/07/2021 1.3  0.8 - 5.3 10e9/L Final     Absolute Monocytes 05/07/2021 0.5  0.0 - 1.3 10e9/L Final     Absolute Eosinophils 05/07/2021 0.1  0.0 - 0.7 10e9/L Final     Absolute Basophils 05/07/2021 0.0  0.0 - 0.2 10e9/L Final     Abs Immature Granulocytes 05/07/2021 0.0  0 - 0.4 10e9/L Final       ASSESSMENT/PLAN:  Hilda is a very pleasant 85 year old woman  with stage IV follicular lymphoma, FLIPI score is 3 (one for stage IV, one for age>60, and one for elevated LDH), associated with 52% median 5 year OS survival and 36% median 10 year overall survival. Most recent CT of the chest, abdomen and pelvis on 9/30/2019 showed no e/o lymphoma recurrence with stable posttreatment changes of lymphoma. There was no significant change in prominent mesenteric. There were stable tiny pulmonary nodules from 4/26/2016, including 2 mm nodule in the right base.     1. NHL.  S/p Rituxan x4, completed in 09/2012. Responded to treatment with resolution of night sweats and decrease in lymphadenopathy. We'll continue to observe her from now on. Clinically and radiographically based on CT scan from November 3, 2020, she has continued to be in remission from NHL, with stable shotty lymphadenopathy in the mesentery radiographically.   We'll see her  back in 6 months with labs - cbcd, cmp, LDH. She had anaphylactic allergy to CT contrast dye in the past and her CTs are ordered without contrast.  I feel with no evidence of disease progression for years on her CT imaging, we can stretch out her CT surveillance interval to  2 years (next due in November 2022).    2. Immunizations - She has received coronavirus 19 vaccinations.  Influenza vaccination is recommended annually.    3. Breast cancer screening Screening mammogram negative 12/2020. Recommended annual screening mammograms.  Next mammogram due in December 2021.  At the end of our visit the patient verbalized understanding, denied any further questions, and concurred with the plan of care.                      Again, thank you for allowing me to participate in the care of your patient.        Sincerely,        Jane Roth MD, MD

## 2021-06-11 ENCOUNTER — OFFICE VISIT (OUTPATIENT)
Dept: FAMILY MEDICINE | Facility: CLINIC | Age: 86
End: 2021-06-11
Payer: MEDICARE

## 2021-06-11 ENCOUNTER — TELEPHONE (OUTPATIENT)
Dept: FAMILY MEDICINE | Facility: CLINIC | Age: 86
End: 2021-06-11

## 2021-06-11 VITALS
TEMPERATURE: 97 F | DIASTOLIC BLOOD PRESSURE: 68 MMHG | RESPIRATION RATE: 16 BRPM | BODY MASS INDEX: 24.03 KG/M2 | HEIGHT: 63 IN | OXYGEN SATURATION: 97 % | SYSTOLIC BLOOD PRESSURE: 128 MMHG | WEIGHT: 135.6 LBS | HEART RATE: 77 BPM

## 2021-06-11 DIAGNOSIS — Z78.0 POSTMENOPAUSAL ESTROGEN DEFICIENCY: ICD-10-CM

## 2021-06-11 DIAGNOSIS — I10 ESSENTIAL HYPERTENSION WITH GOAL BLOOD PRESSURE LESS THAN 140/90: ICD-10-CM

## 2021-06-11 DIAGNOSIS — E03.9 HYPOTHYROIDISM, UNSPECIFIED TYPE: ICD-10-CM

## 2021-06-11 DIAGNOSIS — Z13.220 LIPID SCREENING: ICD-10-CM

## 2021-06-11 DIAGNOSIS — L29.2 VULVAR ITCHING: ICD-10-CM

## 2021-06-11 DIAGNOSIS — L29.2 VULVAR ITCHING: Primary | ICD-10-CM

## 2021-06-11 DIAGNOSIS — M85.80 OSTEOPENIA, UNSPECIFIED LOCATION: ICD-10-CM

## 2021-06-11 DIAGNOSIS — Z00.00 ENCOUNTER FOR MEDICARE ANNUAL WELLNESS EXAM: Primary | ICD-10-CM

## 2021-06-11 DIAGNOSIS — B37.0 ORAL THRUSH: ICD-10-CM

## 2021-06-11 LAB
CHOLEST SERPL-MCNC: 216 MG/DL
HDLC SERPL-MCNC: 65 MG/DL
LDLC SERPL CALC-MCNC: 132 MG/DL
NONHDLC SERPL-MCNC: 151 MG/DL
TRIGL SERPL-MCNC: 95 MG/DL
TSH SERPL DL<=0.005 MIU/L-ACNC: 1.82 MU/L (ref 0.4–4)

## 2021-06-11 PROCEDURE — 36415 COLL VENOUS BLD VENIPUNCTURE: CPT | Performed by: FAMILY MEDICINE

## 2021-06-11 PROCEDURE — 99214 OFFICE O/P EST MOD 30 MIN: CPT | Mod: 25 | Performed by: FAMILY MEDICINE

## 2021-06-11 PROCEDURE — G0439 PPPS, SUBSEQ VISIT: HCPCS | Performed by: FAMILY MEDICINE

## 2021-06-11 PROCEDURE — 80061 LIPID PANEL: CPT | Performed by: FAMILY MEDICINE

## 2021-06-11 PROCEDURE — 84443 ASSAY THYROID STIM HORMONE: CPT | Performed by: FAMILY MEDICINE

## 2021-06-11 RX ORDER — LEVOTHYROXINE SODIUM 50 UG/1
50 TABLET ORAL DAILY
Qty: 90 TABLET | Refills: 3 | Status: CANCELLED | OUTPATIENT
Start: 2021-06-11

## 2021-06-11 RX ORDER — LOSARTAN/HYDROCHLOROTHIAZIDE 50-12.5 MG
1 TABLET ORAL DAILY
Qty: 90 TABLET | Refills: 3 | Status: SHIPPED | OUTPATIENT
Start: 2021-06-11 | End: 2022-06-15

## 2021-06-11 RX ORDER — NYSTATIN 100000/ML
500000 SUSPENSION, ORAL (FINAL DOSE FORM) ORAL 4 TIMES DAILY
Qty: 140 ML | Refills: 0 | Status: SHIPPED | OUTPATIENT
Start: 2021-06-11 | End: 2021-10-29

## 2021-06-11 RX ORDER — CLOBETASOL PROPIONATE 0.5 MG/ML
SOLUTION TOPICAL 2 TIMES DAILY
Qty: 25 ML | Refills: 0 | Status: SHIPPED | OUTPATIENT
Start: 2021-06-11 | End: 2021-06-11

## 2021-06-11 RX ORDER — CLOBETASOL PROPIONATE 0.5 MG/G
CREAM TOPICAL 2 TIMES DAILY
Qty: 45 G | Refills: 0 | Status: SHIPPED | OUTPATIENT
Start: 2021-06-11 | End: 2022-06-15

## 2021-06-11 ASSESSMENT — ANXIETY QUESTIONNAIRES
7. FEELING AFRAID AS IF SOMETHING AWFUL MIGHT HAPPEN: NOT AT ALL
1. FEELING NERVOUS, ANXIOUS, OR ON EDGE: SEVERAL DAYS
IF YOU CHECKED OFF ANY PROBLEMS ON THIS QUESTIONNAIRE, HOW DIFFICULT HAVE THESE PROBLEMS MADE IT FOR YOU TO DO YOUR WORK, TAKE CARE OF THINGS AT HOME, OR GET ALONG WITH OTHER PEOPLE: NOT DIFFICULT AT ALL
GAD7 TOTAL SCORE: 3
3. WORRYING TOO MUCH ABOUT DIFFERENT THINGS: SEVERAL DAYS
2. NOT BEING ABLE TO STOP OR CONTROL WORRYING: SEVERAL DAYS
5. BEING SO RESTLESS THAT IT IS HARD TO SIT STILL: NOT AT ALL
6. BECOMING EASILY ANNOYED OR IRRITABLE: NOT AT ALL

## 2021-06-11 ASSESSMENT — PATIENT HEALTH QUESTIONNAIRE - PHQ9
5. POOR APPETITE OR OVEREATING: NOT AT ALL
SUM OF ALL RESPONSES TO PHQ QUESTIONS 1-9: 3

## 2021-06-11 ASSESSMENT — MIFFLIN-ST. JEOR: SCORE: 1040.33

## 2021-06-11 NOTE — TELEPHONE ENCOUNTER
Patient called and stated that she really does not want to start the clobetasol (TEMOVATE) 0.05 % external solution that was prescribed  Today.    She stated that it is a liquid form and is a very strong strenghth.  She stated that she has a fungal infection in her throat, and is wondering if she should be treated with a fungal medication instead of this.    She also stated that she can not apply the liquid as she can not control the amount.  It is also not supposed to use internally.    She stated that this is for a scalp issues.    Routed to PCP to please advise.    Blanquita RIBERAN-RN  Triage Nurse  [facility]: [facility]

## 2021-06-11 NOTE — PROGRESS NOTES
"  SUBJECTIVE:   Hilda Potter is a 85 year old female who presents for Preventive Visit.    Patient has been advised of split billing requirements and indicates understanding: Yes  Are you in the first 12 months of your Medicare Part B coverage?  No    Physical Health:    In general, how would you rate your overall physical health? good    Outside of work, how many days during the week do you exercise? 6-7 days/week    Outside of work, approximately how many minutes a day do you exercise?walking 1 mile     If you drink alcohol do you typically have >3 drinks per day or >7 drinks per week? No    Do you usually eat at least 4 servings of fruit and vegetables a day, include whole grains & fiber and avoid regularly eating high fat or \"junk\" foods? NO    Do you have any problems taking medications regularly?  No    Do you have any side effects from medications? not applicable    Needs assistance for the following daily activities: no assistance needed    Which of the following safety concerns are present in your home?  none identified     Hearing impairment: No    In the past 6 months, have you been bothered by leaking of urine? no    Mental Health:    In general, how would you rate your overall mental or emotional health? good  PHQ-2 Score:      Do you feel safe in your environment? Yes    Have you ever done Advance Care Planning? (For example, a Health Directive, POLST, or a discussion with a medical provider or your loved ones about your wishes): Yes, advance care planning is on file.    Additional concerns to address?  YES    Multiple concerns    Vaginal Symptoms x2-3 weeks  Reporting intermittent vaginal itching and soreness   Aquaphor does give minimal  relief.   Denies having any discharge.     Believes she also has another bout of oral thrush.   States that she has had oral thrush in the past and usually ends up having trouble swallowing levothyroxine in the morning.     HYPERTENSION - Patient has " longstanding history of HTN , currently denies any symptoms referable to elevated blood pressure. Specifically denies chest pain, palpitations, dyspnea, orthopnea, PND or peripheral edema. Blood pressure readings have been in normal range- brings in home BP record. SBP < 130 and DBP 60-70s. Current medication regimen is as listed below. Patient denies any side effects of medication.     HYPOTHYROIDISM - Patient has a longstanding history of chronic Hypothyroidism. Patient has been doing well, noting no tremor, insomnia, hair loss or changes in skin texture. Continues to take medications as directed, without adverse reactions or side effects. Last TSH   Lab Results   Component Value Date    TSH 1.47 2020       Fall risk:  Fallen 2 or more times in the past year?: No  Any fall with injury in the past year?: No    Cognitive Screenin) Repeat 3 items (Leader, Season, Table)    2) Clock draw: NORMAL  3) 3 item recall: Recalls 3 objects  Results: 3 items recalled: COGNITIVE IMPAIRMENT LESS LIKELY    Mini-CogTM Copyright S Radha. Licensed by the author for use in [facility]; reprinted with permission (arben@[facility]). All rights reserved.      Do you have sleep apnea, excessive snoring or daytime drowsiness?: no      HEALTH CARE MAINTENANCE: Due for lipid screening and TSH labs     Reviewed and updated as needed this visit by clinical staff  Tobacco  Allergies  Meds              Reviewed and updated as needed this visit by Provider                Social History     Tobacco Use     Smoking status: Never Smoker     Smokeless tobacco: Never Used   Substance Use Topics     Alcohol use: Yes     Comment: wine occasionally                           Current providers sharing in care for this patient include:   Patient Care Team:  Ame Dobson MD as PCP - General (Family Practice)  Ame Dobson MD as Assigned PCP  Jane Roth MD as Assigned Cancer Care Provider  Andi Bay MD as  Assigned Surgical Provider  Iliana De Los Santos MD as Assigned Neuroscience Provider    The following health maintenance items are reviewed in Epic and correct as of today:  Health Maintenance   Topic Date Due     ZOSTER IMMUNIZATION (3 of 3) 07/24/2018     FALL RISK ASSESSMENT  06/12/2021     EYE EXAM  03/29/2022     BMP  05/07/2022     MEDICARE ANNUAL WELLNESS VISIT  06/11/2022     TSH W/FREE T4 REFLEX  06/11/2022     ANNUAL REVIEW OF HM ORDERS  06/11/2022     DTAP/TDAP/TD IMMUNIZATION (3 - Td) 07/25/2023     ADVANCE CARE PLANNING  06/11/2026     DEXA  05/12/2030     MIGRAINE ACTION PLAN  Completed     PHQ-2  Completed     INFLUENZA VACCINE  Completed     Pneumococcal Vaccine: 65+ Years  Completed     COVID-19 Vaccine  Completed     IPV IMMUNIZATION  Aged Out     MENINGITIS IMMUNIZATION  Aged Out     HEPATITIS B IMMUNIZATION  Aged Out     Lab work is in process  Labs reviewed in EPIC  BP Readings from Last 3 Encounters:   06/11/21 128/68   05/07/21 (!) 140/80   04/28/21 (!) 162/71    Wt Readings from Last 3 Encounters:   06/11/21 61.5 kg (135 lb 9.6 oz)   05/07/21 61.6 kg (135 lb 11.2 oz)   04/28/21 61.2 kg (135 lb)                  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)     Premature beats     Premature atrial beats     Compression fracture of L1 lumbar vertebra, seen on CT     Lung nodule < 6cm on CT     Dupuytren's contracture of right hand     Hypothyroidism, unspecified type     Essential hypertension with goal blood pressure less than 140/90     Combined forms of age-related cataract, mild-mod, both eyes     Migraine without status migrainosus, not  intractable, unspecified migraine type     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     Past Surgical History:   Procedure Laterality Date     APPENDECTOMY OPEN  1954     BLEPHAROPLASTY BILATERAL  10/2007    both eyes, upper lids     C LENGTHEN,TENDON,HAND/FINGER  1993    repair of dupytrons's contracture     CHOLECYSTECTOMY, OPEN  1992     COLONOSCOPY  6/02, 10/13    Q 5 years, polyps     D & C       REPAIR PTOSIS       SALPINGO OOPHORECTOMY,R/L/LUIS DANIEL      left ovary and tube     SMALL BOWEL RESECTION  1986    colon resection      TONSILLECTOMY & ADENOIDECTOMY      childhood     TUBAL LIGATION       UPPER GI ENDOSCOPY  6/02, 8/11       Social History     Tobacco Use     Smoking status: Never Smoker     Smokeless tobacco: Never Used   Substance Use Topics     Alcohol use: Yes     Comment: wine occasionally     Family History   Problem Relation Age of Onset     Hypertension Mother      Arthritis Mother      Cardiovascular Mother      Retinal detachment Mother      Migraines Mother      C.A.D. Father      Hypertension Father      Cerebrovascular Disease Father      Arthritis Father      Cardiovascular Father      Eye Disorder Father      Heart Disease Father      Glaucoma Father      Parkinsonism Father      Cardiovascular Brother      Glaucoma Brother      Peripheral Neuropathy Brother      Glaucoma Other      Macular Degeneration No family hx of      Bleeding Disorder No family hx of      Anesthesia Reaction No family hx of          Current Outpatient Medications   Medication Sig Dispense Refill     Aspirin-Acetaminophen-Caffeine (EXCEDRIN MIGRAINE PO) Take 1 tablet by mouth as needed       calcium carbonate-vitamin D (OS-FIDENCIO) 600-400 MG-UNIT chewable tablet Take 1 chew tab by mouth 2 times daily       carboxymethylcellulose (REFRESH PLUS) 0.5 % SOLN Place 1 drop into both eyes 3 times daily as needed for dry eyes       clobetasol (TEMOVATE) 0.05 % external solution Apply topically 2 times daily Sparingly to affected  "area x 1 week then 3 times a week thereafter for 2 weeks. Repeat as needed. 25 mL 0     HYZAAR 50-12.5 MG tablet Take 1 tablet by mouth daily 90 tablet 3     latanoprost (XALATAN) 0.005 % ophthalmic solution Place 1 drop into both eyes At Bedtime 10 mL 3     levothyroxine (SYNTHROID/LEVOTHROID) 50 MCG tablet Take 1 tablet (50 mcg) by mouth daily 90 tablet 3     MELATONIN PO Take 3 mg by mouth nightly as needed        Multiple Vitamin (MULTI-VITAMIN) per tablet Take 1 tablet by mouth daily.         nystatin (MYCOSTATIN) 756690 UNIT/ML suspension Take 5 mLs (500,000 Units) by mouth 4 times daily for 7 days - swish and spit/swallow. 140 mL 0     Allergies   Allergen Reactions     Diatrizoate Other (See Comments)     Bisphosphonates GI Disturbance     GI distress     Ivp Dye [Contrast Dye] Swelling     Lisinopril Cough     Losartan Hives     But can tolerate Hyzaar.      Morphine Hives     Morphine Sulfate Nausea and Vomiting     Prilosec [Omeprazole] Hives     Latex Rash         ROS:  Constitutional, HEENT, cardiovascular, pulmonary, gi and gu systems are negative, except as otherwise noted.    OBJECTIVE:   /68   Pulse 77   Temp 97  F (36.1  C) (Tympanic)   Resp 16   Ht 1.61 m (5' 3.39\")   Wt 61.5 kg (135 lb 9.6 oz)   LMP 10/01/1986   SpO2 97%   Breastfeeding No   BMI 23.73 kg/m   Estimated body mass index is 23.73 kg/m  as calculated from the following:    Height as of this encounter: 1.61 m (5' 3.39\").    Weight as of this encounter: 61.5 kg (135 lb 9.6 oz).  EXAM:   GENERAL: healthy, alert and no distress  EYES: Eyes grossly normal to inspection, PERRL and conjunctivae and sclerae normal  HENT: ear canals and TM's normal, nose and mouth without ulcers or lesions. No adherent oral thrush noted on buccal mucosa but noted white coating of the tongue.   RESP: lungs clear to auscultation - no rales, rhonchi or wheezes  CV: regular rate and rhythm, normal S1 S2, no S3 or S4, no murmur, click or rub, no " "peripheral edema and peripheral pulses strong  ABDOMEN: soft, nontender, no hepatosplenomegaly, no masses and bowel sounds normal   (female): External genitalia revealed erythema on the vulva, no skin breakdown or excoriations noted.   MS: no gross musculoskeletal defects noted, no edema  SKIN: Multiple SK lesions on the back.     Diagnostic Test Results:  Previous labs reviewed.   Previous DEXA in 2015 reviewed- osteopenia  Labs drawn and in process.     ASSESSMENT / PLAN:   Hilda was seen today for physical.    Diagnoses and all orders for this visit:    Encounter for Medicare annual wellness exam  -     REVIEW OF HEALTH MAINTENANCE PROTOCOL ORDERS    Lipid screening  -     Lipid panel reflex to direct LDL Fasting    Hypothyroidism, unspecified type  -     TSH WITH FREE T4 REFLEX  -      Will refill Levothyroxine after labs    Essential hypertension with goal blood pressure less than 140/90, controlled.  -     Refill: HYZAAR 50-12.5 MG tablet; Take 1 tablet by mouth daily    Vulvar itching  -     clobetasol (TEMOVATE) 0.05 % external solution; Apply topically 2 times daily Sparingly to affected area x 1 week then 3 times a week thereafter for 2 weeks. Repeat as needed.    Oral thrush  -     nystatin (MYCOSTATIN) 765715 UNIT/ML suspension; Take 5 mLs (500,000 Units) by mouth 4 times daily for 7 days - swish and spit/swallow.    Postmenopausal estrogen deficiency with   Osteopenia, unspecified location  -     DX Hip/Pelvis/Spine; Future  -     calcium carbonate-vitamin D (OS-FIDENCIO) 600-400 MG-UNIT chewable tablet; Take 1 chew tab by mouth 2 times daily        Patient has been advised of split billing requirements and indicates understanding: Yes    COUNSELING:  Reviewed preventive health counseling, as reflected in patient instructions       Regular exercise       Healthy diet/nutrition    Estimated body mass index is 23.73 kg/m  as calculated from the following:    Height as of this encounter: 1.61 m (5' 3.39\").    " Weight as of this encounter: 61.5 kg (135 lb 9.6 oz).        She reports that she has never smoked. She has never used smokeless tobacco.    Appropriate preventive services were discussed with this patient, including applicable screening as appropriate for cardiovascular disease, diabetes, osteopenia/osteoporosis, and glaucoma.  As appropriate for age/gender, discussed screening for colorectal cancer, prostate cancer, breast cancer, and cervical cancer. Checklist reviewing preventive services available has been given to the patient.    Reviewed patients plan of care and provided an AVS. The Basic Care Plan (routine screening as documented in Health Maintenance) for Hilda meets the Care Plan requirement. This Care Plan has been established and reviewed with the Patient.    Counseling Resources:  ATP IV Guidelines  Pooled Cohorts Equation Calculator  Breast Cancer Risk Calculator  BRCA-Related Cancer Risk Assessment: FHS-7 Tool  FRAX Risk Assessment  ICSI Preventive Guidelines  Dietary Guidelines for Americans, 2010  USDA's MyPlate  ASA Prophylaxis  Lung CA Screening    Follow up in 2 weeks if symptoms persist.   6 months- med check.   Next Annual Physical due in 4/2022    Ame Dobson MD  [facility]

## 2021-06-11 NOTE — LETTER
June 21, 2021      Hilda Potter  [address] [address]        Dear ,    We are writing to inform you of your test results.    Your recent labs looked good.     Thyroid function test was normal. Continue current dose of Levothyroxine. Refills sent.   Cholesterol remains stable and similar to what it was a year ago.   Continue efforts to eat a healthy diet and exercise regularly.     Resulted Orders   TSH WITH FREE T4 REFLEX   Result Value Ref Range    TSH 1.82 0.40 - 4.00 mU/L   Lipid panel reflex to direct LDL Fasting   Result Value Ref Range    Cholesterol 216 (H) <200 mg/dL      Comment:      Desirable:       <200 mg/dl    Triglycerides 95 <150 mg/dL      Comment:      Fasting specimen    HDL Cholesterol 65 >49 mg/dL    LDL Cholesterol Calculated 132 (H) <100 mg/dL      Comment:      Above desirable:  100-129 mg/dl  Borderline High:  130-159 mg/dL  High:             160-189 mg/dL  Very high:       >189 mg/dl      Non HDL Cholesterol 151 (H) <130 mg/dL      Comment:      Above Desirable:  130-159 mg/dl  Borderline high:  160-189 mg/dl  High:             190-219 mg/dl  Very high:       >219 mg/dl         If you have any questions or concerns, please call the clinic at the number listed above.       Sincerely,      Ame Dobson M.D/ellyno

## 2021-06-11 NOTE — PATIENT INSTRUCTIONS
Patient Education   Personalized Prevention Plan  You are due for the preventive services outlined below.  Your care team is available to assist you in scheduling these services.  If you have already completed any of these items, please share that information with your care team to update in your medical record.  Health Maintenance Due   Topic Date Due     ANNUAL REVIEW OF HM ORDERS  Never done     Zoster (Shingles) Vaccine (3 of 3) 07/24/2018     PHQ-2  01/01/2021     Annual Wellness Visit  06/12/2021     Thyroid Function Lab  06/12/2021     FALL RISK ASSESSMENT  06/12/2021

## 2021-06-11 NOTE — TELEPHONE ENCOUNTER
Patient called. Clobetasol external solution sent in error. Rx- resent for a Clobetasol cream. Apologized for the error made. Patient was understanding and plans to  Rx.

## 2021-06-12 ASSESSMENT — ANXIETY QUESTIONNAIRES: GAD7 TOTAL SCORE: 3

## 2021-06-20 DIAGNOSIS — E03.9 HYPOTHYROIDISM, UNSPECIFIED TYPE: ICD-10-CM

## 2021-06-20 RX ORDER — LEVOTHYROXINE SODIUM 50 UG/1
50 TABLET ORAL DAILY
Qty: 90 TABLET | Refills: 3 | Status: SHIPPED | OUTPATIENT
Start: 2021-06-20 | End: 2022-06-17

## 2021-06-21 ENCOUNTER — ANCILLARY PROCEDURE (OUTPATIENT)
Dept: BONE DENSITY | Facility: CLINIC | Age: 86
End: 2021-06-21
Attending: FAMILY MEDICINE
Payer: MEDICARE

## 2021-06-21 DIAGNOSIS — Z78.0 POSTMENOPAUSAL ESTROGEN DEFICIENCY: ICD-10-CM

## 2021-06-21 DIAGNOSIS — E03.9 HYPOTHYROIDISM, UNSPECIFIED TYPE: ICD-10-CM

## 2021-06-21 PROCEDURE — 77085 DXA BONE DENSITY AXL VRT FX: CPT | Performed by: INTERNAL MEDICINE

## 2021-06-21 RX ORDER — LEVOTHYROXINE SODIUM 50 UG/1
TABLET ORAL
Qty: 90 TABLET | Refills: 3 | OUTPATIENT
Start: 2021-06-21

## 2021-06-21 NOTE — TELEPHONE ENCOUNTER
Duplicate request from same pharmacy.     Last written: E-Prescribing Status: Receipt confirmed by pharmacy (6/20/2021  7:54 PM CDT)    Current Refill Refused.     Daly Blue RN BSN  [facility]

## 2021-07-14 ENCOUNTER — OFFICE VISIT (OUTPATIENT)
Dept: FAMILY MEDICINE | Facility: CLINIC | Age: 86
End: 2021-07-14
Payer: MEDICARE

## 2021-07-14 VITALS
TEMPERATURE: 97.1 F | RESPIRATION RATE: 16 BRPM | DIASTOLIC BLOOD PRESSURE: 78 MMHG | BODY MASS INDEX: 23.69 KG/M2 | HEART RATE: 79 BPM | SYSTOLIC BLOOD PRESSURE: 124 MMHG | WEIGHT: 135.4 LBS | OXYGEN SATURATION: 96 %

## 2021-07-14 DIAGNOSIS — I10 ESSENTIAL HYPERTENSION WITH GOAL BLOOD PRESSURE LESS THAN 140/90: ICD-10-CM

## 2021-07-14 DIAGNOSIS — R13.10 PILL DYSPHAGIA: ICD-10-CM

## 2021-07-14 DIAGNOSIS — M85.88 OSTEOPENIA OF LUMBAR SPINE: Primary | ICD-10-CM

## 2021-07-14 DIAGNOSIS — Z87.81 HISTORY OF TRAUMATIC FRACTURE OF VERTEBRA: ICD-10-CM

## 2021-07-14 DIAGNOSIS — N18.31 STAGE 3A CHRONIC KIDNEY DISEASE (H): ICD-10-CM

## 2021-07-14 PROBLEM — N18.30 CHRONIC KIDNEY DISEASE, STAGE 3 (H): Status: ACTIVE | Noted: 2021-07-14

## 2021-07-14 PROCEDURE — 99214 OFFICE O/P EST MOD 30 MIN: CPT | Performed by: FAMILY MEDICINE

## 2021-07-16 ENCOUNTER — TRANSFERRED RECORDS (OUTPATIENT)
Dept: HEALTH INFORMATION MANAGEMENT | Facility: CLINIC | Age: 86
End: 2021-07-16

## 2021-08-11 ENCOUNTER — OFFICE VISIT (OUTPATIENT)
Dept: FAMILY MEDICINE | Facility: CLINIC | Age: 86
End: 2021-08-11
Payer: MEDICARE

## 2021-08-11 VITALS
BODY MASS INDEX: 23.73 KG/M2 | HEART RATE: 84 BPM | WEIGHT: 135.6 LBS | TEMPERATURE: 97.3 F | SYSTOLIC BLOOD PRESSURE: 155 MMHG | RESPIRATION RATE: 18 BRPM | DIASTOLIC BLOOD PRESSURE: 67 MMHG | OXYGEN SATURATION: 96 %

## 2021-08-11 DIAGNOSIS — Z01.818 PREOP GENERAL PHYSICAL EXAM: Primary | ICD-10-CM

## 2021-08-11 DIAGNOSIS — R13.10 DYSPHAGIA, UNSPECIFIED TYPE: ICD-10-CM

## 2021-08-11 PROCEDURE — 99214 OFFICE O/P EST MOD 30 MIN: CPT | Performed by: FAMILY MEDICINE

## 2021-08-11 NOTE — PROGRESS NOTES
[facility] [place]  [address] [facility]  [place] [address]  Phone: [phone]  Primary Provider: Ame Mario  Pre-op Performing Provider: AME MARIO      PREOPERATIVE EVALUATION:  Today's date: 8/11/2021    Hilda Potter is a 86 year old female who presents for a preoperative evaluation.    Surgical Information:  Surgery/Procedure: upper endoscopy dilatation  Surgery Location: [facility]  Surgeon: Jerrod Ly  Surgery Date: 9/1/21  Time of Surgery: 1015  Where patient plans to recover: At home with family  Fax number for surgical facility: [phone]    Type of Anesthesia Anticipated: General    Assessment & Plan     The proposed surgical procedure is considered LOW risk.    Preop general physical exam      Dysphagia, unspecified type  Due for EGD with possible dilatation.            Risks and Recommendations:  The patient has the following additional risks and recommendations for perioperative complications:   - No identified additional risk factors other than previously addressed    Medication Instructions:   - ACE/ARB: HOLD due to exceptional risk of hypotension during surgery.   - Take Levothyroxine the morning of surgery with small sips of water.    RECOMMENDATION:  APPROVAL GIVEN to proceed with proposed procedure, without further diagnostic evaluation.        \    Subjective     HPI related to upcoming procedure:       86 year old pleasant female patient of mine here for a Pre-op exam.   Has dysphagia to pills and occasional solid food. Was seen and evaluated by GI on 7/16/2021. Recommended an EGD with possible dilatation.   States that she understands the risks and benefits of the procedure and wishes to proceed.     Denies having any concerns today. States that her BPs are better at home- ranges in the 120s-130s systolic and 60s-70s diastolic.       Preop Questions 8/11/2021   1. Have you ever had a heart attack or stroke? No   2. Have you ever had  surgery on your heart or blood vessels, such as a stent placement, a coronary artery bypass, or surgery on an artery in your head, neck, heart, or legs? No   3. Do you have chest pain with activity? No   4. Do you have a history of  heart failure? No   5. Do you currently have a cold, bronchitis or symptoms of other infection? No   6. Do you have a cough, shortness of breath, or wheezing? No   7. Do you or anyone in your family have previous history of blood clots? No   8. Do you or does anyone in your family have a serious bleeding problem such as prolonged bleeding following surgeries or cuts? No   9. Have you ever had problems with anemia or been told to take iron pills? No   10. Have you had any abnormal blood loss such as black, tarry or bloody stools, or abnormal vaginal bleeding? No   11. Have you ever had a blood transfusion? No   12. Are you willing to have a blood transfusion if it is medically needed before, during, or after your surgery? Yes   13. Have you or any of your relatives ever had problems with anesthesia? No   14. Do you have sleep apnea, excessive snoring or daytime drowsiness? No   15. Do you have any artifical heart valves or other implanted medical devices like a pacemaker, defibrillator, or continuous glucose monitor? No   16. Do you have artificial joints? No   17. Are you allergic to latex? No     Health Care Directive:  Patient has a Health Care Directive on file      Preoperative Review of :   reviewed - no record of controlled substances prescribed.      Status of Chronic Conditions:  See problem list for active medical problems.  Problems all longstanding and stable, except as noted/documented.  See ROS for pertinent symptoms related to these conditions.      Review of Systems  Constitutional, neuro, ENT, endocrine, pulmonary, cardiac, gastrointestinal, genitourinary, musculoskeletal, integument and psychiatric systems are negative, except as otherwise noted.    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 11/04/2018     Priority: Medium     Migraine without status migrainosus, not intractable, unspecified migraine type 09/10/2018     Priority: Medium     Combined forms of age-related cataract, mild-mod, both eyes 11/30/2017     Priority: Medium     Essential hypertension with goal blood pressure less than 140/90 10/03/2017     Priority: Medium     Hypothyroidism, unspecified type 09/16/2016     Priority: Medium     Dupuytren's contracture of right hand 11/09/2015     Priority: Medium     Lung nodule < 6cm on CT 05/15/2015     Priority: Medium     Compression fracture of L1 lumbar vertebra, seen on CT 04/01/2015     Priority: Medium     Premature atrial beats 10/29/2014     Priority: Medium     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  Document previously scanned on 4-1-13.  Validation form completed and sent to be scanned.  Code Status needs to be updated to reflect choices in most recent ACP document-full code.  Confirmed/documented designated decision maker(s). See permanent comments section of demographics in clinical tab. View document(s) and details by clicking on code status.   Added by Rhiannon Sales RN System ACP Coordinator on 4/3/2013.    Advance Care Planning 11/13/2017: ACP Review of Chart / Resources Provided:  Reviewed chart for advance care plan.  Hilda Potter has an advance care plan which needs to be updated. Patient states presence of new/updated ACP document. Copy requested   Added by Madyson Matthews                     Patient states has Advance Directive and will bring in a copy to clinic.        GERD (gastroesophageal reflux disease) 06/02/2011     Priority: Medium     CARDIOVASCULAR SCREENING; LDL GOAL LESS THAN 130 05/26/2011     Priority: Medium     Osteoporosis 10/01/2009     Priority: Medium     AR (allergic rhinitis)      Priority: Medium     Reflux Esophagitis      Priority: Medium     Atrophic vaginitis      Priority: Medium      Past Medical History:   Diagnosis Date     AR (allergic rhinitis)      Atrophic vaginitis      Chronic kidney disease, stage 3 7/14/2021     Compression fracture of L1 lumbar vertebra (H) 4/2015     Glaucoma (increased eye pressure)      HTN (hypertension)      Migraine      Nonsenile cataract      Osteoporosis      Reflux esophagitis      Thyroid disease      Past Surgical History:   Procedure Laterality Date     APPENDECTOMY OPEN  1954     BLEPHAROPLASTY BILATERAL  10/2007    both eyes, upper lids     C LENGTHEN,TENDON,HAND/FINGER  1993    repair of dupytrons's contracture     CHOLECYSTECTOMY, OPEN  1992     COLONOSCOPY  6/02, 10/13    Q 5 years, polyps     D & C       REPAIR PTOSIS       SALPINGO OOPHORECTOMY,R/L/LUIS DANIEL      left ovary and tube     SMALL BOWEL RESECTION  1986    colon resection      TONSILLECTOMY & ADENOIDECTOMY      childhood     TUBAL LIGATION       UPPER GI ENDOSCOPY  6/02, 8/11     Current Outpatient Medications   Medication Sig Dispense Refill     Aspirin-Acetaminophen-Caffeine (EXCEDRIN MIGRAINE PO) Take 1 tablet by mouth as needed       calcium carbonate-vitamin D (OS-FIDENCIO) 600-400 MG-UNIT chewable tablet Take 1 chew tab by mouth 2 times daily       carboxymethylcellulose (REFRESH PLUS) 0.5 % SOLN Place 1 drop into both eyes 3 times daily as needed for dry eyes       clobetasol (TEMOVATE) 0.05 % external cream Apply topically 2 times daily Sparingly to affected area x 1 week then 3 times a week thereafter for 2 weeks. Repeat as  needed. 45 g 0     HYZAAR 50-12.5 MG tablet Take 1 tablet by mouth daily 90 tablet 3     latanoprost (XALATAN) 0.005 % ophthalmic solution Place 1 drop into both eyes At Bedtime 10 mL 3     levothyroxine (SYNTHROID/LEVOTHROID) 50 MCG tablet Take 1 tablet (50 mcg) by mouth daily 90 tablet 3     MELATONIN PO Take 3 mg by mouth nightly as needed        Multiple Vitamin (MULTI-VITAMIN) per tablet Take 1 tablet by mouth daily.           Allergies   Allergen Reactions     Diatrizoate Other (See Comments)     Bisphosphonates GI Disturbance     GI distress     Ivp Dye [Contrast Dye] Swelling     Lisinopril Cough     Losartan Hives     But can tolerate Hyzaar.      Morphine Hives     Morphine Sulfate Nausea and Vomiting     Prilosec [Omeprazole] Hives     Latex Rash        Social History     Tobacco Use     Smoking status: Never Smoker     Smokeless tobacco: Never Used   Substance Use Topics     Alcohol use: Yes     Comment: wine occasionally     Family History   Problem Relation Age of Onset     Hypertension Mother      Arthritis Mother      Cardiovascular Mother      Retinal detachment Mother      Migraines Mother      C.A.D. Father      Hypertension Father      Cerebrovascular Disease Father      Arthritis Father      Cardiovascular Father      Eye Disorder Father      Heart Disease Father      Glaucoma Father      Parkinsonism Father      Cardiovascular Brother      Glaucoma Brother      Peripheral Neuropathy Brother      Glaucoma Other      Macular Degeneration No family hx of      Bleeding Disorder No family hx of      Anesthesia Reaction No family hx of      History   Drug Use No         Objective     LMP 10/01/1986     Physical Exam    GENERAL APPEARANCE: healthy, alert and no distress     EYES: EOMI, PERRL     HENT: ear canals and TM's normal and nose and mouth without ulcers or lesions     NECK: no adenopathy, no asymmetry, masses, or scars and thyroid normal to palpation     RESP: lungs clear to auscultation - no  rales, rhonchi or wheezes     CV: regular rates and rhythm, normal S1 S2, no S3 or S4 and no murmur, click or rub     ABDOMEN:  soft, nontender, no HSM or masses and bowel sounds normal     MS: extremities normal- no gross deformities noted, no evidence of inflammation in joints, FROM in all extremities.     SKIN: no suspicious lesions or rashes     NEURO: Normal strength and tone, sensory exam grossly normal, mentation intact and speech normal     PSYCH: mentation appears normal. and affect normal/bright     LYMPHATICS: No cervical adenopathy    Recent Labs   Lab Test 05/07/21  1515 01/05/21  1021 11/03/20  0912   HGB 12.8 12.7 12.8    161 156    140 142   POTASSIUM 4.0 4.2 4.2   CR 0.97 0.94 0.90   A1C  --   --  5.4        Diagnostics:  No labs were ordered during this visit.   No EKG required for low risk surgery (cataract, skin procedure, breast biopsy, etc).    Revised Cardiac Risk Index (RCRI):  The patient has the following serious cardiovascular risks for perioperative complications:   - No serious cardiac risks = 0 points     RCRI Interpretation: 0 points: Class I (very low risk - 0.4% complication rate)           Signed Electronically by: Ame Dobson MD  Copy of this evaluation report is provided to requesting physician.

## 2021-08-11 NOTE — PATIENT INSTRUCTIONS

## 2021-08-19 ENCOUNTER — OFFICE VISIT (OUTPATIENT)
Dept: OPHTHALMOLOGY | Facility: CLINIC | Age: 86
End: 2021-08-19
Payer: MEDICARE

## 2021-08-19 DIAGNOSIS — H40.003 GLAUCOMA SUSPECT OF BOTH EYES: Primary | ICD-10-CM

## 2021-08-19 PROCEDURE — 92133 CPTRZD OPH DX IMG PST SGM ON: CPT | Performed by: OPHTHALMOLOGY

## 2021-08-19 PROCEDURE — 92083 EXTENDED VISUAL FIELD XM: CPT | Performed by: OPHTHALMOLOGY

## 2021-08-19 PROCEDURE — 92012 INTRM OPH EXAM EST PATIENT: CPT | Performed by: OPHTHALMOLOGY

## 2021-08-19 ASSESSMENT — VISUAL ACUITY
METHOD: SNELLEN - LINEAR
OD_CC+: -2
OD_CC: 20/30
OS_CC: 20/30
OS_CC+: -2

## 2021-08-19 ASSESSMENT — EXTERNAL EXAM - LEFT EYE: OS_EXAM: NORMAL

## 2021-08-19 ASSESSMENT — TONOMETRY
IOP_METHOD: APPLANATION
OD_IOP_MMHG: 20
OS_IOP_MMHG: 19

## 2021-08-19 ASSESSMENT — EXTERNAL EXAM - RIGHT EYE: OD_EXAM: NORMAL

## 2021-08-19 NOTE — LETTER
8/19/2021         RE: Hilda Potter  [address]  [place] [address]        Dear Colleague,    Thank you for referring your patient, Hilda Potter, to the [facility]. Please see a copy of my visit note below.     Current Eye Medications:  Xalatan at bedtime both eyes      Subjective:  6 month follow up for an intraocular pressure check, glaucoma OCT, retinal OCT, and Enciso Visual Field. Vision is doing well both eyes in the distance. Having trouble straining more when reading at near for last 6 months. No eye pain or discomfort in either eye.      Objective:  See Ophthalmology Exam.       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      Plan:  Continue same medication.  May need lower intraocular pressures.  Return visit 6 (or 8 if go south) months for a complete exam.    Andi Bay M.D.  [phone]             Again, thank you for allowing me to participate in the care of your patient.        Sincerely,        Andi Bay MD

## 2021-08-19 NOTE — PROGRESS NOTES
Current Eye Medications:  Xalatan at bedtime both eyes      Subjective:  6 month follow up for an intraocular pressure check, glaucoma OCT, retinal OCT, and Enciso Visual Field. Vision is doing well both eyes in the distance. Having trouble straining more when reading at near for last 6 months. No eye pain or discomfort in either eye.      Objective:  See Ophthalmology Exam.       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      Plan:  Continue same medication.  May need lower intraocular pressures.  Return visit 6 (or 8 if go south) months for a complete exam.    Andi Bay M.D.  [phone]

## 2021-08-19 NOTE — PATIENT INSTRUCTIONS
Continue same medication.  May need lower intraocular pressures.  Return visit 6 months for a complete exam.    Andi Bay M.D.  [phone]

## 2021-08-28 ENCOUNTER — TELEPHONE (OUTPATIENT)
Dept: FAMILY MEDICINE | Facility: CLINIC | Age: 86
End: 2021-08-28

## 2021-08-28 NOTE — TELEPHONE ENCOUNTER
Reason for Call:  Other call back    Detailed comments: PT HAS PROCEDURE 9/1 AT [place],  TESTED POS FOR COVID 8/28. PT WAITING FOR COVID TEST FROM 8/27. CAN SHE STILL DO APPT. PT IS ALSO CONCERNED ABOUT HAVING COVID AND LYMPHOMA IN REMISSION AN WOULD LIKE TO GET FEEDBACK ON THAT    Phone Number Patient can be reached at: Home number on file [phone] (home)    Best Time: ANYTIME     Can we leave a detailed message on this number? YES    Call taken on 8/28/2021 at 4:17 PM by Priti Gardner

## 2021-08-30 NOTE — TELEPHONE ENCOUNTER
Patient should follow up with surgeon's office at [place] regarding covid test, assume she will need to reschedule her procedure but surgeon's office will determine that.    See 's chart (Joey) regarding his issue.    Attempted to call patient at home/mobile number, no answer, left message on voicemail; patient was instructed to return call to [facility] at [phone].    Katie Rogers RN  [facility]

## 2021-08-30 NOTE — TELEPHONE ENCOUNTER
Patient called back, she tested negative Friday. She will reach out to [place] for further guidance.     Florecita Jackson RN

## 2021-09-01 ENCOUNTER — TRANSFERRED RECORDS (OUTPATIENT)
Dept: HEALTH INFORMATION MANAGEMENT | Facility: CLINIC | Age: 86
End: 2021-09-01

## 2021-09-15 ENCOUNTER — TELEPHONE (OUTPATIENT)
Dept: FAMILY MEDICINE | Facility: CLINIC | Age: 86
End: 2021-09-15

## 2021-09-15 NOTE — TELEPHONE ENCOUNTER
Reason for Call:  Request for results:    Name of test or procedure: ESOPHAGOGASTRODUODENOSCOPY WITH DILATION    Date of test of procedure: 09/01/21    Location of the test or procedure: [facility] to leave the result message on voice mail or with a family member? YES    Phone number Patient can be reached at:  Home number on file [phone]     Additional comments: patient is specifically looking for biopsy results     Call taken on 9/15/2021 at 12:54 PM by Angie Alarcon

## 2021-09-16 NOTE — TELEPHONE ENCOUNTER
Noted.   Patient is currently seeing [facility], last seen 7/16 for dysphagia and an EGD with dilatation.   Recommend patient call her GI provider (provider that ordered the test) for the results and next steps.

## 2021-09-26 ENCOUNTER — HEALTH MAINTENANCE LETTER (OUTPATIENT)
Age: 86
End: 2021-09-26

## 2021-10-13 DIAGNOSIS — H40.003 GLAUCOMA SUSPECT OF BOTH EYES: ICD-10-CM

## 2021-10-13 RX ORDER — LATANOPROST 50 UG/ML
1 SOLUTION/ DROPS OPHTHALMIC AT BEDTIME
Qty: 10 ML | Refills: 3 | Status: SHIPPED | OUTPATIENT
Start: 2021-10-13 | End: 2022-08-18

## 2021-10-22 DIAGNOSIS — B37.0 ORAL THRUSH: ICD-10-CM

## 2021-10-25 NOTE — TELEPHONE ENCOUNTER
Routing refill request to provider for review/approval because:  Drug not active on patient's medication list      Florecita Jackson RN

## 2021-10-29 RX ORDER — NYSTATIN 100000/ML
SUSPENSION, ORAL (FINAL DOSE FORM) ORAL
Qty: 140 ML | Refills: 0 | Status: SHIPPED | OUTPATIENT
Start: 2021-10-29 | End: 2023-03-30

## 2021-11-15 ENCOUNTER — LAB (OUTPATIENT)
Dept: LAB | Facility: CLINIC | Age: 86
End: 2021-11-15

## 2021-11-15 ENCOUNTER — ONCOLOGY VISIT (OUTPATIENT)
Dept: ONCOLOGY | Facility: CLINIC | Age: 86
End: 2021-11-15
Payer: MEDICARE

## 2021-11-15 VITALS
OXYGEN SATURATION: 97 % | WEIGHT: 135.5 LBS | TEMPERATURE: 98 F | RESPIRATION RATE: 14 BRPM | SYSTOLIC BLOOD PRESSURE: 148 MMHG | HEIGHT: 63 IN | HEART RATE: 77 BPM | BODY MASS INDEX: 24.01 KG/M2 | DIASTOLIC BLOOD PRESSURE: 84 MMHG

## 2021-11-15 DIAGNOSIS — R10.13 EPIGASTRIC DISCOMFORT: ICD-10-CM

## 2021-11-15 DIAGNOSIS — C82.00 GRADE I FOLLICULAR SMALL CLEAVED CELL LYMPHOMA (H): Primary | ICD-10-CM

## 2021-11-15 DIAGNOSIS — C82.00 GRADE I FOLLICULAR SMALL CLEAVED CELL LYMPHOMA (H): ICD-10-CM

## 2021-11-15 DIAGNOSIS — I10 ESSENTIAL HYPERTENSION WITH GOAL BLOOD PRESSURE LESS THAN 140/90: ICD-10-CM

## 2021-11-15 LAB
ALBUMIN SERPL-MCNC: 3.5 G/DL (ref 3.4–5)
ALP SERPL-CCNC: 77 U/L (ref 40–150)
ALT SERPL W P-5'-P-CCNC: 48 U/L (ref 0–50)
ANION GAP SERPL CALCULATED.3IONS-SCNC: 1 MMOL/L (ref 3–14)
AST SERPL W P-5'-P-CCNC: 27 U/L (ref 0–45)
BASOPHILS # BLD AUTO: 0 10E3/UL (ref 0–0.2)
BASOPHILS NFR BLD AUTO: 1 %
BILIRUB SERPL-MCNC: 0.8 MG/DL (ref 0.2–1.3)
BUN SERPL-MCNC: 19 MG/DL (ref 7–30)
CALCIUM SERPL-MCNC: 9.2 MG/DL (ref 8.5–10.1)
CHLORIDE BLD-SCNC: 108 MMOL/L (ref 94–109)
CO2 SERPL-SCNC: 31 MMOL/L (ref 20–32)
CREAT SERPL-MCNC: 0.86 MG/DL (ref 0.52–1.04)
EOSINOPHIL # BLD AUTO: 0.1 10E3/UL (ref 0–0.7)
EOSINOPHIL NFR BLD AUTO: 1 %
ERYTHROCYTE [DISTWIDTH] IN BLOOD BY AUTOMATED COUNT: 13.2 % (ref 10–15)
GFR SERPL CREATININE-BSD FRML MDRD: 62 ML/MIN/1.73M2
GLUCOSE BLD-MCNC: 83 MG/DL (ref 70–99)
HCT VFR BLD AUTO: 39.7 % (ref 35–47)
HGB BLD-MCNC: 13.4 G/DL (ref 11.7–15.7)
HOLD SPECIMEN: NORMAL
HOLD SPECIMEN: NORMAL
IMM GRANULOCYTES # BLD: 0 10E3/UL
IMM GRANULOCYTES NFR BLD: 1 %
LDH SERPL L TO P-CCNC: 178 U/L (ref 81–234)
LYMPHOCYTES # BLD AUTO: 1.2 10E3/UL (ref 0.8–5.3)
LYMPHOCYTES NFR BLD AUTO: 28 %
MCH RBC QN AUTO: 30 PG (ref 26.5–33)
MCHC RBC AUTO-ENTMCNC: 33.8 G/DL (ref 31.5–36.5)
MCV RBC AUTO: 89 FL (ref 78–100)
MONOCYTES # BLD AUTO: 0.4 10E3/UL (ref 0–1.3)
MONOCYTES NFR BLD AUTO: 10 %
NEUTROPHILS # BLD AUTO: 2.7 10E3/UL (ref 1.6–8.3)
NEUTROPHILS NFR BLD AUTO: 59 %
NRBC # BLD AUTO: 0 10E3/UL
NRBC BLD AUTO-RTO: 0 /100
PLATELET # BLD AUTO: 165 10E3/UL (ref 150–450)
POTASSIUM BLD-SCNC: 4.1 MMOL/L (ref 3.4–5.3)
PROT SERPL-MCNC: 7.1 G/DL (ref 6.8–8.8)
RBC # BLD AUTO: 4.47 10E6/UL (ref 3.8–5.2)
SODIUM SERPL-SCNC: 140 MMOL/L (ref 133–144)
WBC # BLD AUTO: 4.4 10E3/UL (ref 4–11)

## 2021-11-15 PROCEDURE — 85025 COMPLETE CBC W/AUTO DIFF WBC: CPT

## 2021-11-15 PROCEDURE — 80053 COMPREHEN METABOLIC PANEL: CPT

## 2021-11-15 PROCEDURE — 99214 OFFICE O/P EST MOD 30 MIN: CPT | Performed by: NURSE PRACTITIONER

## 2021-11-15 PROCEDURE — 36415 COLL VENOUS BLD VENIPUNCTURE: CPT

## 2021-11-15 PROCEDURE — 83615 LACTATE (LD) (LDH) ENZYME: CPT

## 2021-11-15 ASSESSMENT — PAIN SCALES - GENERAL: PAINLEVEL: NO PAIN (0)

## 2021-11-15 ASSESSMENT — MIFFLIN-ST. JEOR: SCORE: 1034.94

## 2021-11-15 NOTE — LETTER
"    11/15/2021         RE: Hilda Potter  [address] [facility] [address] [address]      Hematology/Oncology Visit    Care Team:  - Oncologist: Dr. Roth   - PCP: Ame Dobson MD    Reason for visit: 6 month lymphoma follow-up    Diagnosis: grade IV follicular lymphoma    Cancer and Treatment Hx:  Nov 2011: presented with night sweats and lymphadenopathy. Negative infectious work-up.   April 2012: CT demonstrated multiple borderline mildly enlarged retroperitoneal and mesenteric lymph nodes, with the largest size of 1.5 cm. CT guided biopsy of intraabdominal lymph node c/w low grade follicular lymphoma. Flow cytometry showed kappa monotypic CD10 positive B cells. BMBx demonstrated 0.2% follicular lymphoma by flow cytometry.   Sept 2012: completed weekly Rituxan x 4, premed solumedrol    Interval History:  Hilda has been feeling well since her last oncology appt. She denies any soaking night sweats, fevers, lymphadenopathy, unintentional weight loss, or early satiety. She has noticed intermittent epigastric/\"below right ribcage discomfort over the past few weeks but this is mild and not specifically bothersome. Eating well and regular BMs 1-2 times per day. Only other health concern is migraines for which she sees PCP and Neurology for. Discussed labs and imaging from last year. No questions or concerns today.    Medications:  Discussed pertinent mediations.    Physical Exam:   GEN: pleasantly conversant female no acute distress  SKIN: generally intact, no visible rash, bruising, or sores   ENT: eyes non-icteric, no notable oral sores  LYMPH: no notable cervical, supraclavicular, or axillary lymphadenopathy  RESP: clear to auscultation bilaterally, on room air  CARDS: regular rate and rhythm, no notable murmurs   GI: abd soft without notable hepatosplenomegaly, mild discomfort with deep palpation of LLQ otherwise non-tender to palpation  MSK: no notable lower extremity edema  NEURO: alert and " "oriented without obvious focal deficit    BP (!) 148/84 (BP Location: Left arm)   Pulse 77   Temp 98  F (36.7  C) (Oral)   Resp 14   Ht 1.61 m (5' 3.39\")   Wt 61.5 kg (135 lb 8 oz)   LMP 10/01/1986   SpO2 97%   BMI 23.71 kg/m       Wt Readings from Last 4 Encounters:   11/15/21 61.5 kg (135 lb 8 oz)   08/11/21 61.5 kg (135 lb 9.6 oz)   07/14/21 61.4 kg (135 lb 6.4 oz)   06/11/21 61.5 kg (135 lb 9.6 oz)     Labs:  Result Value    Sodium 140    Potassium 4.1    Chloride 108    Carbon Dioxide (CO2) 31    Anion Gap 1 (L)    Urea Nitrogen 19    Creatinine 0.86    Calcium 9.2    Glucose 83    Alkaline Phosphatase 77    AST 27    ALT 48    Protein Total 7.1    Albumin 3.5    Bilirubin Total 0.8    GFR Estimate 62   Result Value    WBC Count 4.4    Hemoglobin 13.4    Platelet Count 165    Absolute Neutrophils 2.7       Imaging:  Reviewed CT CAP no contrast from 11/3/2020:  Impression: In this patient with history of non-Hodgkin's lymphoma:  1. No new adenopathy identified in the chest abdomen and pelvis.  Stable scattered mesenteric lymph nodes with adjacent haziness of the mesenteric fat.  2. Resolution of previously described pulmonary nodularity at the right lower lobe.  3. Diverticulosis without evidence of diverticulitis.    Assessment and Plan:  Follicular lymphoma  - Status post rituxan last Sept 2012 on observation since  - Most recent CT CAP Nov 2020 demonstrated continued remission  - No current B- symptoms, CBC stable, and LDH WNL  - Dr. Roth previously recommended CT every 2 years (no contrast given prior anaphylaxis)  - Oncology follow up with labs yearly    Hypertension  - BP elevated 148/84, continues on Hyzaar and follows with PCP    Intermittent epigastric/RUQ abdominal discomfort  - No worrisome clinical findings and no significant suspicion of lymphoma etiology  - Recommended following up with PCP if progressive or no improvement      Future Appointments   Date Time Provider Department Center "   11/15/2021 10:30 AM LAB [facility]   11/15/2021 11:00 AM Macarena Marcelino APRN CNP [facility]   12/16/2021 10:45 AM FKMA1 [facility]   2/23/2022 10:00 AM Anid Bay MD [facility]   The total time of this encounter amounted to 30 minutes today. This time includes face-to-face time spent with the patient, prep work, ordering tests, and performing post-visit documentation.    Macarena Marcelino, ALICIA Fregoso CNP

## 2021-11-15 NOTE — PROGRESS NOTES
"Hematology/Oncology Visit    Care Team:  - Oncologist: Dr. Roth   - PCP: Ame Dobson MD    Reason for visit: 6 month lymphoma follow-up    Diagnosis: grade IV follicular lymphoma    Cancer and Treatment Hx:  Nov 2011: presented with night sweats and lymphadenopathy. Negative infectious work-up.   April 2012: CT demonstrated multiple borderline mildly enlarged retroperitoneal and mesenteric lymph nodes, with the largest size of 1.5 cm. CT guided biopsy of intraabdominal lymph node c/w low grade follicular lymphoma. Flow cytometry showed kappa monotypic CD10 positive B cells. BMBx demonstrated 0.2% follicular lymphoma by flow cytometry.   Sept 2012: completed weekly Rituxan x 4, premed solumedrol    Interval History:  Hilda has been feeling well since her last oncology appt. She denies any soaking night sweats, fevers, lymphadenopathy, unintentional weight loss, or early satiety. She has noticed intermittent epigastric/\"below right ribcage discomfort over the past few weeks but this is mild and not specifically bothersome. Eating well and regular BMs 1-2 times per day. Only other health concern is migraines for which she sees PCP and Neurology for. Discussed labs and imaging from last year. No questions or concerns today.    Medications:  Discussed pertinent mediations.    Physical Exam:   GEN: pleasantly conversant female no acute distress  SKIN: generally intact, no visible rash, bruising, or sores   ENT: eyes non-icteric, no notable oral sores  LYMPH: no notable cervical, supraclavicular, or axillary lymphadenopathy  RESP: clear to auscultation bilaterally, on room air  CARDS: regular rate and rhythm, no notable murmurs   GI: abd soft without notable hepatosplenomegaly, mild discomfort with deep palpation of LLQ otherwise non-tender to palpation  MSK: no notable lower extremity edema  NEURO: alert and oriented without obvious focal deficit    BP (!) 148/84 (BP Location: Left arm)   Pulse 77   Temp 98 " " F (36.7  C) (Oral)   Resp 14   Ht 1.61 m (5' 3.39\")   Wt 61.5 kg (135 lb 8 oz)   LMP 10/01/1986   SpO2 97%   BMI 23.71 kg/m       Wt Readings from Last 4 Encounters:   11/15/21 61.5 kg (135 lb 8 oz)   08/11/21 61.5 kg (135 lb 9.6 oz)   07/14/21 61.4 kg (135 lb 6.4 oz)   06/11/21 61.5 kg (135 lb 9.6 oz)     Labs:  Result Value    Sodium 140    Potassium 4.1    Chloride 108    Carbon Dioxide (CO2) 31    Anion Gap 1 (L)    Urea Nitrogen 19    Creatinine 0.86    Calcium 9.2    Glucose 83    Alkaline Phosphatase 77    AST 27    ALT 48    Protein Total 7.1    Albumin 3.5    Bilirubin Total 0.8    GFR Estimate 62   Result Value    WBC Count 4.4    Hemoglobin 13.4    Platelet Count 165    Absolute Neutrophils 2.7       Imaging:  Reviewed CT CAP no contrast from 11/3/2020:  Impression: In this patient with history of non-Hodgkin's lymphoma:  1. No new adenopathy identified in the chest abdomen and pelvis.  Stable scattered mesenteric lymph nodes with adjacent haziness of the mesenteric fat.  2. Resolution of previously described pulmonary nodularity at the right lower lobe.  3. Diverticulosis without evidence of diverticulitis.    Assessment and Plan:  Follicular lymphoma  - Status post rituxan last Sept 2012 on observation since  - Most recent CT CAP Nov 2020 demonstrated continued remission  - No current B- symptoms, CBC stable, and LDH WNL  - Dr. Roth previously recommended CT every 2 years (no contrast given prior anaphylaxis)  - Oncology follow up with labs yearly    Hypertension  - BP elevated 148/84, continues on Hyzaar and follows with PCP    Intermittent epigastric/RUQ abdominal discomfort  - No worrisome clinical findings and no significant suspicion of lymphoma etiology  - Recommended following up with PCP if progressive or no improvement      Future Appointments   Date Time Provider Department Center   11/15/2021 10:30 AM LAB ONC [facility]   11/15/2021 11:00 AM Macarena Marcelino APRN CNP " [facility]   12/16/2021 10:45 AM FKMA1 [facility]   2/23/2022 10:00 AM Andi Bay MD [facility]   The total time of this encounter amounted to 30 minutes today. This time includes face-to-face time spent with the patient, prep work, ordering tests, and performing post-visit documentation.    Macarena Marcelino, CNP

## 2021-11-15 NOTE — NURSING NOTE
"Oncology Rooming Note    November 15, 2021 10:52 AM   Hilda Potter is a 86 year old female who presents for:    No chief complaint on file.    Initial Vitals: BP (!) 148/84 (BP Location: Left arm)   Pulse 77   Temp 98  F (36.7  C) (Oral)   Resp 14   Ht 1.61 m (5' 3.39\")   Wt 61.5 kg (135 lb 8 oz)   LMP 10/01/1986   SpO2 97%   BMI 23.71 kg/m   Estimated body mass index is 23.71 kg/m  as calculated from the following:    Height as of this encounter: 1.61 m (5' 3.39\").    Weight as of this encounter: 61.5 kg (135 lb 8 oz). Body surface area is 1.66 meters squared.  No Pain (0) Comment: Data Unavailable   Patient's last menstrual period was 10/01/1986.  Allergies reviewed: Yes  Medications reviewed: Yes    Medications: Medication refills not needed today.  Pharmacy name entered into [facility]:    [facility] #[id number] - [facility] [address] AT [facility] - ONCOLOGY PHARMACY  [facility] - [place], [place] - [address] [place] [address]  [facility] #[id number] - [place], [place] - [address] AT [facility] [address]    Clinical concerns: Check soreness under left rib cage area Macarena Talamantes, [facility]              "

## 2022-01-18 ENCOUNTER — VIRTUAL VISIT (OUTPATIENT)
Dept: FAMILY MEDICINE | Facility: CLINIC | Age: 87
End: 2022-01-18
Payer: MEDICARE

## 2022-01-18 ENCOUNTER — LAB (OUTPATIENT)
Dept: LAB | Facility: CLINIC | Age: 87
End: 2022-01-18
Payer: MEDICARE

## 2022-01-18 DIAGNOSIS — I10 ESSENTIAL HYPERTENSION WITH GOAL BLOOD PRESSURE LESS THAN 140/90: ICD-10-CM

## 2022-01-18 DIAGNOSIS — R82.90 ABNORMAL FINDING ON URINALYSIS: Primary | ICD-10-CM

## 2022-01-18 DIAGNOSIS — N30.00 ACUTE CYSTITIS WITHOUT HEMATURIA: ICD-10-CM

## 2022-01-18 DIAGNOSIS — R82.90 ABNORMAL FINDING ON URINALYSIS: ICD-10-CM

## 2022-01-18 DIAGNOSIS — N18.31 STAGE 3A CHRONIC KIDNEY DISEASE (H): ICD-10-CM

## 2022-01-18 DIAGNOSIS — R30.0 DYSURIA: ICD-10-CM

## 2022-01-18 DIAGNOSIS — R30.0 DYSURIA: Primary | ICD-10-CM

## 2022-01-18 LAB
ALBUMIN UR-MCNC: NEGATIVE MG/DL
APPEARANCE UR: CLEAR
BACTERIA #/AREA URNS HPF: ABNORMAL /HPF
BILIRUB UR QL STRIP: NEGATIVE
COLOR UR AUTO: YELLOW
CREAT UR-MCNC: 105 MG/DL
GLUCOSE UR STRIP-MCNC: NEGATIVE MG/DL
HGB UR QL STRIP: ABNORMAL
KETONES UR STRIP-MCNC: NEGATIVE MG/DL
LEUKOCYTE ESTERASE UR QL STRIP: ABNORMAL
MICROALBUMIN UR-MCNC: 8 MG/L
MICROALBUMIN/CREAT UR: 7.62 MG/G CR (ref 0–25)
NITRATE UR QL: NEGATIVE
PH UR STRIP: 5.5 [PH] (ref 5–7)
RBC #/AREA URNS AUTO: ABNORMAL /HPF
SP GR UR STRIP: >=1.03 (ref 1–1.03)
SQUAMOUS #/AREA URNS AUTO: ABNORMAL /LPF
UROBILINOGEN UR STRIP-ACNC: 0.2 E.U./DL
WBC #/AREA URNS AUTO: ABNORMAL /HPF

## 2022-01-18 PROCEDURE — 82043 UR ALBUMIN QUANTITATIVE: CPT

## 2022-01-18 PROCEDURE — 87086 URINE CULTURE/COLONY COUNT: CPT

## 2022-01-18 PROCEDURE — 81001 URINALYSIS AUTO W/SCOPE: CPT

## 2022-01-18 PROCEDURE — 99441 PR PHYSICIAN TELEPHONE EVALUATION 5-10 MIN: CPT | Performed by: FAMILY MEDICINE

## 2022-01-18 RX ORDER — NITROFURANTOIN 25; 75 MG/1; MG/1
100 CAPSULE ORAL 2 TIMES DAILY
Qty: 10 CAPSULE | Refills: 0 | Status: SHIPPED | OUTPATIENT
Start: 2022-01-18 | End: 2022-01-23

## 2022-01-18 NOTE — PROGRESS NOTES
Hilda is a 86 year old who is being evaluated via a billable telephone visit.      What phone number would you like to be contacted at? home  How would you like to obtain your AVS? MyChart    Assessment & Plan     Dysuria  Will begin treatment for presumed UTI. Depending on the culture and sensitivities, change Rx prn  - UA Macro with Reflex to Micro and Culture - lab collect  - Albumin Random Urine Quantitative with Creat Ratio    Essential hypertension with goal blood pressure less than 140/90  Needs follow up  - UA Macro with Reflex to Micro and Culture - lab collect  - Albumin Random Urine Quantitative with Creat Ratio    Stage 3a chronic kidney disease (H)    - UA Macro with Reflex to Micro and Culture - lab collect  - Albumin Random Urine Quantitative with Creat Ratio    Return in about 4 weeks (around 2/15/2022) for Follow up.    Maria Teresa Danielle MD  [facility]          Neptali Stevens is a 86 year old who presents for the following health issues     HPI   Pain in lower abd  Darker color to urine  Some smell  Does not feel good generally    Review of Systems       Objective       Vitals:  No vitals were obtained today due to virtual visit.    Physical Exam     PSYCH: Alert and oriented times 3; coherent speech, normal   rate and volume, able to articulate logical thoughts, able   to abstract reason, no tangential thoughts, no hallucinations   or delusions  Her affect is normal  RESP: No cough, no audible wheezing, able to talk in full sentences  Remainder of exam unable to be completed due to telephone visits    Results for orders placed or performed in visit on 01/18/22   UA Macro with Reflex to Micro and Culture - lab collect     Status: Abnormal    Specimen: Urine, Midstream   Result Value Ref Range    Color Urine Yellow Colorless, Straw, Light Yellow, Yellow    Appearance Urine Clear Clear    Glucose Urine Negative Negative mg/dL    Bilirubin Urine Negative Negative    Ketones  Urine Negative Negative mg/dL    Specific Gravity Urine >=1.030 1.003 - 1.035    Blood Urine Trace (A) Negative    pH Urine 5.5 5.0 - 7.0    Protein Albumin Urine Negative Negative mg/dL    Urobilinogen Urine 0.2 0.2, 1.0 E.U./dL    Nitrite Urine Negative Negative    Leukocyte Esterase Urine Trace (A) Negative   Albumin Random Urine Quantitative with Creat Ratio     Status: None   Result Value Ref Range    Creatinine Urine mg/dL 105 mg/dL    Albumin Urine mg/L 8 mg/L    Albumin Urine mg/g Cr 7.62 0.00 - 25.00 mg/g Cr   Urine Microscopic     Status: Abnormal   Result Value Ref Range    Bacteria Urine None Seen None Seen /HPF    RBC Urine 0-2 0-2 /HPF /HPF    WBC Urine 0-5 0-5 /HPF /HPF    Squamous Epithelials Urine Few (A) None Seen /LPF    Narrative    Urine Culture not indicated         Phone call duration: 5 minutes

## 2022-01-20 ENCOUNTER — TELEPHONE (OUTPATIENT)
Dept: FAMILY MEDICINE | Facility: CLINIC | Age: 87
End: 2022-01-20

## 2022-01-20 ENCOUNTER — ANCILLARY PROCEDURE (OUTPATIENT)
Dept: MAMMOGRAPHY | Facility: CLINIC | Age: 87
End: 2022-01-20
Attending: FAMILY MEDICINE
Payer: MEDICARE

## 2022-01-20 DIAGNOSIS — Z12.31 VISIT FOR SCREENING MAMMOGRAM: ICD-10-CM

## 2022-01-20 LAB — BACTERIA UR CULT: NO GROWTH

## 2022-01-20 PROCEDURE — 77067 SCR MAMMO BI INCL CAD: CPT | Mod: TC | Performed by: RADIOLOGY

## 2022-01-20 NOTE — TELEPHONE ENCOUNTER
Yes- Dr Danielle reached out and gave me update.   I agree with her plan.    Urine culture is negative. If symptoms are better since starting the medication continue to complete the 5 day course of the antibiotic.   Hope you're enjoying the New Year!  See you in 6/2022 for Annual Wellness visit.

## 2022-01-20 NOTE — TELEPHONE ENCOUNTER
Patient called in regarding her recent UA and the antibiotic that was prescribed. She stated Dr Danielle said they would send a message to Dr Dobson to review the results and the antibiotic that was prescribed to see if she agreed with the treatment plan. Patient is calling to see if Dr Dobson got a message regarding this and if she agrees with the antibiotic she was prescribed? She mentioned her creatinine, but nurse does not see that anything but a urine was collected. UC is still in process.     Patient stated that she has had improvement in her symptoms since starting the medication.     Routed to provider to review.     Thank you,  Florecita Jackson RN

## 2022-01-20 NOTE — TELEPHONE ENCOUNTER
Patient informed of message below per Dr Dobson, read word for word.  Patient verbalized understanding and was appreciative of call.

## 2022-03-17 NOTE — PROGRESS NOTES
Hematology/Oncology Visit  Care Team:  - Oncologist: Dr. Roth  - PCP: Ame Dobson MD    Reason for visit: 6 month follow-up lymphoma    Diagnosis: grade IV follicular lymphoma    Cancer and Treatment Hx:  Nov 2011: presented with night sweats and lymphadenopathy. Negative infectious work-up.   April 2012: CT demonstrated multiple borderline mildly enlarged retroperitoneal and mesenteric lymph nodes, with the largest size of 1.5 cm. CT guided biopsy of intraabdominal lymph node c/w low grade follicular lymphoma. Flow cytometry showed kappa monotypic CD10 positive B cells. BMBx demonstrated 0.2% follicular lymphoma by flow cytometry.   Sept 2012: completed weekly Rituxan x 4, premed solumedrol      Interval History:  Hilda has noticed worsening migraines, runny nose, mild night sweats, and LUQ discomfort/abdominal fullness over the past few months. When she was first diagnosed in 2011 she had drenching night sweats and she reports that what she is experiencing now is not anything like that. She gets warm at night and intermittently notices a little sweat but never significant and not soaking. She feels like her abdomen is more full than usual and notes LUQ discomfort intermittently. She denies fevers, palpable lymphadenopathy, unintentional weight loss, or early satiety. She has also noticed increased clear nasal drainage and more frequent migraines. She has had migraines with aura since high school but they have been more frequent over the past 2 months. She sometimes feels a sensation of bugs running up and down her left ear/neck which last for a day then resolves without intervention. She is wondering if this could be from seeing the chiropractor recently. She also saw a dentist recently and had a root canal. She typically has seasonal allergies but isn't currently taking any medications for allergies. She hasn't been able to get in to see her PCP due to scheduling. She feels like her urine is a little  darker yellow and is wondering if her kidneys are ok. She is hoping to move up her CT imaging that was originally scheduled for this fall.    Medications:  Discussed pertinent medications.    Physical Exam:   GEN: pleasantly conversant female no acute distress  SKIN: generally intact, no visible rash, bruising, or sores   ENT: eyes non-icteric, no notable oral sores, no tenderness to palpation of sinuses  LYMPH: no notable cervical, supraclavicular, axillary, or inguinal lymphadenopathy  RESP: clear to auscultation bilaterally, on room air  CARDS: regular rate and rhythm, no notable murmurs   GI: abd soft without notable hepatosplenomegaly, non-tender to palpation  MSK: no notable lower extremity edema  NEURO: alert and oriented without obvious focal deficit, stable gait  BP (!) 161/74 (BP Location: Right arm)   Pulse 80   Temp 98.1  F (36.7  C) (Oral)   Wt 60.4 kg (133 lb 1.6 oz)   LMP 10/01/1986   SpO2 96%   BMI 23.29 kg/m       Wt Readings from Last 4 Encounters:   03/21/22 60.4 kg (133 lb 1.6 oz)   11/15/21 61.5 kg (135 lb 8 oz)   08/11/21 61.5 kg (135 lb 9.6 oz)   07/14/21 61.4 kg (135 lb 6.4 oz)     Labs:  No results found for any visits on 03/21/22.    Imaging:  Reviewed CT CAP no contrast from 11/3/2020:  Impression: In this patient with history of non-Hodgkin's lymphoma:  1. No new adenopathy identified in the chest abdomen and pelvis.  Stable scattered mesenteric lymph nodes with adjacent haziness of the mesenteric fat.  2. Resolution of previously described pulmonary nodularity at the right lower lobe.  3. Diverticulosis without evidence of diverticulitis.    Assessment and Plan:  Follicular lymphoma  Abdominal fullness  Migraines  Rhinnorhea  Patient is status post rituxan which was last in Sept 2012 and has been on observation since. Most recent CT CAP Nov 2020 demonstrated continued remission. Dr. Roth previously recommended CT every 2 years. Given mild B-symptoms and patient concern,  obtained BMP/LDH and moved up CT CAP scan (no contrast given prior anaphylaxis) that was planned for this fall. BMP and LDH WNL. Discussed very low likelihood that follicular lymphoma would have returned with CNS involvement. Recommended Claritin and Flonase for rhinorrhea and if no improvement in rhinorrhea and migraines to follow with PCP. Will communicate CT results once in.     **ADDENDUM: CT CAP 3/23 demonstrated ongoing mesenteric haziness with new chinyere mass (2.6cm) since last CT in Nov 2020, no bulky adenopathy. Called Anish 3/24 and discussed CT findings as well as slow growing nature of follicular lymphoma, she denies any new or worsening symptoms. Given she only had mild adenopathy back in 2012 (but drenching night sweats) when she was treated with Rituxan, I will discuss with Dr. Saunders next week and keep her informed.    **ADDENDUM 3/28: Discussed with Dr. Saunders, will plan a repeat CT in 6 months and follow-up with him afterwards. Called patient and updated her, feels comfortable with that plan. Knows to contact the clinic sooner if she develops any soaking nights sweats, worsening abdominal fullness, or recurrent fevers.      The total time of this encounter amounted to 30 minutes today. This time includes face-to-face time spent with the patient, prep work, ordering tests, and performing post-visit documentation.  - Macarena Marcelino, CNP

## 2022-03-21 ENCOUNTER — ONCOLOGY VISIT (OUTPATIENT)
Dept: ONCOLOGY | Facility: CLINIC | Age: 87
End: 2022-03-21
Payer: MEDICARE

## 2022-03-21 VITALS
OXYGEN SATURATION: 96 % | HEART RATE: 80 BPM | TEMPERATURE: 98.1 F | BODY MASS INDEX: 23.29 KG/M2 | WEIGHT: 133.1 LBS | DIASTOLIC BLOOD PRESSURE: 74 MMHG | SYSTOLIC BLOOD PRESSURE: 161 MMHG

## 2022-03-21 DIAGNOSIS — R19.8 ABDOMINAL FULLNESS: ICD-10-CM

## 2022-03-21 DIAGNOSIS — C82.00 GRADE I FOLLICULAR SMALL CLEAVED CELL LYMPHOMA (H): Primary | ICD-10-CM

## 2022-03-21 DIAGNOSIS — G43.909 MIGRAINE WITHOUT STATUS MIGRAINOSUS, NOT INTRACTABLE, UNSPECIFIED MIGRAINE TYPE: ICD-10-CM

## 2022-03-21 DIAGNOSIS — J34.89 RHINORRHEA: ICD-10-CM

## 2022-03-21 LAB
ANION GAP SERPL CALCULATED.3IONS-SCNC: 4 MMOL/L (ref 3–14)
BUN SERPL-MCNC: 22 MG/DL (ref 7–30)
CALCIUM SERPL-MCNC: 9.2 MG/DL (ref 8.5–10.1)
CHLORIDE BLD-SCNC: 109 MMOL/L (ref 94–109)
CO2 SERPL-SCNC: 29 MMOL/L (ref 20–32)
CREAT SERPL-MCNC: 0.87 MG/DL (ref 0.52–1.04)
GFR SERPL CREATININE-BSD FRML MDRD: 65 ML/MIN/1.73M2
GLUCOSE BLD-MCNC: 92 MG/DL (ref 70–99)
LDH SERPL L TO P-CCNC: 175 U/L (ref 81–234)
POTASSIUM BLD-SCNC: 4.2 MMOL/L (ref 3.4–5.3)
SODIUM SERPL-SCNC: 142 MMOL/L (ref 133–144)

## 2022-03-21 PROCEDURE — 99214 OFFICE O/P EST MOD 30 MIN: CPT | Performed by: NURSE PRACTITIONER

## 2022-03-21 PROCEDURE — 83615 LACTATE (LD) (LDH) ENZYME: CPT | Performed by: INTERNAL MEDICINE

## 2022-03-21 PROCEDURE — 80048 BASIC METABOLIC PNL TOTAL CA: CPT | Performed by: INTERNAL MEDICINE

## 2022-03-21 PROCEDURE — 36415 COLL VENOUS BLD VENIPUNCTURE: CPT | Performed by: INTERNAL MEDICINE

## 2022-03-21 RX ORDER — LORATADINE 10 MG/1
10 TABLET ORAL DAILY
Qty: 30 TABLET | Refills: 1 | Status: SHIPPED | OUTPATIENT
Start: 2022-03-21 | End: 2023-03-30

## 2022-03-21 RX ORDER — FLUTICASONE PROPIONATE 50 MCG
1 SPRAY, SUSPENSION (ML) NASAL DAILY
Qty: 9.9 ML | Refills: 1 | Status: SHIPPED | OUTPATIENT
Start: 2022-03-21 | End: 2022-08-18

## 2022-03-21 ASSESSMENT — PAIN SCALES - GENERAL: PAINLEVEL: MILD PAIN (3)

## 2022-03-21 NOTE — NURSING NOTE
"Oncology Rooming Note    March 21, 2022 10:23 AM   Hilda Potter is a 86 year old female who presents for:    Chief Complaint   Patient presents with     Oncology Clinic Visit     Initial Vitals: BP (!) 161/74 (BP Location: Right arm)   Pulse 80   Temp 98.1  F (36.7  C) (Oral)   Wt 60.4 kg (133 lb 1.6 oz)   LMP 10/01/1986   SpO2 96%   BMI 23.29 kg/m   Estimated body mass index is 23.29 kg/m  as calculated from the following:    Height as of 11/15/21: 1.61 m (5' 3.39\").    Weight as of this encounter: 60.4 kg (133 lb 1.6 oz). Body surface area is 1.64 meters squared.  Mild Pain (3) Comment: Data Unavailable   Patient's last menstrual period was 10/01/1986.  Allergies reviewed: Yes  Medications reviewed: Yes    Medications: Medication refills not needed today.  Pharmacy name entered into [facility]:    [facility] #[id number] - [place] [id number] [facility]  [place] [facility] [place]  [facility] - [place], [place] - [address] [facility]  [facility] #[id number] - [place], [place] - [address] AT [facility] [address]    Clinical concerns: headaches worsening. She also reports having some lower abdominal pain.   Gretchen Meyer LPN              "

## 2022-03-21 NOTE — LETTER
3/21/2022         RE: Hilda Potter  [address]  [place] [address]        Dear Colleague,    Thank you for referring your patient, Hilda Potter, to the [facility]. Please see a copy of my visit note below.    Hematology/Oncology Visit  Care Team:  - Oncologist: Dr. Roth  - PCP: Ame Dobson MD    Reason for visit: 6 month follow-up lymphoma    Diagnosis: grade IV follicular lymphoma    Cancer and Treatment Hx:  Nov 2011: presented with night sweats and lymphadenopathy. Negative infectious work-up.   April 2012: CT demonstrated multiple borderline mildly enlarged retroperitoneal and mesenteric lymph nodes, with the largest size of 1.5 cm. CT guided biopsy of intraabdominal lymph node c/w low grade follicular lymphoma. Flow cytometry showed kappa monotypic CD10 positive B cells. BMBx demonstrated 0.2% follicular lymphoma by flow cytometry.   Sept 2012: completed weekly Rituxan x 4, premed solumedrol      Interval History:  Hilda has noticed worsening migraines, runny nose, mild night sweats, and LUQ discomfort/abdominal fullness over the past few months. When she was first diagnosed in 2011 she had drenching night sweats and she reports that what she is experiencing now is not anything like that. She gets warm at night and intermittently notices a little sweat but never significant and not soaking. She feels like her abdomen is more full than usual and notes LUQ discomfort intermittently. She denies fevers, palpable lymphadenopathy, unintentional weight loss, or early satiety. She has also noticed increased clear nasal drainage and more frequent migraines. She has had migraines with aura since high school but they have been more frequent over the past 2 months. She sometimes feels a sensation of bugs running up and down her left ear/neck which last for a day then resolves without intervention. She is wondering if this could be from seeing the  chiropractor recently. She also saw a dentist recently and had a root canal. She typically has seasonal allergies but isn't currently taking any medications for allergies. She hasn't been able to get in to see her PCP due to scheduling. She feels like her urine is a little darker yellow and is wondering if her kidneys are ok. She is hoping to move up her CT imaging that was originally scheduled for this fall.    Medications:  Discussed pertinent medications.    Physical Exam:   GEN: pleasantly conversant female no acute distress  SKIN: generally intact, no visible rash, bruising, or sores   ENT: eyes non-icteric, no notable oral sores, no tenderness to palpation of sinuses  LYMPH: no notable cervical, supraclavicular, axillary, or inguinal lymphadenopathy  RESP: clear to auscultation bilaterally, on room air  CARDS: regular rate and rhythm, no notable murmurs   GI: abd soft without notable hepatosplenomegaly, non-tender to palpation  MSK: no notable lower extremity edema  NEURO: alert and oriented without obvious focal deficit, stable gait  BP (!) 161/74 (BP Location: Right arm)   Pulse 80   Temp 98.1  F (36.7  C) (Oral)   Wt 60.4 kg (133 lb 1.6 oz)   LMP 10/01/1986   SpO2 96%   BMI 23.29 kg/m       Wt Readings from Last 4 Encounters:   03/21/22 60.4 kg (133 lb 1.6 oz)   11/15/21 61.5 kg (135 lb 8 oz)   08/11/21 61.5 kg (135 lb 9.6 oz)   07/14/21 61.4 kg (135 lb 6.4 oz)     Labs:  No results found for any visits on 03/21/22.    Imaging:  Reviewed CT CAP no contrast from 11/3/2020:  Impression: In this patient with history of non-Hodgkin's lymphoma:  1. No new adenopathy identified in the chest abdomen and pelvis.  Stable scattered mesenteric lymph nodes with adjacent haziness of the mesenteric fat.  2. Resolution of previously described pulmonary nodularity at the right lower lobe.  3. Diverticulosis without evidence of diverticulitis.    Assessment and Plan:  Follicular lymphoma  Abdominal  fullness  Migraines  Rhinnorhea  Patient is status post rituxan which was last in Sept 2012 and has been on observation since. Most recent CT CAP Nov 2020 demonstrated continued remission. Dr. Roth previously recommended CT every 2 years. Given mild B-symptoms and patient concern, obtained BMP/LDH and moved up CT CAP scan (no contrast given prior anaphylaxis) that was planned for this fall. BMP and LDH WNL. Discussed very low likelihood that follicular lymphoma would have returned with CNS involvement. Recommended Claritin and Flonase for rhinorrhea and if no improvement in rhinorrhea and migraines to follow with PCP. Will communicate CT results once in.       Future Appointments   Date Time Provider Department Center   3/21/2022 10:30 AM Macarena Marcelino APRN CNP [facility] [place]   3/31/2022 10:00 AM [facility]   5/24/2022 11:00 AM [facility] De Los [facility]   6/15/2022  9:30 AM Ame Dobson MD [facility]   11/3/2022 11:20 AM LAB FIRST FLOOR [facility] [place]   11/3/2022 11:40 AM MGCT1 CT [place]   11/7/2022 10:30 AM Bang Saunders MD [facility]   The total time of this encounter amounted to 30 minutes today. This time includes face-to-face time spent with the patient, prep work, ordering tests, and performing post-visit documentation.    Macarena Marcelino CNP        Again, thank you for allowing me to participate in the care of your patient.        Sincerely,        ALICIA Serrano CNP

## 2022-03-23 ENCOUNTER — ANCILLARY PROCEDURE (OUTPATIENT)
Dept: CT IMAGING | Facility: CLINIC | Age: 87
End: 2022-03-23
Attending: NURSE PRACTITIONER
Payer: MEDICARE

## 2022-03-23 DIAGNOSIS — C82.00 GRADE I FOLLICULAR SMALL CLEAVED CELL LYMPHOMA (H): ICD-10-CM

## 2022-03-23 PROCEDURE — G1004 CDSM NDSC: HCPCS | Performed by: RADIOLOGY

## 2022-03-23 PROCEDURE — 74176 CT ABD & PELVIS W/O CONTRAST: CPT | Mod: MG | Performed by: RADIOLOGY

## 2022-03-23 PROCEDURE — 71250 CT THORAX DX C-: CPT | Mod: MG | Performed by: RADIOLOGY

## 2022-03-31 ENCOUNTER — OFFICE VISIT (OUTPATIENT)
Dept: OPHTHALMOLOGY | Facility: CLINIC | Age: 87
End: 2022-03-31
Payer: MEDICARE

## 2022-03-31 DIAGNOSIS — H25.813 COMBINED FORMS OF AGE-RELATED CATARACT OF BOTH EYES: Primary | ICD-10-CM

## 2022-03-31 DIAGNOSIS — H52.4 PRESBYOPIA: ICD-10-CM

## 2022-03-31 DIAGNOSIS — H40.003 GLAUCOMA SUSPECT OF BOTH EYES: ICD-10-CM

## 2022-03-31 DIAGNOSIS — Z98.890 HISTORY OF BLEPHAROPLASTY: ICD-10-CM

## 2022-03-31 DIAGNOSIS — H43.813 POSTERIOR VITREOUS DETACHMENT OF BOTH EYES: ICD-10-CM

## 2022-03-31 DIAGNOSIS — Z01.01 ENCOUNTER FOR EXAMINATION OF EYES AND VISION WITH ABNORMAL FINDINGS: ICD-10-CM

## 2022-03-31 PROCEDURE — 92014 COMPRE OPH EXAM EST PT 1/>: CPT | Performed by: OPHTHALMOLOGY

## 2022-03-31 PROCEDURE — 92015 DETERMINE REFRACTIVE STATE: CPT | Mod: GY | Performed by: OPHTHALMOLOGY

## 2022-03-31 ASSESSMENT — VISUAL ACUITY
OS_CC: 20/25
CORRECTION_TYPE: GLASSES
METHOD: SNELLEN - LINEAR
OD_CC: 20/25

## 2022-03-31 ASSESSMENT — REFRACTION_MANIFEST
OS_ADD: +2.75
OS_AXIS: 173
OS_SPHERE: +2.75
OS_CYLINDER: +1.75
OD_AXIS: 175
OD_SPHERE: +2.75
OD_ADD: +2.75
OD_CYLINDER: +2.25

## 2022-03-31 ASSESSMENT — CONF VISUAL FIELD
OD_NORMAL: 1
METHOD: COUNTING FINGERS
OS_NORMAL: 1

## 2022-03-31 ASSESSMENT — TONOMETRY
OS_IOP_MMHG: 21
OD_IOP_MMHG: 23
IOP_METHOD: APPLANATION

## 2022-03-31 ASSESSMENT — REFRACTION_WEARINGRX
OS_CYLINDER: +1.50
SPECS_TYPE: PAL
OS_AXIS: 180
OS_SPHERE: +3.00
OD_CYLINDER: +1.75
OD_ADD: +2.75
OD_SPHERE: +2.75
OS_ADD: +2.75
OD_AXIS: 173

## 2022-03-31 ASSESSMENT — EXTERNAL EXAM - LEFT EYE: OS_EXAM: NORMAL

## 2022-03-31 ASSESSMENT — CUP TO DISC RATIO
OS_RATIO: 0.6
OD_RATIO: 0.7

## 2022-03-31 ASSESSMENT — EXTERNAL EXAM - RIGHT EYE: OD_EXAM: NORMAL

## 2022-03-31 NOTE — LETTER
3/31/2022         RE: Hilda Potter  [address]  [place] [place] [address]        Dear Colleague,    Thank you for referring your patient, Hilda Potter, to the [facility]. Please see a copy of my visit note below.     Current Eye Medications:  Latanoprost at bedtime both eyes, last took 10 pm. Refresh three times a day both eyes.     Subjective:  Complete eye exam. Vision is doing fine both eyes distance and near. No eye pain or discomfort in either eye.      Objective:  See Ophthalmology Exam.       Assessment:  Stable eye exam.      ICD-10-CM    1. Combined forms of age-related cataract, mild-mod, both eyes  H25.813    2. 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 History of blepharoplasty, upper lids, ou  Z98.890    4. Posterior vitreous detachment of both eyes  H43.813    5. Encounter for examination of eyes and vision with abnormal findings  Z01.01    6. Presbyopia  H52.4         Plan:  Glasses Rx given - optional  Continue same medication.  May use artificial tears up to 4 times daily both eyes. (Refresh Tears, Systane Ultra/Balance, or Theratears)   Return visit 5 months for an intraocular pressure check, glaucoma OCT, retinal OCT, and Enciso Visual Field.   Andi Bay M.D.  [phone]              Again, thank you for allowing me to participate in the care of your patient.        Sincerely,        Andi aBy MD

## 2022-03-31 NOTE — PROGRESS NOTES
Current Eye Medications:  Latanoprost at bedtime both eyes, last took 10 pm. Refresh three times a day both eyes.     Subjective:  Complete eye exam. Vision is doing fine both eyes distance and near. No eye pain or discomfort in either eye.      Objective:  See Ophthalmology Exam.       Assessment:  Stable eye exam.      ICD-10-CM    1. Combined forms of age-related cataract, mild-mod, both eyes  H25.813    2. 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 History of blepharoplasty, upper lids, ou  Z98.890    4. Posterior vitreous detachment of both eyes  H43.813    5. Encounter for examination of eyes and vision with abnormal findings  Z01.01    6. Presbyopia  H52.4         Plan:  Glasses Rx given - optional  Continue same medication.  May use artificial tears up to 4 times daily both eyes. (Refresh Tears, Systane Ultra/Balance, or Theratears)   Return visit 5 months for an intraocular pressure check, glaucoma OCT, retinal OCT, and Enciso Visual Field.   Andi Bay M.D.  [phone]

## 2022-03-31 NOTE — PATIENT INSTRUCTIONS
Glasses Rx given - optional  Continue same medication.  May use artificial tears up to 4 times daily both eyes. (Refresh Tears, Systane Ultra/Balance, or Theratears)   Return visit 5 months for an intraocular pressure check, glaucoma OCT, retinal OCT, and Enciso Visual Field.   Andi Bay M.D.  [phone]

## 2022-04-01 PROBLEM — H43.813 POSTERIOR VITREOUS DETACHMENT OF BOTH EYES: Status: ACTIVE | Noted: 2022-04-01

## 2022-05-24 ENCOUNTER — OFFICE VISIT (OUTPATIENT)
Dept: NEUROLOGY | Facility: CLINIC | Age: 87
End: 2022-05-24
Payer: MEDICARE

## 2022-05-24 VITALS
WEIGHT: 136.6 LBS | BODY MASS INDEX: 24.2 KG/M2 | SYSTOLIC BLOOD PRESSURE: 138 MMHG | HEIGHT: 63 IN | DIASTOLIC BLOOD PRESSURE: 77 MMHG | HEART RATE: 80 BPM

## 2022-05-24 DIAGNOSIS — G43.109 MIGRAINE WITH AURA AND WITHOUT STATUS MIGRAINOSUS, NOT INTRACTABLE: Primary | ICD-10-CM

## 2022-05-24 PROCEDURE — 99214 OFFICE O/P EST MOD 30 MIN: CPT | Performed by: PSYCHIATRY & NEUROLOGY

## 2022-05-24 RX ORDER — SUMATRIPTAN 50 MG/1
50 TABLET, FILM COATED ORAL
Qty: 12 TABLET | Refills: 3 | Status: SHIPPED | OUTPATIENT
Start: 2022-05-24 | End: 2022-09-26

## 2022-05-24 NOTE — LETTER
5/24/2022         RE: Hilda Potter  [address]  [facility] [address]        Dear Colleague,    Thank you for referring your patient, Hilda Potter, to the [facility] [place]. Please see a copy of my visit note below.    ESTABLISHED PATIENT NEUROLOGY NOTE    DATE OF VISIT: 5/24/2022  MRN: [id number]  PATIENT NAME: Hilda Potter  YOB: 1935    Chief Complaint   Patient presents with     Migraine     Follow up- getting worse      SUBJECTIVE:                                                      HISTORY OF PRESENT ILLNESS:  Hilda is here for follow up regarding migraine headaches.    History as previously documented by me (4.6.21):  She has history of TMJ dysfunction as well. I have not seen the patient for almost 2 years. Her migraines have been described as starting with jagged lights in the Right eye, sometimes followed by pain, nausea and dizziness. Rest and Excedrin migraine were effective in managing her headaches when we last met, and they were not frequent enough to warrant preventative treatment at that time. Brain MRI in 2018 was unremarkable, ESR normal. I have referred Hilda to [facility] clinic for the TMJ dysfunction in the past, but I do not see any notes indicating she has been seen in that clinic. She was following with a chiropractor for the TMJ symptoms in the past.      Hilda is here alone today.  She tells me that in recent months her headaches have become a little bit more frequent in that she has about 4 of her typical migraines per month now.  These still respond to just 1 dose of either Excedrin or Tylenol.  She continues to have the aura in her right eye, and then sometimes she develops migratory pain and nausea which are typical for her.  She says that the Excedrin tends to do a little bit better job in calming the nausea.  She sometimes feels fatigued in the days following a typical migraine.  No new headache  characteristics.  She has been working on dietary changes to see if this helps.   Raw tomatoes seem to be a trigger. She avoids red wine.     She mentions that she also has left posterior neck pain, and one muscle that sometimes is a harbinger for an oncoming migraine.     She had a massage therapist up until the Covid pandemic started, and it is in these months thereafter that she has noticed an increase in the headaches.  She has not tried acupuncture in the past.  She would like to avoid a preventative medication at this point.     She sees her eye doctor regularly because she has developed glaucoma.  She has not noticed any problems with her current eyeglasses or major changes in her vision otherwise.     She does continue to have some left jaw pain.  She says that the chiropractic treatments no longer seemed to be helpful for this.    Hilda says that she had a migraine one night and awoke with an elevated blood pressure, in >200/100, so she went to the emergency room at [facility] and was treated with sumatriptan.  Per the notes, her headache resolved with this.  She did take a few more doses of this but does not have any refills.  I ask if it has been helpful for her headaches and she is not sure.  She says typically she would take the Excedrin at symptom onset and take the sumatriptan later if her headache persisted.  She has also been having some different scalp pain and also dull her head pain, always on the left side with some odd sensations around the ear.  She is still doing massage therapy.  She sees a chiropractor to help with the neck.  She is unaware that we talked about doing acupuncture as an option.  Hilda would still like to avoid a daily headache medication but she is concerned with some of the changes and wonders about head imaging.  No imaging was done when she was in the emergency room.    CURRENT MEDICATIONS:   Aspirin-Acetaminophen-Caffeine (EXCEDRIN MIGRAINE PO), Take 1 tablet by mouth  "as needed  calcium carbonate-vitamin D (OS-FIDENCIO) 600-400 MG-UNIT chewable tablet, Take 1 chew tab by mouth 2 times daily  carboxymethylcellulose (REFRESH PLUS) 0.5 % SOLN, Place 1 drop into both eyes 3 times daily as needed for dry eyes  fluticasone (FLONASE) 50 MCG/ACT nasal spray, Spray 1 spray into both nostrils daily  HYZAAR 50-12.5 MG tablet, Take 1 tablet by mouth daily  latanoprost (XALATAN) 0.005 % ophthalmic solution, Place 1 drop into both eyes At Bedtime  levothyroxine (SYNTHROID/LEVOTHROID) 50 MCG tablet, Take 1 tablet (50 mcg) by mouth daily  loratadine (CLARITIN) 10 MG tablet, Take 1 tablet (10 mg) by mouth daily  MELATONIN PO, Take 3 mg by mouth nightly as needed   Multiple Vitamin (MULTI-VITAMIN) per tablet, Take 1 tablet by mouth daily  clobetasol (TEMOVATE) 0.05 % external cream, Apply topically 2 times daily Sparingly to affected area x 1 week then 3 times a week thereafter for 2 weeks. Repeat as needed. (Patient not taking: Reported on 5/24/2022)  nystatin (MYCOSTATIN) 553846 UNIT/ML suspension, TAKE 5 ML BY MOUTH FOUR TIMES DAILY FOR 7 DAYS AS DIRECTED (Patient not taking: Reported on 5/24/2022)    No current facility-administered medications on file prior to visit.      RECENT DIAGNOSTIC STUDIES:   Labs: No results found for any visits on 05/24/22.    REVIEW OF SYSTEMS:                                                      10-point review of systems is negative except as mentioned above in HPI.    EXAM:                                                      Physical Exam:   Vitals: /77 (BP Location: Right arm, Patient Position: Sitting)   Pulse 80   Ht 1.61 m (5' 3.39\")   Wt 62 kg (136 lb 9.6 oz)   LMP 10/01/1986   BMI 23.90 kg/m    BMI= Body mass index is 23.9 kg/m .  GENERAL: NAD.  HEENT: NC/AT.  CV: RRR. S1, S2.   NECK: No bruits.  PULM: Non-labored breathing.   Focused Neurologic:  MENTAL STATUS: Alert, attentive. Speech is fluent. Normal comprehension. Normal concentration. Adequate " fund of knowledge.   CRANIAL NERVES: Discs technically difficult to visualize. Visual fields intact to confrontation. Pupils equally, round and reactive to light. Facial sensation and movement normal. EOM full. Hearing intact to conversation. Trapezius strength intact. Palate moves symmetrically. Tongue midline.  MOTOR: 5/5 in proximal and distal muscle groups of upper and lower extremities. Tone and bulk normal.   DTRs: Intact and symmetric. Babinski down-going bilaterally.   SENSATION: Normal light touch throughout.   COORDINATION: Finger-nose-finger normal.  Finger tapping normal.  STATION AND GAIT: Romberg negative. Casual gait is normal.     ASSESSMENT and PLAN:                                                      Assessment:    ICD-10-CM    1. Migraine with aura and without status migrainosus, not intractable  G43.109 SUMAtriptan (IMITREX) 50 MG tablet     Pain Management Referral     MR Brain w/o & w Contrast        Ms. Potter is here for follow-up regarding migraine headaches.  It sounds like her headaches have responded to sumatriptan which she was given in the emergency room about 8 months ago.  We will resume this for abortive treatment.  Given her other symptoms, I do think it would be reasonable to get updated brain imaging.  I am also referring Hilda for acupuncture.  We will plan to follow-up again in a few months.  She understands and agrees with plan.    Plan:  -- Updated brain MRI.  We will notify you of the results.  -- Continue your chiropractic treatments and massage therapy.  -- I am referring you for acupuncture because I think this may also be helpful from a headache standpoint.  -- Sumatriptan for headaches as needed.  This works best if you take the medication right when you notice symptom onset.  Okay to take a second dose 2 hours later if symptoms persist.  -- Return to neurology clinic in 3-4 months.    Total Time: 30 minutes were spent with the patient and in chart  review/documentation (as described above in Assessment and Plan)/coordinating the care on date of service.    Iliana Connor MD  Neurology    Dragon software used in the dictation of this note.                      Again, thank you for allowing me to participate in the care of your patient.        Sincerely,        Iliana Connor MD

## 2022-05-24 NOTE — PROGRESS NOTES
ESTABLISHED PATIENT NEUROLOGY NOTE    DATE OF VISIT: 5/24/2022  MRN: [id number]  PATIENT NAME: Hilda Potter  YOB: 1935    Chief Complaint   Patient presents with     Migraine     Follow up- getting worse      SUBJECTIVE:                                                      HISTORY OF PRESENT ILLNESS:  Hilda is here for follow up regarding migraine headaches.    History as previously documented by me (4.6.21):  She has history of TMJ dysfunction as well. I have not seen the patient for almost 2 years. Her migraines have been described as starting with jagged lights in the Right eye, sometimes followed by pain, nausea and dizziness. Rest and Excedrin migraine were effective in managing her headaches when we last met, and they were not frequent enough to warrant preventative treatment at that time. Brain MRI in 2018 was unremarkable, ESR normal. I have referred Hilda to [facility] Ne[facility] clinic for the TMJ dysfunction in the past, but I do not see any notes indicating she has been seen in that clinic. She was following with a chiropractor for the TMJ symptoms in the past.      Hilda is here alone today.  She tells me that in recent months her headaches have become a little bit more frequent in that she has about 4 of her typical migraines per month now.  These still respond to just 1 dose of either Excedrin or Tylenol.  She continues to have the aura in her right eye, and then sometimes she develops migratory pain and nausea which are typical for her.  She says that the Excedrin tends to do a little bit better job in calming the nausea.  She sometimes feels fatigued in the days following a typical migraine.  No new headache characteristics.  She has been working on dietary changes to see if this helps.   Raw tomatoes seem to be a trigger. She avoids red wine.     She mentions that she also has left posterior neck pain, and one muscle that sometimes is a harbinger for an oncoming migraine.     She had a  massage therapist up until the Covid pandemic started, and it is in these months thereafter that she has noticed an increase in the headaches.  She has not tried acupuncture in the past.  She would like to avoid a preventative medication at this point.     She sees her eye doctor regularly because she has developed glaucoma.  She has not noticed any problems with her current eyeglasses or major changes in her vision otherwise.     She does continue to have some left jaw pain.  She says that the chiropractic treatments no longer seemed to be helpful for this.    Hilda says that she had a migraine one night and awoke with an elevated blood pressure, in >200/100, so she went to the emergency room at [facility] and was treated with sumatriptan.  Per the notes, her headache resolved with this.  She did take a few more doses of this but does not have any refills.  I ask if it has been helpful for her headaches and she is not sure.  She says typically she would take the Excedrin at symptom onset and take the sumatriptan later if her headache persisted.  She has also been having some different scalp pain and also dull her head pain, always on the left side with some odd sensations around the ear.  She is still doing massage therapy.  She sees a chiropractor to help with the neck.  She is unaware that we talked about doing acupuncture as an option.  Hilda would still like to avoid a daily headache medication but she is concerned with some of the changes and wonders about head imaging.  No imaging was done when she was in the emergency room.    CURRENT MEDICATIONS:   Aspirin-Acetaminophen-Caffeine (EXCEDRIN MIGRAINE PO), Take 1 tablet by mouth as needed  calcium carbonate-vitamin D (OS-FIDENCIO) 600-400 MG-UNIT chewable tablet, Take 1 chew tab by mouth 2 times daily  carboxymethylcellulose (REFRESH PLUS) 0.5 % SOLN, Place 1 drop into both eyes 3 times daily as needed for dry eyes  fluticasone (FLONASE) 50 MCG/ACT nasal spray,  "Spray 1 spray into both nostrils daily  HYZAAR 50-12.5 MG tablet, Take 1 tablet by mouth daily  latanoprost (XALATAN) 0.005 % ophthalmic solution, Place 1 drop into both eyes At Bedtime  levothyroxine (SYNTHROID/LEVOTHROID) 50 MCG tablet, Take 1 tablet (50 mcg) by mouth daily  loratadine (CLARITIN) 10 MG tablet, Take 1 tablet (10 mg) by mouth daily  MELATONIN PO, Take 3 mg by mouth nightly as needed   Multiple Vitamin (MULTI-VITAMIN) per tablet, Take 1 tablet by mouth daily  clobetasol (TEMOVATE) 0.05 % external cream, Apply topically 2 times daily Sparingly to affected area x 1 week then 3 times a week thereafter for 2 weeks. Repeat as needed. (Patient not taking: Reported on 5/24/2022)  nystatin (MYCOSTATIN) [id number] UNIT/ML suspension, TAKE 5 ML BY MOUTH FOUR TIMES DAILY FOR 7 DAYS AS DIRECTED (Patient not taking: Reported on 5/24/2022)    No current facility-administered medications on file prior to visit.      RECENT DIAGNOSTIC STUDIES:   Labs: No results found for any visits on 05/24/22.    REVIEW OF SYSTEMS:                                                      10-point review of systems is negative except as mentioned above in HPI.    EXAM:                                                      Physical Exam:   Vitals: /77 (BP Location: Right arm, Patient Position: Sitting)   Pulse 80   Ht 1.61 m (5' 3.39\")   Wt 62 kg (136 lb 9.6 oz)   LMP 10/01/1986   BMI 23.90 kg/m    BMI= Body mass index is 23.9 kg/m .  GENERAL: NAD.  HEENT: NC/AT.  CV: RRR. S1, S2.   NECK: No bruits.  PULM: Non-labored breathing.   Focused Neurologic:  MENTAL STATUS: Alert, attentive. Speech is fluent. Normal comprehension. Normal concentration. Adequate fund of knowledge.   CRANIAL NERVES: Discs technically difficult to visualize. Visual fields intact to confrontation. Pupils equally, round and reactive to light. Facial sensation and movement normal. EOM full. Hearing intact to conversation. Trapezius strength intact. Palate moves " symmetrically. Tongue midline.  MOTOR: 5/5 in proximal and distal muscle groups of upper and lower extremities. Tone and bulk normal.   DTRs: Intact and symmetric. Babinski down-going bilaterally.   SENSATION: Normal light touch throughout.   COORDINATION: Finger-nose-finger normal.  Finger tapping normal.  STATION AND GAIT: Romberg negative. Casual gait is normal.     ASSESSMENT and PLAN:                                                      Assessment:    ICD-10-CM    1. Migraine with aura and without status migrainosus, not intractable  G43.109 SUMAtriptan (IMITREX) 50 MG tablet     Pain Management Referral     MR Brain w/o & w Contrast        Ms. Potter is here for follow-up regarding migraine headaches.  It sounds like her headaches have responded to sumatriptan which she was given in the emergency room about 8 months ago.  We will resume this for abortive treatment.  Given her other symptoms, I do think it would be reasonable to get updated brain imaging.  I am also referring Hilda for acupuncture.  We will plan to follow-up again in a few months.  She understands and agrees with plan.    Plan:  -- Updated brain MRI.  We will notify you of the results.  -- Continue your chiropractic treatments and massage therapy.  -- I am referring you for acupuncture because I think this may also be helpful from a headache standpoint.  -- Sumatriptan for headaches as needed.  This works best if you take the medication right when you notice symptom onset.  Okay to take a second dose 2 hours later if symptoms persist.  -- Return to neurology clinic in 3-4 months.    Total Time: 30 minutes were spent with the patient and in chart review/documentation (as described above in Assessment and Plan)/coordinating the care on date of service.    Iliana Connor MD  Neurology    Dragon software used in the dictation of this note.

## 2022-05-24 NOTE — NURSING NOTE
Chief Complaint   Patient presents with     Migraine     Follow up- getting worse      Eddie Jauregui CMA@ on 5/24/2022 at 10:43 AM

## 2022-05-24 NOTE — PATIENT INSTRUCTIONS
Plan:  -- Updated brain MRI.  We will notify you of the results.  -- Continue your chiropractic treatments and massage therapy.  -- I am referring you for acupuncture because I think this may also be helpful from a headache standpoint.  -- Sumatriptan for headaches as needed.  This works best if you take the medication right when you notice symptom onset.  Okay to take a second dose 2 hours later if symptoms persist.  -- Return to neurology clinic in 3-4 months.

## 2022-06-15 ENCOUNTER — OFFICE VISIT (OUTPATIENT)
Dept: FAMILY MEDICINE | Facility: CLINIC | Age: 87
End: 2022-06-15
Payer: MEDICARE

## 2022-06-15 VITALS
SYSTOLIC BLOOD PRESSURE: 120 MMHG | TEMPERATURE: 97.4 F | DIASTOLIC BLOOD PRESSURE: 60 MMHG | HEIGHT: 63 IN | WEIGHT: 133.2 LBS | RESPIRATION RATE: 16 BRPM | OXYGEN SATURATION: 96 % | HEART RATE: 84 BPM | BODY MASS INDEX: 23.6 KG/M2

## 2022-06-15 DIAGNOSIS — Z00.00 ENCOUNTER FOR MEDICARE ANNUAL WELLNESS EXAM: Primary | ICD-10-CM

## 2022-06-15 DIAGNOSIS — E03.9 HYPOTHYROIDISM, UNSPECIFIED TYPE: ICD-10-CM

## 2022-06-15 DIAGNOSIS — I10 HYPERTENSION GOAL BP (BLOOD PRESSURE) < 140/90: ICD-10-CM

## 2022-06-15 DIAGNOSIS — Z23 NEED FOR VACCINATION: ICD-10-CM

## 2022-06-15 DIAGNOSIS — Z13.220 LIPID SCREENING: ICD-10-CM

## 2022-06-15 LAB
CHOLEST SERPL-MCNC: 207 MG/DL
FASTING STATUS PATIENT QL REPORTED: NO
HDLC SERPL-MCNC: 63 MG/DL
LDLC SERPL CALC-MCNC: 119 MG/DL
NONHDLC SERPL-MCNC: 144 MG/DL
TRIGL SERPL-MCNC: 123 MG/DL
TSH SERPL DL<=0.005 MIU/L-ACNC: 1.48 MU/L (ref 0.4–4)

## 2022-06-15 PROCEDURE — 84443 ASSAY THYROID STIM HORMONE: CPT | Performed by: FAMILY MEDICINE

## 2022-06-15 PROCEDURE — 90677 PCV20 VACCINE IM: CPT | Performed by: FAMILY MEDICINE

## 2022-06-15 PROCEDURE — 80061 LIPID PANEL: CPT | Performed by: FAMILY MEDICINE

## 2022-06-15 PROCEDURE — G0439 PPPS, SUBSEQ VISIT: HCPCS | Performed by: FAMILY MEDICINE

## 2022-06-15 PROCEDURE — 99214 OFFICE O/P EST MOD 30 MIN: CPT | Mod: 25 | Performed by: FAMILY MEDICINE

## 2022-06-15 PROCEDURE — 36415 COLL VENOUS BLD VENIPUNCTURE: CPT | Performed by: FAMILY MEDICINE

## 2022-06-15 RX ORDER — HYDROCHLOROTHIAZIDE 25 MG/1
25 TABLET ORAL DAILY
Qty: 30 TABLET | Refills: 1 | Status: SHIPPED | OUTPATIENT
Start: 2022-06-15 | End: 2022-06-18

## 2022-06-15 ASSESSMENT — ENCOUNTER SYMPTOMS
CONSTIPATION: 0
SHORTNESS OF BREATH: 0
PARESTHESIAS: 0
BREAST MASS: 0
DIZZINESS: 0
HEMATURIA: 0
NAUSEA: 0
FREQUENCY: 0
CHILLS: 0
FEVER: 0
ARTHRALGIAS: 0
HEARTBURN: 0
JOINT SWELLING: 0
MYALGIAS: 0
PALPITATIONS: 0
COUGH: 0
DIARRHEA: 0
HEMATOCHEZIA: 0
SORE THROAT: 0
EYE PAIN: 0
DYSURIA: 0
NERVOUS/ANXIOUS: 1
HEADACHES: 1
ABDOMINAL PAIN: 1
WEAKNESS: 0

## 2022-06-15 ASSESSMENT — PAIN SCALES - GENERAL: PAINLEVEL: NO PAIN (0)

## 2022-06-15 ASSESSMENT — ACTIVITIES OF DAILY LIVING (ADL): CURRENT_FUNCTION: NO ASSISTANCE NEEDED

## 2022-06-15 NOTE — NURSING NOTE
Prior to immunization administration, verified patients identity using patient s name and date of birth. Please see Immunization Activity for additional information.     Screening Questionnaire for Adult Immunization    Are you sick today?   No   Do you have allergies to medications, food, a vaccine component or latex?   Yes   Have you ever had a serious reaction after receiving a vaccination?   Yes   Do you have a long-term health problem with heart, lung, kidney, or metabolic disease (e.g., diabetes), asthma, a blood disorder, no spleen, complement component deficiency, a cochlear implant, or a spinal fluid leak?  Are you on long-term aspirin therapy?   Yes   Do you have cancer, leukemia, HIV/AIDS, or any other immune system problem?   Yes   Do you have a parent, brother, or sister with an immune system problem?   No   In the past 3 months, have you taken medications that affect  your immune system, such as prednisone, other steroids, or anticancer drugs; drugs for the treatment of rheumatoid arthritis, Crohn s disease, or psoriasis; or have you had radiation treatments?   No   Have you had a seizure, or a brain or other nervous system problem?   No   During the past year, have you received a transfusion of blood or blood    products, or been given immune (gamma) globulin or antiviral drug?   No   For women: Are you pregnant or is there a chance you could become       pregnant during the next month?   No   Have you received any vaccinations in the past 4 weeks?   No     Immunization questionnaire was positive for at least one answer.  Notified Dr. Dobson.        Per orders of Dr. Dobson, injection of [id number] given by Debbie Cifuentes CMA. Patient instructed to remain in clinic for 15 minutes afterwards, and to report any adverse reaction to me immediately.       Screening performed by Debbie Cifuentes CMA on 6/15/2022 at 9:57 AM.

## 2022-06-15 NOTE — PROGRESS NOTES
"SUBJECTIVE:   Hilda Potter is a 86 year old female who presents for Preventive Visit.      Patient has been advised of split billing requirements and indicates understanding: Yes  Are you in the first 12 months of your Medicare coverage?  No    Healthy Habits:     In general, how would you rate your overall health?  Good    Frequency of exercise:  4-5 days/week    Duration of exercise:  15-30 minutes    Do you usually eat at least 4 servings of fruit and vegetables a day, include whole grains    & fiber and avoid regularly eating high fat or \"junk\" foods?  No    Taking medications regularly:  Yes    Medication side effects:  None    Ability to successfully perform activities of daily living:  No assistance needed    Home Safety:  No safety concerns identified    Hearing Impairment:  No hearing concerns    In the past 6 months, have you been bothered by leaking of urine?  No    In general, how would you rate your overall mental or emotional health?  Good      PHQ-2 Total Score: 2    Additional concerns today:  Yes    Do you feel safe in your environment? Yes    Have you ever done Advance Care Planning? (For example, a Health Directive, POLST, or a discussion with a medical provider or your loved ones about your wishes): Yes, advance care planning is on file.       Fall risk  Fallen 2 or more times in the past year?: No  Any fall with injury in the past year?: No    Cognitive Screening   1) Repeat 3 items (Leader, Season, Table)    2) Clock draw: NORMAL  3) 3 item recall: Recalls 3 objects  Results: 3 items recalled: COGNITIVE IMPAIRMENT LESS LIKELY    Mini-CogTM Copyright LAINEY Garcia. Licensed by the author for use in [facility]; reprinted with permission (arben@[email or web address]). All rights reserved.      Do you have sleep apnea, excessive snoring or daytime drowsiness?: no    Reviewed and updated as needed this visit by clinical staff   Tobacco  Allergies  Meds   Med Hx  Surg Hx  Fam Hx  Soc Hx      "     Reviewed and updated as needed this visit by Provider                   Social History     Tobacco Use     Smoking status: Never Smoker     Smokeless tobacco: Never Used   Substance Use Topics     Alcohol use: Not Currently     Comment: wine occasionally         Alcohol Use 6/15/2022   Prescreen: >3 drinks/day or >7 drinks/week? Not Applicable   Prescreen: >3 drinks/day or >7 drinks/week? -         PROBLEMS TO ADD ON...  -------------------------------------  Hypertension-  Currently on Hyzaar 50-12.5 mg/day.   Reporting fluctuating BP  Low and high BP readings - checking BP regularly a home. Not tolerating the Hyzaar well and would like to switch BPs.        Migraines every 1-2 weeks, has MRI scheduled 7/24/2022 - taking excedrin migraine and sumatriptan. Currently following Neurology.      HYPOTHYROIDISM - Patient has a longstanding history of chronic Hypothyroidism. Patient has been doing well, noting no tremor, insomnia, hair loss or changes in skin texture. Continues to take medications as directed, without adverse reactions or side effects. Last TSH   Lab Results   Component Value Date    TSH 1.82 06/11/2021       HEALTH CARE MAINTENANCE: Due for annual labs     Current providers sharing in care for this patient include:   Patient Care Team:  Ame Dobson MD as PCP - General (Family Practice)  Ame Dobson MD as Assigned PCP  Andi Bay MD as Assigned Surgical Provider  Iliana De Los Santos MD as Assigned Neuroscience Provider  Macarena Marcelino APRN CNP as Assigned Cancer Care Provider    The following health maintenance items are reviewed in Epic and correct as of today:  Health Maintenance Due   Topic Date Due     ZOSTER IMMUNIZATION (3 of 3) 07/24/2018     LIPID  06/11/2022     TSH W/FREE T4 REFLEX  06/11/2022       Pertinent mammograms are reviewed under the imaging tab.    Review of Systems   Constitutional: Negative for chills and fever.   HENT: Negative for congestion,  "ear pain, hearing loss and sore throat.    Eyes: Negative for pain and visual disturbance.   Respiratory: Negative for cough and shortness of breath.    Cardiovascular: Negative for chest pain, palpitations and peripheral edema.   Gastrointestinal: Positive for abdominal pain. Negative for constipation, diarrhea, heartburn, hematochezia and nausea.   Breasts:  Negative for tenderness, breast mass and discharge.   Genitourinary: Positive for pelvic pain and urgency. Negative for dysuria, frequency, genital sores, hematuria, vaginal bleeding and vaginal discharge.   Musculoskeletal: Negative for arthralgias, joint swelling and myalgias.   Skin: Negative for rash.   Neurological: Positive for headaches. Negative for dizziness, weakness and paresthesias.   Psychiatric/Behavioral: Negative for mood changes. The patient is nervous/anxious.          OBJECTIVE:   /60 (BP Location: Left arm, Patient Position: Sitting, Cuff Size: Adult Regular)   Pulse 84   Temp 97.4  F (36.3  C) (Tympanic)   Resp 16   Ht 1.608 m (5' 3.31\")   Wt 60.4 kg (133 lb 3.2 oz)   LMP 10/01/1986   SpO2 96%   Breastfeeding No   BMI 23.37 kg/m   Estimated body mass index is 23.37 kg/m  as calculated from the following:    Height as of this encounter: 1.608 m (5' 3.31\").    Weight as of this encounter: 60.4 kg (133 lb 3.2 oz).  Physical Exam  GENERAL APPEARANCE: healthy, alert and no distress  EYES: Eyes grossly normal to inspection, PERRL and conjunctivae and sclerae normal  HENT: ear canals and TM's normal, nose and mouth without ulcers or lesions, oropharynx clear and oral mucous membranes moist  NECK: no adenopathy, no asymmetry, masses, or scars and thyroid normal to palpation  RESP: lungs clear to auscultation - no rales, rhonchi or wheezes  BREAST: normal without masses, tenderness or nipple discharge and no palpable axillary masses or adenopathy  CV: regular rate and rhythm, normal S1 S2, no S3 or S4, no murmur, click or rub, no " "peripheral edema and peripheral pulses strong  ABDOMEN: soft, nontender, no hepatosplenomegaly, no masses and bowel sounds normal  MS: no musculoskeletal defects are noted and gait is age appropriate without ataxia  SKIN: no suspicious lesions or rashes  NEURO: Normal strength and tone, sensory exam grossly normal, mentation intact and speech normal  PSYCH: mentation appears normal and affect normal/bright    Diagnostic Test Results:    ASSESSMENT / PLAN:   Hilda was seen today for wellness visit.    Diagnoses and all orders for this visit:    Encounter for Medicare annual wellness exam  -     REVIEW OF HEALTH MAINTENANCE PROTOCOL ORDERS    Lipid screening  -     Lipid panel reflex to direct LDL Fasting    Hypothyroidism, unspecified type, on Levothyroxine.   -     TSH WITH FREE T4 REFLEX    Hypertension goal BP (blood pressure) < 140/90, fluctuates.  -  Discontinue Hyzaar    -  Start: hydrochlorothiazide (HYDRODIURIL) 25 MG tablet; Take 1 tablet (25 mg) by mouth daily.  -  Repeat BP in 2 weeks (ok send BP results via [employer])    Need for vaccination  -     Pneumococcal 20 Valent Conjugate (PCV20)        Patient has been advised of split billing requirements and indicates understanding: Yes    COUNSELING:  Reviewed preventive health counseling, as reflected in patient instructions       Regular exercise       Healthy diet/nutrition    Estimated body mass index is 23.37 kg/m  as calculated from the following:    Height as of this encounter: 1.608 m (5' 3.31\").    Weight as of this encounter: 60.4 kg (133 lb 3.2 oz).        She reports that she has never smoked. She has never used smokeless tobacco.      Appropriate preventive services were discussed with this patient, including applicable screening as appropriate for cardiovascular disease, diabetes, osteopenia/osteoporosis, and glaucoma.  As appropriate for age/gender, discussed screening for colorectal cancer, prostate cancer, breast cancer, and cervical cancer. " Checklist reviewing preventive services available has been given to the patient.    Reviewed patients plan of care and provided an AVS. The Basic Care Plan (routine screening as documented in Health Maintenance) for Hilda meets the Care Plan requirement. This Care Plan has been established and reviewed with the Patient.    Counseling Resources:  ATP IV Guidelines  Pooled Cohorts Equation Calculator  Breast Cancer Risk Calculator  Breast Cancer: Medication to Reduce Risk  FRAX Risk Assessment  ICSI Preventive Guidelines  Dietary Guidelines for Americans, 2010  USDA's MyPlate  ASA Prophylaxis  Lung CA Screening    Follow up in 2 weeks - BP check.   Next Annual Physical due in 6 /2023    Ame Dobson MD  [facility]

## 2022-06-15 NOTE — PROGRESS NOTES
"    The patient was counseled and encouraged to consider modifying their diet and eating habits. She was provided with information on recommended healthy diet options.  Answers for HPI/ROS submitted by the patient on 6/15/2022  In general, how would you rate your overall physical health?: good  Frequency of exercise:: 4-5 days/week  Do you usually eat at least 4 servings of fruit and vegetables a day, include whole grains & fiber, and avoid regularly eating high fat or \"junk\" foods? : No  Taking medications regularly:: Yes  Medication side effects:: None  Activities of Daily Living: no assistance needed  Home safety: no safety concerns identified  Hearing Impairment:: no hearing concerns  In the past 6 months, have you been bothered by leaking of urine?: No  abdominal pain: Yes  Blood in stool: No  Blood in urine: No  chest pain: No  chills: No  congestion: No  constipation: No  cough: No  diarrhea: No  dizziness: No  ear pain: No  eye pain: No  nervous/anxious: Yes  fever: No  frequency: No  genital sores: No  headaches: Yes  hearing loss: No  heartburn: No  arthralgias: No  joint swelling: No  peripheral edema: No  mood changes: No  myalgias: No  nausea: No  dysuria: No  palpitations: No  Skin sensation changes: No  sore throat: No  urgency: Yes  rash: No  shortness of breath: No  visual disturbance: No  weakness: No  pelvic pain: Yes  vaginal bleeding: No  vaginal discharge: No  tenderness: No  breast mass: No  breast discharge: No  In general, how would you rate your overall mental or emotional health?: good  Additional concerns today:: Yes  Duration of exercise:: 15-30 minutes        "

## 2022-06-15 NOTE — PATIENT INSTRUCTIONS
Patient Education   Personalized Prevention Plan  You are due for the preventive services outlined below.  Your care team is available to assist you in scheduling these services.  If you have already completed any of these items, please share that information with your care team to update in your medical record.  Health Maintenance Due   Topic Date Due     Pneumococcal Vaccine (3 - PPSV23 or PCV20) 10/27/2016     Zoster (Shingles) Vaccine (3 of 3) 07/24/2018     Cholesterol Lab  06/11/2022     ANNUAL REVIEW OF HM ORDERS  06/11/2022     Thyroid Function Lab  06/11/2022       Understanding USDA MyPlate  The USDA has guidelines to help you make healthy food choices. These are called MyPlate. MyPlate shows the food groups that make up healthy meals using the image of a place setting. Before you eat, think about the healthiest choices for what to put on your plate or in your cup or bowl. To learn more about building a healthy plate, visit www.choosemyplate.gov.    The food groups    Fruits. Any fruit or 100% fruit juice counts as part of the Fruit Group. Fruits may be fresh, canned, frozen, or dried, and may be whole, cut-up, or pureed. Make 1/2 of your plate fruits and vegetables.    Vegetables. Any vegetable or 100% vegetable juice counts as a member of the Vegetable Group. Vegetables may be fresh, frozen, canned, or dried. They can be served raw or cooked and may be whole, cut-up, or mashed. Make 1/2 of your plate fruits and vegetables.    Grains. All foods made from grains are part of the Grains Group. These include wheat, rice, oats, cornmeal, and barley. Grains are often used to make foods such as bread, pasta, oatmeal, cereal, tortillas, and grits. Grains should be no more than 1/4 of your plate. At least half of your grains should be whole grains.    Protein. This group includes meat, poultry, seafood, beans and peas, eggs, processed soy products (such as tofu), nuts (including nut butters), and seeds. Make  protein choices no more than 1/4 of your plate. Meat and poultry choices should be lean or low fat.    Dairy. The Dairy Group includes all fluid milk products and foods made from milk that contain calcium, such as yogurt and cheese. (Foods that have little calcium, such as cream, butter, and cream cheese, are not part of this group.) Most dairy choices should be low-fat or fat-free.    Oils. Oils aren't a food group, but they do contain essential nutrients. However it's important to watch your intake of oils. These are fats that are liquid at room temperature. They include canola, corn, olive, soybean, vegetable, and sunflower oil. Foods that are mainly oil include mayonnaise, certain salad dressings, and soft margarines. You likely already get your daily oil allowance from the foods you eat.  Things to limit  Eating healthy also means limiting these things in your diet:       Salt (sodium). Many processed foods have a lot of sodium. To keep sodium intake down, eat fresh vegetables, meats, poultry, and seafood when possible. Purchase low-sodium, reduced-sodium, or no-salt-added food products at the store. And don't add salt to your meals at home. Instead, season them with herbs and spices such as dill, oregano, cumin, and paprika. Or try adding flavor with lemon or lime zest and juice.    Saturated fat. Saturated fats are most often found in animal products such as beef, pork, and chicken. They are often solid at room temperature, such as butter. To reduce your saturated fat intake, choose leaner cuts of meat and poultry. And try healthier cooking methods such as grilling, broiling, roasting, or baking. For a simple lower-fat swap, use plain nonfat yogurt instead of mayonnaise when making potato salad or macaroni salad.    Added sugars. These are sugars added to foods. They are in foods such as ice cream, candy, soda, fruit drinks, sports drinks, energy drinks, cookies, pastries, jams, and syrups. Cut down on added  sugars by sharing sweet treats with a family member or friend. You can also choose fruit for dessert, and drink water or other unsweetened beverages.     [employer] last reviewed this educational content on 6/1/2020 2000-2021 [employer]. All rights reserved. This information is not intended as a substitute for professional medical care. Always follow your healthcare professional's instructions.

## 2022-06-17 DIAGNOSIS — E03.9 HYPOTHYROIDISM, UNSPECIFIED TYPE: ICD-10-CM

## 2022-06-17 RX ORDER — LEVOTHYROXINE SODIUM 50 UG/1
50 TABLET ORAL DAILY
Qty: 90 TABLET | Refills: 3 | Status: SHIPPED | OUTPATIENT
Start: 2022-06-17 | End: 2023-05-24

## 2022-06-23 ENCOUNTER — HOSPITAL ENCOUNTER (OUTPATIENT)
Dept: MRI IMAGING | Facility: CLINIC | Age: 87
Discharge: HOME | End: 2022-06-23
Attending: PSYCHIATRY & NEUROLOGY | Admitting: PSYCHIATRY & NEUROLOGY
Payer: MEDICARE

## 2022-06-23 DIAGNOSIS — G43.109 MIGRAINE WITH AURA AND WITHOUT STATUS MIGRAINOSUS, NOT INTRACTABLE: ICD-10-CM

## 2022-06-23 PROCEDURE — 70551 MRI BRAIN STEM W/O DYE: CPT

## 2022-06-23 NOTE — RESULT ENCOUNTER NOTE
Please let Hilda know that her brain MRI looks stable.  Age-related changes only.  No structural cause for her headaches.    Thank you,  Dr. Connor

## 2022-06-29 ENCOUNTER — TELEPHONE (OUTPATIENT)
Dept: FAMILY MEDICINE | Facility: CLINIC | Age: 87
End: 2022-06-29

## 2022-06-29 DIAGNOSIS — I10 HYPERTENSION GOAL BP (BLOOD PRESSURE) < 140/90: ICD-10-CM

## 2022-06-29 NOTE — TELEPHONE ENCOUNTER
Patient said that the medication is working and would like her next refill after this one to be ninety days.

## 2022-06-29 NOTE — TELEPHONE ENCOUNTER
Dr. Dobson     Please see below. Ok for refill?    Requested Prescriptions   Pending Prescriptions Disp Refills     hydrochlorothiazide (HYDRODIURIL) 25 MG tablet 90 tablet 0     Sig: Take 1 tablet (25 mg) by mouth daily       There is no refill protocol information for this order        ThanksAda RN  [facility]

## 2022-06-30 RX ORDER — HYDROCHLOROTHIAZIDE 25 MG/1
25 TABLET ORAL DAILY
Qty: 90 TABLET | Refills: 3 | Status: SHIPPED | OUTPATIENT
Start: 2022-06-30 | End: 2023-06-19

## 2022-08-18 ENCOUNTER — OFFICE VISIT (OUTPATIENT)
Dept: OPHTHALMOLOGY | Facility: CLINIC | Age: 87
End: 2022-08-18
Payer: MEDICARE

## 2022-08-18 DIAGNOSIS — H40.003 GLAUCOMA SUSPECT OF BOTH EYES: Primary | ICD-10-CM

## 2022-08-18 PROCEDURE — 92083 EXTENDED VISUAL FIELD XM: CPT | Performed by: OPHTHALMOLOGY

## 2022-08-18 PROCEDURE — 92012 INTRM OPH EXAM EST PATIENT: CPT | Performed by: OPHTHALMOLOGY

## 2022-08-18 PROCEDURE — 92133 CPTRZD OPH DX IMG PST SGM ON: CPT | Performed by: OPHTHALMOLOGY

## 2022-08-18 RX ORDER — LATANOPROST 50 UG/ML
1 SOLUTION/ DROPS OPHTHALMIC AT BEDTIME
Qty: 10 ML | Refills: 3 | Status: SHIPPED | OUTPATIENT
Start: 2022-08-18 | End: 2023-04-03

## 2022-08-18 ASSESSMENT — VISUAL ACUITY
CORRECTION_TYPE: GLASSES
OD_CC: 20/40
OS_CC+: -2
OD_CC+: -2+1
METHOD: SNELLEN - LINEAR
OS_CC: 20/25

## 2022-08-18 ASSESSMENT — TONOMETRY
IOP_METHOD: APPLANATION
OD_IOP_MMHG: 20
OS_IOP_MMHG: 18

## 2022-08-18 ASSESSMENT — EXTERNAL EXAM - RIGHT EYE: OD_EXAM: NORMAL

## 2022-08-18 ASSESSMENT — EXTERNAL EXAM - LEFT EYE: OS_EXAM: NORMAL

## 2022-08-18 NOTE — PROGRESS NOTES
Current Eye Medications: Latanoprost at bedtime both eyes  Refresh three times a day both eyes.     Subjective:  Here for OCT mac and nerve/HVF for glaucoma. Doing well, does need refills.    Gets migraine auras when reading; does have a new brighter lamp.     Objective:  See Ophthalmology Exam.       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      Plan:  Continue same medication  May use artificial tears up to 4 times daily both eyes. (Refresh Tears, Systane Ultra/Balance, or Theratears)   Return visit 6 months for a complete exam.  May need lower intraocular pressures.  Andi Bay M.D.  [phone]

## 2022-08-18 NOTE — PATIENT INSTRUCTIONS
Continue same medication  May use artificial tears up to 4 times daily both eyes. (Refresh Tears, Systane Ultra/Balance, or Theratears)   Return visit 6 months for a complete exam.    Andi Bay M.D.  [phone]

## 2022-08-18 NOTE — LETTER
8/18/2022         RE: Hilda Potter  [address]  [place] [address]        Dear Colleague,    Thank you for referring your patient, Hilda Potter, to the [facility]. Please see a copy of my visit note below.     Current Eye Medications: Latanoprost at bedtime both eyes  Refresh three times a day both eyes.     Subjective:  Here for OCT mac and nerve/HVF for glaucoma. Doing well, does need refills.    Gets migraine auras when reading; does have a new brighter lamp.     Objective:  See Ophthalmology Exam.       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      Plan:  Continue same medication  May use artificial tears up to 4 times daily both eyes. (Refresh Tears, Systane Ultra/Balance, or Theratears)   Return visit 6 months for a complete exam.  May need lower intraocular pressures.  Andi Bay M.D.  [phone]            Again, thank you for allowing me to participate in the care of your patient.        Sincerely,        Andi Bay MD

## 2022-09-26 ENCOUNTER — LAB (OUTPATIENT)
Dept: LAB | Facility: CLINIC | Age: 87
End: 2022-09-26
Payer: MEDICARE

## 2022-09-26 ENCOUNTER — OFFICE VISIT (OUTPATIENT)
Dept: NEUROLOGY | Facility: CLINIC | Age: 87
End: 2022-09-26

## 2022-09-26 ENCOUNTER — ANCILLARY PROCEDURE (OUTPATIENT)
Dept: CT IMAGING | Facility: CLINIC | Age: 87
End: 2022-09-26
Attending: NURSE PRACTITIONER
Payer: MEDICARE

## 2022-09-26 VITALS — WEIGHT: 136.2 LBS | BODY MASS INDEX: 23.89 KG/M2 | SYSTOLIC BLOOD PRESSURE: 158 MMHG | DIASTOLIC BLOOD PRESSURE: 77 MMHG

## 2022-09-26 DIAGNOSIS — G43.109 MIGRAINE WITH AURA AND WITHOUT STATUS MIGRAINOSUS, NOT INTRACTABLE: Primary | ICD-10-CM

## 2022-09-26 DIAGNOSIS — Z79.899 ENCOUNTER FOR LONG-TERM (CURRENT) USE OF MEDICATIONS: ICD-10-CM

## 2022-09-26 DIAGNOSIS — C82.00 GRADE I FOLLICULAR SMALL CLEAVED CELL LYMPHOMA (H): ICD-10-CM

## 2022-09-26 LAB
ALBUMIN SERPL-MCNC: 3.6 G/DL (ref 3.4–5)
ALP SERPL-CCNC: 71 U/L (ref 40–150)
ALT SERPL W P-5'-P-CCNC: 31 U/L (ref 0–50)
ANION GAP SERPL CALCULATED.3IONS-SCNC: 3 MMOL/L (ref 3–14)
AST SERPL W P-5'-P-CCNC: 26 U/L (ref 0–45)
BASOPHILS # BLD AUTO: 0 10E3/UL (ref 0–0.2)
BASOPHILS NFR BLD AUTO: 0 %
BILIRUB SERPL-MCNC: 0.8 MG/DL (ref 0.2–1.3)
BUN SERPL-MCNC: 20 MG/DL (ref 7–30)
CALCIUM SERPL-MCNC: 9.5 MG/DL (ref 8.5–10.1)
CHLORIDE BLD-SCNC: 101 MMOL/L (ref 94–109)
CO2 SERPL-SCNC: 34 MMOL/L (ref 20–32)
CREAT SERPL-MCNC: 0.9 MG/DL (ref 0.52–1.04)
EOSINOPHIL # BLD AUTO: 0 10E3/UL (ref 0–0.7)
EOSINOPHIL NFR BLD AUTO: 1 %
ERYTHROCYTE [DISTWIDTH] IN BLOOD BY AUTOMATED COUNT: 13.2 % (ref 10–15)
GFR SERPL CREATININE-BSD FRML MDRD: 62 ML/MIN/1.73M2
GLUCOSE BLD-MCNC: 116 MG/DL (ref 70–99)
HCT VFR BLD AUTO: 40.6 % (ref 35–47)
HGB BLD-MCNC: 13.6 G/DL (ref 11.7–15.7)
LDH SERPL L TO P-CCNC: 188 U/L (ref 81–234)
LYMPHOCYTES # BLD AUTO: 1.3 10E3/UL (ref 0.8–5.3)
LYMPHOCYTES NFR BLD AUTO: 31 %
MCH RBC QN AUTO: 29.2 PG (ref 26.5–33)
MCHC RBC AUTO-ENTMCNC: 33.5 G/DL (ref 31.5–36.5)
MCV RBC AUTO: 87 FL (ref 78–100)
MONOCYTES # BLD AUTO: 0.5 10E3/UL (ref 0–1.3)
MONOCYTES NFR BLD AUTO: 11 %
NEUTROPHILS # BLD AUTO: 2.5 10E3/UL (ref 1.6–8.3)
NEUTROPHILS NFR BLD AUTO: 57 %
PLATELET # BLD AUTO: 164 10E3/UL (ref 150–450)
POTASSIUM BLD-SCNC: 3.6 MMOL/L (ref 3.4–5.3)
PROT SERPL-MCNC: 7.5 G/DL (ref 6.8–8.8)
RBC # BLD AUTO: 4.65 10E6/UL (ref 3.8–5.2)
SODIUM SERPL-SCNC: 138 MMOL/L (ref 133–144)
WBC # BLD AUTO: 4.3 10E3/UL (ref 4–11)

## 2022-09-26 PROCEDURE — 99214 OFFICE O/P EST MOD 30 MIN: CPT | Performed by: PSYCHIATRY & NEUROLOGY

## 2022-09-26 PROCEDURE — 74176 CT ABD & PELVIS W/O CONTRAST: CPT | Mod: MG | Performed by: STUDENT IN AN ORGANIZED HEALTH CARE EDUCATION/TRAINING PROGRAM

## 2022-09-26 PROCEDURE — 36415 COLL VENOUS BLD VENIPUNCTURE: CPT

## 2022-09-26 PROCEDURE — 80053 COMPREHEN METABOLIC PANEL: CPT

## 2022-09-26 PROCEDURE — 85025 COMPLETE CBC W/AUTO DIFF WBC: CPT

## 2022-09-26 PROCEDURE — G1004 CDSM NDSC: HCPCS | Mod: GC | Performed by: STUDENT IN AN ORGANIZED HEALTH CARE EDUCATION/TRAINING PROGRAM

## 2022-09-26 PROCEDURE — 71250 CT THORAX DX C-: CPT | Mod: MG | Performed by: STUDENT IN AN ORGANIZED HEALTH CARE EDUCATION/TRAINING PROGRAM

## 2022-09-26 PROCEDURE — 83615 LACTATE (LD) (LDH) ENZYME: CPT

## 2022-09-26 RX ORDER — SUMATRIPTAN 50 MG/1
50 TABLET, FILM COATED ORAL
Qty: 12 TABLET | Refills: 3 | Status: SHIPPED | OUTPATIENT
Start: 2022-09-26 | End: 2023-03-30 | Stop reason: ALTCHOICE

## 2022-09-26 NOTE — LETTER
9/26/2022         RE: Hilda Potter  [address]  [place] [address]        Dear Colleague,    Thank you for referring your patient, Hilda Potter, to the [facility] [place]. Please see a copy of my visit note below.    ESTABLISHED PATIENT NEUROLOGY NOTE    DATE OF VISIT: 9/26/2022  MRN: [id number]  PATIENT NAME: Hilda Potter  YOB: 1935    Chief Complaint   Patient presents with     Headache     4 mo follow-up      SUBJECTIVE:                                                      HISTORY OF PRESENT ILLNESS:  Hilda is here for follow up regarding migraine headaches.  She has been treating her headaches with Excedrin.  When we met a few months ago she was continuing massage therapy and also seeing her chiropractor for her neck.  We have also discussed acupuncture as an option for treatment.  She has wanted to avoid daily headache medications.  She had tried sumatriptan when she was at [facility] for elevated blood pressure associated with one of her migraines and this did help.  I suggested she continue the sumatriptan going forward for abortive treatment.  I also requested updated brain MRI which was stable compared to 2018 imaging, age-related changes only.  I also referred her to acupuncture.    Hilda called in the meantime to say that acupuncture was going to be too expensive for her, not covered by insurance.  We had also discussed switching to a different triptan and possibly getting her in for occipital nerve blocks.    Hilda is here with her . She says the headaches are about the same. She has tried the sumatriptan for her headaches. She says that she has tried this now one time recently, instead of waiting to see if the Excedrin would work first.  This helped dissipate her headache quite a bit, quickly.  She says she also noticed an improvement in her nausea and the fogginess she feels with her headaches. She has been doing  the massage and chiropractic treatments as well.  She and her  agree that stress seems to aggravate the headaches, sometimes bring them on.  For instance if she has to travel she has a big event coming up she can almost anticipate a headache.  Red wine and chocolate also trigger headaches.  She continues to have some left occipital pain that sometimes feels like an itching sensation all the way of her scalp on that side.    CURRENT MEDICATIONS:   Aspirin-Acetaminophen-Caffeine (EXCEDRIN MIGRAINE PO), Take 1 tablet by mouth as needed  calcium carbonate-vitamin D (OS-FIDENCIO) 600-400 MG-UNIT chewable tablet, Take 1 chew tab by mouth 2 times daily  carboxymethylcellulose (REFRESH PLUS) 0.5 % SOLN, Place 1 drop into both eyes 3 times daily as needed for dry eyes  hydrochlorothiazide (HYDRODIURIL) 25 MG tablet, Take 1 tablet (25 mg) by mouth daily  latanoprost (XALATAN) 0.005 % ophthalmic solution, Place 1 drop into both eyes At Bedtime  levothyroxine (SYNTHROID/LEVOTHROID) 50 MCG tablet, Take 1 tablet (50 mcg) by mouth daily  loratadine (CLARITIN) 10 MG tablet, Take 1 tablet (10 mg) by mouth daily  MELATONIN PO, Take 3 mg by mouth nightly as needed   Multiple Vitamin (MULTI-VITAMIN) per tablet, Take 1 tablet by mouth daily  nystatin (MYCOSTATIN) 661373 UNIT/ML suspension, TAKE 5 ML BY MOUTH FOUR TIMES DAILY FOR 7 DAYS AS DIRECTED (Patient not taking: No sig reported)    No current facility-administered medications on file prior to visit.      RECENT DIAGNOSTIC STUDIES:   Labs:   Results for orders placed or performed in visit on 09/26/22   CT Chest abdomen pelvis w/o contrast     Status: None    Narrative    EXAMINATION: CT CHEST ABDOMEN PELVIS W/O CONTRAST, 9/26/2022 11:05 AM    TECHNIQUE: Helical CT images from the thoracic inlet through the  symphysis pubis were obtained without intravenous contrast.     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 Atherosclerotic  calcifications of the aorta and coronary arteries.  No  lymphadenopathy.  Esophagus appears normal.    Lungs/pleura: The central tracheobronchial tree is patent.  No focal  mass or consolidation.  No suspicious nodules. No pneumothorax or  pleural effusion. Mild biapical scarring is unchanged.    Chest wall/axilla: No lymphadenopathy.    Abdomen and Pelvis:    Liver: Unremarkable. No suspicious masses. No hepatic steatosis.     Gallbladder/biliary tree: Gallbladder is surgically absent. Mild  biliary dilatation similar to prior exam is likely reservoir effect.    Spleen: Unremarkable.    Pancreas: Unremarkable.     Adrenal glands: Unremarkable.    Kidneys: Unremarkable. No hydronephrosis.    Bowel: Colonic diverticulosis without evidence of acute  diverticulitis. Patent anastomosis of the sigmoid colon. No  obstruction. Diffuse haziness of the mesentery and small mesenteric  lymph nodes slightly improved from 3/23/2022. Decreased chinyere mass in  the central mesentery measuring approximately 1.5 x 0.9 cm, previously  2.6 x 1.3 cm (series 3 image 418).    Retroperitoneum: Atherosclerotic calcifications of the aorta and its  major branches.  Subcentimeter retroperitoneal lymph nodes. No bulky  retroperitoneal lymphadenopathy.    Pelvis: Urinary bladder is incompletely distended limiting evaluation.   Uterus and adnexa are within normal limits.    Bones: Unchanged mild compression deformity of the superior endplate  of L1. No suspicious lesions.    Soft Tissues: Unremarkable.      Impression    IMPRESSION:  In this patient with history of non-Hodgkin's lymphoma:  1. Decreased mesenteric haziness and chinyere mass in the central  mesentery compared to 3/23/2022. No new  lymphadenopathy.  2. Diverticulosis without evidence for diverticulitis.  3. Cholecystectomy. Stable extrahepatic biliary ductal dilatation.    I have personally reviewed the examination and initial interpretation  and I agree with the findings.    ELENI RILEY MD         SYSTEM ID:  [id number]   Results for orders placed or performed in visit on 09/26/22   Lactate Dehydrogenase     Status: Normal   Result Value Ref Range    Lactate Dehydrogenase 188 81 - 234 U/L   Comprehensive metabolic panel     Status: Abnormal   Result Value Ref Range    Sodium 138 133 - 144 mmol/L    Potassium 3.6 3.4 - 5.3 mmol/L    Chloride 101 94 - 109 mmol/L    Carbon Dioxide (CO2) 34 (H) 20 - 32 mmol/L    Anion Gap 3 3 - 14 mmol/L    Urea Nitrogen 20 7 - 30 mg/dL    Creatinine 0.90 0.52 - 1.04 mg/dL    Calcium 9.5 8.5 - 10.1 mg/dL    Glucose 116 (H) 70 - 99 mg/dL    Alkaline Phosphatase 71 40 - 150 U/L    AST 26 0 - 45 U/L    ALT 31 0 - 50 U/L    Protein Total 7.5 6.8 - 8.8 g/dL    Albumin 3.6 3.4 - 5.0 g/dL    Bilirubin Total 0.8 0.2 - 1.3 mg/dL    GFR Estimate 62 >60 mL/min/1.73m2   CBC with platelets and differential     Status: None   Result Value Ref Range    WBC Count 4.3 4.0 - 11.0 10e3/uL    RBC Count 4.65 3.80 - 5.20 10e6/uL    Hemoglobin 13.6 11.7 - 15.7 g/dL    Hematocrit 40.6 35.0 - 47.0 %    MCV 87 78 - 100 fL    MCH 29.2 26.5 - 33.0 pg    MCHC 33.5 31.5 - 36.5 g/dL    RDW 13.2 10.0 - 15.0 %    Platelet Count 164 150 - 450 10e3/uL    % Neutrophils 57 %    % Lymphocytes 31 %    % Monocytes 11 %    % Eosinophils 1 %    % Basophils 0 %    Absolute Neutrophils 2.5 1.6 - 8.3 10e3/uL    Absolute Lymphocytes 1.3 0.8 - 5.3 10e3/uL    Absolute Monocytes 0.5 0.0 - 1.3 10e3/uL    Absolute Eosinophils 0.0 0.0 - 0.7 10e3/uL    Absolute Basophils 0.0 0.0 - 0.2 10e3/uL   CBC with Platelets & Differential     Status: None    Narrative    The following orders were created for panel order CBC with Platelets & Differential.  Procedure                                Abnormality         Status                     ---------                               -----------         ------                     CBC with platelets and d...[[id number]]                      Final result                 Please view results for these tests on the individual orders.       Imaging:   MRI Brain (6.23.22):  IMPRESSION:     1. No evidence of acute infarct, mass, hemorrhage, or herniation.  2. Mild diffuse parenchymal volume loss and white matter changes  likely due to chronic microvascular ischemic disease. No significant  change since 10/1/2018.     Imaging reviewed independently by me.  Agree with radiology read.    REVIEW OF SYSTEMS:                                                      10-point review of systems is negative except as mentioned above in HPI.    EXAM:                                                      Physical Exam:   Vitals: BP (!) 158/77   Wt 61.8 kg (136 lb 3.2 oz)   LMP 10/01/1986   BMI 23.89 kg/m    BMI= Body mass index is 23.89 kg/m .  GENERAL: NAD.  HEENT: NC/AT. No TTP of scalp or neck.  CV: RRR. S1, S2.   NECK: No bruits.  PULM: Non-labored breathing.   Focused Neurologic:  MENTAL STATUS: Alert, attentive. Speech is fluent. Normal comprehension. Normal concentration. Adequate fund of knowledge.   CRANIAL NERVES: Discs technically difficult to visualize. Visual fields intact to confrontation. Pupils equally, round and reactive to light. Facial sensation and movement normal. EOM full. Hearing intact to conversation. Trapezius strength intact. Palate moves symmetrically. Tongue midline.  MOTOR: 5/5 in proximal and distal muscle groups of upper and lower extremities. Tone and bulk normal.   DTRs: Intact and symmetric. Babinski down-going bilaterally.   SENSATION: Normal light touch throughout.   COORDINATION: Finger-nose-finger normal.  Finger tapping normal.  STATION AND GAIT: Romberg negative. Casual gait is normal.     ASSESSMENT and PLAN:                                                       Assessment:    ICD-10-CM    1. Migraine with aura and without status migrainosus, not intractable  G43.109 SUMAtriptan (IMITREX) 50 MG tablet   2. Encounter for long-term (current) use of medications  Z79.899         Ms. Potter is a pleasant 87-year-old woman here for follow-up regarding migraine headaches.  The headaches seem to be responding to sumatriptan though she is only tried this as initial treatment 1 so far.  We will continue this.  I also recommended that Anish continue with the chiropractic and massage treatments.  Unfortunately acupuncture is too expensive for her.  She asks about Botox which I do not is warranted at this time given the frequency of her headaches.  I do think she may benefit from a left occipital nerve block if the paresthesias on that side continue.  She will let me know.  I would like to see Anish back in clinic in 6 months.  She understands and agrees with the plan.  Anish and her  had several questions which were answered to the best of my ability today.    Plan:  -- Continue the sumatriptan as needed at migraine symptom onset.  Okay to take the second dose a couple of hours later as we discussed.  -- It is also okay for you to try taking the sumatriptan on a daily basis for a few days if you have upcoming anticipated stress.  -- Continue with the massage and chiropractic treatments.  -- We could consider sending you for an occipital nerve block if your neck and scalp symptoms worsen.  Please let me know.  -- Return to neurology clinic in 6 months.    Total Time: 30 minutes were spent with the patient and in chart review/documentation (as described above in Assessment and Plan)/coordinating the care on date of service.    Iliana Connor MD  Neurology    Dragon software used in the dictation of this note.                      Again, thank you for allowing me to participate in the care of your patient.         Sincerely,        Iliana Connor MD

## 2022-09-26 NOTE — NURSING NOTE
"Anish Potter is a 87 year old female who presents for:  Chief Complaint   Patient presents with     Headache     4 mo follow-up         Initial Vitals:  BP (!) 158/77   Wt 61.8 kg (136 lb 3.2 oz)   LMP 10/01/1986   BMI 23.89 kg/m   Estimated body mass index is 23.89 kg/m  as calculated from the following:    Height as of 6/15/22: 1.608 m (5' 3.31\").    Weight as of this encounter: 61.8 kg (136 lb 3.2 oz).. Body surface area is 1.66 meters squared. BP completed using cuff size: wrist cuff    Nursing comment: Anish to follow up with Primary Care provider regarding elevated blood pressure.    Eddie Wallace  "

## 2022-09-26 NOTE — PROGRESS NOTES
ESTABLISHED PATIENT NEUROLOGY NOTE    DATE OF VISIT: 9/26/2022  MRN: [id number]  PATIENT NAME: Hilda Potter  YOB: 1935    Chief Complaint   Patient presents with     Headache     4 mo follow-up      SUBJECTIVE:                                                      HISTORY OF PRESENT ILLNESS:  Hilda is here for follow up regarding migraine headaches.  She has been treating her headaches with Excedrin.  When we met a few months ago she was continuing massage therapy and also seeing her chiropractor for her neck.  We have also discussed acupuncture as an option for treatment.  She has wanted to avoid daily headache medications.  She had tried sumatriptan when she was at [facility] for elevated blood pressure associated with one of her migraines and this did help.  I suggested she continue the sumatriptan going forward for abortive treatment.  I also requested updated brain MRI which was stable compared to 2018 imaging, age-related changes only.  I also referred her to acupuncture.    Hilda called in the meantime to say that acupuncture was going to be too expensive for her, not covered by insurance.  We had also discussed switching to a different triptan and possibly getting her in for occipital nerve blocks.    Hilda is here with her . She says the headaches are about the same. She has tried the sumatriptan for her headaches. She says that she has tried this now one time recently, instead of waiting to see if the Excedrin would work first.  This helped dissipate her headache quite a bit, quickly.  She says she also noticed an improvement in her nausea and the fogginess she feels with her headaches. She has been doing the massage and chiropractic treatments as well.  She and her  agree that stress seems to aggravate the headaches, sometimes bring them on.  For instance if she has to travel she has a big event coming up she can almost anticipate a headache.  Red wine and chocolate  also trigger headaches.  She continues to have some left occipital pain that sometimes feels like an itching sensation all the way of her scalp on that side.    CURRENT MEDICATIONS:   Aspirin-Acetaminophen-Caffeine (EXCEDRIN MIGRAINE PO), Take 1 tablet by mouth as needed  calcium carbonate-vitamin D (OS-FIDENCIO) 600-400 MG-UNIT chewable tablet, Take 1 chew tab by mouth 2 times daily  carboxymethylcellulose (REFRESH PLUS) 0.5 % SOLN, Place 1 drop into both eyes 3 times daily as needed for dry eyes  hydrochlorothiazide (HYDRODIURIL) 25 MG tablet, Take 1 tablet (25 mg) by mouth daily  latanoprost (XALATAN) 0.005 % ophthalmic solution, Place 1 drop into both eyes At Bedtime  levothyroxine (SYNTHROID/LEVOTHROID) 50 MCG tablet, Take 1 tablet (50 mcg) by mouth daily  loratadine (CLARITIN) 10 MG tablet, Take 1 tablet (10 mg) by mouth daily  MELATONIN PO, Take 3 mg by mouth nightly as needed   Multiple Vitamin (MULTI-VITAMIN) per tablet, Take 1 tablet by mouth daily  nystatin (MYCOSTATIN) [id number] UNIT/ML suspension, TAKE 5 ML BY MOUTH FOUR TIMES DAILY FOR 7 DAYS AS DIRECTED (Patient not taking: No sig reported)    No current facility-administered medications on file prior to visit.      RECENT DIAGNOSTIC STUDIES:   Labs:   Results for orders placed or performed in visit on 09/26/22   CT Chest abdomen pelvis w/o contrast     Status: None    Narrative    EXAMINATION: CT CHEST ABDOMEN PELVIS W/O CONTRAST, 9/26/2022 11:05 AM    TECHNIQUE: Helical CT images from the thoracic inlet through the  symphysis pubis were obtained without intravenous contrast.     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 Atherosclerotic  calcifications of the aorta and  coronary arteries.  No  lymphadenopathy.  Esophagus appears normal.    Lungs/pleura: The central tracheobronchial tree is patent.  No focal  mass or consolidation.  No suspicious nodules. No pneumothorax or  pleural effusion. Mild biapical scarring is unchanged.    Chest wall/axilla: No lymphadenopathy.    Abdomen and Pelvis:    Liver: Unremarkable. No suspicious masses. No hepatic steatosis.     Gallbladder/biliary tree: Gallbladder is surgically absent. Mild  biliary dilatation similar to prior exam is likely reservoir effect.    Spleen: Unremarkable.    Pancreas: Unremarkable.     Adrenal glands: Unremarkable.    Kidneys: Unremarkable. No hydronephrosis.    Bowel: Colonic diverticulosis without evidence of acute  diverticulitis. Patent anastomosis of the sigmoid colon. No  obstruction. Diffuse haziness of the mesentery and small mesenteric  lymph nodes slightly improved from 3/23/2022. Decreased chinyere mass in  the central mesentery measuring approximately 1.5 x 0.9 cm, previously  2.6 x 1.3 cm (series 3 image 418).    Retroperitoneum: Atherosclerotic calcifications of the aorta and its  major branches.  Subcentimeter retroperitoneal lymph nodes. No bulky  retroperitoneal lymphadenopathy.    Pelvis: Urinary bladder is incompletely distended limiting evaluation.   Uterus and adnexa are within normal limits.    Bones: Unchanged mild compression deformity of the superior endplate  of L1. No suspicious lesions.    Soft Tissues: Unremarkable.      Impression    IMPRESSION:  In this patient with history of non-Hodgkin's lymphoma:  1. Decreased mesenteric haziness and chinyere mass in the central  mesentery compared to 3/23/2022. No new lymphadenopathy.  2. Diverticulosis without evidence for diverticulitis.  3. Cholecystectomy. Stable extrahepatic biliary ductal dilatation.    I have personally reviewed the examination and initial interpretation  and I agree with the findings.    ELENI RILEY MD         SYSTEM ID:   [id number]   Results for orders placed or performed in visit on 09/26/22   Lactate Dehydrogenase     Status: Normal   Result Value Ref Range    Lactate Dehydrogenase 188 81 - 234 U/L   Comprehensive metabolic panel     Status: Abnormal   Result Value Ref Range    Sodium 138 133 - 144 mmol/L    Potassium 3.6 3.4 - 5.3 mmol/L    Chloride 101 94 - 109 mmol/L    Carbon Dioxide (CO2) 34 (H) 20 - 32 mmol/L    Anion Gap 3 3 - 14 mmol/L    Urea Nitrogen 20 7 - 30 mg/dL    Creatinine 0.90 0.52 - 1.04 mg/dL    Calcium 9.5 8.5 - 10.1 mg/dL    Glucose 116 (H) 70 - 99 mg/dL    Alkaline Phosphatase 71 40 - 150 U/L    AST 26 0 - 45 U/L    ALT 31 0 - 50 U/L    Protein Total 7.5 6.8 - 8.8 g/dL    Albumin 3.6 3.4 - 5.0 g/dL    Bilirubin Total 0.8 0.2 - 1.3 mg/dL    GFR Estimate 62 >60 mL/min/1.73m2   CBC with platelets and differential     Status: None   Result Value Ref Range    WBC Count 4.3 4.0 - 11.0 10e3/uL    RBC Count 4.65 3.80 - 5.20 10e6/uL    Hemoglobin 13.6 11.7 - 15.7 g/dL    Hematocrit 40.6 35.0 - 47.0 %    MCV 87 78 - 100 fL    MCH 29.2 26.5 - 33.0 pg    MCHC 33.5 31.5 - 36.5 g/dL    RDW 13.2 10.0 - 15.0 %    Platelet Count 164 150 - 450 10e3/uL    % Neutrophils 57 %    % Lymphocytes 31 %    % Monocytes 11 %    % Eosinophils 1 %    % Basophils 0 %    Absolute Neutrophils 2.5 1.6 - 8.3 10e3/uL    Absolute Lymphocytes 1.3 0.8 - 5.3 10e3/uL    Absolute Monocytes 0.5 0.0 - 1.3 10e3/uL    Absolute Eosinophils 0.0 0.0 - 0.7 10e3/uL    Absolute Basophils 0.0 0.0 - 0.2 10e3/uL   CBC with Platelets & Differential     Status: None    Narrative    The following orders were created for panel order CBC with Platelets & Differential.  Procedure                               Abnormality         Status                     ---------                               -----------         ------                     CBC with platelets and d...[[id number]]                      Final result                 Please view results for these tests on the  individual orders.       Imaging:   MRI Brain (6.23.22):  IMPRESSION:     1. No evidence of acute infarct, mass, hemorrhage, or herniation.  2. Mild diffuse parenchymal volume loss and white matter changes  likely due to chronic microvascular ischemic disease. No significant  change since 10/1/2018.     Imaging reviewed independently by me.  Agree with radiology read.    REVIEW OF SYSTEMS:                                                      10-point review of systems is negative except as mentioned above in HPI.    EXAM:                                                      Physical Exam:   Vitals: BP (!) 158/77   Wt 61.8 kg (136 lb 3.2 oz)   LMP 10/01/1986   BMI 23.89 kg/m    BMI= Body mass index is 23.89 kg/m .  GENERAL: NAD.  HEENT: NC/AT. No TTP of scalp or neck.  CV: RRR. S1, S2.   NECK: No bruits.  PULM: Non-labored breathing.   Focused Neurologic:  MENTAL STATUS: Alert, attentive. Speech is fluent. Normal comprehension. Normal concentration. Adequate fund of knowledge.   CRANIAL NERVES: Discs technically difficult to visualize. Visual fields intact to confrontation. Pupils equally, round and reactive to light. Facial sensation and movement normal. EOM full. Hearing intact to conversation. Trapezius strength intact. Palate moves symmetrically. Tongue midline.  MOTOR: 5/5 in proximal and distal muscle groups of upper and lower extremities. Tone and bulk normal.   DTRs: Intact and symmetric. Babinski down-going bilaterally.   SENSATION: Normal light touch throughout.   COORDINATION: Finger-nose-finger normal.  Finger tapping normal.  STATION AND GAIT: Romberg negative. Casual gait is normal.     ASSESSMENT and PLAN:                                                      Assessment:    ICD-10-CM    1. Migraine with aura and without status migrainosus, not intractable  G43.109 SUMAtriptan (IMITREX) 50 MG tablet   2. Encounter for long-term (current) use of medications  Z79.899         Ms. Potter is a pleasant  87-year-old woman here for follow-up regarding migraine headaches.  The headaches seem to be responding to sumatriptan though she is only tried this as initial treatment 1 so far.  We will continue this.  I also recommended that Anish continue with the chiropractic and massage treatments.  Unfortunately acupuncture is too expensive for her.  She asks about Botox which I do not is warranted at this time given the frequency of her headaches.  I do think she may benefit from a left occipital nerve block if the paresthesias on that side continue.  She will let me know.  I would like to see Anish back in clinic in 6 months.  She understands and agrees with the plan.  Anish and her  had several questions which were answered to the best of my ability today.    Plan:  -- Continue the sumatriptan as needed at migraine symptom onset.  Okay to take the second dose a couple of hours later as we discussed.  -- It is also okay for you to try taking the sumatriptan on a daily basis for a few days if you have upcoming anticipated stress.  -- Continue with the massage and chiropractic treatments.  -- We could consider sending you for an occipital nerve block if your neck and scalp symptoms worsen.  Please let me know.  -- Return to neurology clinic in 6 months.    Total Time: 30 minutes were spent with the patient and in chart review/documentation (as described above in Assessment and Plan)/coordinating the care on date of service.    Iliana Connor MD  Neurology    Dragon software used in the dictation of this note.

## 2022-09-26 NOTE — PATIENT INSTRUCTIONS
Plan:  -- Continue the sumatriptan as needed at migraine symptom onset.  Okay to take the second dose a couple of hours later as we discussed.  -- It is also okay for you to try taking the sumatriptan on a daily basis for a few days if you have upcoming anticipated stress.  -- Continue with the massage and chiropractic treatments.  -- We could consider sending you for an occipital nerve block if your neck and scalp symptoms worsen.  Please let me know.  -- Return to neurology clinic in 6 months.

## 2022-09-28 ENCOUNTER — ONCOLOGY VISIT (OUTPATIENT)
Dept: ONCOLOGY | Facility: CLINIC | Age: 87
End: 2022-09-28
Attending: NURSE PRACTITIONER
Payer: MEDICARE

## 2022-09-28 VITALS
OXYGEN SATURATION: 98 % | DIASTOLIC BLOOD PRESSURE: 78 MMHG | HEART RATE: 81 BPM | BODY MASS INDEX: 23.94 KG/M2 | HEIGHT: 63 IN | SYSTOLIC BLOOD PRESSURE: 142 MMHG | WEIGHT: 135.1 LBS | RESPIRATION RATE: 16 BRPM | TEMPERATURE: 98.1 F

## 2022-09-28 DIAGNOSIS — C82.03 FOLLICULAR LYMPHOMA GRADE I OF INTRA-ABDOMINAL LYMPH NODES (H): ICD-10-CM

## 2022-09-28 DIAGNOSIS — R61 NIGHT SWEATS: Primary | ICD-10-CM

## 2022-09-28 PROCEDURE — 90662 IIV NO PRSV INCREASED AG IM: CPT | Performed by: INTERNAL MEDICINE

## 2022-09-28 PROCEDURE — 99214 OFFICE O/P EST MOD 30 MIN: CPT | Mod: 25 | Performed by: INTERNAL MEDICINE

## 2022-09-28 PROCEDURE — G0008 ADMIN INFLUENZA VIRUS VAC: HCPCS | Performed by: INTERNAL MEDICINE

## 2022-09-28 ASSESSMENT — PAIN SCALES - GENERAL: PAINLEVEL: NO PAIN (0)

## 2022-09-28 NOTE — LETTER
2022         RE: Anish Potter  [address] [facility] [address]        Dear Colleague,    Thank you for referring your patient, Anish Potter, to the [facility]. Please see a copy of my visit note below.    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  # Sep 2012 - ongoing Surveillance.    Anish remains well clinically,  We reviewed her CT scan of the chest abdomen pelvis together; reviewing the mesenteric haziness as well as adenopathy, there is not been marked change over some time; the small changes we have seen have been slight over many years and in the absence of symptoms, certainly not recurrence of night sweats or other constitutional complaints.  We reviewed plans for imaging likely in 2 years time with plans for follow-up labs in 12 months time at which point she will return to clinic.  We reviewed the most important message would be recurrent symptoms in particular night sweats that she had a diagnosis would warrant more sooner or immediate follow-up.  She will do flu shot today; COVID boosted.    Bang Saunders MD    --------------------    Interval History:  Anish returns for follow up of follicular lymphoma unaccompanied.  All in all, she continues to remain quite well.  She did experience some night sweats over the summer that have resolved with the change in the temperature.  Looking back so this could have been from the summer heat.  Her  "weight has been stable.  Her appetite has been good.  No swollen lymph glands.  Her energy has been good.  She has plans to get her flu shot today.  She is COVID boosted.  She has plans to keep going with annual mammograms.  Absolutely no abdominal or pelvic complaints.  No bloating.  No stool changes.  No bladder issues.    --------------------    Physical Exam:  VS: BP (!) 142/78 (BP Location: Left arm)   Pulse 81   Temp 98.1  F (36.7  C) (Oral)   Resp 16   Ht 1.608 m (5' 3.31\")   Wt 61.3 kg (135 lb 1.6 oz)   LMP 10/01/1986   SpO2 98%   BMI 23.70 kg/m    GEN: Well appearing.    Labs / Imaging:  We reviewed CBC, CMP.  We personally reviewed her CT CAP together (including incidental findings).      Again, thank you for allowing me to participate in the care of your patient.        Sincerely,        Bang Saunders MD    "

## 2022-09-28 NOTE — NURSING NOTE
"Oncology Rooming Note    September 28, 2022 10:30 AM   Hilda Potter is a 87 year old female who presents for:    Chief Complaint   Patient presents with     Oncology Clinic Visit     Transfer of care     Initial Vitals: BP (!) 142/78 (BP Location: Left arm)   Pulse 81   Temp 98.1  F (36.7  C) (Oral)   Resp 16   Ht 1.608 m (5' 3.31\")   Wt 61.3 kg (135 lb 1.6 oz)   LMP 10/01/1986   SpO2 98%   BMI 23.70 kg/m   Estimated body mass index is 23.7 kg/m  as calculated from the following:    Height as of this encounter: 1.608 m (5' 3.31\").    Weight as of this encounter: 61.3 kg (135 lb 1.6 oz). Body surface area is 1.65 meters squared.  No Pain (0) Comment: Data Unavailable   Patient's last menstrual period was 10/01/1986.  Allergies reviewed: Yes  Medications reviewed: Yes    Medications: Medication refills not needed today.  Pharmacy name entered into [email or web address]: [facility][id number] - [address] AT [facility]    Clinical concerns: red patch on right cheek is persistet- thought she would ask since she is here today. Also complains of night sweats lately.       Tanya Conner LPN              "

## 2022-09-28 NOTE — PROGRESS NOTES
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  # Sep 2012 - ongoing Surveillance.    Anish remains well clinically,  We reviewed her CT scan of the chest abdomen pelvis together; reviewing the mesenteric haziness as well as adenopathy, there is not been marked change over some time; the small changes we have seen have been slight over many years and in the absence of symptoms, certainly not recurrence of night sweats or other constitutional complaints.  We reviewed plans for imaging likely in 2 years time with plans for follow-up labs in 12 months time at which point she will return to clinic.  We reviewed the most important message would be recurrent symptoms in particular night sweats that she had a diagnosis would warrant more sooner or immediate follow-up.  She will do flu shot today; COVID boosted.    Bang Saunders MD    --------------------    Interval History:  Anish returns for follow up of follicular lymphoma unaccompanied.  All in all, she continues to remain quite well.  She did experience some night sweats over the summer that have resolved with the change in the temperature.  Looking back so this could have been from the summer heat.  Her weight has been stable.  Her appetite has been good.  No swollen lymph glands.  Her energy has been good.  She has plans to get her flu shot today.  She is COVID boosted.  She has plans to keep going with annual mammograms.  Absolutely no abdominal or pelvic complaints.  No  "bloating.  No stool changes.  No bladder issues.    --------------------    Physical Exam:  VS: BP (!) 142/78 (BP Location: Left arm)   Pulse 81   Temp 98.1  F (36.7  C) (Oral)   Resp 16   Ht 1.608 m (5' 3.31\")   Wt 61.3 kg (135 lb 1.6 oz)   LMP 10/01/1986   SpO2 98%   BMI 23.70 kg/m    GEN: Well appearing.    Labs / Imaging:  We reviewed CBC, CMP.  We personally reviewed her CT CAP together (including incidental findings).  "

## 2023-01-24 ENCOUNTER — ANCILLARY PROCEDURE (OUTPATIENT)
Dept: MAMMOGRAPHY | Facility: CLINIC | Age: 88
End: 2023-01-24
Attending: FAMILY MEDICINE
Payer: MEDICARE

## 2023-01-24 DIAGNOSIS — Z12.31 VISIT FOR SCREENING MAMMOGRAM: ICD-10-CM

## 2023-01-24 PROCEDURE — 77067 SCR MAMMO BI INCL CAD: CPT | Mod: TC | Performed by: RADIOLOGY

## 2023-03-29 NOTE — PROGRESS NOTES
__________________________________  ESTABLISHED PATIENT NEUROLOGY NOTE    DATE OF VISIT: 3/30/2023  MRN: [id number]  PATIENT NAME: Hilda Potter  YOB: 1935    Chief Complaint   Patient presents with     Migraine     5 weeks free form Migraines. No concerns     SUBJECTIVE:                                                      HISTORY OF PRESENT ILLNESS:  Hilda is here for follow up regarding migraine    Hilda Potter is an 87-year-old female with a history of migraines.  She is followed by Dr. Connor in the clinic last seen 9/26/2022.  Per chart review, She has been treating her headaches with Excedrin.  When we met a few months ago she was continuing massage therapy and also seeing her chiropractor for her neck.  We have also discussed acupuncture as an option for treatment.  She has wanted to avoid daily headache medications.  She had tried sumatriptan when she was at [facility] for elevated blood pressure associated with one of her migraines and this did help.  I suggested she continue the sumatriptan going forward for abortive treatment.  I also requested updated brain MRI which was stable compared to 2018 imaging, age-related changes only.  I also referred her to acupuncture.    She has been doing the massage and chiropractic treatments as well.  She and her  agree that stress seems to aggravate the headaches, sometimes bring them on.  For instance if she has to travel she has a big event coming up she can almost anticipate a headache.  Red wine and chocolate also trigger headaches.  She continues to have some left occipital pain that sometimes feels like an itching sensation all the way of her scalp on that side.    03/30/23:Hilda arrives to the clinic today for migraine follow-up.  She states that she had an increase in migraines January through February due to increased stress after her  suffered a stroke.  Since then she has remained migraine free.  She describes  her migraines as starting with an aura of flashing lights.  She typically takes an Excedrin Migraine and rests in a dark, quiet room.  She denies any head pain but endorses feeling overall not well and nauseous for 4 to 5 hours.  Associated symptoms include photophobia, phonophobia and nausea.  She may on rare occasions experience nausea and vomiting.  She feels that when she took sumatriptan in the past it caused her to have more frequent migraines.  She stopped taking this medication and continues with Excedrin Migraine.    She has additional concern over increased pruritus of her scalp.  She states this started about 8 to 9 months ago and is bothersome daily.  History of follicular lymphoma. She has follow-up with her oncologist in April and sees her dermatologist this summer.    She has remained healthy since last visit. No other concerns.     Current Medications:   Aspirin-Acetaminophen-Caffeine (EXCEDRIN MIGRAINE PO), Take 1 tablet by mouth as needed  calcium carbonate-vitamin D (OS-FIDENCIO) 600-400 MG-UNIT chewable tablet, Take 1 chew tab by mouth 2 times daily  carboxymethylcellulose (REFRESH PLUS) 0.5 % SOLN, Place 1 drop into both eyes 3 times daily as needed for dry eyes  hydrochlorothiazide (HYDRODIURIL) 25 MG tablet, Take 1 tablet (25 mg) by mouth daily  latanoprost (XALATAN) 0.005 % ophthalmic solution, Place 1 drop into both eyes At Bedtime  levothyroxine (SYNTHROID/LEVOTHROID) 50 MCG tablet, Take 1 tablet (50 mcg) by mouth daily  MELATONIN PO, Take 3 mg by mouth nightly as needed   Multiple Vitamin (MULTI-VITAMIN) per tablet, Take 1 tablet by mouth daily  loratadine (CLARITIN) 10 MG tablet, Take 1 tablet (10 mg) by mouth daily  nystatin (MYCOSTATIN) 508545 UNIT/ML suspension, TAKE 5 ML BY MOUTH FOUR TIMES DAILY FOR 7 DAYS AS DIRECTED (Patient not taking: No sig reported)  SUMAtriptan (IMITREX) 50 MG tablet, Take 1 tablet (50 mg) by mouth at onset of headache for migraine May repeat in 2 hours. Max 4  tablets/24 hours.    No current facility-administered medications on file prior to visit.    Past Medical History:   Patient  has a past medical history of AR (allergic rhinitis), Atrophic vaginitis, Chronic kidney disease, stage 3 (H) (7/14/2021), Compression fracture of L1 lumbar vertebra (H) (4/2015), Glaucoma (increased eye pressure), HTN (hypertension), Migraine, Nonsenile cataract, Osteoporosis, Reflux esophagitis, and Thyroid disease.  Surgical History:  She  has a past surgical history that includes cholecystectomy, open (1992); tonsillectomy & adenoidectomy; tubal ligation; salpingo oophorectomy,r/l/nevaeh; d & c; Appendectomy open (1954); colonoscopy (6/02, 10/13); LENGTHEN,TENDON,HAND/FINGER (1993); upper gi endoscopy (6/02, 8/11); Blepharoplasty bilateral (10/2007); small bowel resection (1986); and Repair ptosis.  Family and Social History:  Reviewed, and she  reports that she has never smoked. She has never used smokeless tobacco. She reports that she does not currently use alcohol. She reports that she does not use drugs.  Reviewed, and family history includes Arthritis in her father and mother; C.A.D. in her father; Cardiovascular in her brother, father, and mother; Cerebrovascular Disease in her father; Eye Disorder in her father; Glaucoma in her brother and father; Heart Disease in her father; Hypertension in her father and mother; Migraines in her mother; Parkinsonism in her father; Peripheral Neuropathy in her brother; Retinal detachment in her mother.    RECENT DIAGNOSTIC STUDIES:     Imaging:   MRI BRAIN WITHOUT CONTRAST  6/23/2022 1:14 PM  HISTORY:  Migraine  IMPRESSION:     1. No evidence of acute infarct, mass, hemorrhage, or herniation.  2. Mild diffuse parenchymal volume loss and white matter changes  likely due to chronic microvascular ischemic disease. No significant  change since 10/1/2018.      REVIEW OF SYSTEMS:                                                      10-point review of  "systems is negative except as mentioned above in HPI.    EXAM:                                                      Physical Exam:   Vitals: /84   Pulse 75   Ht 1.608 m (5' 3.31\")   Wt 63 kg (139 lb)   LMP 10/01/1986   BMI 24.38 kg/m    BMI= Body mass index is 24.38 kg/m .  GENERAL: NAD.  HEENT: NC/AT.  PULM: Non-labored breathing.   Neurologic:  MENTAL STATUS: Alert, attentive. Speech is fluent. Normal comprehension. Normal concentration. Adequate fund of knowledge.   CRANIAL NERVES:  Visual fields intact to confrontation. Pupils equally, round and reactive to light. Facial sensation and movement normal. EOM full. Hearing intact to conversation. Trapezius strength intact. Palate moves symmetrically. Tongue midline.  MOTOR: 5/5 in proximal and distal muscle groups of upper and lower extremities. Tone and bulk normal.   DTRs: Intact/ Dim and symmetric in biceps, BR and patellae.   SENSATION: Normal light touch throughout.   COORDINATION: Normal finger nose finger. Finger tapping normal. Knee heel shin normal.  STATION AND GAIT: Romberg Positive sway. Good postural reflexes. Casual gait normal  Right hand-dominant.      ASSESSMENT and PLAN:                                                      Assessment:    ICD-10-CM    1. Migraine with aura and without status migrainosus, not intractable  G43.109         Hilda Potter is an 87-year-old female with a history of migraines.  She is followed by Dr. Connor in the clinic last seen 9/26/2022. Patient has had a decrease in the frequency and severity of their headache on their current  abortive therapy of Excedrin Migraine and avoiding triggers. We discussed interventions, and will plan to see the patient back in 12 months or as needed. Hilda understands and agrees with this plan.     Plan:  --- Continue Abortive therapy: Continue Excedrin Migraine as needed  --- Try Nonpharmacological interventions like heat or cold, massage, or rest  --- Practice good " headache hygiene: Avoid known triggers, get adequate sleep, manage stress levels, stay hydrated and eat nutritious meals  --- Plan on follow up in the Neurology Clinic in 12 months.  --- Please feel free to reach out if you have any further questions or concerns.  --- Seek immediate medical attention if an emergency arises or if your health becomes progressively worse.     It was a pleasure meeting you today!     Total Time: Total time spent for face to face visit, reviewing labs/imaging studies, counseling and coordination of care was: 30 Minutes spent on the date of the encounter doing chart review, review of test results, patient visit and documentation       This note was dictated using voice recognition software.  Any grammatical or context distortions are unintentional and inherent to the software.    Amira Harmon, DNP, APRN, CNP  [facility]

## 2023-03-30 ENCOUNTER — OFFICE VISIT (OUTPATIENT)
Dept: NEUROLOGY | Facility: CLINIC | Age: 88
End: 2023-03-30
Payer: MEDICARE

## 2023-03-30 VITALS
BODY MASS INDEX: 24.63 KG/M2 | HEIGHT: 63 IN | SYSTOLIC BLOOD PRESSURE: 134 MMHG | HEART RATE: 75 BPM | DIASTOLIC BLOOD PRESSURE: 84 MMHG | WEIGHT: 139 LBS

## 2023-03-30 DIAGNOSIS — G43.109 MIGRAINE WITH AURA AND WITHOUT STATUS MIGRAINOSUS, NOT INTRACTABLE: Primary | ICD-10-CM

## 2023-03-30 PROCEDURE — 99214 OFFICE O/P EST MOD 30 MIN: CPT

## 2023-03-30 NOTE — LETTER
3/30/2023         RE: Hilda Potter  [address] [facility] [address]        Dear Colleague,    Thank you for referring your patient, Hilda Potter, to the [facility]UROLOG[facility] [place]. Please see a copy of my visit note below.      __________________________________  ESTABLISHED PATIENT NEUROLOGY NOTE    DATE OF VISIT: 3/30/2023  MRN: [id number]  PATIENT NAME: Hilda Potter  YOB: 1935    Chief Complaint   Patient presents with     Migraine     5 weeks free form Migraines. No concerns     SUBJECTIVE:                                                      HISTORY OF PRESENT ILLNESS:  Hilda is here for follow up regarding migraine    Hilda Potter is an 87-year-old female with a history of migraines.  She is followed by Dr. Connor in the clinic last seen 9/26/2022.  Per chart review, She has been treating her headaches with Excedrin.  When we met a few months ago she was continuing massage therapy and also seeing her chiropractor for her neck.  We have also discussed acupuncture as an option for treatment.  She has wanted to avoid daily headache medications.  She had tried sumatriptan when she was at [facility] for elevated blood pressure associated with one of her migraines and this did help.  I suggested she continue the sumatriptan going forward for abortive treatment.  I also requested updated brain MRI which was stable compared to 2018 imaging, age-related changes only.  I also referred her to acupuncture.    She has been doing the massage and chiropractic treatments as well.  She and her  agree that stress seems to aggravate the headaches, sometimes bring them on.  For instance if she has to travel she has a big event coming up she can almost anticipate a headache.  Red wine and chocolate also trigger headaches.  She continues to have some left occipital pain that sometimes feels like an itching sensation all the way of her scalp on that  side.    03/30/23:Hilda arrives to the clinic today for migraine follow-up.  She states that she had an increase in migraines January through February due to increased stress after her  suffered a stroke.  Since then she has remained migraine free.  She describes her migraines as starting with an aura of flashing lights.  She typically takes an Excedrin Migraine and rests in a dark, quiet room.  She denies any head pain but endorses feeling overall not well and nauseous for 4 to 5 hours.  Associated symptoms include photophobia, phonophobia and nausea.  She may on rare occasions experience nausea and vomiting.  She feels that when she took sumatriptan in the past it caused her to have more frequent migraines.  She stopped taking this medication and continues with Excedrin Migraine.    She has additional concern over increased pruritus of her scalp.  She states this started about 8 to 9 months ago and is bothersome daily.  History of follicular lymphoma. She has follow-up with her oncologist in April and sees her dermatologist this summer.    She has remained healthy since last visit. No other concerns.     Current Medications:   Aspirin-Acetaminophen-Caffeine (EXCEDRIN MIGRAINE PO), Take 1 tablet by mouth as needed  calcium carbonate-vitamin D (OS-FIDENCIO) 600-400 MG-UNIT chewable tablet, Take 1 chew tab by mouth 2 times daily  carboxymethylcellulose (REFRESH PLUS) 0.5 % SOLN, Place 1 drop into both eyes 3 times daily as needed for dry eyes  hydrochlorothiazide (HYDRODIURIL) 25 MG tablet, Take 1 tablet (25 mg) by mouth daily  latanoprost (XALATAN) 0.005 % ophthalmic solution, Place 1 drop into both eyes At Bedtime  levothyroxine (SYNTHROID/LEVOTHROID) 50 MCG tablet, Take 1 tablet (50 mcg) by mouth daily  MELATONIN PO, Take 3 mg by mouth nightly as needed   Multiple Vitamin (MULTI-VITAMIN) per tablet, Take 1 tablet by mouth daily  loratadine (CLARITIN) 10 MG tablet, Take 1 tablet (10 mg) by mouth daily  nystatin  (MYCOSTATIN) 613269 UNIT/ML suspension, TAKE 5 ML BY MOUTH FOUR TIMES DAILY FOR 7 DAYS AS DIRECTED (Patient not taking: No sig reported)  SUMAtriptan (IMITREX) 50 MG tablet, Take 1 tablet (50 mg) by mouth at onset of headache for migraine May repeat in 2 hours. Max 4 tablets/24 hours.    No current facility-administered medications on file prior to visit.    Past Medical History:   Patient  has a past medical history of AR (allergic rhinitis), Atrophic vaginitis, Chronic kidney disease, stage 3 (H) (7/14/2021), Compression fracture of L1 lumbar vertebra (H) (4/2015), Glaucoma (increased eye pressure), HTN (hypertension), Migraine, Nonsenile cataract, Osteoporosis, Reflux esophagitis, and Thyroid disease.  Surgical History:  She  has a past surgical history that includes cholecystectomy, open (1992); tonsillectomy & adenoidectomy; tubal ligation; salpingo oophorectomy,r/l/nevaeh; d & c; Appendectomy open (1954); colonoscopy (6/02, 10/13); LENGTHEN,TENDON,HAND/FINGER (1993); upper gi endoscopy (6/02, 8/11); Blepharoplasty bilateral (10/2007); small bowel resection (1986); and Repair ptosis.  Family and Social History:  Reviewed, and she  reports that she has never smoked. She has never used smokeless tobacco. She reports that she does not currently use alcohol. She reports that she does not use drugs.  Reviewed, and family history includes Arthritis in her father and mother; C.A.D. in her father; Cardiovascular in her brother, father, and mother; Cerebrovascular Disease in her father; Eye Disorder in her father; Glaucoma in her brother and father; Heart Disease in her father; Hypertension in her father and mother; Migraines in her mother; Parkinsonism in her father; Peripheral Neuropathy in her brother; Retinal detachment in her mother.    RECENT DIAGNOSTIC STUDIES:     Imaging:   MRI BRAIN WITHOUT CONTRAST  6/23/2022 1:14 PM  HISTORY:  Migraine  IMPRESSION:     1. No evidence of acute infarct, mass, hemorrhage, or  "herniation.  2. Mild diffuse parenchymal volume loss and white matter changes  likely due to chronic microvascular ischemic disease. No significant  change since 10/1/2018.      REVIEW OF SYSTEMS:                                                      10-point review of systems is negative except as mentioned above in HPI.    EXAM:                                                      Physical Exam:   Vitals: /84   Pulse 75   Ht 1.608 m (5' 3.31\")   Wt 63 kg (139 lb)   LMP 10/01/1986   BMI 24.38 kg/m    BMI= Body mass index is 24.38 kg/m .  GENERAL: NAD.  HEENT: NC/AT.  PULM: Non-labored breathing.   Neurologic:  MENTAL STATUS: Alert, attentive. Speech is fluent. Normal comprehension. Normal concentration. Adequate fund of knowledge.   CRANIAL NERVES:  Visual fields intact to confrontation. Pupils equally, round and reactive to light. Facial sensation and movement normal. EOM full. Hearing intact to conversation. Trapezius strength intact. Palate moves symmetrically. Tongue midline.  MOTOR: 5/5 in proximal and distal muscle groups of upper and lower extremities. Tone and bulk normal.   DTRs: Intact/ Dim and symmetric in biceps, BR and patellae.   SENSATION: Normal light touch throughout.   COORDINATION: Normal finger nose finger. Finger tapping normal. Knee heel shin normal.  STATION AND GAIT: Romberg Positive sway. Good postural reflexes. Casual gait normal  Right hand-dominant.      ASSESSMENT and PLAN:                                                      Assessment:    ICD-10-CM    1. Migraine with aura and without status migrainosus, not intractable  G43.109         Hilda Potter is an 87-year-old female with a history of migraines.  She is followed by Dr. Connor in the clinic last seen 9/26/2022. Patient has had a decrease in the frequency and severity of their headache on their current  abortive therapy of Excedrin Migraine and avoiding triggers. We discussed interventions, and will plan to see " the patient back in 12 months or as needed. Hilda understands and agrees with this plan.     Plan:  --- Continue Abortive therapy: Continue Excedrin Migraine as needed  --- Try Nonpharmacological interventions like heat or cold, massage, or rest  --- Practice good headache hygiene: Avoid known triggers, get adequate sleep, manage stress levels, stay hydrated and eat nutritious meals  --- Plan on follow up in the Neurology Clinic in 12 months.  --- Please feel free to reach out if you have any further questions or concerns.  --- Seek immediate medical attention if an emergency arises or if your health becomes progressively worse.     It was a pleasure meeting you today!     Total Time: Total time spent for face to face visit, reviewing labs/imaging studies, counseling and coordination of care was: 30 Minutes spent on the date of the encounter doing chart review, review of test results, patient visit and documentation       This note was dictated using voice recognition software.  Any grammatical or context distortions are unintentional and inherent to the software.    Amira Harmon, JOSÉ MIGUEL, APRN, CNP  [facility]         Again, thank you for allowing me to participate in the care of your patient.        Sincerely,        ALICIA Carvalho CNP

## 2023-03-30 NOTE — NURSING NOTE
Chief Complaint   Patient presents with     Migraine     5 weeks free form Migraines. No concerns     Joselyn Dumas on 3/30/2023 at 10:40 AM

## 2023-03-30 NOTE — PATIENT INSTRUCTIONS
Plan:  --- Continue Abortive therapy: Continue Excedrin Migraine as needed  --- Try Nonpharmacological interventions like heat or cold, massage, or rest  --- Practice good headache hygiene: Avoid known triggers, get adequate sleep, manage stress levels, stay hydrated and eat nutritious meals  --- Plan on follow up in the Neurology Clinic in 12 months.  --- Please feel free to reach out if you have any further questions or concerns.  --- Seek immediate medical attention if an emergency arises or if your health becomes progressively worse.     It was a pleasure meeting you today!

## 2023-04-03 ENCOUNTER — OFFICE VISIT (OUTPATIENT)
Dept: OPHTHALMOLOGY | Facility: CLINIC | Age: 88
End: 2023-04-03
Payer: MEDICARE

## 2023-04-03 DIAGNOSIS — Z98.890 HISTORY OF BLEPHAROPLASTY: ICD-10-CM

## 2023-04-03 DIAGNOSIS — H43.813 POSTERIOR VITREOUS DETACHMENT OF BOTH EYES: ICD-10-CM

## 2023-04-03 DIAGNOSIS — H40.003 GLAUCOMA SUSPECT OF BOTH EYES: ICD-10-CM

## 2023-04-03 DIAGNOSIS — H52.4 PRESBYOPIA: ICD-10-CM

## 2023-04-03 DIAGNOSIS — H25.813 COMBINED FORMS OF AGE-RELATED CATARACT OF BOTH EYES: Primary | ICD-10-CM

## 2023-04-03 DIAGNOSIS — Z01.01 ENCOUNTER FOR EXAMINATION OF EYES AND VISION WITH ABNORMAL FINDINGS: ICD-10-CM

## 2023-04-03 PROCEDURE — 92015 DETERMINE REFRACTIVE STATE: CPT | Mod: GY | Performed by: OPHTHALMOLOGY

## 2023-04-03 PROCEDURE — 92014 COMPRE OPH EXAM EST PT 1/>: CPT | Performed by: OPHTHALMOLOGY

## 2023-04-03 RX ORDER — LATANOPROST 50 UG/ML
1 SOLUTION/ DROPS OPHTHALMIC AT BEDTIME
Qty: 10 ML | Refills: 3 | Status: SHIPPED | OUTPATIENT
Start: 2023-04-03 | End: 2024-04-24

## 2023-04-03 RX ORDER — TIMOLOL MALEATE 5 MG/ML
1 SOLUTION/ DROPS OPHTHALMIC EVERY MORNING
Qty: 5 ML | Refills: 11 | Status: SHIPPED | OUTPATIENT
Start: 2023-04-03 | End: 2024-01-19

## 2023-04-03 ASSESSMENT — REFRACTION_WEARINGRX
OS_AXIS: 180
OS_ADD: +2.72
OS_CYLINDER: +1.50
OS_SPHERE: +3.00
OD_CYLINDER: +1.50
OD_ADD: +2.72
OD_SPHERE: +3.00
OD_AXIS: 176
SPECS_TYPE: PAL

## 2023-04-03 ASSESSMENT — REFRACTION_MANIFEST
OS_SPHERE: +2.75
OD_SPHERE: +2.75
OD_CYLINDER: +2.50
OS_CYLINDER: +1.25
OD_AXIS: 175
OD_ADD: +2.75
OS_ADD: +2.75
OS_AXIS: 175

## 2023-04-03 ASSESSMENT — VISUAL ACUITY
OD_CC: 20/40
OS_CC: 20/25
OS_CC+: -1
METHOD: SNELLEN - LINEAR
OD_CC+: -1
CORRECTION_TYPE: GLASSES

## 2023-04-03 ASSESSMENT — CONF VISUAL FIELD
OS_SUPERIOR_TEMPORAL_RESTRICTION: 0
OS_NORMAL: 1
OS_INFERIOR_NASAL_RESTRICTION: 0
OS_INFERIOR_TEMPORAL_RESTRICTION: 0
OD_INFERIOR_TEMPORAL_RESTRICTION: 0
OD_SUPERIOR_NASAL_RESTRICTION: 0
OD_NORMAL: 1
OD_INFERIOR_NASAL_RESTRICTION: 0
OS_SUPERIOR_NASAL_RESTRICTION: 0
OD_SUPERIOR_TEMPORAL_RESTRICTION: 0

## 2023-04-03 ASSESSMENT — TONOMETRY
OS_IOP_MMHG: 19
OD_IOP_MMHG: 22
IOP_METHOD: APPLANATION

## 2023-04-03 ASSESSMENT — PACHYMETRY
OD_CT(UM): 567
OS_CT(UM): 567

## 2023-04-03 ASSESSMENT — EXTERNAL EXAM - LEFT EYE: OS_EXAM: NORMAL

## 2023-04-03 ASSESSMENT — EXTERNAL EXAM - RIGHT EYE: OD_EXAM: NORMAL

## 2023-04-03 ASSESSMENT — CUP TO DISC RATIO
OD_RATIO: 0.7
OS_RATIO: 0.6

## 2023-04-03 NOTE — PATIENT INSTRUCTIONS
"Start Timolol every morning both eyes.  Continue Latanoprost (green top) every evening both eyes.      Glasses prescription given - optional  May use artificial tears up to four times a day (like Refresh Optive, Systane Balance, TheraTears, or generic artificial tears are ok. Avoid \"get the red out\" drops).   Could try using a gel/ointment at bedtime for additional comfort (Celluvisc, Refresh PM, Genteal Gel, etc)   Wait at least 5 minutes between drops if using more than one at a time.   May use Vaseline on eyelids avoid applying into eyes.  Return visit in 3 weeks for an intraocular pressure check.   Andi Bay M.D.  [phone]    "

## 2023-04-03 NOTE — LETTER
"    4/3/2023         RE: Hilda Potter  [address]  [facility] [address]        Dear Colleague,    Thank you for referring your patient, Hilda Potter, to the [facility]. Please see a copy of my visit note below.     Current Eye Medications:  Latanoprost at bedtime both eyes, last took at 10 pm. Refresh three times a day both eyes.      Subjective:  Complete eye exam. Vision is doing fine both eyes in the distance. When trying to read up close, noticed right eye is blurred for last 6 months. No eye pain or discomfort in either eye.      Objective:  See Ophthalmology Exam.       Assessment:  Some worsening of optic discs over time; intraocular pressures too high both eyes.  Otherwise stable eye exam.      ICD-10-CM    1. Combined forms of age-related cataract, mild-mod, both eyes  H25.813       2. 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  H40.003 latanoprost (XALATAN) 0.005 % ophthalmic solution     timolol maleate (TIMOPTIC) 0.5 % ophthalmic solution      3. History of blepharoplasty, upper lids, ou  Z98.890       4. Posterior vitreous detachment of both eyes  H43.813       5. Encounter for examination of eyes and vision with abnormal findings  Z01.01 EYE EXAM (SIMPLE-NONBILLABLE)      6. Presbyopia  H52.4 REFRACTION           Plan:  Start Timolol every morning both eyes.  Continue Latanoprost (green top) every evening both eyes.      Glasses prescription given - optional  May use artificial tears up to four times a day (like Refresh Optive, Systane Balance, TheraTears, or generic artificial tears are ok. Avoid \"get the red out\" drops).   Could try using a gel/ointment at bedtime for additional comfort (Celluvisc, Refresh PM, Genteal Gel, etc)   Wait at least 5 minutes between drops if using more than one at a time.   May use Vaseline on eyelids avoid applying into eyes.  Return visit in 3 weeks for an intraocular pressure check.   Gonio 10/2018 - selective laser " trabeculoplasty candidate.  Andi Bay M.D.  [phone]             Again, thank you for allowing me to participate in the care of your patient.        Sincerely,        Andi Bay MD

## 2023-04-03 NOTE — PROGRESS NOTES
" Current Eye Medications:  Latanoprost at bedtime both eyes, last took at 10 pm. Refresh three times a day both eyes.      Subjective:  Complete eye exam. Vision is doing fine both eyes in the distance. When trying to read up close, noticed right eye is blurred for last 6 months. No eye pain or discomfort in either eye.      Objective:  See Ophthalmology Exam.       Assessment:  Some worsening of optic discs over time; intraocular pressures too high both eyes.  Otherwise stable eye exam.      ICD-10-CM    1. Combined forms of age-related cataract, mild-mod, both eyes  H25.813       2. 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  H40.003 latanoprost (XALATAN) 0.005 % ophthalmic solution     timolol maleate (TIMOPTIC) 0.5 % ophthalmic solution      3. History of blepharoplasty, upper lids, ou  Z98.890       4. Posterior vitreous detachment of both eyes  H43.813       5. Encounter for examination of eyes and vision with abnormal findings  Z01.01 EYE EXAM (SIMPLE-NONBILLABLE)      6. Presbyopia  H52.4 REFRACTION           Plan:  Start Timolol every morning both eyes.  Continue Latanoprost (green top) every evening both eyes.      Glasses prescription given - optional  May use artificial tears up to four times a day (like Refresh Optive, Systane Balance, TheraTears, or generic artificial tears are ok. Avoid \"get the red out\" drops).   Could try using a gel/ointment at bedtime for additional comfort (Celluvisc, Refresh PM, Genteal Gel, etc)   Wait at least 5 minutes between drops if using more than one at a time.   May use Vaseline on eyelids avoid applying into eyes.  Return visit in 3 weeks for an intraocular pressure check.   Gonio 10/2018 - selective laser trabeculoplasty candidate.  Andi Bay M.D.  [phone]         "

## 2023-04-17 ENCOUNTER — NURSE TRIAGE (OUTPATIENT)
Dept: ONCOLOGY | Facility: CLINIC | Age: 88
End: 2023-04-17
Payer: MEDICARE

## 2023-04-17 DIAGNOSIS — R10.2 PELVIC PAIN IN FEMALE: Primary | ICD-10-CM

## 2023-04-17 DIAGNOSIS — R61 NIGHT SWEATS: ICD-10-CM

## 2023-04-17 DIAGNOSIS — C82.00 GRADE I FOLLICULAR SMALL CLEAVED CELL LYMPHOMA (H): ICD-10-CM

## 2023-04-17 DIAGNOSIS — C82.03 FOLLICULAR LYMPHOMA GRADE I OF INTRA-ABDOMINAL LYMPH NODES (H): Primary | ICD-10-CM

## 2023-04-17 NOTE — TELEPHONE ENCOUNTER
Patient notified of recommendations below. The scheduling team will be reaching out to the patient to help schedule. Advised patient to call back with any new or worsening sx.    Marie Chiang RN, BSN, PHN, OCN  [facility]

## 2023-04-17 NOTE — TELEPHONE ENCOUNTER
Work in w/ [facility] or [facility] w/ labs and CT CAP prior.    Please order the followin) CBC, CMP, LDH.  2) CT CAP.    Bang Saunders MD.

## 2023-04-17 NOTE — TELEPHONE ENCOUNTER
Oncology Nurse Triage    Situation:   Hilda is reporting the following symptoms: increased night sweats and lower pelvic pain    Background:   Treating Provider:   Dr. Saunders. Follicular lymphoma.    Date of last office visit: 9/28/22    Recent Treatments: ongoing surveillance    Assessment:     Onset: Intermittently since winter, she has noticed increase lower pelvic pain and night sweats. Pain is mild. No voiding or bowel issues. No swollen lymph nodes noted. Denies fever, chills, or feeling ill. She is active, eating and drinking normally. Would like a 6 month follow up rather than a 12 month follow up due to increased sx. She is asking for lab orders prior to appointment.    Recommendations:     She will continue to monitor sx and call back with any new or worsening sx.     Patient would like to be seen in the next month. Per chart review, no openings available with Dr. Saunders for return patients. Routing to provider for further review, lab orders and recommended next steps.     Marie Chiang, RN, BSN, PHN, OCN  [facility]

## 2023-04-24 ENCOUNTER — OFFICE VISIT (OUTPATIENT)
Dept: OPHTHALMOLOGY | Facility: CLINIC | Age: 88
End: 2023-04-24
Payer: MEDICARE

## 2023-04-24 DIAGNOSIS — H40.003 GLAUCOMA SUSPECT OF BOTH EYES: Primary | ICD-10-CM

## 2023-04-24 PROCEDURE — 92012 INTRM OPH EXAM EST PATIENT: CPT | Performed by: OPHTHALMOLOGY

## 2023-04-24 ASSESSMENT — TONOMETRY
OS_IOP_MMHG: 16
OD_IOP_MMHG: 17
IOP_METHOD: APPLANATION

## 2023-04-24 ASSESSMENT — EXTERNAL EXAM - LEFT EYE: OS_EXAM: NORMAL

## 2023-04-24 ASSESSMENT — VISUAL ACUITY
OD_PH_CC+: -1
OD_CC: 20/40
CORRECTION_TYPE: GLASSES
OD_CC+: -2
METHOD: SNELLEN - LINEAR
OD_PH_CC: 20/25
OS_CC+: -2
OS_CC: 20/20

## 2023-04-24 ASSESSMENT — EXTERNAL EXAM - RIGHT EYE: OD_EXAM: NORMAL

## 2023-04-24 NOTE — PATIENT INSTRUCTIONS
Continue:   Latanoprost (green top) every evening both eyes.  Timolol (yellow top) every morning both eyes.  Wait at least 5 minutes between drops if using more than one at a time.   Return visit 6 months for an intraocular pressure check, glaucoma OCT, retinal OCT, and Enciso Visual Field.    Andi Bay M.D.  [phone]

## 2023-04-24 NOTE — PROGRESS NOTES
Current Eye Medications:   Timolol every morning both eyes - used this morning at 7:45  Latanoprost (green top) every evening both eyes - used last night around 10:00     Subjective:  Here for intraocular pressure check. Patient started timolol as directed following last visit.   Vision is constantly slightly blurry in right eye. Blurry vision might have started before starting timolol. No eye pain or discomfort.      Objective:  See Ophthalmology Exam.       Assessment:  Intraocular pressure improved both eyes with addition of Timolol daily.  Cataracts likely responsible for vision changes.      Plan:  Continue:   Latanoprost (green top) every evening both eyes.  Timolol (yellow top) every morning both eyes.  Wait at least 5 minutes between drops if using more than one at a time.   Return visit 6 months for an intraocular pressure check, glaucoma OCT, retinal OCT, and Enciso Visual Field.    Andi Bay M.D.  [phone]

## 2023-04-24 NOTE — LETTER
4/24/2023         RE: Hilda Potter  [address]  [place] [address]        Dear Colleague,    Thank you for referring your patient, Hilda Potter, to the [facility]. Please see a copy of my visit note below.     Current Eye Medications:   Timolol every morning both eyes - used this morning at 7:45  Latanoprost (green top) every evening both eyes - used last night around 10:00     Subjective:  Here for intraocular pressure check. Patient started timolol as directed following last visit.   Vision is constantly slightly blurry in right eye. Blurry vision might have started before starting timolol. No eye pain or discomfort.      Objective:  See Ophthalmology Exam.       Assessment:  Intraocular pressure improved both eyes with addition of Timolol daily.  Cataracts likely responsible for vision changes.      Plan:  Continue:   Latanoprost (green top) every evening both eyes.  Timolol (yellow top) every morning both eyes.  Wait at least 5 minutes between drops if using more than one at a time.   Return visit 6 months for an intraocular pressure check, glaucoma OCT, retinal OCT, and Enciso Visual Field.    Andi Bay M.D.  [phone]           Again, thank you for allowing me to participate in the care of your patient.        Sincerely,        Andi Bay MD

## 2023-05-22 ENCOUNTER — ANCILLARY PROCEDURE (OUTPATIENT)
Dept: CT IMAGING | Facility: CLINIC | Age: 88
End: 2023-05-22
Attending: NURSE PRACTITIONER
Payer: MEDICARE

## 2023-05-22 ENCOUNTER — LAB (OUTPATIENT)
Dept: LAB | Facility: CLINIC | Age: 88
End: 2023-05-22
Attending: NURSE PRACTITIONER
Payer: MEDICARE

## 2023-05-22 DIAGNOSIS — C82.03 FOLLICULAR LYMPHOMA GRADE I OF INTRA-ABDOMINAL LYMPH NODES (H): ICD-10-CM

## 2023-05-22 DIAGNOSIS — E03.9 HYPOTHYROIDISM, UNSPECIFIED TYPE: ICD-10-CM

## 2023-05-22 DIAGNOSIS — R61 NIGHT SWEATS: ICD-10-CM

## 2023-05-22 DIAGNOSIS — Z13.220 LIPID SCREENING: ICD-10-CM

## 2023-05-22 DIAGNOSIS — N18.30 CHRONIC KIDNEY DISEASE, STAGE 3 (H): ICD-10-CM

## 2023-05-22 LAB
ALBUMIN SERPL-MCNC: 3.5 G/DL (ref 3.4–5)
ALP SERPL-CCNC: 61 U/L (ref 40–150)
ALT SERPL W P-5'-P-CCNC: 31 U/L (ref 0–50)
ANION GAP SERPL CALCULATED.3IONS-SCNC: 4 MMOL/L (ref 3–14)
AST SERPL W P-5'-P-CCNC: 26 U/L (ref 0–45)
BASOPHILS # BLD AUTO: 0 10E3/UL (ref 0–0.2)
BASOPHILS NFR BLD AUTO: 0 %
BILIRUB SERPL-MCNC: 0.8 MG/DL (ref 0.2–1.3)
BUN SERPL-MCNC: 20 MG/DL (ref 7–30)
CALCIUM SERPL-MCNC: 9.5 MG/DL (ref 8.5–10.1)
CHLORIDE BLD-SCNC: 101 MMOL/L (ref 94–109)
CHOLEST SERPL-MCNC: 215 MG/DL
CO2 SERPL-SCNC: 34 MMOL/L (ref 20–32)
CREAT SERPL-MCNC: 0.95 MG/DL (ref 0.52–1.04)
CREAT UR-MCNC: 78 MG/DL
EOSINOPHIL # BLD AUTO: 0.1 10E3/UL (ref 0–0.7)
EOSINOPHIL NFR BLD AUTO: 1 %
ERYTHROCYTE [DISTWIDTH] IN BLOOD BY AUTOMATED COUNT: 13.2 % (ref 10–15)
FASTING STATUS PATIENT QL REPORTED: NO
GFR SERPL CREATININE-BSD FRML MDRD: 58 ML/MIN/1.73M2
GLUCOSE BLD-MCNC: 96 MG/DL (ref 70–99)
HCT VFR BLD AUTO: 39.7 % (ref 35–47)
HDLC SERPL-MCNC: 70 MG/DL
HGB BLD-MCNC: 13.3 G/DL (ref 11.7–15.7)
IMM GRANULOCYTES # BLD: 0 10E3/UL
IMM GRANULOCYTES NFR BLD: 0 %
LDH SERPL L TO P-CCNC: 190 U/L (ref 81–234)
LDLC SERPL CALC-MCNC: 121 MG/DL
LYMPHOCYTES # BLD AUTO: 1.4 10E3/UL (ref 0.8–5.3)
LYMPHOCYTES NFR BLD AUTO: 28 %
MCH RBC QN AUTO: 29.6 PG (ref 26.5–33)
MCHC RBC AUTO-ENTMCNC: 33.5 G/DL (ref 31.5–36.5)
MCV RBC AUTO: 88 FL (ref 78–100)
MICROALBUMIN UR-MCNC: <5 MG/L
MICROALBUMIN/CREAT UR: NORMAL MG/G{CREAT}
MONOCYTES # BLD AUTO: 0.6 10E3/UL (ref 0–1.3)
MONOCYTES NFR BLD AUTO: 12 %
NEUTROPHILS # BLD AUTO: 2.8 10E3/UL (ref 1.6–8.3)
NEUTROPHILS NFR BLD AUTO: 59 %
NONHDLC SERPL-MCNC: 145 MG/DL
NRBC # BLD AUTO: 0 10E3/UL
NRBC BLD AUTO-RTO: 0 /100
PLATELET # BLD AUTO: 170 10E3/UL (ref 150–450)
POTASSIUM BLD-SCNC: 3.7 MMOL/L (ref 3.4–5.3)
PROT SERPL-MCNC: 7.1 G/DL (ref 6.8–8.8)
RBC # BLD AUTO: 4.5 10E6/UL (ref 3.8–5.2)
SODIUM SERPL-SCNC: 139 MMOL/L (ref 133–144)
TRIGL SERPL-MCNC: 119 MG/DL
TSH SERPL DL<=0.005 MIU/L-ACNC: 1.85 MU/L (ref 0.4–4)
WBC # BLD AUTO: 4.8 10E3/UL (ref 4–11)

## 2023-05-22 PROCEDURE — 74176 CT ABD & PELVIS W/O CONTRAST: CPT | Mod: MG | Performed by: RADIOLOGY

## 2023-05-22 PROCEDURE — 36415 COLL VENOUS BLD VENIPUNCTURE: CPT

## 2023-05-22 PROCEDURE — 80061 LIPID PANEL: CPT

## 2023-05-22 PROCEDURE — 85025 COMPLETE CBC W/AUTO DIFF WBC: CPT

## 2023-05-22 PROCEDURE — 83615 LACTATE (LD) (LDH) ENZYME: CPT

## 2023-05-22 PROCEDURE — 71250 CT THORAX DX C-: CPT | Mod: MG | Performed by: RADIOLOGY

## 2023-05-22 PROCEDURE — 82570 ASSAY OF URINE CREATININE: CPT

## 2023-05-22 PROCEDURE — 84443 ASSAY THYROID STIM HORMONE: CPT

## 2023-05-22 PROCEDURE — 82043 UR ALBUMIN QUANTITATIVE: CPT

## 2023-05-22 PROCEDURE — G1010 CDSM STANSON: HCPCS | Performed by: RADIOLOGY

## 2023-05-22 PROCEDURE — 80053 COMPREHEN METABOLIC PANEL: CPT

## 2023-05-23 ENCOUNTER — ONCOLOGY VISIT (OUTPATIENT)
Dept: ONCOLOGY | Facility: CLINIC | Age: 88
End: 2023-05-23
Payer: MEDICARE

## 2023-05-23 VITALS
WEIGHT: 137 LBS | DIASTOLIC BLOOD PRESSURE: 75 MMHG | HEART RATE: 71 BPM | HEIGHT: 63 IN | BODY MASS INDEX: 24.27 KG/M2 | OXYGEN SATURATION: 97 % | SYSTOLIC BLOOD PRESSURE: 174 MMHG

## 2023-05-23 DIAGNOSIS — J18.9 COMMUNITY ACQUIRED PNEUMONIA OF RIGHT LOWER LOBE OF LUNG: ICD-10-CM

## 2023-05-23 DIAGNOSIS — R61 NIGHT SWEATS: ICD-10-CM

## 2023-05-23 DIAGNOSIS — C82.03 FOLLICULAR LYMPHOMA GRADE I OF INTRA-ABDOMINAL LYMPH NODES (H): Primary | ICD-10-CM

## 2023-05-23 PROCEDURE — 99214 OFFICE O/P EST MOD 30 MIN: CPT | Performed by: INTERNAL MEDICINE

## 2023-05-23 RX ORDER — AZITHROMYCIN 250 MG/1
TABLET, FILM COATED ORAL
Qty: 6 TABLET | Refills: 0 | Status: SHIPPED | OUTPATIENT
Start: 2023-05-23 | End: 2023-05-28

## 2023-05-23 ASSESSMENT — PAIN SCALES - GENERAL: PAINLEVEL: NO PAIN (0)

## 2023-05-23 NOTE — NURSING NOTE
"Oncology Rooming Note    May 23, 2023 10:08 AM   Hilda Potter is a 87 year old female who presents for:    Chief Complaint   Patient presents with     Oncology Clinic Visit     Follow up     Initial Vitals: BP (!) 174/75 (BP Location: Right arm, Patient Position: Chair, Cuff Size: Adult Regular)   Pulse 71   Ht 1.608 m (5' 3.31\")   Wt 62.1 kg (137 lb)   LMP 10/01/1986   SpO2 97%   BMI 24.03 kg/m   Estimated body mass index is 24.03 kg/m  as calculated from the following:    Height as of this encounter: 1.608 m (5' 3.31\").    Weight as of this encounter: 62.1 kg (137 lb). Body surface area is 1.67 meters squared.  No Pain (0) Comment: Data Unavailable   Patient's last menstrual period was 10/01/1986.  Allergies reviewed: Yes  Medications reviewed: Yes    Medications: Medication refills not needed today.  Pharmacy name entered into [facility]: [id number] - [address]  AT [facility]    Clinical concerns: Results     Dr. Saunders was notified.      Cecilia Blanco CMA              "

## 2023-05-23 NOTE — PROGRESS NOTES
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  # Sep 2012 - ongoing Surveillance.    Clinically, Anish remains well from a follicular lymphoma standpoint.  Radiographically, she appears to have no evidence of disease.  Her labs are supportive of appearing to remain in a complete remission.  In light of the increased night sweats and the cough and the radiographic findings of new right lower lobe pulmonary nodules, I am inclined to treat her for community-acquired pneumonia with is a course of azithromycin.  She is comfortable with this plan.  We reviewed that many viral pneumonias will run their own course, some community-acquired pneumonias will require he responds to a course of oral antibiotics but opportunistic sections as well as atypical and fungal infections may require additional evaluation.  We agreed that she will let us know in the better part of several weeks whether or not if her cough and pulmonary complaints have resolved.  If they have not resolved, further investigation and likely referral to pulmonary may be warranted.  In the meantime, I am planning to see her back in 12 months time with labs.  She also has follow-up upcoming with her primary care provider will route a message to regarding the pneumonia and potentially repeat imaging to document resolution.    Bang Saunders MD    --------------------    Interval History:  Anish returns for follow up of follicular lymphoma accompanied by her .   "All in all, she remains pretty well.  Looking forward to spending the summer in the [place] area with family.  She has been dealing with a little cough lately.  Is been ongoing and feeling like it is rattling around in her right lung.  She does have a history of some seasonal allergies obviously with they are going on right now.  She is been dealing with a little bit of bursitis in the right hip.  She is also dealing with some increased night sweats recently.    --------------------    Physical Exam:  VS: BP (!) 174/75 (BP Location: Right arm, Patient Position: Chair, Cuff Size: Adult Regular)   Pulse 71   Ht 1.608 m (5' 3.31\")   Wt 62.1 kg (137 lb)   LMP 10/01/1986   SpO2 97%   BMI 24.03 kg/m    GEN: Well appearing.    Labs / Imaging:  We reviewed CBC, CMP, LDH.  We personally reviewed her CT CAP together (including incidental findings).  "

## 2023-05-24 DIAGNOSIS — E03.9 HYPOTHYROIDISM, UNSPECIFIED TYPE: ICD-10-CM

## 2023-05-24 RX ORDER — LEVOTHYROXINE SODIUM 50 UG/1
50 TABLET ORAL DAILY
Qty: 90 TABLET | Refills: 3 | Status: SHIPPED | OUTPATIENT
Start: 2023-05-24 | End: 2023-07-06

## 2023-06-12 ASSESSMENT — ENCOUNTER SYMPTOMS
DIARRHEA: 0
HEARTBURN: 0
PALPITATIONS: 0
NAUSEA: 0
CONSTIPATION: 0
FEVER: 0
BREAST MASS: 0
FREQUENCY: 1
HEMATURIA: 0
NERVOUS/ANXIOUS: 1
PARESTHESIAS: 0
DYSURIA: 0
JOINT SWELLING: 0
EYE PAIN: 0
ABDOMINAL PAIN: 0
ARTHRALGIAS: 1
HEADACHES: 1
SHORTNESS OF BREATH: 0
CHILLS: 0
MYALGIAS: 0
DIZZINESS: 0
SORE THROAT: 0
COUGH: 1
WEAKNESS: 0
HEMATOCHEZIA: 0

## 2023-06-12 ASSESSMENT — ACTIVITIES OF DAILY LIVING (ADL): CURRENT_FUNCTION: NO ASSISTANCE NEEDED

## 2023-06-19 ENCOUNTER — OFFICE VISIT (OUTPATIENT)
Dept: FAMILY MEDICINE | Facility: CLINIC | Age: 88
End: 2023-06-19
Payer: MEDICARE

## 2023-06-19 ENCOUNTER — ANCILLARY PROCEDURE (OUTPATIENT)
Dept: GENERAL RADIOLOGY | Facility: CLINIC | Age: 88
End: 2023-06-19
Attending: FAMILY MEDICINE
Payer: MEDICARE

## 2023-06-19 VITALS
TEMPERATURE: 97 F | WEIGHT: 136.6 LBS | OXYGEN SATURATION: 98 % | HEART RATE: 75 BPM | DIASTOLIC BLOOD PRESSURE: 60 MMHG | BODY MASS INDEX: 24.2 KG/M2 | SYSTOLIC BLOOD PRESSURE: 138 MMHG | HEIGHT: 63 IN | RESPIRATION RATE: 16 BRPM

## 2023-06-19 DIAGNOSIS — G43.109 MIGRAINE WITH AURA AND WITHOUT STATUS MIGRAINOSUS, NOT INTRACTABLE: ICD-10-CM

## 2023-06-19 DIAGNOSIS — I10 HYPERTENSION GOAL BP (BLOOD PRESSURE) < 140/90: ICD-10-CM

## 2023-06-19 DIAGNOSIS — L29.2 VULVAR ITCHING: ICD-10-CM

## 2023-06-19 DIAGNOSIS — Z87.01 HISTORY OF RECENT PNEUMONIA: ICD-10-CM

## 2023-06-19 DIAGNOSIS — Z00.00 ENCOUNTER FOR MEDICARE ANNUAL WELLNESS EXAM: Primary | ICD-10-CM

## 2023-06-19 DIAGNOSIS — N18.31 STAGE 3A CHRONIC KIDNEY DISEASE (H): ICD-10-CM

## 2023-06-19 PROCEDURE — 99214 OFFICE O/P EST MOD 30 MIN: CPT | Mod: 25 | Performed by: FAMILY MEDICINE

## 2023-06-19 PROCEDURE — 71046 X-RAY EXAM CHEST 2 VIEWS: CPT | Mod: TC | Performed by: RADIOLOGY

## 2023-06-19 PROCEDURE — G0439 PPPS, SUBSEQ VISIT: HCPCS | Performed by: FAMILY MEDICINE

## 2023-06-19 RX ORDER — HYDROCHLOROTHIAZIDE 25 MG/1
25 TABLET ORAL DAILY
Qty: 90 TABLET | Refills: 3 | Status: SHIPPED | OUTPATIENT
Start: 2023-06-19 | End: 2024-06-19

## 2023-06-19 RX ORDER — CLOBETASOL PROPIONATE 0.5 MG/G
CREAM TOPICAL 2 TIMES DAILY
Qty: 45 G | Refills: 0 | Status: SHIPPED | OUTPATIENT
Start: 2023-06-19 | End: 2023-08-22

## 2023-06-19 RX ORDER — SUMATRIPTAN 50 MG/1
50 TABLET, FILM COATED ORAL
Qty: 18 TABLET | Refills: 0 | Status: SHIPPED | OUTPATIENT
Start: 2023-06-19 | End: 2023-11-22

## 2023-06-19 ASSESSMENT — ANXIETY QUESTIONNAIRES
3. WORRYING TOO MUCH ABOUT DIFFERENT THINGS: MORE THAN HALF THE DAYS
6. BECOMING EASILY ANNOYED OR IRRITABLE: NEARLY EVERY DAY
2. NOT BEING ABLE TO STOP OR CONTROL WORRYING: MORE THAN HALF THE DAYS
7. FEELING AFRAID AS IF SOMETHING AWFUL MIGHT HAPPEN: NOT AT ALL
1. FEELING NERVOUS, ANXIOUS, OR ON EDGE: MORE THAN HALF THE DAYS
GAD7 TOTAL SCORE: 11
GAD7 TOTAL SCORE: 11
5. BEING SO RESTLESS THAT IT IS HARD TO SIT STILL: SEVERAL DAYS

## 2023-06-19 ASSESSMENT — PAIN SCALES - GENERAL: PAINLEVEL: NO PAIN (0)

## 2023-06-19 ASSESSMENT — PATIENT HEALTH QUESTIONNAIRE - PHQ9
5. POOR APPETITE OR OVEREATING: SEVERAL DAYS
SUM OF ALL RESPONSES TO PHQ QUESTIONS 1-9: 7
SUM OF ALL RESPONSES TO PHQ QUESTIONS 1-9: 7

## 2023-06-19 ASSESSMENT — ACTIVITIES OF DAILY LIVING (ADL): CURRENT_FUNCTION: NO ASSISTANCE NEEDED

## 2023-06-19 NOTE — PATIENT INSTRUCTIONS
"  Patient Education   Personalized Prevention Plan  You are due for the preventive services outlined below.  Your care team is available to assist you in scheduling these services.  If you have already completed any of these items, please share that information with your care team to update in your medical record.  Health Maintenance Due   Topic Date Due     Zoster (Shingles) Vaccine (2 of 2) 07/24/2018     ANNUAL REVIEW OF HM ORDERS  06/15/2023     Annual Wellness Visit  06/15/2023       Depression and Suicide in Older Adults  Nearly 2 million older adults in the [place] have some type of depression. Some of them even take their own lives. Yet depression among older adults is often ignored. Learning about the warning signs of depression may help spare a loved one needless pain. You may also save a life.   What is depression?  Depression is a common and serious illness. It affects the way you think and feel. It is not a normal part of aging. It is not a sign of weakness, a character flaw, or something you can \"snap out of.\" Most people with depression need treatment to get better. The most common symptom is a feeling of deep sadness. People who are depressed also may seem tired and listless. And nothing seems to give them pleasure. It s normal to grieve or be sad sometimes. But sadness lessens or passes with time. Depression rarely goes away or improves on its own. Other symptoms of depression are:     Sleeping more or less than normal    Eating more or less than normal    Having headaches, stomachaches, or other pains that don t go away    Feeling nervous,  empty,  or worthless    Crying a lot    Thinking or talking about suicide or death    Loss of interest in activities previously enjoyed    Social isolation    Feeling confused or forgetful  What causes it?  The causes of depression aren t fully known. But it is thought to result from a complex blend of these factors:     Biochemistry. Certain chemicals in the brain " play a role.    Genes. Depression does run in families.    Life stress. Life stresses can also trigger depression in some people. Older adults often face many stressors. These may include isolation, the death of friends or a spouse, health problems, and financial concerns.    Chronic health conditions. This includes diabetes, heart disease, or cancer. These can cause symptoms of depression. Medicine side effects can cause changes in thoughts and behaviors.  Giving support    Depressed people often may not want to ask for help. When they do, they may be ignored. Or they may get the wrong treatment. You can help by showing parents and older friends love and support. If they seem depressed, don t lecture the person or ignore the symptoms. Don't discount the symptoms as a  normal  part of aging. They are not. Get involved, listen, and show interest and support.   Help them understand that depression is a treatable illness. Tell them you can help them find the right treatment. Offer to go to their healthcare provider's appointment with them for support when the symptoms are discussed. With their approval, contact a local mental health center, social service agency, or hospital about services.   Helping at healthcare visits  You can be an advocate for them at healthcare appointments. Many older adults have chronic illnesses. Many of these can cause symptoms of depression. Medicine side effects can change thoughts and behaviors.   You can help make sure that the healthcare provider looks at all of these factors. They should refer your family member or friend to a mental healthcare provider when needed. In some cases, untreated depression can lead to a misdiagnosis. A person may be diagnosed with a brain disorder such as dementia. If the healthcare provider does not take the issue of depression seriously, help your family member or friend find another provider.   Asking about self-harm thoughts  If you think an older person  you care about could be suicidal, ask,  Have you thought about suicide?  Most people will tell you the truth. If they say yes, they may already have a plan for how and when they will attempt it. Find out as much as you can. The more detailed the plan, and the easier it is to carry out, the more danger the person is in right now. Tell the person you are there for them and you do not want them to get harmed. Don't wait to get help for the person. Call the person's healthcare provider, local hospital, or emergency services.   Call 988 in a crisis   Never leave the person alone. A person who is actively suicidal needs crisis care right away. They need constant supervision. Never leave the person out of sight. Call 988 Tell the crisis counselor you need help for a person who is thinking about suicide. The counselor will help you get the right level of crisis help. You may be advised to take the person to the nearest emergency room.   The National Suicide Prevention Lifeline is available at 988, or 725-211-NPZK (512-158-4719), or www.suicidepreventionlifeline.org. When you call or text 988, you will be connected to trained counselors who are part of the National Suicide Prevention Lifeline network. An online chat option is also available. Lifeline is free and available 24/7.   To learn more    National Suicide Prevention Lifeline at www.suicidepreventionlifeline.org  or 739-473-IVTH (954-421-4600)    National [place] on Mental Illness at www.edwina.org  or 218-493-FXSQ (962-840-2629)    Mental [facility] [place] at www.[facility].org  or 793-465-7846    National [place] of Mental Health at www.nimh.nih.gov  or 407-230-0049    Qasim last reviewed this educational content on 7/1/2022 2000-2022 [employer]. All rights reserved. This information is not intended as a substitute for professional medical care. Always follow your healthcare professional's instructions.

## 2023-06-19 NOTE — PROGRESS NOTES
"    The patient's PHQ-9 score is consistent with mild depression. She was provided with information regarding depression and was advised to schedule a follow up appointment in 12 weeks to further address this issue.  Answers for HPI/ROS submitted by the patient on 6/19/2023  PHQ9 TOTAL SCORE: 7  In general, how would you rate your overall physical health?: good  Frequency of exercise:: 4-5 days/week  Do you usually eat at least 4 servings of fruit and vegetables a day, include whole grains & fiber, and avoid regularly eating high fat or \"junk\" foods? : No  Taking medications regularly:: Yes  Medication side effects:: None  Activities of Daily Living: no assistance needed  Home safety: no safety concerns identified  Hearing Impairment:: no hearing concerns  In the past 6 months, have you been bothered by leaking of urine?: Yes  abdominal pain: No  Blood in stool: No  Blood in urine: No  chest pain: No  chills: No  congestion: Yes  constipation: No  cough: Yes  diarrhea: No  dizziness: No  ear pain: No  eye pain: No  nervous/anxious: Yes  fever: No  frequency: Yes  genital sores: No  headaches: Yes  hearing loss: No  heartburn: No  arthralgias: Yes  joint swelling: No  peripheral edema: No  mood changes: Yes  myalgias: No  nausea: No  dysuria: No  palpitations: No  Skin sensation changes: No  sore throat: No  urgency: Yes  rash: No  shortness of breath: No  visual disturbance: Yes  weakness: No  pelvic pain: No  vaginal bleeding: No  vaginal discharge: No  tenderness: No  breast mass: No  breast discharge: No  In general, how would you rate your overall mental or emotional health?: good  Additional concerns today:: Yes  Duration of exercise:: 15-30 minutes        "

## 2023-06-19 NOTE — PROGRESS NOTES
"SUBJECTIVE:   Anish is a 87 year old who presents for Preventive Visit.       View : No data to display.              Are you in the first 12 months of your Medicare coverage?  No    Healthy Habits:     In general, how would you rate your overall health?  Good    Frequency of exercise:  4-5 days/week    Duration of exercise:  15-30 minutes    Do you usually eat at least 4 servings of fruit and vegetables a day, include whole grains    & fiber and avoid regularly eating high fat or \"junk\" foods?  No    Taking medications regularly:  Yes    Medication side effects:  None    Ability to successfully perform activities of daily living:  No assistance needed    Home Safety:  No safety concerns identified    Hearing Impairment:  No hearing concerns    In the past 6 months, have you been bothered by leaking of urine? Yes    In general, how would you rate your overall mental or emotional health?  Good      PHQ-2 Total Score: 4    Additional concerns today:  Yes       Answers for HPI/ROS submitted by the patient on 6/19/2023  PHQ9 TOTAL SCORE: 7      Discuss results and plan from Oncology appt 05/23/23 Dr Saunders.    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     Recently seen by her Oncologist and found to have pneumonia- was treated with antibiotics (azithromycin).  Reports that symptoms have since improved.   Was recommended to follow up with PCP to ensure complete resolution.    HYPERTENSION - Patient has a longstanding history of HTN , currently denies any symptoms referable to elevated blood pressure. Specifically denies chest pain, palpitations, dyspnea, orthopnea, PND or peripheral edema. Blood pressure readings have been in normal range. Current medication regimen is as listed below. Patient denies any side effects of medication.     BP Readings from Last 3 Encounters:   06/19/23 138/60   05/23/23 (!) 174/75   03/30/23 134/84     Stage 3 Chronic kidney disease-   Recent labs " revealed a eGFR of 58.     History of Migraine headaches. States that these are under good control. Requesting a refill on Imitrex that she takes as needed.     Requesting a refill of her Clobetasol cream- reports that she still has vulvar itching from time to time.       HEALTH CARE MAINTENANCE: Up to date. Labs recently completed.           6/19/2023    10:43 AM   Last PHQ-9   1.  Little interest or pleasure in doing things 2   2.  Feeling down, depressed, or hopeless 2   3.  Trouble falling or staying asleep, or sleeping too much 1   4.  Feeling tired or having little energy 1   5.  Poor appetite or overeating 1   6.  Feeling bad about yourself 0   7.  Trouble concentrating 0   8.  Moving slowly or restless 0   Q9: Thoughts of better off dead/self-harm past 2 weeks 0   PHQ-9 Total Score 7         6/19/2023    11:32 AM   YANY-7    1. Feeling nervous, anxious, or on edge 2   2. Not being able to stop or control worrying 2   3. Worrying too much about different things 2   4. Trouble relaxing 1   5. Being so restless that it is hard to sit still 1   6. Becoming easily annoyed or irritable 3   7. Feeling afraid, as if something awful might happen 0   YANY-7 Total Score 11     In the past two weeks have you had thoughts of suicide or self-harm?  No.    Do you have concerns about your personal safety or the safety of others?   No      Have you ever done Advance Care Planning? (For example, a Health Directive, POLST, or a discussion with a medical provider or your loved ones about your wishes): Yes, patient states has an Advance Care Planning document and will bring a copy to the clinic.       Fall risk  Fallen 2 or more times in the past year?: No  Any fall with injury in the past year?: No    Cognitive Screening   1) Repeat 3 items (Leader, Season, Table)    2) Clock draw: NORMAL  3) 3 item recall: Recalls 3 objects  Results: 3 items recalled: COGNITIVE IMPAIRMENT LESS LIKELY    Mini-CogTM Copyright LAINEY Garcia. Licensed by  the author for use in [facility]; reprinted with permission (arben@[email or web address]). All rights reserved.      Do you have sleep apnea, excessive snoring or daytime drowsiness?: no    Reviewed and updated as needed this visit by clinical staff   Tobacco  Allergies               Reviewed and updated as needed this visit by Provider                 Social History     Tobacco Use     Smoking status: Never     Passive exposure: Never     Smokeless tobacco: Never   Substance Use Topics     Alcohol use: Not Currently     Comment: wine occasionally             6/12/2023     2:23 PM   Alcohol Use   Prescreen: >3 drinks/day or >7 drinks/week? Not Applicable     Do you have a current opioid prescription? No  Do you use any other controlled substances or medications that are not prescribed by a provider? None        Current providers sharing in care for this patient include:   Patient Care Team:  Ame Dobson MD as PCP - General (Family Practice)  Ame Dobson MD as Assigned PCP  Andi Bay MD as Assigned Surgical Provider  Iliana De Los Santos MD as Assigned Neuroscience Provider  Bang Saunders MD as Assigned Cancer Care Provider    The following health maintenance items are reviewed in Epic and correct as of today:  Health Maintenance   Topic Date Due     ZOSTER IMMUNIZATION (2 of 2) 07/24/2018     DTAP/TDAP/TD IMMUNIZATION (2 - Td or Tdap) 07/25/2023     EYE EXAM  04/03/2024     BMP  05/22/2024     LIPID  05/22/2024     MICROALBUMIN  05/22/2024     TSH W/FREE T4 REFLEX  05/22/2024     HEMOGLOBIN  05/22/2024     MEDICARE ANNUAL WELLNESS VISIT  06/19/2024     ANNUAL REVIEW OF HM ORDERS  06/19/2024     FALL RISK ASSESSMENT  06/19/2024     ADVANCE CARE PLANNING  06/19/2028     MIGRAINE ACTION PLAN  Completed     PHQ-2 (once per calendar year)  Completed     INFLUENZA VACCINE  Completed     Pneumococcal Vaccine: 65+ Years  Completed     URINALYSIS  Completed     COVID-19 Vaccine   Completed     IPV IMMUNIZATION  Aged Out     MENINGITIS IMMUNIZATION  Aged Out     Lab work is in process  Labs reviewed in EPIC  BP Readings from Last 3 Encounters:   06/19/23 138/60   05/23/23 (!) 174/75   03/30/23 134/84    Wt Readings from Last 3 Encounters:   06/19/23 62 kg (136 lb 9.6 oz)   05/23/23 62.1 kg (137 lb)   03/30/23 63 kg (139 lb)                  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)     Premature atrial beats     Compression fracture of L1 lumbar vertebra, seen on CT     Lung nodule < 6cm on CT     Dupuytren's contracture of right hand     Hypothyroidism, unspecified type     Essential hypertension with goal blood pressure less than 140/90     Combined forms of age-related cataract, mild-mod, both eyes     Migraine without status migrainosus, not intractable, unspecified migraine type     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     Chronic kidney disease, stage 3 (H)     Epigastric discomfort     Rhinorrhea     Abdominal fullness     Posterior vitreous detachment of both eyes     Past Surgical History:   Procedure Laterality Date     APPENDECTOMY OPEN  1954     BLEPHAROPLASTY BILATERAL  10/2007    both eyes, upper lids     CHOLECYSTECTOMY, OPEN  1992     COLONOSCOPY  6/02, 10/13    Q 5 years, polyps     D & C       REPAIR PTOSIS       SALPINGO OOPHORECTOMY,R/L/LUIS DANIEL      left ovary and tube     SMALL BOWEL RESECTION  1986    colon resection      TONSILLECTOMY & ADENOIDECTOMY      childhood     TUBAL LIGATION       UPPER GI ENDOSCOPY  6/02, 8/11     ZZC LENGTHEN,TENDON,HAND/FINGER  1993    repair of dupytrons's contracture       Social History     Tobacco  Use     Smoking status: Never     Passive exposure: Never     Smokeless tobacco: Never   Substance Use Topics     Alcohol use: Not Currently     Comment: wine occasionally     Family History   Problem Relation Age of Onset     Hypertension Mother      Arthritis Mother      Cardiovascular Mother      Retinal detachment Mother      Migraines Mother      C.A.D. Father      Hypertension Father         Father.  Passed away.     Cerebrovascular Disease Father      Arthritis Father      Cardiovascular Father      Eye Disorder Father      Heart Disease Father      Glaucoma Father      Parkinsonism Father      Cardiovascular Brother      Glaucoma Brother      Peripheral Neuropathy Brother      Macular Degeneration No family hx of      Bleeding Disorder No family hx of      Anesthesia Reaction No family hx of          Current Outpatient Medications   Medication Sig Dispense Refill     Aspirin-Acetaminophen-Caffeine (EXCEDRIN MIGRAINE PO) Take 1 tablet by mouth as needed       calcium carbonate-vitamin D (OS-FIDENCIO) 600-400 MG-UNIT chewable tablet Take 1 chew tab by mouth 2 times daily       carboxymethylcellulose (REFRESH PLUS) 0.5 % SOLN Place 1 drop into both eyes 3 times daily as needed for dry eyes       clobetasol (TEMOVATE) 0.05 % external cream Apply topically 2 times daily Sparingly to affected area x 1 week then 3 times a week thereafter for 2 weeks. Repeat as needed. 45 g 0     hydrochlorothiazide (HYDRODIURIL) 25 MG tablet Take 1 tablet (25 mg) by mouth daily 90 tablet 3     latanoprost (XALATAN) 0.005 % ophthalmic solution Place 1 drop into both eyes At Bedtime 10 mL 3     levothyroxine (SYNTHROID/LEVOTHROID) 50 MCG tablet Take 1 tablet (50 mcg) by mouth daily 90 tablet 3     MELATONIN PO Take 3 mg by mouth nightly as needed        Multiple Vitamin (MULTI-VITAMIN) per tablet Take 1 tablet by mouth daily       SUMAtriptan (IMITREX) 50 MG tablet Take 1 tablet (50 mg) by mouth at onset of headache for migraine May  "repeat in 2 hours. Max 4 tablets/24 hours. 18 tablet 0     timolol maleate (TIMOPTIC) 0.5 % ophthalmic solution Place 1 drop into both eyes every morning Wait at least 5 minutes between drops if using more than one at a time. 5 mL 11     Allergies   Allergen Reactions     Diatrizoate Other (See Comments)     Zoster Vaccine Live Anaphylaxis     Bisphosphonates GI Disturbance     GI distress     Ivp Dye [Contrast Dye] Swelling     Lisinopril Cough     Losartan Hives     But can tolerate Hyzaar.      Morphine Hives     Morphine Sulfate Nausea and Vomiting     Prilosec [Omeprazole] Hives     Latex Rash       Pertinent mammograms are reviewed under the imaging tab.    Review of Systems  Constitutional, HEENT, cardiovascular, pulmonary, gi and gu systems are negative, except as otherwise noted.    OBJECTIVE:   /60   Pulse 75   Temp 97  F (36.1  C) (Temporal)   Resp 16   Ht 1.6 m (5' 2.99\")   Wt 62 kg (136 lb 9.6 oz)   LMP 10/01/1986   SpO2 98%   BMI 24.20 kg/m   Estimated body mass index is 24.2 kg/m  as calculated from the following:    Height as of this encounter: 1.6 m (5' 2.99\").    Weight as of this encounter: 62 kg (136 lb 9.6 oz).  Physical Exam  GENERAL APPEARANCE: healthy, alert and no distress  EYES: Eyes grossly normal to inspection, PERRL and conjunctivae and sclerae normal  HENT: ear canals and TM's normal, nose and mouth without ulcers or lesions, oropharynx clear and oral mucous membranes moist  NECK: no adenopathy, no asymmetry, masses, or scars and thyroid normal to palpation  RESP: lungs clear to auscultation - no rales, rhonchi or wheezes  BREAST: normal without masses, tenderness or nipple discharge and no palpable axillary masses or adenopathy  CV: regular rate and rhythm, normal S1 S2, no S3 or S4, no murmur, click or rub, no peripheral edema and peripheral pulses strong  ABDOMEN: soft, nontender, no hepatosplenomegaly, no masses and bowel sounds normal  MS: no musculoskeletal defects " are noted and gait is age appropriate without ataxia  SKIN: no suspicious lesions or rashes  NEURO: Normal strength and tone, sensory exam grossly normal, mentation intact and speech normal  PSYCH: mentation appears normal and affect normal/bright    Diagnostic Test Results:  Labs reviewed in Epic    ASSESSMENT / PLAN:   Hilda was seen today for wellness visit and results.    Diagnoses and all orders for this visit:    Encounter for Medicare annual wellness exam  -     REVIEW OF HEALTH MAINTENANCE PROTOCOL ORDERS  -     PRIMARY CARE FOLLOW-UP SCHEDULING; Future    Hypertension goal BP (blood pressure) < 140/90, controlled  -     Refill: hydrochlorothiazide (HYDRODIURIL) 25 MG tablet; Take 1 tablet (25 mg) by mouth daily    Migraine with aura and without status migrainosus, not intractable  -     Refill: SUMAtriptan (IMITREX) 50 MG tablet; Take 1 tablet (50 mg) by mouth at onset of headache for migraine May repeat in 2 hours. Max 4 tablets/24 hours.    History of recent pneumonia, completed antibiotics. Currently asymptomatic  -  Repeat Chest X ray to ensure complete resolution.     -  XR Chest 2 Views    Stage 3a chronic kidney disease (H)        -   Stable. Continue monitoring.    Vulvar itching  -    Refill:  clobetasol (TEMOVATE) 0.05 % external cream; Apply topically 2 times daily Sparingly to affected area x 1 week then 3 times a week thereafter for 2 weeks. Repeat as needed.        Patient has been advised of split billing requirements and indicates understanding: Yes      COUNSELING:  Reviewed preventive health counseling, as reflected in patient instructions       Regular exercise       Healthy diet/nutrition        She reports that she has never smoked. She has never been exposed to tobacco smoke. She has never used smokeless tobacco.      Appropriate preventive services were discussed with this patient, including applicable screening as appropriate for cardiovascular disease, diabetes,  osteopenia/osteoporosis, and glaucoma.  As appropriate for age/gender, discussed screening for colorectal cancer, prostate cancer, breast cancer, and cervical cancer. Checklist reviewing preventive services available has been given to the patient.    Reviewed patients plan of care and provided an AVS. The Basic Care Plan (routine screening as documented in Health Maintenance) for Hilda meets the Care Plan requirement. This Care Plan has been established and reviewed with the Patient.      Return in about 1 month (around 7/19/2023) for Medication check, Virtual Visit..    Ame Dobson MD  [facility]    Identified Health Risks:    I have reviewed Opioid Use Disorder and Substance Use Disorder risk factors and made any needed referrals.

## 2023-06-20 ENCOUNTER — TELEPHONE (OUTPATIENT)
Dept: FAMILY MEDICINE | Facility: CLINIC | Age: 88
End: 2023-06-20

## 2023-06-20 NOTE — TELEPHONE ENCOUNTER
Prior Authorization Retail Medication Request    Medication/Dose: clobetasol (TEMOVATE) 0.05 % external cream  ICD code (if different than what is on RX):  L29.2  Previously Tried and Failed:  na  Rationale:  na    Insurance Name:  MEDICARE/Purple  Insurance ID:  [id number]      Pharmacy Information (if different than what is on RX)  Name:  [facility]  Phone:  [phone]

## 2023-06-24 NOTE — TELEPHONE ENCOUNTER
Central Prior Authorization Team   Phone: [phone]    PA Initiation    Medication: clobetasol (TEMOVATE) 0.05 % external cream  Insurance Company: [employer] - Phone [phone] Fax [phone]  Pharmacy Filling the Rx: [facility] #[id number] - [address]  AT [facility]  Filling Pharmacy Phone: [phone]  Filling Pharmacy Fax:    Start Date: 6/24/2023

## 2023-06-27 NOTE — TELEPHONE ENCOUNTER
Prior Authorization Approval    Authorization Effective Date: 3/28/2023  Authorization Expiration Date: 6/26/2024  Medication: clobetasol (TEMOVATE) 0.05 % external cream-PA APPROVED   Approved Dose/Quantity:   Reference #:     Insurance Company: [employer] - Phone [phone] Fax [phone]  Expected CoPay:       CoPay Card Available:      Foundation Assistance Needed:    Which Pharmacy is filling the prescription (Not needed for infusion/clinic administered): [facility] #[id number] [address]  AT [facility]  Pharmacy Notified: Yes- **Instructed pharmacy to notify patient when script is ready to /ship.**  Patient Notified: Yes

## 2023-07-06 DIAGNOSIS — E03.9 HYPOTHYROIDISM, UNSPECIFIED TYPE: ICD-10-CM

## 2023-07-06 RX ORDER — LEVOTHYROXINE SODIUM 50 UG/1
TABLET ORAL
Qty: 90 TABLET | Refills: 3 | Status: SHIPPED | OUTPATIENT
Start: 2023-07-06 | End: 2024-06-19

## 2023-08-15 ASSESSMENT — ANXIETY QUESTIONNAIRES
6. BECOMING EASILY ANNOYED OR IRRITABLE: MORE THAN HALF THE DAYS
IF YOU CHECKED OFF ANY PROBLEMS ON THIS QUESTIONNAIRE, HOW DIFFICULT HAVE THESE PROBLEMS MADE IT FOR YOU TO DO YOUR WORK, TAKE CARE OF THINGS AT HOME, OR GET ALONG WITH OTHER PEOPLE: NOT DIFFICULT AT ALL
4. TROUBLE RELAXING: SEVERAL DAYS
5. BEING SO RESTLESS THAT IT IS HARD TO SIT STILL: SEVERAL DAYS
3. WORRYING TOO MUCH ABOUT DIFFERENT THINGS: SEVERAL DAYS
GAD7 TOTAL SCORE: 8
1. FEELING NERVOUS, ANXIOUS, OR ON EDGE: SEVERAL DAYS
7. FEELING AFRAID AS IF SOMETHING AWFUL MIGHT HAPPEN: SEVERAL DAYS
2. NOT BEING ABLE TO STOP OR CONTROL WORRYING: SEVERAL DAYS
GAD7 TOTAL SCORE: 8

## 2023-08-22 ENCOUNTER — OFFICE VISIT (OUTPATIENT)
Dept: FAMILY MEDICINE | Facility: CLINIC | Age: 88
End: 2023-08-22
Payer: MEDICARE

## 2023-08-22 VITALS
WEIGHT: 135.2 LBS | TEMPERATURE: 98.3 F | DIASTOLIC BLOOD PRESSURE: 72 MMHG | RESPIRATION RATE: 14 BRPM | BODY MASS INDEX: 23.08 KG/M2 | SYSTOLIC BLOOD PRESSURE: 110 MMHG | HEART RATE: 71 BPM | HEIGHT: 64 IN | OXYGEN SATURATION: 97 %

## 2023-08-22 DIAGNOSIS — N90.4 LICHEN SCLEROSUS OF VULVA: ICD-10-CM

## 2023-08-22 DIAGNOSIS — G43.109 MIGRAINE WITH AURA AND WITHOUT STATUS MIGRAINOSUS, NOT INTRACTABLE: Primary | ICD-10-CM

## 2023-08-22 DIAGNOSIS — F43.23 ADJUSTMENT DISORDER WITH MIXED ANXIETY AND DEPRESSED MOOD: ICD-10-CM

## 2023-08-22 PROCEDURE — 99214 OFFICE O/P EST MOD 30 MIN: CPT | Performed by: FAMILY MEDICINE

## 2023-08-22 RX ORDER — CLOBETASOL PROPIONATE 0.5 MG/G
CREAM TOPICAL 2 TIMES DAILY
Qty: 45 G | Refills: 0 | Status: SHIPPED | OUTPATIENT
Start: 2023-08-22 | End: 2024-06-19

## 2023-08-22 ASSESSMENT — ANXIETY QUESTIONNAIRES
7. FEELING AFRAID AS IF SOMETHING AWFUL MIGHT HAPPEN: SEVERAL DAYS
GAD7 TOTAL SCORE: 14
IF YOU CHECKED OFF ANY PROBLEMS ON THIS QUESTIONNAIRE, HOW DIFFICULT HAVE THESE PROBLEMS MADE IT FOR YOU TO DO YOUR WORK, TAKE CARE OF THINGS AT HOME, OR GET ALONG WITH OTHER PEOPLE: SOMEWHAT DIFFICULT
6. BECOMING EASILY ANNOYED OR IRRITABLE: NEARLY EVERY DAY
3. WORRYING TOO MUCH ABOUT DIFFERENT THINGS: MORE THAN HALF THE DAYS
2. NOT BEING ABLE TO STOP OR CONTROL WORRYING: MORE THAN HALF THE DAYS
1. FEELING NERVOUS, ANXIOUS, OR ON EDGE: MORE THAN HALF THE DAYS
5. BEING SO RESTLESS THAT IT IS HARD TO SIT STILL: MORE THAN HALF THE DAYS

## 2023-08-22 ASSESSMENT — PATIENT HEALTH QUESTIONNAIRE - PHQ9
5. POOR APPETITE OR OVEREATING: MORE THAN HALF THE DAYS
SUM OF ALL RESPONSES TO PHQ QUESTIONS 1-9: 13

## 2023-08-22 ASSESSMENT — PAIN SCALES - GENERAL: PAINLEVEL: NO PAIN (0)

## 2023-08-22 NOTE — PROGRESS NOTES
Assessment & Plan     Migraine with aura and without status migrainosus, not intractable  - Encouraged patient to keep a headache diary  - Trial Abortive therapy, patient would like to retrial Imitrex.      Lichen sclerosus of vulva  Recommended to resume Clobestasol and consider a maintenance therapy. Patient verbalized understanding.  - Refill: clobetasol (TEMOVATE) 0.05 % external cream  Dispense: 45 g; Refill: 0    Adjustment disorder with mixed anxiety and depressed mood  -  PHQ-9/YANY 7 completed, see below/Epic for details    -  Refill: sertraline (ZOLOFT) 50 MG tablet  Dispense: 30 tablet; Refill: 1           Depression Screening Follow Up        8/22/2023     4:40 PM   PHQ   PHQ-9 Total Score 13   Q9: Thoughts of better off dead/self-harm past 2 weeks Not at all         8/22/2023     4:40 PM   Last PHQ-9   1.  Little interest or pleasure in doing things 2   2.  Feeling down, depressed, or hopeless 2   3.  Trouble falling or staying asleep, or sleeping too much 2   4.  Feeling tired or having little energy 3   5.  Poor appetite or overeating 2   6.  Feeling bad about yourself 1   7.  Trouble concentrating 1   8.  Moving slowly or restless 0   Q9: Thoughts of better off dead/self-harm past 2 weeks 0   PHQ-9 Total Score 13   Difficulty at work, home, or with people Somewhat difficult       Follow Up Actions Taken  Started patient on anti-depressant.       Return in about 1 month (around 9/22/2023) for Medication check, Virtual Visit..      Ame Dobson MD  [facility] JEREMY Stevens is a 88 year old, presenting for the following health issues:  Migraine (Recheck sumatriptan) and Vaginal Problem        8/22/2023     3:02 PM   Additional Questions   Roomed by Pascale   Accompanied by n/a       History of Present Illness       Mental Health Follow-up:  Patient presents to follow-up on Depression & Anxiety.Patient's depression since last visit has been:  Worse  The patient is not  "having other symptoms associated with depression.  Patient's anxiety since last visit has been:  Worse  The patient is not having other symptoms associated with anxiety.  Any significant life events: No  Patient is not feeling anxious or having panic attacks.  Patient has no concerns about alcohol or drug use.    Headaches:   Since the patient's last clinic visit, headaches are: worsened  The patient is getting headaches:  4 to 5 times a month  She is not able to do normal daily activities when she has a migraine.  The patient is taking the following rescue/relief medications:  Tylenol and Excedrin   Patient states \"I get some relief\" from the rescue/relief medications.   The patient is taking the following medications to prevent migraines:  Other  In the past 4 weeks, the patient has gone to an Urgent Care or Emergency Room 0 times times due to headaches.    She eats 2-3 servings of fruits and vegetables daily.She consumes 0 sweetened beverage(s) daily.She exercises with enough effort to increase her heart rate 20 to 29 minutes per day.  She exercises with enough effort to increase her heart rate 3 or less days per week.   She is taking medications regularly.       Migraine headaches with aura-  Only tried the Imitrex a couple of times and it caused some nausea and she stopped it.   She's back to taking Excedrin Migraine which is not very effective.   Usually has to withdraw from activities in order to sleep off the headache. This is affecting her quality of life.   Wishes to give the Imitrex another trial     Vulva  Symptoms  Onset/Duration: 1 month ago, symptoms returned  Description:  Vaginal Discharge: none   Itching (Pruritis): YES, chronic, intermittent- previously used Clobetasol ointment that was effective but stopped it after she felt better.  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   Considering taking a medication for anxiety.         8/22/2023     4:40 PM   Last PHQ-9   1.  Little interest or pleasure in doing things 2   2.  Feeling down, depressed, or hopeless 2   3.  Trouble falling or staying asleep, or sleeping too much 2   4.  Feeling tired or having little energy 3   5.  Poor appetite or overeating 2   6.  Feeling bad about yourself 1   7.  Trouble concentrating 1   8.  Moving slowly or restless 0   Q9: Thoughts of better off dead/self-harm past 2 weeks 0   PHQ-9 Total Score 13   Difficulty at work, home, or with people Somewhat difficult         8/22/2023     4:40 PM   YANY-7    1. Feeling nervous, anxious, or on edge 2   2. Not being able to stop or control worrying 2   3. Worrying too much about different things 2   4. Trouble relaxing 2   5. Being so restless that it is hard to sit still 2   6. Becoming easily annoyed or irritable 3   7. Feeling afraid, as if something awful might happen 1   YANY-7 Total Score 14   If you checked any problems, how difficult have they made it for you to do your work, take care of things at home, or get along with other people? Somewhat difficult     In the past two weeks have you had thoughts of suicide or self-harm?  No.    Do you have concerns about your personal safety or the safety of others?   No      Review of Systems   Genitourinary:  Positive for vaginal discharge.            Objective    /72   Pulse 71   Temp 98.3  F (36.8  " C) (Temporal)   Resp 14   Ht 1.631 m (5' 4.21\")   Wt 61.3 kg (135 lb 3.2 oz)   LMP 10/01/1986   SpO2 97%   BMI 23.05 kg/m    Body mass index is 23.05 kg/m .  Physical Exam   GENERAL: healthy, alert and no distress   (female): Female external genitalia revealed white atrophic macules around the vulva that coalesce into plaques. No fissures or hemorrhage noted. Vaginal mucosal atrophy.                 "

## 2023-09-17 ASSESSMENT — ANXIETY QUESTIONNAIRES
5. BEING SO RESTLESS THAT IT IS HARD TO SIT STILL: NOT AT ALL
GAD7 TOTAL SCORE: 0
3. WORRYING TOO MUCH ABOUT DIFFERENT THINGS: NOT AT ALL
2. NOT BEING ABLE TO STOP OR CONTROL WORRYING: NOT AT ALL
4. TROUBLE RELAXING: NOT AT ALL
1. FEELING NERVOUS, ANXIOUS, OR ON EDGE: NOT AT ALL
GAD7 TOTAL SCORE: 0
IF YOU CHECKED OFF ANY PROBLEMS ON THIS QUESTIONNAIRE, HOW DIFFICULT HAVE THESE PROBLEMS MADE IT FOR YOU TO DO YOUR WORK, TAKE CARE OF THINGS AT HOME, OR GET ALONG WITH OTHER PEOPLE: NOT DIFFICULT AT ALL
7. FEELING AFRAID AS IF SOMETHING AWFUL MIGHT HAPPEN: NOT AT ALL
6. BECOMING EASILY ANNOYED OR IRRITABLE: NOT AT ALL

## 2023-09-19 ENCOUNTER — NURSE TRIAGE (OUTPATIENT)
Dept: FAMILY MEDICINE | Facility: CLINIC | Age: 88
End: 2023-09-19

## 2023-09-19 NOTE — TELEPHONE ENCOUNTER
Patient called and stated that she has had lower abdominal pain for one month, and also has had vaginal symptoms.  Patient was seen on 8/22/23.  She stated that the abdominal pain, feels like a cramping at about a 8/10.  It is a lot worse today. She is able to walk, and can get to the car. She stated that she is feeling lightheaded and dizzy since last night.  She is drinking fluids.    Denies any other symptoms or concerns.    Advised per protocol:    GO TO ED NOW:   * You need to be seen in the Emergency Department.  * Leave now. Call 911 if needed.  She stated that her  will take her now.  Patient stated understanding and agreeable with the plan of care.   Blanquita RN BSN  Triage Nurse  [facility]

## 2023-09-19 NOTE — TELEPHONE ENCOUNTER
"Reason for Disposition    SEVERE abdominal pain (e.g., excruciating)     Call 911 if needed.  Patient stated understanding and agreeable with the plan of care.    Additional Information    Negative: Passed out (i.e., fainted, collapsed and was not responding)    Negative: Shock suspected (e.g., cold/pale/clammy skin, too weak to stand, low BP, rapid pulse)     Advised to call 911 if these symptoms occur.  Patient stated understanding and agreeable with the plan of care.    Negative: Sounds like a life-threatening emergency to the triager    Negative: Vomiting red blood or black (coffee ground) material    Negative: Blood in bowel movements  (Exception: Blood on surface of BM with constipation.)    Negative: Black or tarry bowel movements  (Exception: Chronic-unchanged black-grey BMs AND is taking iron pills or Pepto-Bismol.)    Negative: Fever > 103 F (39.4 C)    Negative: Fever > 101 F (38.3 C) and over 60 years of age    Negative: Blood in urine (red, pink, or tea-colored)    Negative: Vomiting and abdomen looks much more swollen than usual    Answer Assessment - Initial Assessment Questions  1. LOCATION: \"Where does it hurt?\"       Across lower abnormal, possible bowel, also has redness in vaginal area  2. RADIATION: \"Does the pain shoot anywhere else?\" (e.g., chest, back)      denies  3. ONSET: \"When did the pain begin?\" (e.g., minutes, hours or days ago)       For one month, worse since yesterday  4. SUDDEN: \"Gradual or sudden onset?\"      Happens off and on  5. PATTERN \"Does the pain come and go, or is it constant?\"     - If it comes and goes: \"How long does it last?\" \"Do you have pain now?\"      (Note: Comes and goes means the pain is intermittent. It goes away completely between bouts.)       - If constant: \"Is it getting better, staying the same, or getting worse?\"       (Note: Constant means the pain never goes away completely; most serious pain is constant and gets worse.)       Getting worse  6. " "SEVERITY: \"How bad is the pain?\"  (e.g., Scale 1-10; mild, moderate, or severe)     - MILD (1-3): Doesn't interfere with normal activities, abdomen soft and not tender to touch.      - MODERATE (4-7): Interferes with normal activities or awakens from sleep, abdomen tender to touch.      - SEVERE (8-10): Excruciating pain, doubled over, unable to do any normal activities.        8/10  7. RECURRENT SYMPTOM: \"Have you ever had this type of stomach pain before?\" If Yes, ask: \"When was the last time?\" and \"What happened that time?\"       Off and on for one month, but is getting worse  8. CAUSE: \"What do you think is causing the stomach pain?\"      No idea  9. RELIEVING/AGGRAVATING FACTORS: \"What makes it better or worse?\" (e.g., antacids, bending or twisting motion, bowel movement)      nothing  10. OTHER SYMPTOMS: \"Do you have any other symptoms?\" (e.g., back pain, diarrhea, fever, urination pain, vomiting)        Redness in vaginal area, lightheadedness, dizziness, nausea  11. PREGNANCY: \"Is there any chance you are pregnant?\" \"When was your last menstrual period?\"        NA    Protocols used: Abdominal Pain - Female-A-[place]    "

## 2023-09-20 ENCOUNTER — VIRTUAL VISIT (OUTPATIENT)
Dept: FAMILY MEDICINE | Facility: CLINIC | Age: 88
End: 2023-09-20
Payer: MEDICARE

## 2023-09-20 DIAGNOSIS — F43.23 ADJUSTMENT DISORDER WITH MIXED ANXIETY AND DEPRESSED MOOD: Primary | ICD-10-CM

## 2023-09-20 DIAGNOSIS — N90.4 LICHEN SCLEROSUS OF VULVA: ICD-10-CM

## 2023-09-20 PROCEDURE — 99214 OFFICE O/P EST MOD 30 MIN: CPT | Mod: 95 | Performed by: FAMILY MEDICINE

## 2023-09-20 RX ORDER — SERTRALINE HYDROCHLORIDE 25 MG/1
25 TABLET, FILM COATED ORAL DAILY
Qty: 90 TABLET | Refills: 1 | Status: SHIPPED | OUTPATIENT
Start: 2023-09-20 | End: 2024-03-12

## 2023-09-20 ASSESSMENT — PATIENT HEALTH QUESTIONNAIRE - PHQ9: SUM OF ALL RESPONSES TO PHQ QUESTIONS 1-9: 3

## 2023-09-20 NOTE — PROGRESS NOTES
"Hilda is a 88 year old who is being evaluated via a billable telephone visit.      What phone number would you like to be contacted at? [phone]  How would you like to obtain your AVS? Ele    Distant Location (provider location):  On-site    Assessment & Plan     Adjustment disorder with mixed anxiety and depressed mood, much improved  - PHQ-9/YANY 7 completed, see below/Epic for details    - Refill: sertraline (ZOLOFT) 25 MG tablet  Dispense: 90 tablet; Refill: 1    Lichen sclerosus of vulva  - On Clobetasol   - Ob/Gyn Referral- may consider biopsy if appropriate.       Return in about 2 days (around 9/22/2023) for Face to Face Visit. For ER follow up visit- rectal pain and abdominal pain. Sooner if needed.       Ame Dobson MD  [facility] JEREMY Stevens is a 88 year old, presenting for the following health issues:   Follow Up (sertraline)        9/20/2023     9:53 AM   Additional Questions   Roomed by Jack GAR   Accompanied by NA         9/20/2023     9:53 AM   Patient Reported Additional Medications   Patient reports taking the following new medications NA       History of Present Illness       Mental Health Follow-up:  Patient presents to follow-up on Anxiety.    Patient's anxiety since last visit has been:  Better  The patient is not having other symptoms associated with anxiety.  Any significant life events: No  Patient is not feeling anxious or having panic attacks.  Patient has no concerns about alcohol or drug use.    Headaches:   Since the patient's last clinic visit, headaches are: worsened  The patient is getting headaches:  2 or 3 times a week  She is not able to do normal daily activities when she has a migraine.  The patient is taking the following rescue/relief medications:  Excedrin and sumatriptan (Imitrex)   Patient states \"I get some relief\" from the rescue/relief medications.   The patient is taking the following medications to prevent migraines:  No " medications to prevent migraines  In the past 4 weeks, the patient has gone to an Urgent Care or Emergency Room 0 times times due to headaches.    Reason for visit:  Vaginal infection, lower bowel and stomach pain and migrain discussion    She eats 2-3 servings of fruits and vegetables daily.She consumes 1 sweetened beverage(s) daily.She exercises with enough effort to increase her heart rate 10 to 19 minutes per day.  She exercises with enough effort to increase her heart rate 4 days per week.   She is taking medications regularly.       Depression and Anxiety Follow-Up  Doing well on Sertraline 25 mg/day. Tolerating it well at a low dose.   No side effects reported.  Requesting a refill.   How are you doing with your depression since your last visit? Improved   How are you doing with your anxiety since your last visit?  Improved   Are you having other symptoms that might be associated with depression or anxiety? No  Have you had a significant life event? [facility] 09/19/2023 [place]  Do you have any concerns with your use of alcohol or other drugs? No          9/17/2023     4:43 PM   Last PHQ-9   1.  Little interest or pleasure in doing things 0   2.  Feeling down, depressed, or hopeless 1   3.  Trouble falling or staying asleep, or sleeping too much 0   4.  Feeling tired or having little energy 1   5.  Poor appetite or overeating 1   6.  Feeling bad about yourself 0   7.  Trouble concentrating 0   8.  Moving slowly or restless 0   Q9: Thoughts of better off dead/self-harm past 2 weeks 0   PHQ-9 Total Score 3   Difficulty at work, home, or with people Not difficult at all         9/17/2023     4:43 PM   YANY-7    1. Feeling nervous, anxious, or on edge 0   2. Not being able to stop or control worrying 0   3. Worrying too much about different things 0   4. Trouble relaxing 0   5. Being so restless that it is hard to sit still 0   6. Becoming easily annoyed or irritable 0   7. Feeling afraid, as if something  awful might happen 0   YANY-7 Total Score 0   If you checked any problems, how difficult have they made it for you to do your work, take care of things at home, or get along with other people? Not difficult at all     In the past two weeks have you had thoughts of suicide or self-harm?  No.    Do you have concerns about your personal safety or the safety of others?   No      Vulva itching-   Thought to be due to lichen sclerosis- responded well to clobetasol in the past.   Was recommended to follow-up with OB/GYN. Requesting a referral.     Reported vaginal pain and abdominal pain- prompted an ER visit yesterday.   Due for a face to face ER follow up visit. Will help her schedule.       Social History     Tobacco Use    Smoking status: Never     Passive exposure: Never    Smokeless tobacco: Never   Vaping Use    Vaping Use: Never used   Substance Use Topics    Alcohol use: Not Currently     Comment: wine occasionally    Drug use: No         6/19/2023    10:43 AM 8/22/2023     4:40 PM 9/17/2023     4:43 PM   PHQ   PHQ-9 Total Score 7 13 3   Q9: Thoughts of better off dead/self-harm past 2 weeks Not at all Not at all Not at all         8/15/2023     5:31 PM 8/22/2023     4:40 PM 9/17/2023     4:43 PM   YANY-7 SCORE   Total Score 8 (mild anxiety)  0 (minimal anxiety)   Total Score 8 14 0         9/17/2023     4:43 PM   Last PHQ-9   1.  Little interest or pleasure in doing things 0   2.  Feeling down, depressed, or hopeless 1   3.  Trouble falling or staying asleep, or sleeping too much 0   4.  Feeling tired or having little energy 1   5.  Poor appetite or overeating 1   6.  Feeling bad about yourself 0   7.  Trouble concentrating 0   8.  Moving slowly or restless 0   Q9: Thoughts of better off dead/self-harm past 2 weeks 0   PHQ-9 Total Score 3   Difficulty at work, home, or with people Not difficult at all         9/17/2023     4:43 PM   YANY-7    1. Feeling nervous, anxious, or on edge 0   2. Not being able to stop or  control worrying 0   3. Worrying too much about different things 0   4. Trouble relaxing 0   5. Being so restless that it is hard to sit still 0   6. Becoming easily annoyed or irritable 0   7. Feeling afraid, as if something awful might happen 0   YANY-7 Total Score 0   If you checked any problems, how difficult have they made it for you to do your work, take care of things at home, or get along with other people? Not difficult at all             Review of Systems   Constitutional, HEENT, cardiovascular, pulmonary, gi and gu systems are negative, except as otherwise noted.      Objective           Vitals:  No vitals were obtained today due to virtual visit.    Physical Exam   healthy, alert, and no distress  PSYCH: Alert and oriented times 3; coherent speech, normal   rate and volume, able to articulate logical thoughts, able   to abstract reason, no tangential thoughts, no hallucinations   or delusions  Her affect is normal  RESP: No cough, no audible wheezing, able to talk in full sentences  Remainder of exam unable to be completed due to telephone visits          Phone call duration: 25 minutes

## 2023-09-26 ENCOUNTER — OFFICE VISIT (OUTPATIENT)
Dept: FAMILY MEDICINE | Facility: CLINIC | Age: 88
End: 2023-09-26
Payer: MEDICARE

## 2023-09-26 VITALS
HEIGHT: 63 IN | OXYGEN SATURATION: 98 % | SYSTOLIC BLOOD PRESSURE: 130 MMHG | WEIGHT: 135.4 LBS | HEART RATE: 69 BPM | RESPIRATION RATE: 16 BRPM | BODY MASS INDEX: 23.99 KG/M2 | TEMPERATURE: 97.4 F | DIASTOLIC BLOOD PRESSURE: 64 MMHG

## 2023-09-26 DIAGNOSIS — C82.00 GRADE I FOLLICULAR SMALL CLEAVED CELL LYMPHOMA (H): ICD-10-CM

## 2023-09-26 DIAGNOSIS — L90.0 LICHEN SCLEROSUS: ICD-10-CM

## 2023-09-26 DIAGNOSIS — K65.4 MESENTERIC PANNICULITIS (H): ICD-10-CM

## 2023-09-26 DIAGNOSIS — R10.2 SUPRAPUBIC DISCOMFORT: Primary | ICD-10-CM

## 2023-09-26 DIAGNOSIS — Z23 NEED FOR PROPHYLACTIC VACCINATION AND INOCULATION AGAINST INFLUENZA: ICD-10-CM

## 2023-09-26 LAB
ALBUMIN UR-MCNC: NEGATIVE MG/DL
AMORPH CRY #/AREA URNS HPF: ABNORMAL /HPF
APPEARANCE UR: ABNORMAL
BACTERIA #/AREA URNS HPF: ABNORMAL /HPF
BILIRUB UR QL STRIP: NEGATIVE
COLOR UR AUTO: YELLOW
GLUCOSE UR STRIP-MCNC: NEGATIVE MG/DL
HGB UR QL STRIP: NEGATIVE
KETONES UR STRIP-MCNC: NEGATIVE MG/DL
LEUKOCYTE ESTERASE UR QL STRIP: ABNORMAL
NITRATE UR QL: NEGATIVE
PH UR STRIP: 7 [PH] (ref 5–7)
RBC #/AREA URNS AUTO: ABNORMAL /HPF
SP GR UR STRIP: 1.02 (ref 1–1.03)
SQUAMOUS #/AREA URNS AUTO: ABNORMAL /LPF
UROBILINOGEN UR STRIP-ACNC: 0.2 E.U./DL
WBC #/AREA URNS AUTO: ABNORMAL /HPF

## 2023-09-26 PROCEDURE — G0008 ADMIN INFLUENZA VIRUS VAC: HCPCS | Performed by: FAMILY MEDICINE

## 2023-09-26 PROCEDURE — 99214 OFFICE O/P EST MOD 30 MIN: CPT | Mod: 25 | Performed by: FAMILY MEDICINE

## 2023-09-26 PROCEDURE — 87086 URINE CULTURE/COLONY COUNT: CPT | Performed by: FAMILY MEDICINE

## 2023-09-26 PROCEDURE — 90662 IIV NO PRSV INCREASED AG IM: CPT | Performed by: FAMILY MEDICINE

## 2023-09-26 PROCEDURE — 81001 URINALYSIS AUTO W/SCOPE: CPT | Performed by: FAMILY MEDICINE

## 2023-09-26 ASSESSMENT — PAIN SCALES - GENERAL: PAINLEVEL: MILD PAIN (2)

## 2023-09-26 NOTE — PROGRESS NOTES
Prior to immunization administration, verified patients identity using patient s name and date of birth. Please see Immunization Activity for additional information.     Screening Questionnaire for Adult Immunization    Are you sick today?   No   Do you have allergies to medications, food, a vaccine component or latex?   Yes   Have you ever had a serious reaction after receiving a vaccination?   Yes   Do you have a long-term health problem with heart, lung, kidney, or metabolic disease (e.g., diabetes), asthma, a blood disorder, no spleen, complement component deficiency, a cochlear implant, or a spinal fluid leak?  Are you on long-term aspirin therapy?   No   Do you have cancer, leukemia, HIV/AIDS, or any other immune system problem?   Yes   Do you have a parent, brother, or sister with an immune system problem?   No   In the past 3 months, have you taken medications that affect  your immune system, such as prednisone, other steroids, or anticancer drugs; drugs for the treatment of rheumatoid arthritis, Crohn s disease, or psoriasis; or have you had radiation treatments?   No   Have you had a seizure, or a brain or other nervous system problem?   No   During the past year, have you received a transfusion of blood or blood    products, or been given immune (gamma) globulin or antiviral drug?   No   For women: Are you pregnant or is there a chance you could become       pregnant during the next month?   No   Have you received any vaccinations in the past 4 weeks?   No     Immunization questionnaire was positive for at least one answer.  Notified Dr Dobson.      Patient instructed to remain in clinic for 15 minutes afterwards, and to report any adverse reactions.     Screening performed by Jack Hughes CMA on 9/26/2023 at 10:52 AM.    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   - Following with Oncology.     Lichen sclerosus  - Continue high dose topical corticosteroid - Clobetasol.   - Follow up with OBGYN    Need for prophylactic vaccination and inoculation against influenza  - INFLUENZA VACCINE 65+ (FLUZONE HD)      Post Medication Reconciliation Status:  Discharge medications reconciled, continue medications without change      Return in about 2 weeks (around 10/10/2023), or if symptoms worsen or fail to improve.  Ame Dobson MD        [facility] JEREMY Stevens is a 88 year old, presenting for the following health issues:  ER F/U        9/26/2023    10:43 AM   Additional Questions   Roomed by Jack CMA   Accompanied by spouse         9/26/2023    10:43 AM   Patient Reported Additional Medications   Patient reports taking the following new medications NA       HPI       ED/UC Followup:    Facility:  [facility]  Date of visit: 09/19/2023  Reason for visit: Abdominal and vaginal pain  Current Status: Pt stated that she is feeling okay, just having some mild lower abdominal pain/discomfort    ER records reviewed.   Work up done- labs were unremarkable.   CT/Abdo- showed some central mesenteric haziness consistent with mesenteric panniculitis versus enteritis/colitis. Patient reports having non-bloody loose bowel movements and mild lower abdominal pain/discomfort. No fever or chills.       Patient has a known history of follicular lymphoma, stage IVB- currently in remission.   Following with Oncology. Last office visit- 5/23/2023- see Epic for details.     Patient reports that she had unpleasant experience at the ER recently.  States that she does not trust her UA results- felt like her sample sat on the counter for many hours unlabeled. Results were reported normal- patient is doubtful  "of the results and would like urinalysis repeated today.   Denies having any dysuria, hematuria, flank pain.        Review of Systems   Constitutional, HEENT, cardiovascular, pulmonary, gi and gu systems are negative, except as otherwise noted.      Objective    /64   Pulse 69   Temp 97.4  F (36.3  C) (Temporal)   Resp 16   Ht 1.605 m (5' 3.19\")   Wt 61.4 kg (135 lb 6.4 oz)   LMP 10/01/1986   SpO2 98%   BMI 23.84 kg/m    Body mass index is 23.84 kg/m .  Physical Exam   GENERAL: healthy, alert and no distress  ABDOMEN: soft, suprapubic tenderness, no hepatosplenomegaly, no masses and bowel sounds normal   (female): Erythematous female external genitalia, no excoriations.  Normal urethral meatus , and vaginal mucosal atrophy    DATA  Recent ER records reviewed.                 "

## 2023-09-26 NOTE — PATIENT INSTRUCTIONS
clobetasol (TEMOVATE) 0.05 % external cream; Apply to affected vulva area at night for 4 weeks, then every other night for 4 weeks, then twice weekly for 4 weeks

## 2023-09-27 ENCOUNTER — TELEPHONE (OUTPATIENT)
Dept: FAMILY MEDICINE | Facility: CLINIC | Age: 88
End: 2023-09-27
Payer: MEDICARE

## 2023-09-27 DIAGNOSIS — R10.2 SUPRAPUBIC DISCOMFORT: Primary | ICD-10-CM

## 2023-09-27 RX ORDER — SULFAMETHOXAZOLE/TRIMETHOPRIM 800-160 MG
1 TABLET ORAL 2 TIMES DAILY
Qty: 6 TABLET | Refills: 0 | Status: SHIPPED | OUTPATIENT
Start: 2023-09-27 | End: 2023-09-27

## 2023-09-27 NOTE — TELEPHONE ENCOUNTER
Noted.   Result note sent- UA is equivocal, could be urogenital leda. Still awaiting urine culture. Takes a couple of days. Will let her know as soon as we know. Thanks.

## 2023-09-27 NOTE — TELEPHONE ENCOUNTER
"Called pt to relay pcp's message below. Pt stated that she is frustrated that she does not have a medication to take despite RN reiterating pcp's response on needing to wait for urine culture.     Pt stated that she received a Flu shot 9/27/23 and had to cancel COVID appointment today while waiting for UA interpretation. RN informed pt  that pcp ok'd pt to receive COVID booster while waiting for urine culture. Pt stated \"I wouldn't dare do that\" as she is still feeling weak and nauseated. RN informed pt to stay hydrated and not skip meals.     Pt is requesting medications or advice from pcp to help with symptoms while waiting for urine culture.     Linda Galan RN on 9/27/2023 at 11:53 AM    "

## 2023-09-27 NOTE — TELEPHONE ENCOUNTER
Routed to PCP to please advise on results.    Blanquita RIBERAN RN  Triage Nurse  [facility]

## 2023-09-27 NOTE — CONFIDENTIAL NOTE
Noted.   Patient called to answer concerns.   Preliminary urine culture after the first day reveals no growth.  No empiric antibiotic therapy recommended at this time.  Urine culture pending at this time.  Patient verbalized understanding and was agreeable with the plan.    Regarding the suprapubic pain and recent colitis on CT Abdo pelvis with ongoing symptoms-recommended she is seen by GI.  Referral was sent.    Will follow-up with staff message recently sent stating that GI referral was denied as patient has an [place] [facility] provider.  Patient states that she currently has no [place] [facility] provider.     Flu shot received 9/27/2023  COVID-vaccine not recommended at this time-consider receiving it in a month if desired.

## 2023-09-28 LAB — BACTERIA SPEC CULT: NO GROWTH

## 2023-10-02 ENCOUNTER — TRANSFERRED RECORDS (OUTPATIENT)
Dept: HEALTH INFORMATION MANAGEMENT | Facility: CLINIC | Age: 88
End: 2023-10-02
Payer: MEDICARE

## 2023-10-03 ENCOUNTER — TELEPHONE (OUTPATIENT)
Dept: ONCOLOGY | Facility: CLINIC | Age: 88
End: 2023-10-03
Payer: MEDICARE

## 2023-10-03 DIAGNOSIS — C82.03 FOLLICULAR LYMPHOMA GRADE I OF INTRA-ABDOMINAL LYMPH NODES (H): Primary | ICD-10-CM

## 2023-10-03 NOTE — TELEPHONE ENCOUNTER
Patient is calling under the advisement of [facility] provider to schedule an appointment with Dr. Chip alanis for possible Lymphoma progression. Patient was seen by [facility] on 10/2/23. Please see care everywhere notes in Epic. Note copy/pasted below.    impression Overall this is a very nice but complicated woman presenting with little information that requires further evaluation. There is an abnormality on the CT scan showing central mesenteric haziness with nonenlarged lymph nodes. This represents either a panniculitis or infection versus recurrent lymphoma possibly. We discussed seeing the oncologist to see if further evaluation of lymphoma would be required. Colonoscopy could be performed. At this point, she is not having diarrhea or other symptoms that would indicate an infectious colitis. There is abnormality of the biliary system, likely representing reservoir effect but the question of a minimal mass effect from a periampullary diverticulum. She is not eager to pursue MRCP at this time given her allergies to dye. The lichen changes require further evaluation.       Per schedule review, Dr. Saunders does not have availability this month. Routing to care team for review and follow up. Patient advised she would get a call back with update.    Marie Chiang, RN, BSN, PHN, OCN  [facility]

## 2023-10-04 NOTE — TELEPHONE ENCOUNTER
Images are in. Attempted to call radiology multiple times this afternoon to request comparison.  Unable to reach radiologist today. Routing to care coordinator to try and request comparison.    Shauna Burnett RN on 10/4/2023 at 4:42 PM

## 2023-10-04 NOTE — TELEPHONE ENCOUNTER
Called [facility] imaging whom states they will push images now for 9/19/23 CT abdomen pelvis.    Hand delivered Outside Imaging Archiving Request Form to [facility] radiology department.

## 2023-10-04 NOTE — TELEPHONE ENCOUNTER
Would recommend the followin) PET scan.  2) NANDA visit to review after PET scan w/ labs (CBC, LDH)  3) Keep BJT appointment as planned.    Orders placed.    Bang Saunders MD.

## 2023-10-04 NOTE — TELEPHONE ENCOUNTER
Cancel that plan and modify to this plan:    1) Obtain outside [facility] imaging.  2) Obtain [facility] radiology re-read for comparison to our last scan.  3) NANDA visit after wards to review.    There is a chance that these are just stable changes.    Symptoms are also critical to assess.    Bang Saunders MD.

## 2023-10-09 ENCOUNTER — HOSPITAL ENCOUNTER (INPATIENT)
Dept: GENERAL RADIOLOGY | Facility: CLINIC | Age: 88
Discharge: HOME | End: 2023-10-09
Attending: INTERNAL MEDICINE
Payer: MEDICARE

## 2023-10-09 ENCOUNTER — TELEPHONE (OUTPATIENT)
Dept: OBGYN | Facility: CLINIC | Age: 88
End: 2023-10-09

## 2023-10-09 ENCOUNTER — OFFICE VISIT (OUTPATIENT)
Dept: OBGYN | Facility: CLINIC | Age: 88
End: 2023-10-09
Attending: FAMILY MEDICINE
Payer: MEDICARE

## 2023-10-09 VITALS — OXYGEN SATURATION: 97 % | DIASTOLIC BLOOD PRESSURE: 72 MMHG | SYSTOLIC BLOOD PRESSURE: 166 MMHG | HEART RATE: 62 BPM

## 2023-10-09 DIAGNOSIS — C82.03 FOLLICULAR LYMPHOMA GRADE I OF INTRA-ABDOMINAL LYMPH NODES (H): ICD-10-CM

## 2023-10-09 DIAGNOSIS — N90.4 LICHEN SCLEROSUS OF VULVA: ICD-10-CM

## 2023-10-09 DIAGNOSIS — N89.8 VAGINAL ITCHING: Primary | ICD-10-CM

## 2023-10-09 LAB
CLUE CELLS: ABNORMAL
TRICHOMONAS, WET PREP: ABNORMAL
WBC'S/HIGH POWER FIELD, WET PREP: ABNORMAL
YEAST, WET PREP: ABNORMAL

## 2023-10-09 PROCEDURE — 99204 OFFICE O/P NEW MOD 45 MIN: CPT | Performed by: OBSTETRICS & GYNECOLOGY

## 2023-10-09 PROCEDURE — 74176 CT ABD & PELVIS W/O CONTRAST: CPT | Mod: 26 | Performed by: RADIOLOGY

## 2023-10-09 PROCEDURE — 87210 SMEAR WET MOUNT SALINE/INK: CPT | Performed by: OBSTETRICS & GYNECOLOGY

## 2023-10-09 NOTE — PATIENT INSTRUCTIONS
To Schedule an Appointment 24/7  Call: [phone]    If you have any questions regarding your visit, Please contact your care team.  [facility]: [phone]  [facility] HOURS TELEPHONE NUMBER   Cephas Agbeh, M.D.      [facility] Ott-Surgery Scheduler  Renea-Surgery Scheduler         Monday - [place]:    8:00 am-4:45 pm  Tuesday - [place]:   8:00 am-4:45 pm  Friday-[place]:       8:00 am-4:45 pm  Typical Surgery Day:  Wednesday [facility]   [address]   [place] [address] [phone]   Fax [phone]    Imaging Scheduling all locations  [phone]      [facility]   [address] [place] [address] [address].   [place], [place] [address] [phone]    [facility]  [address] [facility] [address]  [phone]  Fax [phone]     Urgent Care locations:  [facility] Monday-Friday                               10 am - 8 pm  Saturday and Sunday                      9 am - 5 pm  Monday-Friday                              10 am- 8 pm  Saturday and Sunday                      9 am - 5 pm    [phone]   **Surgeries** Our Surgery Schedulers will contact you to schedule. If you do not receive a call within 3 business days, please call [phone].  If you need a medication refill, please contact your pharmacy. Please allow 3 business days for your refill to be completed.  As always, Thank you for trusting us with your healthcare needs!  see additional instructions from your care team below

## 2023-10-09 NOTE — TELEPHONE ENCOUNTER
[facility] Call Center    Phone Message    May a detailed message be left on voicemail: yes     Reason for Call: Other: Hilda is calling in asking to speak with Dr Agbeh or his team.   She saw her test results on mychart and noticed that her wbc is high.  She's wanting to know if she should be on an antibiotic or something due to this?  Please call her back to discuss.      Action Taken: Message routed to:  [facility] p [phone]    Travel Screening: Not Applicable

## 2023-10-09 NOTE — PROGRESS NOTES
Hilda is a 88 year old  referred here by Dr. MARIO for consultation regarding Lichen Sclerosus ..  She was placed on Temovate cream, about 4 weeks ago for presumed lichen sclerosis.  She had previously been on this medication and had worked very well for her..  Says the redness in the vulvar area has improved but the itching still persists..  Denies vaginal discharge.  ROS: Ten point review of systems was reviewed and negative except the above.    Gyne: - abn pap (last pap ), - STD's    Past Medical History:   Diagnosis Date    AR (allergic rhinitis)     Atrophic vaginitis     Chronic kidney disease, stage 3 (H) 2021    Compression fracture of L1 lumbar vertebra (H) 2015    Glaucoma (increased eye pressure)     HTN (hypertension)     Migraine     Nonsenile cataract     Osteoporosis     Reflux esophagitis     Thyroid disease      Past Surgical History:   Procedure Laterality Date    APPENDECTOMY OPEN      BLEPHAROPLASTY BILATERAL  10/2007    both eyes, upper lids    CHOLECYSTECTOMY, OPEN      COLONOSCOPY  , 10/13    Q 5 years, polyps    D & C      REPAIR PTOSIS      SALPINGO OOPHORECTOMY,R/L/LUIS DANIEL      left ovary and tube    SMALL BOWEL RESECTION      colon resection     TONSILLECTOMY & ADENOIDECTOMY      childhood    TUBAL LIGATION      UPPER GI ENDOSCOPY  ,     ZZC LENGTHEN,TENDON,HAND/FINGER      repair of dupytrons's contracture     Patient Active Problem List   Diagnosis    AR (allergic rhinitis)    Reflux Esophagitis    Osteoporosis    Atrophic vaginitis    CARDIOVASCULAR SCREENING; LDL GOAL LESS THAN 130    Advanced directives, counseling/discussion    GERD (gastroesophageal reflux disease)    History of blepharoplasty, upper lids, ou    Migraine    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)    Premature atrial beats    Compression fracture of L1 lumbar vertebra, seen on CT    Lung nodule < 6cm on CT    Dupuytren's contracture of right hand    Hypothyroidism,  unspecified type    Essential hypertension with goal blood pressure less than 140/90    Combined forms of age-related cataract, mild-mod, both eyes    Migraine without status migrainosus, not intractable, unspecified migraine type    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    Chronic kidney disease, stage 3 (H)    Epigastric discomfort    Rhinorrhea    Abdominal fullness    Posterior vitreous detachment of both eyes       ALL/Meds: Her medication and allergy histories were reviewed and are documented in their appropriate chart areas.    SH: - tob, - EtOH,     FH: Her family history was reviewed and documented in its appropriate chart area.    PE: BP (!) 166/72 (BP Location: Right arm, Patient Position: Chair, Cuff Size: Adult Regular)   Pulse 62   LMP 10/01/1986   SpO2 97%   Breastfeeding No   There is no height or weight on file to calculate BMI.    General Appearance:  healthy, alert, active, no distress  HEENT: NCAT  Abdomen: Soft, nontender.  Normal bowel sounds.  No masses  Pelvic:       - Ext: NEFG, very thin external genitalia.  The posterior fourchette with some erythema, however minimally tender.  This is the area that she says the itching is.  A wet prep was performed.       - Urethra: no lesions, laura masses, no hypermobility       - Urethral Meatus: normal appearance,        - Bladder: no tenderness, no masses       - Vagina: pale and atrophic. no discharge       - Cervix: no lesions, parous       - Uterus: small sized, AV/ mobile, normal contour       - Adnexa: no masses, no tenderness       - Rectal: deferred,        - Pelvic support: no cystocele, no rectocele, no uterine prolapse  Nurse present for exam.  A/P    ICD-10-CM    1. Vaginal itching  N89.8 Wet prep - Clinic Collect      2. Lichen sclerosus of vulva  N90.4 Ob/Gyn Referral     Wet prep - Clinic Collect        We discussed that lichen sclerosus can be a chronic condition.  Unable to discern whether this new symptoms are due to lichen  sclerosis.  This is due to the partial treatment.  I recommended her to continue with the treatment for another 1 month.  At that time we will biopsy if symptoms persist.  May also consider treatment with estrogen for possible atrophic vaginitis..    Total time preparing to see patient with reviewing prior encounter and labs, face to face time,  and coordinating care on the same calendar date: 45 mins      CEPHAS AGBEH, MD.

## 2023-10-09 NOTE — TELEPHONE ENCOUNTER
"Per today's, 10/9, wet prep result note:  \"All of your labs were normal for you\".    There are positive WBCs seen on wet prep today.    Contacted pt to let her know this is a non-significant finding and does not indicate infection and does not need a medication for this.     Pt verbalized understanding.    Rhiannon Garcia RN    "

## 2023-10-12 ENCOUNTER — TELEPHONE (OUTPATIENT)
Dept: GASTROENTEROLOGY | Facility: CLINIC | Age: 88
End: 2023-10-12
Payer: MEDICARE

## 2023-10-12 NOTE — TELEPHONE ENCOUNTER
Confirmed with Radiologist that read and comparison of outside imaging will be completed prior to visit on 10/17/2023.      Daly Leon RN

## 2023-10-12 NOTE — TELEPHONE ENCOUNTER
received a message from Susan OROSCO to help get Pt scheduled for a new GI appointment with an NANDA, ok'd per Dr. Montiel. Writer called and talked with Pt. Pt is unsure why Pt needs another GI appointment when Pt was seen at [facility]. Pt would like to talk with the referring provider before scheduling an appointment.      will send a message update to Susan OROSCO.

## 2023-10-18 ENCOUNTER — ONCOLOGY VISIT (OUTPATIENT)
Dept: ONCOLOGY | Facility: CLINIC | Age: 88
End: 2023-10-18
Payer: MEDICARE

## 2023-10-18 ENCOUNTER — LAB (OUTPATIENT)
Dept: LAB | Facility: CLINIC | Age: 88
End: 2023-10-18
Payer: MEDICARE

## 2023-10-18 VITALS
OXYGEN SATURATION: 96 % | BODY MASS INDEX: 23.92 KG/M2 | SYSTOLIC BLOOD PRESSURE: 172 MMHG | DIASTOLIC BLOOD PRESSURE: 71 MMHG | HEIGHT: 63 IN | RESPIRATION RATE: 16 BRPM | WEIGHT: 135 LBS | HEART RATE: 66 BPM | TEMPERATURE: 97.8 F

## 2023-10-18 DIAGNOSIS — C82.03 FOLLICULAR LYMPHOMA GRADE I OF INTRA-ABDOMINAL LYMPH NODES (H): Primary | ICD-10-CM

## 2023-10-18 DIAGNOSIS — C82.03 FOLLICULAR LYMPHOMA GRADE I OF INTRA-ABDOMINAL LYMPH NODES (H): ICD-10-CM

## 2023-10-18 DIAGNOSIS — L90.0 LICHEN SCLEROSUS: ICD-10-CM

## 2023-10-18 DIAGNOSIS — R59.0 MESENTERIC LYMPHADENOPATHY: ICD-10-CM

## 2023-10-18 LAB
ALBUMIN SERPL BCG-MCNC: 3.9 G/DL (ref 3.5–5.2)
ALP SERPL-CCNC: 61 U/L (ref 35–104)
ALT SERPL W P-5'-P-CCNC: 13 U/L (ref 0–50)
ANION GAP SERPL CALCULATED.3IONS-SCNC: 10 MMOL/L (ref 7–15)
AST SERPL W P-5'-P-CCNC: 26 U/L (ref 0–45)
BASO+EOS+MONOS # BLD AUTO: NORMAL 10*3/UL
BASO+EOS+MONOS NFR BLD AUTO: NORMAL %
BASOPHILS # BLD AUTO: 0 10E3/UL (ref 0–0.2)
BASOPHILS NFR BLD AUTO: 0 %
BILIRUB SERPL-MCNC: 0.6 MG/DL
BUN SERPL-MCNC: 19.5 MG/DL (ref 8–23)
CALCIUM SERPL-MCNC: 9.1 MG/DL (ref 8.8–10.2)
CHLORIDE SERPL-SCNC: 97 MMOL/L (ref 98–107)
CREAT SERPL-MCNC: 0.86 MG/DL (ref 0.51–0.95)
DEPRECATED HCO3 PLAS-SCNC: 29 MMOL/L (ref 22–29)
EGFRCR SERPLBLD CKD-EPI 2021: 65 ML/MIN/1.73M2
EOSINOPHIL # BLD AUTO: 0.1 10E3/UL (ref 0–0.7)
EOSINOPHIL NFR BLD AUTO: 1 %
ERYTHROCYTE [DISTWIDTH] IN BLOOD BY AUTOMATED COUNT: 12.9 % (ref 10–15)
GLUCOSE SERPL-MCNC: 124 MG/DL (ref 70–99)
HCT VFR BLD AUTO: 38.5 % (ref 35–47)
HGB BLD-MCNC: 12.9 G/DL (ref 11.7–15.7)
IMM GRANULOCYTES # BLD: 0 10E3/UL
IMM GRANULOCYTES NFR BLD: 0 %
LDH SERPL L TO P-CCNC: 208 U/L (ref 0–250)
LYMPHOCYTES # BLD AUTO: 0.9 10E3/UL (ref 0.8–5.3)
LYMPHOCYTES NFR BLD AUTO: 19 %
MCH RBC QN AUTO: 29.8 PG (ref 26.5–33)
MCHC RBC AUTO-ENTMCNC: 33.5 G/DL (ref 31.5–36.5)
MCV RBC AUTO: 89 FL (ref 78–100)
MONOCYTES # BLD AUTO: 0.4 10E3/UL (ref 0–1.3)
MONOCYTES NFR BLD AUTO: 9 %
NEUTROPHILS # BLD AUTO: 3.5 10E3/UL (ref 1.6–8.3)
NEUTROPHILS NFR BLD AUTO: 71 %
NRBC # BLD AUTO: 0 10E3/UL
NRBC BLD AUTO-RTO: 0 /100
PLATELET # BLD AUTO: 182 10E3/UL (ref 150–450)
POTASSIUM SERPL-SCNC: 3.8 MMOL/L (ref 3.4–5.3)
PROT SERPL-MCNC: 6.5 G/DL (ref 6.4–8.3)
RBC # BLD AUTO: 4.33 10E6/UL (ref 3.8–5.2)
SODIUM SERPL-SCNC: 136 MMOL/L (ref 135–145)
WBC # BLD AUTO: 5 10E3/UL (ref 4–11)

## 2023-10-18 PROCEDURE — 36415 COLL VENOUS BLD VENIPUNCTURE: CPT

## 2023-10-18 PROCEDURE — 99214 OFFICE O/P EST MOD 30 MIN: CPT | Performed by: INTERNAL MEDICINE

## 2023-10-18 PROCEDURE — 80053 COMPREHEN METABOLIC PANEL: CPT

## 2023-10-18 PROCEDURE — 85025 COMPLETE CBC W/AUTO DIFF WBC: CPT

## 2023-10-18 PROCEDURE — 83615 LACTATE (LD) (LDH) ENZYME: CPT

## 2023-10-18 ASSESSMENT — PAIN SCALES - GENERAL: PAINLEVEL: MILD PAIN (3)

## 2023-10-18 NOTE — NURSING NOTE
"Oncology Rooming Note    October 18, 2023 2:30 PM   Hilda Potter is a 88 year old female who presents for:    Chief Complaint   Patient presents with    Oncology Clinic Visit     Follow up     Initial Vitals: BP (!) 172/71 (BP Location: Left arm)   Pulse 66   Temp 97.8  F (36.6  C) (Oral)   Resp 16   Ht 1.605 m (5' 3.19\")   Wt 61.2 kg (135 lb)   LMP 10/01/1986   SpO2 96%   BMI 23.77 kg/m   Estimated body mass index is 23.77 kg/m  as calculated from the following:    Height as of this encounter: 1.605 m (5' 3.19\").    Weight as of this encounter: 61.2 kg (135 lb). Body surface area is 1.65 meters squared.  Mild Pain (3) Comment: Data Unavailable   Patient's last menstrual period was 10/01/1986.  Allergies reviewed: Yes  Medications reviewed: Yes    Medications: Medication refills not needed today.  Pharmacy name entered into [employer]: [id number] - [address] AT [facility]    Clinical concerns: lower abdominal discomfort for a month or so. Reports night sweats for several days a few months ago, since resolved.        Tanya Conner LPN              "

## 2023-10-18 NOTE — LETTER
10/18/2023         RE: Hilda Potter  [address]  [facility] [address]        Dear Colleague,    Thank you for referring your patient, Hilda Potter, to the [facility]. Please see a copy of my visit note below.    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  # Sep 2012 - ongoing Surveillance.    Would support colonoscopy to ensure no other luminal causes for her ongoing abdominal pelvic symptoms.  If colonoscopy reassuring, I do suspect clinically that we are starting to see the signs of recurrent follicular lymphoma with increased mesenteric burden when measured radiographically over many years likely starting to impact bowels and starting to have some symptoms we reviewed serial CAT scans over time.  Given great tolerance of prior rituximab and excellent long-term disease control, we agreed that if colonoscopy reassuring we will look at rituximab single agent for 4 weeks; we agreed to hold off on a PET scan.  Separately, lichen sclerosis should be helped w/ rituximab (if it is lichen sclerosis).    Bang Saunders MD    --------------------    Interval History:  Hilda returns for follow up of follicular lymphoma.  All in all, she has been increasingly fatigued.  She has been noticing increasing abdominal symptoms as well.  They are not terrible or terribly acute, but they are more of a nuisance and increasing in frequency and severity.   "She has plans for a colonoscopy.  She was having some sweats in the past but this subsided and went away.  Some of her symptomatology feels similar to her prior presentation at diagnosis.    --------------------    Physical Exam:  VS: BP (!) 172/71 (BP Location: Left arm)   Pulse 66   Temp 97.8  F (36.6  C) (Oral)   Resp 16   Ht 1.605 m (5' 3.19\")   Wt 61.2 kg (135 lb)   LMP 10/01/1986   SpO2 96%   BMI 23.77 kg/m  .  GEN: Well appearing.    Labs / Imaging:  Reviewed CBC, LDH, CMP.  Reviewed CT abdomen pelvis w/ independent review.      Again, thank you for allowing me to participate in the care of your patient.        Sincerely,        Bang Saunders MD  "

## 2023-10-19 NOTE — PROGRESS NOTES
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  # Sep 2012 - ongoing Surveillance.    Would support colonoscopy to ensure no other luminal causes for her ongoing abdominal pelvic symptoms.  If colonoscopy reassuring, I do suspect clinically that we are starting to see the signs of recurrent follicular lymphoma with increased mesenteric burden when measured radiographically over many years likely starting to impact bowels and starting to have some symptoms we reviewed serial CAT scans over time.  Given great tolerance of prior rituximab and excellent long-term disease control, we agreed that if colonoscopy reassuring we will look at rituximab single agent for 4 weeks; we agreed to hold off on a PET scan.  Separately, lichen sclerosis should be helped w/ rituximab (if it is lichen sclerosis).    Bang Saunders MD    --------------------    Interval History:  Hilda returns for follow up of follicular lymphoma.  All in all, she has been increasingly fatigued.  She has been noticing increasing abdominal symptoms as well.  They are not terrible or terribly acute, but they are more of a nuisance and increasing in frequency and severity.  She has plans for a colonoscopy.  She was having some sweats in the past but this subsided and went away.  Some of her symptomatology feels similar to her prior presentation at diagnosis.    --------------------    Physical Exam:  VS: BP (!) 172/71 (BP Location: Left arm)   " Pulse 66   Temp 97.8  F (36.6  C) (Oral)   Resp 16   Ht 1.605 m (5' 3.19\")   Wt 61.2 kg (135 lb)   LMP 10/01/1986   SpO2 96%   BMI 23.77 kg/m  .  GEN: Well appearing.    Labs / Imaging:  Reviewed CBC, LDH, CMP.  Reviewed CT abdomen pelvis w/ independent review.  "

## 2023-10-19 NOTE — PATIENT INSTRUCTIONS
1) Please contact [facility] and encourage colonoscopy.  2) Rituximab weekly x 4 weeks after completing colonoscopy.  3) Provider visit weeks 1, 3.  4) BJT MG 3 months after completing rituximab w/ CT CAP and labs (CBC, LDH).    Bang Saunders MD.

## 2023-10-25 ENCOUNTER — PATIENT OUTREACH (OUTPATIENT)
Dept: ONCOLOGY | Facility: CLINIC | Age: 88
End: 2023-10-25
Payer: MEDICARE

## 2023-10-25 NOTE — PROGRESS NOTES
Patient calling to see if Dr. Saunders has contacted Dr. Mojica at [facility] to arrange for a colonoscopy.  No documentation noted that provider has reached out to [facility].      Contacted [facility], informed nurse of Dr. Mojica that Dr. Saunders is recommending colonoscopy to rule out luminal causes of patient symptoms.  Patient is anxious to get this done as soon as possible, as Dr. Saunders is recommending treatment for lymphoma, but not until colonoscopy is completed.  Colonoscopy order had been placed with [facility], they will reach out to patient to schedule.      Patient contacted, informed that [facility] should be reaching out to schedule, may contact them if she does not hear as an order is in place.     Daly Leon RN

## 2023-10-27 NOTE — PROGRESS NOTES
Colonoscopy is scheduled for 12/12/2023 at the [facility].  Patient wondering if Dr. Saunders feels this time frame is acceptable, or if she could pursue treatment start prior to this date.      Patient aware that Dr. Saunders will return to office next week.     Daly Leon RN

## 2023-10-28 NOTE — TELEPHONE ENCOUNTER
Really would encourage [facility] to push colonoscopy up ASAP so we can get going w/ treatment.    Bang Saunders MD.

## 2023-10-30 ENCOUNTER — OFFICE VISIT (OUTPATIENT)
Dept: OPHTHALMOLOGY | Facility: CLINIC | Age: 88
End: 2023-10-30
Payer: MEDICARE

## 2023-10-30 DIAGNOSIS — H40.1131 PRIMARY OPEN ANGLE GLAUCOMA (POAG) OF BOTH EYES, MILD STAGE: Primary | ICD-10-CM

## 2023-10-30 PROCEDURE — 92083 EXTENDED VISUAL FIELD XM: CPT | Performed by: OPHTHALMOLOGY

## 2023-10-30 PROCEDURE — 92012 INTRM OPH EXAM EST PATIENT: CPT | Performed by: OPHTHALMOLOGY

## 2023-10-30 PROCEDURE — 92133 CPTRZD OPH DX IMG PST SGM ON: CPT | Performed by: OPHTHALMOLOGY

## 2023-10-30 ASSESSMENT — EXTERNAL EXAM - LEFT EYE: OS_EXAM: NORMAL

## 2023-10-30 ASSESSMENT — CONF VISUAL FIELD
OD_INFERIOR_TEMPORAL_RESTRICTION: 0
OD_SUPERIOR_TEMPORAL_RESTRICTION: 0
OD_SUPERIOR_NASAL_RESTRICTION: 0
OD_INFERIOR_NASAL_RESTRICTION: 0

## 2023-10-30 ASSESSMENT — VISUAL ACUITY
CORRECTION_TYPE: GLASSES
OS_CC: 20/25
OD_CC: 20/25
METHOD: SNELLEN - LINEAR

## 2023-10-30 ASSESSMENT — EXTERNAL EXAM - RIGHT EYE: OD_EXAM: NORMAL

## 2023-10-30 ASSESSMENT — TONOMETRY
OD_IOP_MMHG: 18
IOP_METHOD: APPLANATION
OS_IOP_MMHG: 17

## 2023-10-30 NOTE — PROGRESS NOTES
Current Eye Medications:  Timolol every morning both eyes - used this morning at 8:30 am.  Latanoprost (green top) every evening both eyes - used last night at 10 pm     Subjective:  6 month follow up for an intraocular pressure check, glaucoma OCT, retinal OCT, and Enciso Visual Field. Vision is doing well both eyes in the distance and near. If patient reads for long periods of time, causes migraines. Can read newspaper OK, but if reading a book would bring on headache.      Objective:  See Ophthalmology Exam.       Assessment:  Slight worsening of glaucoma OCT and Enciso Visual Field left eye in patient with mild open angle glaucoma.      Plan:  Continue:   Latanoprost (green top) every evening both eyes.    Timolol (yellow top) twice daily both eyes about 12 hours apart.    Wait at least 5 minutes between drops if using more than one at a time.     Return visit in 6 months for a complete exam.  Could consider combo drop.     Andi Bay M.D.  [phone]

## 2023-10-30 NOTE — PATIENT INSTRUCTIONS
Continue:   Latanoprost (green top) every evening both eyes.    Timolol (yellow top) twice daily both eyes about 12 hours apart.    Wait at least 5 minutes between drops if using more than one at a time.     Return visit in 6 months for a complete exam.     Andi Bay M.D.  [phone]

## 2023-10-30 NOTE — LETTER
10/30/2023         RE: Hilda Potter  [address]  [place] [address]        Dear Colleague,    Thank you for referring your patient, Hilda Potter, to the [facility]. Please see a copy of my visit note below.     Current Eye Medications:  Timolol every morning both eyes - used this morning at 8:30 am.  Latanoprost (green top) every evening both eyes - used last night at 10 pm     Subjective:  6 month follow up for an intraocular pressure check, glaucoma OCT, retinal OCT, and Enciso Visual Field. Vision is doing well both eyes in the distance and near. If patient reads for long periods of time, causes migraines. Can read newspaper OK, but if reading a book would bring on headache.      Objective:  See Ophthalmology Exam.       Assessment:  Slight worsening of glaucoma OCT and Enciso Visual Field left eye in patient with mild open angle glaucoma.      Plan:  Continue:   Latanoprost (green top) every evening both eyes.    Timolol (yellow top) twice daily both eyes about 12 hours apart.    Wait at least 5 minutes between drops if using more than one at a time.     Return visit in 6 months for a complete exam.  Could consider combo drop.     Andi Bay M.D.  [phone]         Again, thank you for allowing me to participate in the care of your patient.        Sincerely,        Andi Bay MD

## 2023-11-08 NOTE — PROGRESS NOTES
[place] had informed patient that due to her age she was required to do the colonoscopy in a hospital setting.  She was initially informed that the first available colonoscopy date was February 2024.  They did manage to get the procedure moved up to December 2023.  Contacted [facility] to inquire when first available might be, they are currently scheduling into January 2024.      Message to scheduling team requesting that patient be contacted to arrange for treatment start following colonoscopy.     Daly Leon RN

## 2023-11-15 ENCOUNTER — LAB (OUTPATIENT)
Dept: LAB | Facility: CLINIC | Age: 88
End: 2023-11-15
Payer: MEDICARE

## 2023-11-15 DIAGNOSIS — C82.00 GRADE I FOLLICULAR SMALL CLEAVED CELL LYMPHOMA (H): Primary | ICD-10-CM

## 2023-11-15 LAB
BASOPHILS # BLD AUTO: 0 10E3/UL (ref 0–0.2)
BASOPHILS NFR BLD AUTO: 0 %
EOSINOPHIL # BLD AUTO: 0 10E3/UL (ref 0–0.7)
EOSINOPHIL NFR BLD AUTO: 1 %
ERYTHROCYTE [DISTWIDTH] IN BLOOD BY AUTOMATED COUNT: 13.1 % (ref 10–15)
HBV CORE AB SERPL QL IA: NONREACTIVE
HBV SURFACE AB SERPL IA-ACNC: 0.68 M[IU]/ML
HBV SURFACE AB SERPL IA-ACNC: NONREACTIVE M[IU]/ML
HBV SURFACE AG SERPL QL IA: NONREACTIVE
HCT VFR BLD AUTO: 39.2 % (ref 35–47)
HGB BLD-MCNC: 12.9 G/DL (ref 11.7–15.7)
IMM GRANULOCYTES # BLD: 0 10E3/UL
IMM GRANULOCYTES NFR BLD: 0 %
LYMPHOCYTES # BLD AUTO: 1.1 10E3/UL (ref 0.8–5.3)
LYMPHOCYTES NFR BLD AUTO: 21 %
MCH RBC QN AUTO: 29.5 PG (ref 26.5–33)
MCHC RBC AUTO-ENTMCNC: 32.9 G/DL (ref 31.5–36.5)
MCV RBC AUTO: 90 FL (ref 78–100)
MONOCYTES # BLD AUTO: 0.5 10E3/UL (ref 0–1.3)
MONOCYTES NFR BLD AUTO: 9 %
NEUTROPHILS # BLD AUTO: 3.6 10E3/UL (ref 1.6–8.3)
NEUTROPHILS NFR BLD AUTO: 69 %
NRBC # BLD AUTO: 0 10E3/UL
NRBC BLD AUTO-RTO: 0 /100
PLATELET # BLD AUTO: 161 10E3/UL (ref 150–450)
RBC # BLD AUTO: 4.38 10E6/UL (ref 3.8–5.2)
WBC # BLD AUTO: 5.3 10E3/UL (ref 4–11)

## 2023-11-15 PROCEDURE — 86704 HEP B CORE ANTIBODY TOTAL: CPT | Mod: GZ | Performed by: PHYSICIAN ASSISTANT

## 2023-11-15 PROCEDURE — 86706 HEP B SURFACE ANTIBODY: CPT | Mod: GZ | Performed by: PHYSICIAN ASSISTANT

## 2023-11-15 PROCEDURE — 85025 COMPLETE CBC W/AUTO DIFF WBC: CPT | Performed by: PHYSICIAN ASSISTANT

## 2023-11-15 PROCEDURE — 87340 HEPATITIS B SURFACE AG IA: CPT | Mod: GZ | Performed by: PHYSICIAN ASSISTANT

## 2023-11-15 PROCEDURE — 36415 COLL VENOUS BLD VENIPUNCTURE: CPT | Performed by: PHYSICIAN ASSISTANT

## 2023-11-15 RX ORDER — ACETAMINOPHEN 325 MG/1
650 TABLET ORAL ONCE
Status: CANCELLED
Start: 2023-12-07

## 2023-11-15 RX ORDER — ALBUTEROL SULFATE 0.83 MG/ML
2.5 SOLUTION RESPIRATORY (INHALATION)
Status: CANCELLED | OUTPATIENT
Start: 2023-11-30

## 2023-11-15 RX ORDER — METHYLPREDNISOLONE SODIUM SUCCINATE 125 MG/2ML
125 INJECTION, POWDER, LYOPHILIZED, FOR SOLUTION INTRAMUSCULAR; INTRAVENOUS
Status: CANCELLED
Start: 2023-12-12

## 2023-11-15 RX ORDER — EPINEPHRINE 1 MG/ML
0.3 INJECTION, SOLUTION INTRAMUSCULAR; SUBCUTANEOUS EVERY 5 MIN PRN
Status: CANCELLED | OUTPATIENT
Start: 2023-11-30

## 2023-11-15 RX ORDER — EPINEPHRINE 1 MG/ML
0.3 INJECTION, SOLUTION INTRAMUSCULAR; SUBCUTANEOUS EVERY 5 MIN PRN
Status: CANCELLED | OUTPATIENT
Start: 2023-12-14

## 2023-11-15 RX ORDER — HEPARIN SODIUM (PORCINE) LOCK FLUSH IV SOLN 100 UNIT/ML 100 UNIT/ML
5 SOLUTION INTRAVENOUS
Status: CANCELLED | OUTPATIENT
Start: 2023-11-30

## 2023-11-15 RX ORDER — METHYLPREDNISOLONE SODIUM SUCCINATE 125 MG/2ML
125 INJECTION, POWDER, LYOPHILIZED, FOR SOLUTION INTRAMUSCULAR; INTRAVENOUS
Status: CANCELLED
Start: 2023-11-30

## 2023-11-15 RX ORDER — MEPERIDINE HYDROCHLORIDE 25 MG/ML
25 INJECTION INTRAMUSCULAR; INTRAVENOUS; SUBCUTANEOUS
Status: CANCELLED
Start: 2023-11-21

## 2023-11-15 RX ORDER — MEPERIDINE HYDROCHLORIDE 25 MG/ML
25 INJECTION INTRAMUSCULAR; INTRAVENOUS; SUBCUTANEOUS EVERY 30 MIN PRN
Status: CANCELLED | OUTPATIENT
Start: 2023-12-14

## 2023-11-15 RX ORDER — ALBUTEROL SULFATE 90 UG/1
1-2 AEROSOL, METERED RESPIRATORY (INHALATION)
Status: CANCELLED
Start: 2023-11-30

## 2023-11-15 RX ORDER — DIPHENHYDRAMINE HCL 25 MG
50 CAPSULE ORAL ONCE
Status: CANCELLED
Start: 2023-11-21

## 2023-11-15 RX ORDER — DIPHENHYDRAMINE HYDROCHLORIDE 50 MG/ML
50 INJECTION INTRAMUSCULAR; INTRAVENOUS
Status: CANCELLED
Start: 2023-11-21

## 2023-11-15 RX ORDER — HEPARIN SODIUM,PORCINE 10 UNIT/ML
5-20 VIAL (ML) INTRAVENOUS DAILY PRN
Status: CANCELLED | OUTPATIENT
Start: 2023-12-14

## 2023-11-15 RX ORDER — MEPERIDINE HYDROCHLORIDE 25 MG/ML
25 INJECTION INTRAMUSCULAR; INTRAVENOUS; SUBCUTANEOUS
Status: CANCELLED
Start: 2023-12-14

## 2023-11-15 RX ORDER — EPINEPHRINE 1 MG/ML
0.3 INJECTION, SOLUTION INTRAMUSCULAR; SUBCUTANEOUS EVERY 5 MIN PRN
Status: CANCELLED | OUTPATIENT
Start: 2023-11-21

## 2023-11-15 RX ORDER — ALBUTEROL SULFATE 90 UG/1
1-2 AEROSOL, METERED RESPIRATORY (INHALATION)
Status: CANCELLED
Start: 2023-11-21

## 2023-11-15 RX ORDER — ALBUTEROL SULFATE 90 UG/1
1-2 AEROSOL, METERED RESPIRATORY (INHALATION)
Status: CANCELLED
Start: 2023-12-14

## 2023-11-15 RX ORDER — MEPERIDINE HYDROCHLORIDE 25 MG/ML
25 INJECTION INTRAMUSCULAR; INTRAVENOUS; SUBCUTANEOUS EVERY 30 MIN PRN
Status: CANCELLED | OUTPATIENT
Start: 2023-12-07

## 2023-11-15 RX ORDER — DIPHENHYDRAMINE HCL 25 MG
50 CAPSULE ORAL ONCE
Status: CANCELLED
Start: 2023-12-14

## 2023-11-15 RX ORDER — METHYLPREDNISOLONE SODIUM SUCCINATE 125 MG/2ML
125 INJECTION, POWDER, LYOPHILIZED, FOR SOLUTION INTRAMUSCULAR; INTRAVENOUS
Status: CANCELLED
Start: 2023-12-05

## 2023-11-15 RX ORDER — HEPARIN SODIUM (PORCINE) LOCK FLUSH IV SOLN 100 UNIT/ML 100 UNIT/ML
5 SOLUTION INTRAVENOUS
Status: CANCELLED | OUTPATIENT
Start: 2023-12-14

## 2023-11-15 RX ORDER — HEPARIN SODIUM,PORCINE 10 UNIT/ML
5-20 VIAL (ML) INTRAVENOUS DAILY PRN
Status: CANCELLED | OUTPATIENT
Start: 2023-11-30

## 2023-11-15 RX ORDER — DIPHENHYDRAMINE HYDROCHLORIDE 50 MG/ML
50 INJECTION INTRAMUSCULAR; INTRAVENOUS
Status: CANCELLED
Start: 2023-12-07

## 2023-11-15 RX ORDER — DIPHENHYDRAMINE HYDROCHLORIDE 50 MG/ML
50 INJECTION INTRAMUSCULAR; INTRAVENOUS
Status: CANCELLED
Start: 2023-12-14

## 2023-11-15 RX ORDER — METHYLPREDNISOLONE SODIUM SUCCINATE 125 MG/2ML
125 INJECTION, POWDER, LYOPHILIZED, FOR SOLUTION INTRAMUSCULAR; INTRAVENOUS
Status: CANCELLED
Start: 2023-11-28

## 2023-11-15 RX ORDER — METHYLPREDNISOLONE SODIUM SUCCINATE 125 MG/2ML
125 INJECTION, POWDER, LYOPHILIZED, FOR SOLUTION INTRAMUSCULAR; INTRAVENOUS
Status: CANCELLED
Start: 2023-12-14

## 2023-11-15 RX ORDER — DIPHENHYDRAMINE HYDROCHLORIDE 50 MG/ML
50 INJECTION INTRAMUSCULAR; INTRAVENOUS
Status: CANCELLED
Start: 2023-11-30

## 2023-11-15 RX ORDER — MEPERIDINE HYDROCHLORIDE 25 MG/ML
25 INJECTION INTRAMUSCULAR; INTRAVENOUS; SUBCUTANEOUS
Status: CANCELLED
Start: 2023-12-07

## 2023-11-15 RX ORDER — MEPERIDINE HYDROCHLORIDE 25 MG/ML
25 INJECTION INTRAMUSCULAR; INTRAVENOUS; SUBCUTANEOUS
Status: CANCELLED
Start: 2023-11-30

## 2023-11-15 RX ORDER — DIPHENHYDRAMINE HCL 25 MG
50 CAPSULE ORAL ONCE
Status: CANCELLED
Start: 2023-11-30

## 2023-11-15 RX ORDER — HEPARIN SODIUM,PORCINE 10 UNIT/ML
5-20 VIAL (ML) INTRAVENOUS DAILY PRN
Status: CANCELLED | OUTPATIENT
Start: 2023-12-07

## 2023-11-15 RX ORDER — DIPHENHYDRAMINE HCL 25 MG
50 CAPSULE ORAL ONCE
Status: CANCELLED
Start: 2023-12-07

## 2023-11-15 RX ORDER — METHYLPREDNISOLONE SODIUM SUCCINATE 125 MG/2ML
125 INJECTION, POWDER, LYOPHILIZED, FOR SOLUTION INTRAMUSCULAR; INTRAVENOUS
Status: CANCELLED
Start: 2023-11-21

## 2023-11-15 RX ORDER — HEPARIN SODIUM (PORCINE) LOCK FLUSH IV SOLN 100 UNIT/ML 100 UNIT/ML
5 SOLUTION INTRAVENOUS
Status: CANCELLED | OUTPATIENT
Start: 2023-11-21

## 2023-11-15 RX ORDER — EPINEPHRINE 1 MG/ML
0.3 INJECTION, SOLUTION INTRAMUSCULAR; SUBCUTANEOUS EVERY 5 MIN PRN
Status: CANCELLED | OUTPATIENT
Start: 2023-12-07

## 2023-11-15 RX ORDER — HEPARIN SODIUM,PORCINE 10 UNIT/ML
5-20 VIAL (ML) INTRAVENOUS DAILY PRN
Status: CANCELLED | OUTPATIENT
Start: 2023-11-21

## 2023-11-15 RX ORDER — ACETAMINOPHEN 325 MG/1
650 TABLET ORAL ONCE
Status: CANCELLED
Start: 2023-12-14

## 2023-11-15 RX ORDER — ALBUTEROL SULFATE 0.83 MG/ML
2.5 SOLUTION RESPIRATORY (INHALATION)
Status: CANCELLED | OUTPATIENT
Start: 2023-12-14

## 2023-11-15 RX ORDER — ALBUTEROL SULFATE 0.83 MG/ML
2.5 SOLUTION RESPIRATORY (INHALATION)
Status: CANCELLED | OUTPATIENT
Start: 2023-11-21

## 2023-11-15 RX ORDER — HEPARIN SODIUM (PORCINE) LOCK FLUSH IV SOLN 100 UNIT/ML 100 UNIT/ML
5 SOLUTION INTRAVENOUS
Status: CANCELLED | OUTPATIENT
Start: 2023-12-07

## 2023-11-15 RX ORDER — ALBUTEROL SULFATE 90 UG/1
1-2 AEROSOL, METERED RESPIRATORY (INHALATION)
Status: CANCELLED
Start: 2023-12-07

## 2023-11-15 RX ORDER — ALBUTEROL SULFATE 0.83 MG/ML
2.5 SOLUTION RESPIRATORY (INHALATION)
Status: CANCELLED | OUTPATIENT
Start: 2023-12-07

## 2023-11-15 RX ORDER — MEPERIDINE HYDROCHLORIDE 25 MG/ML
25 INJECTION INTRAMUSCULAR; INTRAVENOUS; SUBCUTANEOUS EVERY 30 MIN PRN
Status: CANCELLED | OUTPATIENT
Start: 2023-11-21

## 2023-11-15 RX ORDER — MEPERIDINE HYDROCHLORIDE 25 MG/ML
25 INJECTION INTRAMUSCULAR; INTRAVENOUS; SUBCUTANEOUS EVERY 30 MIN PRN
Status: CANCELLED | OUTPATIENT
Start: 2023-11-30

## 2023-11-15 RX ORDER — METHYLPREDNISOLONE SODIUM SUCCINATE 125 MG/2ML
125 INJECTION, POWDER, LYOPHILIZED, FOR SOLUTION INTRAMUSCULAR; INTRAVENOUS
Status: CANCELLED
Start: 2023-12-07

## 2023-11-15 RX ORDER — ACETAMINOPHEN 325 MG/1
650 TABLET ORAL ONCE
Status: CANCELLED
Start: 2023-11-21

## 2023-11-15 RX ORDER — ACETAMINOPHEN 325 MG/1
650 TABLET ORAL ONCE
Status: CANCELLED
Start: 2023-11-30

## 2023-11-17 ENCOUNTER — PATIENT OUTREACH (OUTPATIENT)
Dept: ONCOLOGY | Facility: CLINIC | Age: 88
End: 2023-11-17
Payer: MEDICARE

## 2023-11-17 NOTE — PROGRESS NOTES
Received message from KWAN Allen  inquiring if patient is ok to move forward with treatment prior to completing colonoscopy.  Patient is on her schedule on Monday.  Patient had previously left a message requesting to be scheduled for her treatments.  Message had been sent to scheduling team requesting that patient be scheduled for treatment following completion of colonoscopy on 12/12/2023.      Patient contacted to see if colonoscopy had been moved up to allow for treatment to begin.  Patient stated that it had not, she thought that it had been cleared for her to move forward prior to colonoscopy because she was contacted to schedule.      Discussed with Dr. Saunders, who explained that he had requested treatment AFTER colonoscopy so that any possible GI concerns causing symptoms could be ruled out and a full, proper investigation be done.  However, being that patient is uncomfortable and the patient's GI provider ([facility]) is unable to complete her colonoscopy in a timely fashion, ok for patient to proceed as scheduled and keep colonoscopy as scheduled, as well.     Patient contacted, informed that Dr. Saunders has agreed that she may move forward with treatment.  Explained to patient that treatment may impact a complete investigation with colonoscopy - e.g. some things present at this time may resolve with treatment.  Did explain that she should also keep colonoscopy and proceed with that as scheduled, however.  Patient verbalizes understanding and feels this is the best way to proceed.  She is especially hopeful that her vaginal concerns will improve with treatment.      Daly Leon RN

## 2023-11-17 NOTE — PROGRESS NOTES
Virtual Visit Details    Type of service:  Telephone Visit   Phone call duration: 10 minutes     Oncology/Hematology Visit Note  Nov 20, 2023    Reason for Visit: Follow up of grade IV follicular lymphoma     History of Present Illness:   Nov 2011: presented with night sweats and lymphadenopathy. Negative infectious work-up.   April 2012: CT demonstrated multiple borderline mildly enlarged retroperitoneal and mesenteric lymph nodes, with the largest size of 1.5 cm. CT guided biopsy of intraabdominal lymph node c/w low grade follicular lymphoma. Flow cytometry showed kappa monotypic CD10 positive B cells. BMBx demonstrated 0.2% follicular lymphoma by flow cytometry.   Sept 2012: completed weekly Rituxan x 4, premed solumedrol  [id number]: Observation   October 2023: Increased abdominal adenopathy. Recommended colonoscopy, then Rituxan x 4    Interval History:  Hilda is called today. She is overall feeling well. Notes she still has some issues with gas and nausea with migraines. Otherwise abdominal symptoms are improved with less discomfort, normal bowel movements. No infectious symptoms. No night sweats. She tolerated Rituxan well in the past though does remember having a reaction to her first infusion and then they gave her meds each subsequent time to help.     Current Outpatient Medications   Medication Sig Dispense Refill    Aspirin-Acetaminophen-Caffeine (EXCEDRIN MIGRAINE PO) Take 1 tablet by mouth as needed      calcium carbonate-vitamin D (OS-FIDENCIO) 600-400 MG-UNIT chewable tablet Take 1 chew tab by mouth 2 times daily      carboxymethylcellulose (REFRESH PLUS) 0.5 % SOLN Place 1 drop into both eyes 3 times daily as needed for dry eyes      clobetasol (TEMOVATE) 0.05 % external cream Apply topically 2 times daily Sparingly to affected area x 1 week then 3 times a week thereafter for 2 weeks and taper done to once a week for maintenance. 45 g 0    hydrochlorothiazide (HYDRODIURIL) 25 MG tablet Take 1 tablet (25  mg) by mouth daily 90 tablet 3    latanoprost (XALATAN) 0.005 % ophthalmic solution Place 1 drop into both eyes At Bedtime 10 mL 3    levothyroxine (SYNTHROID/LEVOTHROID) 50 MCG tablet TAKE 1 TABLET(50 MCG) BY MOUTH DAILY 90 tablet 3    MELATONIN PO Take 3 mg by mouth nightly as needed       Multiple Vitamin (MULTI-VITAMIN) per tablet Take 1 tablet by mouth daily      sertraline (ZOLOFT) 25 MG tablet Take 1 tablet (25 mg) by mouth daily 90 tablet 1    SUMAtriptan (IMITREX) 50 MG tablet Take 1 tablet (50 mg) by mouth at onset of headache for migraine May repeat in 2 hours. Max 4 tablets/24 hours. 18 tablet 0    timolol maleate (TIMOPTIC) 0.5 % ophthalmic solution Place 1 drop into both eyes every morning Wait at least 5 minutes between drops if using more than one at a time. 5 mL 11       Past Medical History  Past Medical History:   Diagnosis Date    AR (allergic rhinitis)     Atrophic vaginitis     Chronic kidney disease, stage 3 (H) 7/14/2021    Compression fracture of L1 lumbar vertebra (H) 4/2015    Glaucoma (increased eye pressure)     HTN (hypertension)     Migraine     Nonsenile cataract     Osteoporosis     Reflux esophagitis     Thyroid disease      Past Surgical History:   Procedure Laterality Date    APPENDECTOMY OPEN  1954    BLEPHAROPLASTY BILATERAL  10/2007    both eyes, upper lids    CHOLECYSTECTOMY, OPEN  1992    COLONOSCOPY  6/02, 10/13    Q 5 years, polyps    D & C      REPAIR PTOSIS      SALPINGO OOPHORECTOMY,R/L/LUIS DANIEL      left ovary and tube    SMALL BOWEL RESECTION  1986    colon resection     TONSILLECTOMY & ADENOIDECTOMY      childhood    TUBAL LIGATION      UPPER GI ENDOSCOPY  6/02, 8/11    ZZC LENGTHEN,TENDON,HAND/FINGER  1993    repair of dupytrons's contracture     Allergies   Allergen Reactions    Diatrizoate Other (See Comments)    Zoster Vaccine Live Anaphylaxis    Bisphosphonates GI Disturbance     GI distress    Ivp Dye [Contrast Dye] Swelling    Lisinopril Cough    Losartan Hives      "But can tolerate Hyzaar.     Morphine Hives    Morphine Sulfate Nausea and Vomiting    Prilosec [Omeprazole] Hives    Latex Rash     Social History   Social History     Tobacco Use    Smoking status: Never     Passive exposure: Never    Smokeless tobacco: Never   Vaping Use    Vaping Use: Never used   Substance Use Topics    Alcohol use: Not Currently     Comment: wine occasionally    Drug use: No      Past medical history and social history were reviewed.    Physical Examination:  /69   Ht 1.6 m (5' 3\")   Wt 59.9 kg (132 lb)   LMP 10/01/1986   BMI 23.38 kg/m    Wt Readings from Last 10 Encounters:   10/18/23 61.2 kg (135 lb)   09/26/23 61.4 kg (135 lb 6.4 oz)   08/22/23 61.3 kg (135 lb 3.2 oz)   06/19/23 62 kg (136 lb 9.6 oz)   05/23/23 62.1 kg (137 lb)   03/30/23 63 kg (139 lb)   09/28/22 61.3 kg (135 lb 1.6 oz)   09/26/22 61.8 kg (136 lb 3.2 oz)   06/15/22 60.4 kg (133 lb 3.2 oz)   05/24/22 62 kg (136 lb 9.6 oz)     Unable to do physical exam 2/2 telephone visit. Well sounding in no distress. Normal speech and thought process. Good voice quality. No audible wheezing or cough.    Laboratory Data:   Latest Reference Range & Units 11/15/23 10:20   WBC 4.0 - 11.0 10e3/uL 5.3   Hemoglobin 11.7 - 15.7 g/dL 12.9   Hematocrit 35.0 - 47.0 % 39.2   Platelet Count 150 - 450 10e3/uL 161   RBC Count 3.80 - 5.20 10e6/uL 4.38   MCV 78 - 100 fL 90   MCH 26.5 - 33.0 pg 29.5   MCHC 31.5 - 36.5 g/dL 32.9   RDW 10.0 - 15.0 % 13.1   % Neutrophils % 69   % Lymphocytes % 21   % Monocytes % 9   % Eosinophils % 1   % Basophils % 0   Absolute Basophils 0.0 - 0.2 10e3/uL 0.0   Absolute Eosinophils 0.0 - 0.7 10e3/uL 0.0   Absolute Immature Granulocytes <=0.4 10e3/uL 0.0   Absolute Lymphocytes 0.8 - 5.3 10e3/uL 1.1   Absolute Monocytes 0.0 - 1.3 10e3/uL 0.5   % Immature Granulocytes % 0   Absolute Neutrophils 1.6 - 8.3 10e3/uL 3.6   Absolute NRBCs 10e3/uL 0.0   NRBCs per 100 WBC <1 /100 0      Latest Reference Range & Units " 11/15/23 10:20   Hep B Surface Agn Nonreactive  Nonreactive   Hepatitis B Core Kiersten Nonreactive  Nonreactive   Hepatitis B Surface Antibody Instrument Value <8.00 m[IU]/mL 0.68   Hepatitis B Surface Antibody  Nonreactive       Assessment and Plan:    # Follicular Lymphoma   Initially stage IVB (night sweats and marrow involvement) diagnosed in 2011 s/p Rituxan x 4. Had CR. Did require solumedrol pre-med. Now with increasing adenopathy on scans and abdominal symptoms, plan for repeating Rituxan x 4.  - Abdominal symptoms are improved. Discussed with Dr. Saunders and patient and will continue with treatment as planned. She has colonoscopy 12/12/23  - Baseline CBC and hepatitis studies acceptable to start treatment  - Given hx of reaction and patient worried about reaction will add Solumedrol pre-med as she did well with this in the past. Discussed with pharmacy  - Will see NANDA in 2 weeks  - MD with scans in March     15 minutes spent on the date of the encounter doing chart review, review of test results, interpretation of tests, patient visit, documentation, and discussion with other provider(s)     Maulik Mckeon PA-C  Department of Hematology and Oncology  [facility]

## 2023-11-20 ENCOUNTER — VIRTUAL VISIT (OUTPATIENT)
Dept: ONCOLOGY | Facility: CLINIC | Age: 88
End: 2023-11-20
Attending: PHYSICIAN ASSISTANT
Payer: MEDICARE

## 2023-11-20 ENCOUNTER — PATIENT OUTREACH (OUTPATIENT)
Dept: ONCOLOGY | Facility: CLINIC | Age: 88
End: 2023-11-20
Payer: MEDICARE

## 2023-11-20 VITALS
SYSTOLIC BLOOD PRESSURE: 119 MMHG | WEIGHT: 132 LBS | HEIGHT: 63 IN | BODY MASS INDEX: 23.39 KG/M2 | DIASTOLIC BLOOD PRESSURE: 69 MMHG

## 2023-11-20 DIAGNOSIS — C82.00 GRADE I FOLLICULAR SMALL CLEAVED CELL LYMPHOMA (H): Primary | ICD-10-CM

## 2023-11-20 PROCEDURE — 99442 PR PHYSICIAN TELEPHONE EVALUATION 11-20 MIN: CPT | Mod: 95 | Performed by: PHYSICIAN ASSISTANT

## 2023-11-20 RX ORDER — METHYLPREDNISOLONE SODIUM SUCCINATE 125 MG/2ML
125 INJECTION, POWDER, LYOPHILIZED, FOR SOLUTION INTRAMUSCULAR; INTRAVENOUS ONCE
Status: CANCELLED
Start: 2023-11-30 | End: 2023-11-28

## 2023-11-20 RX ORDER — METHYLPREDNISOLONE SODIUM SUCCINATE 125 MG/2ML
125 INJECTION, POWDER, LYOPHILIZED, FOR SOLUTION INTRAMUSCULAR; INTRAVENOUS ONCE
Status: CANCELLED
Start: 2023-11-21 | End: 2023-11-21

## 2023-11-20 RX ORDER — METHYLPREDNISOLONE SODIUM SUCCINATE 125 MG/2ML
125 INJECTION, POWDER, LYOPHILIZED, FOR SOLUTION INTRAMUSCULAR; INTRAVENOUS ONCE
Status: CANCELLED
Start: 2023-12-14 | End: 2023-12-12

## 2023-11-20 RX ORDER — METHYLPREDNISOLONE SODIUM SUCCINATE 125 MG/2ML
125 INJECTION, POWDER, LYOPHILIZED, FOR SOLUTION INTRAMUSCULAR; INTRAVENOUS ONCE
Status: CANCELLED
Start: 2023-12-07 | End: 2023-12-05

## 2023-11-20 ASSESSMENT — PAIN SCALES - GENERAL: PAINLEVEL: NO PAIN (0)

## 2023-11-20 NOTE — NURSING NOTE
Is the patient currently in the state of [place]? YES    Visit mode:TELEPHONE    If the visit is dropped, the patient can be reconnected by: TELEPHONE VISIT: Phone number:   Telephone Information:   Mobile [phone]       Will anyone else be joining the visit? NO  (If patient encounters technical issues they should call [phone])    How would you like to obtain your AVS? MyChart    Are changes needed to the allergy or medication list? Pt declined allergy review and Pt declined med review    Patient declined individual allergy and medication review by support staff because patient denies any changes since echeck-in completion and states all information entered during echeck-in remains accurate.    Reason for visit: RECHECK    NANCY SwannF

## 2023-11-20 NOTE — LETTER
11/20/2023         RE: Hilda Potter  [address]  [place] [place] [address]        Dear Colleague,    Thank you for referring your patient, Hilda Potter, to the [facility]. Please see a copy of my visit note below.    Virtual Visit Details    Type of service:  Telephone Visit   Phone call duration: 10 minutes     Oncology/Hematology Visit Note  Nov 20, 2023    Reason for Visit: Follow up of grade IV follicular lymphoma     History of Present Illness:   Nov 2011: presented with night sweats and lymphadenopathy. Negative infectious work-up.   April 2012: CT demonstrated multiple borderline mildly enlarged retroperitoneal and mesenteric lymph nodes, with the largest size of 1.5 cm. CT guided biopsy of intraabdominal lymph node c/w low grade follicular lymphoma. Flow cytometry showed kappa monotypic CD10 positive B cells. BMBx demonstrated 0.2% follicular lymphoma by flow cytometry.   Sept 2012: completed weekly Rituxan x 4, premed solumedrol  [id number]: Observation   October 2023: Increased abdominal adenopathy. Recommended colonoscopy, then Rituxan x 4    Interval History:  Hilda is called today. She is overall feeling well. Notes she still has some issues with gas and nausea with migraines. Otherwise abdominal symptoms are improved with less discomfort, normal bowel movements. No infectious symptoms. No night sweats. She tolerated Rituxan well in the past though does remember having a reaction to her first infusion and then they gave her meds each subsequent time to help.     Current Outpatient Medications   Medication Sig Dispense Refill     Aspirin-Acetaminophen-Caffeine (EXCEDRIN MIGRAINE PO) Take 1 tablet by mouth as needed       calcium carbonate-vitamin D (OS-FIDENCIO) 600-400 MG-UNIT chewable tablet Take 1 chew tab by mouth 2 times daily       carboxymethylcellulose (REFRESH PLUS) 0.5 % SOLN Place 1 drop into both eyes 3 times daily as needed for dry eyes        clobetasol (TEMOVATE) 0.05 % external cream Apply topically 2 times daily Sparingly to affected area x 1 week then 3 times a week thereafter for 2 weeks and taper done to once a week for maintenance. 45 g 0     hydrochlorothiazide (HYDRODIURIL) 25 MG tablet Take 1 tablet (25 mg) by mouth daily 90 tablet 3     latanoprost (XALATAN) 0.005 % ophthalmic solution Place 1 drop into both eyes At Bedtime 10 mL 3     levothyroxine (SYNTHROID/LEVOTHROID) 50 MCG tablet TAKE 1 TABLET(50 MCG) BY MOUTH DAILY 90 tablet 3     MELATONIN PO Take 3 mg by mouth nightly as needed        Multiple Vitamin (MULTI-VITAMIN) per tablet Take 1 tablet by mouth daily       sertraline (ZOLOFT) 25 MG tablet Take 1 tablet (25 mg) by mouth daily 90 tablet 1     SUMAtriptan (IMITREX) 50 MG tablet Take 1 tablet (50 mg) by mouth at onset of headache for migraine May repeat in 2 hours. Max 4 tablets/24 hours. 18 tablet 0     timolol maleate (TIMOPTIC) 0.5 % ophthalmic solution Place 1 drop into both eyes every morning Wait at least 5 minutes between drops if using more than one at a time. 5 mL 11       Past Medical History  Past Medical History:   Diagnosis Date     AR (allergic rhinitis)      Atrophic vaginitis      Chronic kidney disease, stage 3 (H) 7/14/2021     Compression fracture of L1 lumbar vertebra (H) 4/2015     Glaucoma (increased eye pressure)      HTN (hypertension)      Migraine      Nonsenile cataract      Osteoporosis      Reflux esophagitis      Thyroid disease      Past Surgical History:   Procedure Laterality Date     APPENDECTOMY OPEN  1954     BLEPHAROPLASTY BILATERAL  10/2007    both eyes, upper lids     CHOLECYSTECTOMY, OPEN  1992     COLONOSCOPY  6/02, 10/13    Q 5 years, polyps     D & C       REPAIR PTOSIS       SALPINGO OOPHORECTOMY,R/L/LUIS DANIEL      left ovary and tube     SMALL BOWEL RESECTION  1986    colon resection      TONSILLECTOMY & ADENOIDECTOMY      childhood     TUBAL LIGATION       UPPER GI ENDOSCOPY  6/02, 8/11      "ZZC LENGTHEN,TENDON,HAND/FINGER  1993    repair of dupytrons's contracture     Allergies   Allergen Reactions     Diatrizoate Other (See Comments)     Zoster Vaccine Live Anaphylaxis     Bisphosphonates GI Disturbance     GI distress     Ivp Dye [Contrast Dye] Swelling     Lisinopril Cough     Losartan Hives     But can tolerate Hyzaar.      Morphine Hives     Morphine Sulfate Nausea and Vomiting     Prilosec [Omeprazole] Hives     Latex Rash     Social History   Social History     Tobacco Use     Smoking status: Never     Passive exposure: Never     Smokeless tobacco: Never   Vaping Use     Vaping Use: Never used   Substance Use Topics     Alcohol use: Not Currently     Comment: wine occasionally     Drug use: No      Past medical history and social history were reviewed.    Physical Examination:  /69   Ht 1.6 m (5' 3\")   Wt 59.9 kg (132 lb)   LMP 10/01/1986   BMI 23.38 kg/m    Wt Readings from Last 10 Encounters:   10/18/23 61.2 kg (135 lb)   09/26/23 61.4 kg (135 lb 6.4 oz)   08/22/23 61.3 kg (135 lb 3.2 oz)   06/19/23 62 kg (136 lb 9.6 oz)   05/23/23 62.1 kg (137 lb)   03/30/23 63 kg (139 lb)   09/28/22 61.3 kg (135 lb 1.6 oz)   09/26/22 61.8 kg (136 lb 3.2 oz)   06/15/22 60.4 kg (133 lb 3.2 oz)   05/24/22 62 kg (136 lb 9.6 oz)     Unable to do physical exam 2/2 telephone visit. Well sounding in no distress. Normal speech and thought process. Good voice quality. No audible wheezing or cough.    Laboratory Data:   Latest Reference Range & Units 11/15/23 10:20   WBC 4.0 - 11.0 10e3/uL 5.3   Hemoglobin 11.7 - 15.7 g/dL 12.9   Hematocrit 35.0 - 47.0 % 39.2   Platelet Count 150 - 450 10e3/uL 161   RBC Count 3.80 - 5.20 10e6/uL 4.38   MCV 78 - 100 fL 90   MCH 26.5 - 33.0 pg 29.5   MCHC 31.5 - 36.5 g/dL 32.9   RDW 10.0 - 15.0 % 13.1   % Neutrophils % 69   % Lymphocytes % 21   % Monocytes % 9   % Eosinophils % 1   % Basophils % 0   Absolute Basophils 0.0 - 0.2 10e3/uL 0.0   Absolute Eosinophils 0.0 - 0.7 " 10e3/uL 0.0   Absolute Immature Granulocytes <=0.4 10e3/uL 0.0   Absolute Lymphocytes 0.8 - 5.3 10e3/uL 1.1   Absolute Monocytes 0.0 - 1.3 10e3/uL 0.5   % Immature Granulocytes % 0   Absolute Neutrophils 1.6 - 8.3 10e3/uL 3.6   Absolute NRBCs 10e3/uL 0.0   NRBCs per 100 WBC <1 /100 0      Latest Reference Range & Units 11/15/23 10:20   Hep B Surface Agn Nonreactive  Nonreactive   Hepatitis B Core Kiersten Nonreactive  Nonreactive   Hepatitis B Surface Antibody Instrument Value <8.00 m[IU]/mL 0.68   Hepatitis B Surface Antibody  Nonreactive       Assessment and Plan:    # Follicular Lymphoma   Initially stage IVB (night sweats and marrow involvement) diagnosed in 2011 s/p Rituxan x 4. Had CR. Did require solumedrol pre-med. Now with increasing adenopathy on scans and abdominal symptoms, plan for repeating Rituxan x 4.  - Abdominal symptoms are improved. Discussed with Dr. Saunders and patient and will continue with treatment as planned. She has colonoscopy 12/12/23  - Baseline CBC and hepatitis studies acceptable to start treatment  - Given hx of reaction and patient worried about reaction will add Solumedrol pre-med as she did well with this in the past. Discussed with pharmacy  - Will see NANDA in 2 weeks  - MD with scans in March     15 minutes spent on the date of the encounter doing chart review, review of test results, interpretation of tests, patient visit, documentation, and discussion with other provider(s)     Maulik Mckeon PA-C  Department of Hematology and Oncology  [facility]       Again, thank you for allowing me to participate in the care of your patient.        Sincerely,        KWAN Garcia

## 2023-11-20 NOTE — LETTER
11/20/2023         RE: Hilda Potter  [address]  [facility] [address]        Dear Colleague,    Thank you for referring your patient, Hilda Potter, to the [facility]. Please see a copy of my visit note below.    Virtual Visit Details    Type of service:  Telephone Visit   Phone call duration: 10 minutes     Oncology/Hematology Visit Note  Nov 20, 2023    Reason for Visit: Follow up of grade IV follicular lymphoma     History of Present Illness:   Nov 2011: presented with night sweats and lymphadenopathy. Negative infectious work-up.   April 2012: CT demonstrated multiple borderline mildly enlarged retroperitoneal and mesenteric lymph nodes, with the largest size of 1.5 cm. CT guided biopsy of intraabdominal lymph node c/w low grade follicular lymphoma. Flow cytometry showed kappa monotypic CD10 positive B cells. BMBx demonstrated 0.2% follicular lymphoma by flow cytometry.   Sept 2012: completed weekly Rituxan x 4, premed solumedrol  [id number]: Observation   October 2023: Increased abdominal adenopathy. Recommended colonoscopy, then Rituxan x 4    Interval History:  Hilda is called today. She is overall feeling well. Notes she still has some issues with gas and nausea with migraines. Otherwise abdominal symptoms are improved with less discomfort, normal bowel movements. No infectious symptoms. No night sweats. She tolerated Rituxan well in the past though does remember having a reaction to her first infusion and then they gave her meds each subsequent time to help.     Current Outpatient Medications   Medication Sig Dispense Refill     Aspirin-Acetaminophen-Caffeine (EXCEDRIN MIGRAINE PO) Take 1 tablet by mouth as needed       calcium carbonate-vitamin D (OS-FIDENCIO) 600-400 MG-UNIT chewable tablet Take 1 chew tab by mouth 2 times daily       carboxymethylcellulose (REFRESH PLUS) 0.5 % SOLN Place 1 drop into both eyes 3 times daily as needed for dry eyes        clobetasol (TEMOVATE) 0.05 % external cream Apply topically 2 times daily Sparingly to affected area x 1 week then 3 times a week thereafter for 2 weeks and taper done to once a week for maintenance. 45 g 0     hydrochlorothiazide (HYDRODIURIL) 25 MG tablet Take 1 tablet (25 mg) by mouth daily 90 tablet 3     latanoprost (XALATAN) 0.005 % ophthalmic solution Place 1 drop into both eyes At Bedtime 10 mL 3     levothyroxine (SYNTHROID/LEVOTHROID) 50 MCG tablet TAKE 1 TABLET(50 MCG) BY MOUTH DAILY 90 tablet 3     MELATONIN PO Take 3 mg by mouth nightly as needed        Multiple Vitamin (MULTI-VITAMIN) per tablet Take 1 tablet by mouth daily       sertraline (ZOLOFT) 25 MG tablet Take 1 tablet (25 mg) by mouth daily 90 tablet 1     SUMAtriptan (IMITREX) 50 MG tablet Take 1 tablet (50 mg) by mouth at onset of headache for migraine May repeat in 2 hours. Max 4 tablets/24 hours. 18 tablet 0     timolol maleate (TIMOPTIC) 0.5 % ophthalmic solution Place 1 drop into both eyes every morning Wait at least 5 minutes between drops if using more than one at a time. 5 mL 11       Past Medical History  Past Medical History:   Diagnosis Date     AR (allergic rhinitis)      Atrophic vaginitis      Chronic kidney disease, stage 3 (H) 7/14/2021     Compression fracture of L1 lumbar vertebra (H) 4/2015     Glaucoma (increased eye pressure)      HTN (hypertension)      Migraine      Nonsenile cataract      Osteoporosis      Reflux esophagitis      Thyroid disease      Past Surgical History:   Procedure Laterality Date     APPENDECTOMY OPEN  1954     BLEPHAROPLASTY BILATERAL  10/2007    both eyes, upper lids     CHOLECYSTECTOMY, OPEN  1992     COLONOSCOPY  6/02, 10/13    Q 5 years, polyps     D & C       REPAIR PTOSIS       SALPINGO OOPHORECTOMY,R/L/LUIS DANIEL      left ovary and tube     SMALL BOWEL RESECTION  1986    colon resection      TONSILLECTOMY & ADENOIDECTOMY      childhood     TUBAL LIGATION       UPPER GI ENDOSCOPY  6/02, 8/11      "ZZC LENGTHEN,TENDON,HAND/FINGER  1993    repair of dupytrons's contracture     Allergies   Allergen Reactions     Diatrizoate Other (See Comments)     Zoster Vaccine Live Anaphylaxis     Bisphosphonates GI Disturbance     GI distress     Ivp Dye [Contrast Dye] Swelling     Lisinopril Cough     Losartan Hives     But can tolerate Hyzaar.      Morphine Hives     Morphine Sulfate Nausea and Vomiting     Prilosec [Omeprazole] Hives     Latex Rash     Social History   Social History     Tobacco Use     Smoking status: Never     Passive exposure: Never     Smokeless tobacco: Never   Vaping Use     Vaping Use: Never used   Substance Use Topics     Alcohol use: Not Currently     Comment: wine occasionally     Drug use: No      Past medical history and social history were reviewed.    Physical Examination:  /69   Ht 1.6 m (5' 3\")   Wt 59.9 kg (132 lb)   LMP 10/01/1986   BMI 23.38 kg/m    Wt Readings from Last 10 Encounters:   10/18/23 61.2 kg (135 lb)   09/26/23 61.4 kg (135 lb 6.4 oz)   08/22/23 61.3 kg (135 lb 3.2 oz)   06/19/23 62 kg (136 lb 9.6 oz)   05/23/23 62.1 kg (137 lb)   03/30/23 63 kg (139 lb)   09/28/22 61.3 kg (135 lb 1.6 oz)   09/26/22 61.8 kg (136 lb 3.2 oz)   06/15/22 60.4 kg (133 lb 3.2 oz)   05/24/22 62 kg (136 lb 9.6 oz)     Unable to do physical exam 2/2 telephone visit. Well sounding in no distress. Normal speech and thought process. Good voice quality. No audible wheezing or cough.    Laboratory Data:   Latest Reference Range & Units 11/15/23 10:20   WBC 4.0 - 11.0 10e3/uL 5.3   Hemoglobin 11.7 - 15.7 g/dL 12.9   Hematocrit 35.0 - 47.0 % 39.2   Platelet Count 150 - 450 10e3/uL 161   RBC Count 3.80 - 5.20 10e6/uL 4.38   MCV 78 - 100 fL 90   MCH 26.5 - 33.0 pg 29.5   MCHC 31.5 - 36.5 g/dL 32.9   RDW 10.0 - 15.0 % 13.1   % Neutrophils % 69   % Lymphocytes % 21   % Monocytes % 9   % Eosinophils % 1   % Basophils % 0   Absolute Basophils 0.0 - 0.2 10e3/uL 0.0   Absolute Eosinophils 0.0 - 0.7 " 10e3/uL 0.0   Absolute Immature Granulocytes <=0.4 10e3/uL 0.0   Absolute Lymphocytes 0.8 - 5.3 10e3/uL 1.1   Absolute Monocytes 0.0 - 1.3 10e3/uL 0.5   % Immature Granulocytes % 0   Absolute Neutrophils 1.6 - 8.3 10e3/uL 3.6   Absolute NRBCs 10e3/uL 0.0   NRBCs per 100 WBC <1 /100 0      Latest Reference Range & Units 11/15/23 10:20   Hep B Surface Agn Nonreactive  Nonreactive   Hepatitis B Core Kiersten Nonreactive  Nonreactive   Hepatitis B Surface Antibody Instrument Value <8.00 m[IU]/mL 0.68   Hepatitis B Surface Antibody  Nonreactive       Assessment and Plan:    # Follicular Lymphoma   Initially stage IVB (night sweats and marrow involvement) diagnosed in 2011 s/p Rituxan x 4. Had CR. Did require solumedrol pre-med. Now with increasing adenopathy on scans and abdominal symptoms, plan for repeating Rituxan x 4.  - Abdominal symptoms are improved. Discussed with Dr. Saunders and patient and will continue with treatment as planned. She has colonoscopy 12/12/23  - Baseline CBC and hepatitis studies acceptable to start treatment  - Given hx of reaction and patient worried about reaction will add Solumedrol pre-med as she did well with this in the past. Discussed with pharmacy  - Will see NANDA in 2 weeks  - MD with scans in March     15 minutes spent on the date of the encounter doing chart review, review of test results, interpretation of tests, patient visit, documentation, and discussion with other provider(s)     Maulik Mckeon PA-C  Department of Hematology and Oncology  [facility]       Again, thank you for allowing me to participate in the care of your patient.        Sincerely,        KWAN Garcia

## 2023-11-20 NOTE — PROGRESS NOTES
Hilda's call was returned to clarify her appointments for upcoming Rituxin starting tomorrow.  She was confused with her appointments on [facility] stating she was going to have an infusion weekly.  She thought per Dr. Saunders, she would have one infusion the first time and then an injection the other times.  Writer explained that her thoughts were correct but on [facility] the wording states infusion just meaning that you have a time set up in our infusion room.  Writer explained the need to have the first Rituxin infusion scheduled as a 4 hr appointment because of the potential for a reaction so medication would be given slowly.  If she was able to tolerate it, then the injection could be given under the skin but she would still require observation.    Patient was satisfied with the explanation.      Lara Loredo, RNCC  [place]   [phone]

## 2023-11-21 ENCOUNTER — INFUSION THERAPY VISIT (OUTPATIENT)
Dept: INFUSION THERAPY | Facility: CLINIC | Age: 88
End: 2023-11-21
Payer: MEDICARE

## 2023-11-21 VITALS
DIASTOLIC BLOOD PRESSURE: 69 MMHG | TEMPERATURE: 98.4 F | HEART RATE: 75 BPM | RESPIRATION RATE: 16 BRPM | WEIGHT: 138.2 LBS | SYSTOLIC BLOOD PRESSURE: 160 MMHG | BODY MASS INDEX: 24.48 KG/M2 | OXYGEN SATURATION: 100 %

## 2023-11-21 DIAGNOSIS — C82.00 GRADE I FOLLICULAR SMALL CLEAVED CELL LYMPHOMA (H): Primary | ICD-10-CM

## 2023-11-21 PROCEDURE — 96413 CHEMO IV INFUSION 1 HR: CPT | Performed by: INTERNAL MEDICINE

## 2023-11-21 PROCEDURE — 96415 CHEMO IV INFUSION ADDL HR: CPT | Performed by: INTERNAL MEDICINE

## 2023-11-21 PROCEDURE — 96375 TX/PRO/DX INJ NEW DRUG ADDON: CPT | Performed by: PHYSICIAN ASSISTANT

## 2023-11-21 PROCEDURE — 96367 TX/PROPH/DG ADDL SEQ IV INF: CPT | Performed by: INTERNAL MEDICINE

## 2023-11-21 RX ORDER — ACETAMINOPHEN 325 MG/1
650 TABLET ORAL ONCE
Status: COMPLETED | OUTPATIENT
Start: 2023-11-21 | End: 2023-11-21

## 2023-11-21 RX ORDER — METHYLPREDNISOLONE SODIUM SUCCINATE 125 MG/2ML
125 INJECTION, POWDER, LYOPHILIZED, FOR SOLUTION INTRAMUSCULAR; INTRAVENOUS ONCE
Status: COMPLETED | OUTPATIENT
Start: 2023-11-21 | End: 2023-11-21

## 2023-11-21 RX ADMIN — METHYLPREDNISOLONE SODIUM SUCCINATE 125 MG: 125 INJECTION INTRAMUSCULAR; INTRAVENOUS at 11:24

## 2023-11-21 RX ADMIN — ACETAMINOPHEN 650 MG: 325 TABLET ORAL at 11:22

## 2023-11-21 RX ADMIN — Medication 250 ML: at 11:18

## 2023-11-21 ASSESSMENT — PAIN SCALES - GENERAL: PAINLEVEL: NO PAIN (0)

## 2023-11-21 NOTE — PROGRESS NOTES
"Infusion Nursing Note:  Hilda Potter presents today for Truxima.    Patient seen by provider today: No   present during visit today: Not Applicable.    Note: abd symptoms consist of bloating, fullness and constipation.  Patient recalls getting \"tingling lips\" during a previous Rituxan infusion.   Infusion rate:  Start at 50 mg/hr, if no reaction increase by 50 mg/hr every 30 minutes to a max of 400 mg/hr.  Subsequent doses: Start at 100 mg/hr, if no reaction increase rate 100 mg/hr every 30 minutes to a max of 400 mg/hr.         Intravenous Access:  Peripheral IV placed.    Treatment Conditions:  Results reviewed, labs MET treatment parameters, ok to proceed with treatment.      Post Infusion Assessment:  Patient tolerated infusion without incident.       Discharge Plan:   RTC 11/30 for D8.      JEANETH MICHEL RN    "

## 2023-11-22 ENCOUNTER — OFFICE VISIT (OUTPATIENT)
Dept: FAMILY MEDICINE | Facility: CLINIC | Age: 88
End: 2023-11-22
Payer: MEDICARE

## 2023-11-22 VITALS
DIASTOLIC BLOOD PRESSURE: 64 MMHG | BODY MASS INDEX: 24.63 KG/M2 | SYSTOLIC BLOOD PRESSURE: 132 MMHG | RESPIRATION RATE: 16 BRPM | OXYGEN SATURATION: 97 % | WEIGHT: 139 LBS | HEART RATE: 76 BPM | HEIGHT: 63 IN | TEMPERATURE: 96.1 F

## 2023-11-22 DIAGNOSIS — Z01.818 PREOP GENERAL PHYSICAL EXAM: Primary | ICD-10-CM

## 2023-11-22 DIAGNOSIS — C82.00 GRADE I FOLLICULAR SMALL CLEAVED CELL LYMPHOMA (H): ICD-10-CM

## 2023-11-22 DIAGNOSIS — R93.5 ABNORMAL CT OF THE ABDOMEN: ICD-10-CM

## 2023-11-22 PROCEDURE — 99214 OFFICE O/P EST MOD 30 MIN: CPT | Performed by: FAMILY MEDICINE

## 2023-11-22 ASSESSMENT — PAIN SCALES - GENERAL: PAINLEVEL: NO PAIN (0)

## 2023-11-22 NOTE — PROGRESS NOTES
[facility]  [address]  [place] [address]  Phone: [phone]  Primary Provider: Ame Dobson  Pre-op Performing Provider: AME DOBSON      PREOPERATIVE EVALUATION:  Today's date: 11/22/2023    Hilda is a 88 year old, presenting for the following:  Pre-Op Exam        11/22/2023    10:41 AM   Additional Questions   Roomed by Jack CMA   Accompanied by NA         11/22/2023    10:41 AM   Patient Reported Additional Medications   Patient reports taking the following new medications NA       Surgical Information:  Surgery/Procedure: Colonoscopy  Surgery Location: [facility]  Surgeon: Dr. Field  Surgery Date: 12/12/2023  Time of Surgery: 8 am   Where patient plans to recover: At home with family  Fax number for surgical facility: [facility] [phone]    Assessment & Plan     The proposed surgical procedure is considered LOW risk.    Preop general physical exam      Abnormal CT of the abdomen  Central mesenteric haziness with a few conspicuous but nonenlarged lymph nodes may reflect mesenteric panniculitis or be reactive to adjacent enteritis/colitis.     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        - No identified additional risk factors other than previously addressed    Antiplatelet or Anticoagulation Medication Instructions:   - Patient is on no antiplatelet or anticoagulation medications.    Additional Medication Instructions:  Patient is to take all scheduled medications on the day of surgery EXCEPT for modifications listed below:   - Diuretics: HOLD on the day of surgery.      RECOMMENDATION:  APPROVAL GIVEN to proceed with proposed procedure, without further diagnostic evaluation.      Subjective       HPI related to upcoming procedure: Colonoscopy    Patient recently seen at [facility]  on 10//2/2023 for generalized abdominal pain with an abnormal CT showing central mesenteric haziness  with nonenlarged lymph nodes.  Representing either panniculitis or infection versus recurrent lymphoma.   Was recommended to obtain a  colonoscopy for further evaluation.  Received an infusion yesterday for lymphoma treatment. Following with Oncology.   Reports feeling fine today.  No infusion complications noted.     States that she understands the risks and benefits of the colonoscopy and wishes to proceed.        11/15/2023     9:07 AM   Preop Questions   1. Have you ever had a heart attack or stroke? No   2. Have you ever had surgery on your heart or blood vessels, such as a stent placement, a coronary artery bypass, or surgery on an artery in your head, neck, heart, or legs? No   3. Do you have chest pain with activity? No   4. Do you have a history of  heart failure? No   5. Do you currently have a cold, bronchitis or symptoms of other infection? No   6. Do you have a cough, shortness of breath, or wheezing? No   7. Do you or anyone in your family have previous history of blood clots? No   8. Do you or does anyone in your family have a serious bleeding problem such as prolonged bleeding following surgeries or cuts? No   9. Have you ever had problems with anemia or been told to take iron pills? No   10. Have you had any abnormal blood loss such as black, tarry or bloody stools, or abnormal vaginal bleeding? No   11. Have you ever had a blood transfusion? No   12. Are you willing to have a blood transfusion if it is medically needed before, during, or after your surgery? Yes   13. Have you or any of your relatives ever had problems with anesthesia? No   14. Do you have sleep apnea, excessive snoring or daytime drowsiness? No   15. Do you have any artifical heart valves or other implanted medical devices like a pacemaker, defibrillator, or continuous glucose monitor? No   16. Do you have artificial joints? No   17. Are you allergic to latex? YES:       Health Care Directive:  Patient has a Health Care Directive  on file      Preoperative Review of :   reviewed - no record of controlled substances prescribed.      Status of Chronic Conditions:  See problem list for active medical problems.  Problems all longstanding and stable, except as noted/documented.  See ROS for pertinent symptoms related to these conditions.    Review of Systems  Constitutional, neuro, ENT, endocrine, pulmonary, cardiac, gastrointestinal, genitourinary, musculoskeletal, integument and psychiatric systems are negative, except as otherwise noted.    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 11/04/2018     Priority: Medium    Migraine without status migrainosus, not intractable, unspecified migraine type 09/10/2018     Priority: Medium    Combined forms of age-related cataract, mild-mod, both eyes 11/30/2017     Priority: Medium    Essential hypertension with goal blood pressure less than 140/90 10/03/2017     Priority: Medium    Hypothyroidism, unspecified type 09/16/2016     Priority: Medium    Dupuytren's contracture of right hand 11/09/2015     Priority: Medium    Lung nodule < 6cm on CT 05/15/2015     Priority: Medium    Compression fracture of L1 lumbar vertebra, seen on CT 04/01/2015     Priority: Medium    Premature atrial beats 10/29/2014     Priority: Medium    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  Document previously scanned on 4-1-13.  Validation form completed and sent to be scanned.  Code Status needs to be updated to reflect choices in most recent ACP document-full code.  Confirmed/documented designated decision maker(s). See permanent comments section of demographics in clinical tab. View document(s) and details by clicking on code status.   Added by Rhiannon Sales RN System ACP Coordinator on 4/3/2013.    Advance Care Planning 11/13/2017: ACP Review of Chart / Resources Provided:  Reviewed chart for advance care plan.  Hilda Potter has an advance care plan which needs to be updated. Patient states presence of new/updated ACP document. Copy requested  Added by Madyson Matthews                     Patient states has Advance Directive and will bring in a copy to clinic.       GERD (gastroesophageal reflux disease) 06/02/2011     Priority: Medium    CARDIOVASCULAR SCREENING; LDL GOAL LESS THAN 130 05/26/2011     Priority: Medium    Osteoporosis 10/01/2009     Priority: Medium    AR (allergic rhinitis)      Priority: Medium    Reflux Esophagitis      Priority: Medium    Atrophic vaginitis      Priority: Medium      Past Medical History:   Diagnosis Date    AR (allergic rhinitis)     Atrophic vaginitis     Chronic kidney disease, stage 3 (H) 7/14/2021    Compression fracture of L1 lumbar vertebra (H) 4/2015    Glaucoma (increased eye pressure)     HTN (hypertension)     Migraine     Nonsenile cataract     Osteoporosis     Reflux esophagitis     Thyroid disease      Past Surgical History:   Procedure Laterality Date    APPENDECTOMY OPEN  1954    BLEPHAROPLASTY BILATERAL  10/2007    both eyes, upper lids    CHOLECYSTECTOMY, OPEN  1992    COLONOSCOPY  6/02, 10/13    Q 5 years, polyps    D & C      REPAIR PTOSIS      SALPINGO OOPHORECTOMY,R/L/LUIS DANIEL      left ovary and tube    SMALL BOWEL RESECTION  1986    colon resection      TONSILLECTOMY & ADENOIDECTOMY      childhood    TUBAL LIGATION      UPPER GI ENDOSCOPY  6/02, 8/11    [facility] LENGTHEN,TENDON,HAND/FINGER  1993    repair of dupytrons's contracture     Current Outpatient Medications   Medication Sig Dispense Refill    Aspirin-Acetaminophen-Caffeine (EXCEDRIN MIGRAINE PO) Take 1 tablet by mouth as needed      calcium carbonate-vitamin D (OS-FIDENCIO) 600-400 MG-UNIT chewable tablet Take 1 chew tab by mouth 2 times daily      carboxymethylcellulose (REFRESH PLUS) 0.5 % SOLN Place 1 drop into both eyes 3 times daily as needed for dry eyes      clobetasol (TEMOVATE) 0.05 % external cream Apply topically 2 times daily Sparingly to affected area x 1 week then 3 times a week thereafter for 2 weeks and taper done to once a week for maintenance. 45 g 0    hydrochlorothiazide (HYDRODIURIL) 25 MG tablet Take 1 tablet (25 mg) by mouth daily 90 tablet 3    latanoprost (XALATAN) 0.005 % ophthalmic solution Place 1 drop into both eyes At Bedtime 10 mL 3    levothyroxine (SYNTHROID/LEVOTHROID) 50 MCG tablet TAKE 1 TABLET(50 MCG) BY MOUTH DAILY 90 tablet 3    MELATONIN PO Take 3 mg by mouth nightly as needed       Multiple Vitamin (MULTI-VITAMIN) per tablet Take 1 tablet by mouth daily      sertraline (ZOLOFT) 25 MG tablet Take 1 tablet (25 mg) by mouth daily 90 tablet 1    timolol maleate (TIMOPTIC) 0.5 % ophthalmic solution Place 1 drop into both eyes every morning Wait at least 5 minutes between drops if using more than one at a time. 5 mL 11       Allergies   Allergen Reactions    Diatrizoate Other (See Comments)    Zoster Vaccine Live Anaphylaxis    Bisphosphonates GI Disturbance     GI distress    Ivp Dye [Contrast Dye] Swelling    Lisinopril Cough    Losartan Hives     But can tolerate Hyzaar.     Morphine Hives    Morphine Sulfate Nausea and Vomiting    Prilosec [Omeprazole] Hives    Latex Rash        Social History     Tobacco Use    Smoking status: Never     Passive exposure: Never    Smokeless  "tobacco: Never   Substance Use Topics    Alcohol use: Not Currently     Comment: wine occasionally     Family History   Problem Relation Age of Onset    Hypertension Mother     Arthritis Mother     Cardiovascular Mother     Retinal detachment Mother     Migraines Mother     C.A.D. Father     Hypertension Father         Father.  Passed away.    Cerebrovascular Disease Father     Arthritis Father     Cardiovascular Father     Eye Disorder Father     Heart Disease Father     Glaucoma Father     Parkinsonism Father     Cardiovascular Brother     Glaucoma Brother     Peripheral Neuropathy Brother     Macular Degeneration No family hx of     Bleeding Disorder No family hx of     Anesthesia Reaction No family hx of      History   Drug Use No         Objective     /64   Pulse 76   Temp (!) 96.1  F (35.6  C) (Temporal)   Resp 16   Ht 1.61 m (5' 3.39\")   Wt 63 kg (139 lb)   LMP 10/01/1986   SpO2 97%   BMI 24.32 kg/m      Physical Exam    GENERAL APPEARANCE: healthy, alert and no distress     EYES: EOMI, PERRL     HENT: ear canals and TM's normal and nose and mouth without ulcers or lesions     NECK: no adenopathy, no asymmetry, masses, or scars and thyroid normal to palpation     RESP: lungs clear to auscultation - no rales, rhonchi or wheezes     CV: regular rates and rhythm, normal S1 S2, no S3 or S4 and no murmur, click or rub     ABDOMEN:  soft, nontender, no HSM or masses and bowel sounds normal     MS: extremities normal- no gross deformities noted, no evidence of inflammation in joints, FROM in all extremities.     SKIN: no suspicious lesions or rashes     NEURO: Normal strength and tone, sensory exam grossly normal, mentation intact and speech normal     PSYCH: mentation appears normal. and affect normal/bright     LYMPHATICS: No cervical adenopathy    Recent Labs   Lab Test 11/15/23  1020 10/18/23  1411 05/22/23  1131   HGB 12.9 12.9 13.3    182 170   NA  --  136 139   POTASSIUM  --  3.8 3.7   CR "  --  0.86 0.95        Diagnostics:  No labs were ordered during this visit.   No EKG required for low risk surgery (cataract, skin procedure, breast biopsy, etc).    Revised Cardiac Risk Index (RCRI):  The patient has the following serious cardiovascular risks for perioperative complications:   - No serious cardiac risks = 0 points     RCRI Interpretation: 0 points: Class I (very low risk - 0.4% complication rate)         Signed Electronically by: Ame Dobson MD  Copy of this evaluation report is provided to requesting physician.

## 2023-11-22 NOTE — PATIENT INSTRUCTIONS
Preparing for Your Surgery  Getting started  A nurse will call you to review your health history and instructions. They will give you an arrival time based on your scheduled surgery time. Please be ready to share:  Your doctor's clinic name and phone number  Your medical, surgical, and anesthesia history  A list of allergies and sensitivities  A list of medicines, including herbal treatments and over-the-counter drugs  Whether the patient has a legal guardian (ask how to send us the papers in advance)  Please tell us if you're pregnant--or if there's any chance you might be pregnant. Some surgeries may injure a fetus (unborn baby), so they require a pregnancy test. Surgeries that are safe for a fetus don't always need a test, and you can choose whether to have one.   If you have a child who's having surgery, please ask for a copy of Preparing for Your Child's Surgery.    Preparing for surgery  Within 10 to 30 days of surgery: Have a pre-op exam (sometimes called an H&P, or History and Physical). This can be done at a clinic or pre-operative center.  If you're having a , you may not need this exam. Talk to your care team.  At your pre-op exam, talk to your care team about all medicines you take. If you need to stop any medicines before surgery, ask when to start taking them again.  We do this for your safety. Many medicines can make you bleed too much during surgery. Some change how well surgery (anesthesia) drugs work.  Call your insurance company to let them know you're having surgery. (If you don't have insurance, call [phone].)  Call your clinic if there's any change in your health. This includes signs of a cold or flu (sore throat, runny nose, cough, rash, fever). It also includes a scrape or scratch near the surgery site.  If you have questions on the day of surgery, call your hospital or surgery center.  Eating and drinking guidelines  For your safety: Unless your surgeon tells you otherwise,  follow the guidelines below.  Eat and drink as usual until 8 hours before you arrive for surgery. After that, no food or milk.  Drink clear liquids until 2 hours before you arrive. These are liquids you can see through, like water, Gatorade, and Propel Water. They also include plain black coffee and tea (no cream or milk), candy, and breath mints. You can spit out gum when you arrive.  If you drink alcohol: Stop drinking it the night before surgery.  If your care team tells you to take medicine on the morning of surgery, it's okay to take it with a sip of water.  Preventing infection  Shower or bathe the night before and morning of your surgery. Follow the instructions your clinic gave you. (If no instructions, use regular soap.)  Don't shave or clip hair near your surgery site. We'll remove the hair if needed.  Don't smoke or vape the morning of surgery. You may chew nicotine gum up to 2 hours before surgery. A nicotine patch is okay.  Note: Some surgeries require you to completely quit smoking and nicotine. Check with your surgeon.  Your care team will make every effort to keep you safe from infection. We will:  Clean our hands often with soap and water (or an alcohol-based hand rub).  Clean the skin at your surgery site with a special soap that kills germs.  Give you a special gown to keep you warm. (Cold raises the risk of infection.)  Wear special hair covers, masks, gowns and gloves during surgery.  Give antibiotic medicine, if prescribed. Not all surgeries need antibiotics.  What to bring on the day of surgery  Photo ID and insurance card  Copy of your health care directive, if you have one  Glasses and hearing aids (bring cases)  You can't wear contacts during surgery  Inhaler and eye drops, if you use them (tell us about these when you arrive)  CPAP machine or breathing device, if you use them  A few personal items, if spending the night  If you have . . .  A pacemaker, ICD (cardiac defibrillator) or other  implant: Bring the ID card.  An implanted stimulator: Bring the remote control.  A legal guardian: Bring a copy of the certified (court-stamped) guardianship papers.  Please remove any jewelry, including body piercings. Leave jewelry and other valuables at home.  If you're going home the day of surgery  You must have a responsible adult drive you home. They should stay with you overnight as well.  If you don't have someone to stay with you, and you aren't safe to go home alone, we may keep you overnight. Insurance often won't pay for this.  After surgery  If it's hard to control your pain or you need more pain medicine, please call your surgeon's office.  Questions?   If you have any questions for your care team, list them here: _________________________________________________________________________________________________________________________________________________________________________ ____________________________________ ____________________________________ ____________________________________  For informational purposes only. Not to replace the advice of your health care provider. Copyright   2003, 2019 [employer]. All rights reserved. Clinically reviewed by Beverly Caban MD. [employer] 829789 - REV 12/22.    How to Take Your Medication Before Surgery  Pre-operative medication management   Stop Asprin/NSAIDS at least 7-10 days prior to surgery.  Do not take Hydrochlorothiazide  the morning of surgery  May take Sertraline and Levothyroxine  the morning of surgery with small sips of water.

## 2023-11-25 PROBLEM — H40.1131 PRIMARY OPEN ANGLE GLAUCOMA (POAG) OF BOTH EYES, MILD STAGE: Status: ACTIVE | Noted: 2023-11-25

## 2023-11-25 ASSESSMENT — PACHYMETRY
OS_CT(UM): 567
OD_CT(UM): 567

## 2023-11-30 ENCOUNTER — INFUSION THERAPY VISIT (OUTPATIENT)
Dept: INFUSION THERAPY | Facility: CLINIC | Age: 88
End: 2023-11-30
Payer: MEDICARE

## 2023-11-30 ENCOUNTER — LAB (OUTPATIENT)
Dept: LAB | Facility: CLINIC | Age: 88
End: 2023-11-30
Payer: MEDICARE

## 2023-11-30 VITALS
BODY MASS INDEX: 24.11 KG/M2 | WEIGHT: 137.8 LBS | TEMPERATURE: 98 F | OXYGEN SATURATION: 97 % | HEART RATE: 65 BPM | DIASTOLIC BLOOD PRESSURE: 66 MMHG | SYSTOLIC BLOOD PRESSURE: 145 MMHG

## 2023-11-30 DIAGNOSIS — C82.00 GRADE I FOLLICULAR SMALL CLEAVED CELL LYMPHOMA (H): Primary | ICD-10-CM

## 2023-11-30 LAB
BASOPHILS # BLD AUTO: 0 10E3/UL (ref 0–0.2)
BASOPHILS NFR BLD AUTO: 0 %
EOSINOPHIL # BLD AUTO: 0.1 10E3/UL (ref 0–0.7)
EOSINOPHIL NFR BLD AUTO: 1 %
ERYTHROCYTE [DISTWIDTH] IN BLOOD BY AUTOMATED COUNT: 13.3 % (ref 10–15)
HCT VFR BLD AUTO: 37.7 % (ref 35–47)
HGB BLD-MCNC: 12.4 G/DL (ref 11.7–15.7)
IMM GRANULOCYTES # BLD: 0 10E3/UL
IMM GRANULOCYTES NFR BLD: 0 %
LYMPHOCYTES # BLD AUTO: 1.2 10E3/UL (ref 0.8–5.3)
LYMPHOCYTES NFR BLD AUTO: 21 %
MCH RBC QN AUTO: 29.2 PG (ref 26.5–33)
MCHC RBC AUTO-ENTMCNC: 32.9 G/DL (ref 31.5–36.5)
MCV RBC AUTO: 89 FL (ref 78–100)
MONOCYTES # BLD AUTO: 0.6 10E3/UL (ref 0–1.3)
MONOCYTES NFR BLD AUTO: 11 %
NEUTROPHILS # BLD AUTO: 3.8 10E3/UL (ref 1.6–8.3)
NEUTROPHILS NFR BLD AUTO: 67 %
NRBC # BLD AUTO: 0 10E3/UL
NRBC BLD AUTO-RTO: 0 /100
PLATELET # BLD AUTO: 178 10E3/UL (ref 150–450)
RBC # BLD AUTO: 4.24 10E6/UL (ref 3.8–5.2)
WBC # BLD AUTO: 5.8 10E3/UL (ref 4–11)

## 2023-11-30 PROCEDURE — 85025 COMPLETE CBC W/AUTO DIFF WBC: CPT | Performed by: INTERNAL MEDICINE

## 2023-11-30 PROCEDURE — 96401 CHEMO ANTI-NEOPL SQ/IM: CPT | Performed by: INTERNAL MEDICINE

## 2023-11-30 PROCEDURE — 96374 THER/PROPH/DIAG INJ IV PUSH: CPT | Performed by: PHYSICIAN ASSISTANT

## 2023-11-30 PROCEDURE — 36415 COLL VENOUS BLD VENIPUNCTURE: CPT | Performed by: INTERNAL MEDICINE

## 2023-11-30 RX ORDER — DIPHENHYDRAMINE HCL 25 MG
50 CAPSULE ORAL ONCE
Status: COMPLETED | OUTPATIENT
Start: 2023-11-30 | End: 2023-11-30

## 2023-11-30 RX ORDER — ACETAMINOPHEN 325 MG/1
650 TABLET ORAL ONCE
Status: COMPLETED | OUTPATIENT
Start: 2023-11-30 | End: 2023-11-30

## 2023-11-30 RX ORDER — METHYLPREDNISOLONE SODIUM SUCCINATE 125 MG/2ML
125 INJECTION, POWDER, LYOPHILIZED, FOR SOLUTION INTRAMUSCULAR; INTRAVENOUS ONCE
Status: COMPLETED | OUTPATIENT
Start: 2023-11-30 | End: 2023-11-30

## 2023-11-30 RX ADMIN — METHYLPREDNISOLONE SODIUM SUCCINATE 125 MG: 125 INJECTION INTRAMUSCULAR; INTRAVENOUS at 15:54

## 2023-11-30 RX ADMIN — ACETAMINOPHEN 650 MG: 325 TABLET ORAL at 15:45

## 2023-11-30 RX ADMIN — Medication 50 MG: at 15:45

## 2023-11-30 NOTE — PROGRESS NOTES
Infusion Nursing Note:  Hilda Potter presents today for C1D8 Rituxan Hycela.    Patient seen by provider today: No   present during visit today: Not Applicable.    Note: Pt denies any new medical concerns besides a sore tailbone after hitting it on a chair, states she tolerated her last rituxan without complications. See flowsheet for assessment.      Intravenous Access:  Peripheral IV placed for solumedrol    Treatment Conditions:  Lab Results   Component Value Date    HGB 12.4 11/30/2023    WBC 5.8 11/30/2023    ANEU 3.0 05/07/2021    ANEUTAUTO 3.8 11/30/2023     11/30/2023        Results reviewed, labs MET treatment parameters, ok to proceed with treatment.      Post Infusion Assessment:  Patient tolerated infusion without incident.  Patient observed for 15 minutes post Rituxan Hycela per protocol.  Site patent and intact, free from redness, edema or discomfort.  No evidence of extravasations.  Access discontinued per protocol.       Discharge Plan:   Patient discharged in stable condition accompanied by: .  Departure Mode: Ambulatory.  Pt will RTC 12/7/23 for C1D15 Rituxan Hycela .      Milan Mallory RN

## 2023-12-06 ENCOUNTER — PATIENT OUTREACH (OUTPATIENT)
Dept: ONCOLOGY | Facility: CLINIC | Age: 88
End: 2023-12-06
Payer: MEDICARE

## 2023-12-06 NOTE — TELEPHONE ENCOUNTER
Agree w/ recs.    Monitor for spreading cellulitis; may require abx.    May resolve on its own due to local rituximab subcutaneous reaction.    Bang Saunders MD.

## 2023-12-06 NOTE — PROGRESS NOTES
Oncology Follow Up Visit: December 7, 2023    Oncologist: Dr Bang Saunders  PCP: Ame Dboson    Diagnosis: Grade IV follicular lymphoma  Hilda Potter is an 89 yo female 6 month follow-up lymphoma  11/2011- presented with night sweats and lymphadenopathy. Negative infectious work-up.   4/2012-CT demonstrated multiple borderline mildly enlarged retroperitoneal and mesenteric lymph nodes, with the largest size of 1.5 cm. CT guided biopsy of intraabdominal lymph node c/w low grade follicular lymphoma. Flow cytometry showed kappa monotypic CD10 positive B cells. BMBx demonstrated 0.2% follicular lymphoma by flow cytometry.   Treatment:   9/2012 Completed weekly Rituxan x 4, premed solumedrol  [id number] Observation   9/2023 CT CAP for abdominal pain and fatigue- finding abdominal adenopathy   10/2023 Recommended colonoscopy, then Rituxan x 4  11/21/2023 began Rituxan- planned for 4 weekly doses    Interval History: Ms. Potter and  come to clinic  prior to day 15 of Rituxan to treat her follicular lymphoma. Pt reports she noted the injection side from day 8 to the abdomen turned dark pink and was more firm after the injection and has since improved but the area is still noticeable though not warm to touch. Bowels are still variable but has Planned colonoscopy for 12/12/2023 and is aware of prep. She denies change in appetite or any nausea. Sleep affected by the steroids the first night. Denies fevers, headaches, SOB or new aches.  Does not like IV and wasn't to switch to oral steroid.   Rest of comprehensive and complete ROS is reviewed and is negative.   Past Medical History:   Diagnosis Date    AR (allergic rhinitis)     Atrophic vaginitis     Chronic kidney disease, stage 3 (H) 7/14/2021    Compression fracture of L1 lumbar vertebra (H) 4/2015    Glaucoma (increased eye pressure)     HTN (hypertension)     Migraine     Nonsenile cataract     Osteoporosis     Reflux esophagitis     Thyroid disease   "    Current Outpatient Medications   Medication    Aspirin-Acetaminophen-Caffeine (EXCEDRIN MIGRAINE PO)    calcium carbonate-vitamin D (OS-FIDENCIO) 600-400 MG-UNIT chewable tablet    carboxymethylcellulose (REFRESH PLUS) 0.5 % SOLN    clobetasol (TEMOVATE) 0.05 % external cream    hydrochlorothiazide (HYDRODIURIL) 25 MG tablet    latanoprost (XALATAN) 0.005 % ophthalmic solution    levothyroxine (SYNTHROID/LEVOTHROID) 50 MCG tablet    MELATONIN PO    Multiple Vitamin (MULTI-VITAMIN) per tablet    sertraline (ZOLOFT) 25 MG tablet    timolol maleate (TIMOPTIC) 0.5 % ophthalmic solution     No current facility-administered medications for this visit.     Allergies   Allergen Reactions    Diatrizoate Other (See Comments)    Zoster Vaccine Live Anaphylaxis    Bisphosphonates GI Disturbance     GI distress    Ivp Dye [Contrast Dye] Swelling    Lisinopril Cough    Losartan Hives     But can tolerate Hyzaar.     Morphine Hives    Morphine Sulfate Nausea and Vomiting    Prilosec [Omeprazole] Hives    Latex Rash       Physical Exam:BP (!) 167/73 (BP Location: Right arm, Patient Position: Chair, Cuff Size: Adult Regular)   Pulse 67   Temp 97.9  F (36.6  C) (Oral)   Ht 1.61 m (5' 3.39\")   Wt 62.1 kg (137 lb)   LMP 10/01/1986   SpO2 97%   BMI 23.97 kg/m     ECOG PS- 0  Constitutional: Alert, cooperative, and in no distress.   ENT: Eyes bright, No mouth sores  Neck: Supple, No adenopathy.  Cardiac: Heart rate and rhythm is regular and strong without murmur  Respiratory: Breathing easy. Lung sounds clear to auscultation  GI: Abdomen is soft, non-tender, does have a 6cm darker pink area around previous injection site.   BS normal. No masses or organomegaly  MS: Muscle tone normal, extremities normal with no edema. Moving well for age- no assist needed to exam table.  Skin: left mid abdomen has a darker pink area that is about 5 cms but without firmness or warmth.   Neuro: Sensory grossly WNL, gait normal.   Lymph: Normal " ant/post cervical, axillary, supraclavicular nodes  Psych: Mentation appears normal and affect normal/bright with good conversation    Laboratory/Imaging Results:   Results for orders placed or performed in visit on 12/07/23   CBC with platelets and differential     Status: None   Result Value Ref Range    WBC Count 4.9 4.0 - 11.0 10e3/uL    RBC Count 4.13 3.80 - 5.20 10e6/uL    Hemoglobin 12.2 11.7 - 15.7 g/dL    Hematocrit 36.7 35.0 - 47.0 %    MCV 89 78 - 100 fL    MCH 29.5 26.5 - 33.0 pg    MCHC 33.2 31.5 - 36.5 g/dL    RDW 13.2 10.0 - 15.0 %    Platelet Count 168 150 - 450 10e3/uL    % Neutrophils 69 %    % Lymphocytes 21 %    % Monocytes 8 %    % Eosinophils 1 %    % Basophils 1 %    % Immature Granulocytes 0 %    NRBCs per 100 WBC 0 <1 /100    Absolute Neutrophils 3.4 1.6 - 8.3 10e3/uL    Absolute Lymphocytes 1.1 0.8 - 5.3 10e3/uL    Absolute Monocytes 0.4 0.0 - 1.3 10e3/uL    Absolute Eosinophils 0.1 0.0 - 0.7 10e3/uL    Absolute Basophils 0.0 0.0 - 0.2 10e3/uL    Absolute Immature Granulocytes 0.0 <=0.4 10e3/uL    Absolute NRBCs 0.0 10e3/uL   CBC with platelets differential     Status: None    Narrative    The following orders were created for panel order CBC with platelets differential.  Procedure                               Abnormality         Status                     ---------                               -----------         ------                     CBC with platelets and d...[[id number]]                      Final result                 Please view results for these tests on the individual orders.     Assessment and Plan:   Grade IV follicular lymphoma-Pt has completed days 1 and 8 of plan for 4 Rituxan Hycela injections.  She is meeting goals for treatment today. She has noted some redness and firmness from day 8 to the abdomen which does appear as reaction and not infection. The color change does seem to be improving and we will use opposite side of abdomen for todays injection.   - pt does not  like IVs and would like to move to oral steroid for final injection   Will return for 4th and final injection next week   Has imaging and review set for March followup.   The total time of this encounter amounted to  40 minutes. This time included face to face time spent with the patient, prep work, ordering tests, and performing post visit documentation.  Doris Anguiano,Cnp

## 2023-12-06 NOTE — PROGRESS NOTES
[facility]: Cancer Care - RN CC Symptom Call              Situation                                                               Patient presented to clinic for evaluation of injection site, would like to avoid coming to clinic so early tomorrow if she should not receive treatment.  Informed patient that provider will review labs drawn in AM, will evaluate site and determine if treatment should be given tomorrow.                                     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  States last night she started to feel unwell - reports nausea and noted redness at the injection site.  Reports it was quite red and warm to the touch, also sore.    Rash Location: abdomen - RLQ  Rash Description: pink and sore.  Reports redness has somewhat diminished.  Rash Severity: moderate - pink area, approximately the size of palm of hand.    Onset: sudden - appeared 5 days following injection.  Duration: 1 days    Progression of Symptoms:  reports redness is less than last night.  Also reports nausea.  Unsure if this is a drug reaction so long after injection.       Accompanying signs and symptoms: nausea    History:        Previous history of a similar rash?: No        Recent exposure of concern?: No       Recent medication - New or changes (RX or OTC): YES - rituximab injection       Allergies verified: Yes and Not Asked       Recent infusions: Yes: rituximab injection       Oral chemotherapy: No    Precipitating or alleviating factors: None    Therapies tried and outcome: none                Intervention                                                      Homecare instructions:      -   advised to monitor site, may outline pink area.  Monitor for fever, increased nausea, other symptoms.  Recommended to keep provider visit as scheduled in the morning, as provider would determine if further action is needed or if treatment should be postponed.  Advised if redness expands or worsens, experiencing new symptoms or develops fever would recommend urgent care for further evaluation ahead of appointment tomorrow.     Plan                                                         -   Patient to be seen in clinic within 24 hours; patient aware of appointment date/time.  To urgent care prior if worsening symptoms.    Patient verbalizes understanding of and agrees with plan? YES      Daly Leon RN

## 2023-12-07 ENCOUNTER — ONCOLOGY VISIT (OUTPATIENT)
Dept: ONCOLOGY | Facility: CLINIC | Age: 88
End: 2023-12-07
Payer: MEDICARE

## 2023-12-07 ENCOUNTER — TELEPHONE (OUTPATIENT)
Dept: ONCOLOGY | Facility: CLINIC | Age: 88
End: 2023-12-07

## 2023-12-07 ENCOUNTER — INFUSION THERAPY VISIT (OUTPATIENT)
Dept: INFUSION THERAPY | Facility: CLINIC | Age: 88
End: 2023-12-07
Payer: MEDICARE

## 2023-12-07 ENCOUNTER — LAB (OUTPATIENT)
Dept: LAB | Facility: CLINIC | Age: 88
End: 2023-12-07
Payer: MEDICARE

## 2023-12-07 VITALS
HEART RATE: 67 BPM | SYSTOLIC BLOOD PRESSURE: 167 MMHG | HEIGHT: 63 IN | DIASTOLIC BLOOD PRESSURE: 73 MMHG | WEIGHT: 137 LBS | TEMPERATURE: 97.9 F | OXYGEN SATURATION: 97 % | BODY MASS INDEX: 24.27 KG/M2

## 2023-12-07 DIAGNOSIS — T49.95XD: ICD-10-CM

## 2023-12-07 DIAGNOSIS — C82.00 GRADE I FOLLICULAR SMALL CLEAVED CELL LYMPHOMA (H): Primary | ICD-10-CM

## 2023-12-07 LAB
BASOPHILS # BLD AUTO: 0 10E3/UL (ref 0–0.2)
BASOPHILS NFR BLD AUTO: 1 %
EOSINOPHIL # BLD AUTO: 0.1 10E3/UL (ref 0–0.7)
EOSINOPHIL NFR BLD AUTO: 1 %
ERYTHROCYTE [DISTWIDTH] IN BLOOD BY AUTOMATED COUNT: 13.2 % (ref 10–15)
HCT VFR BLD AUTO: 36.7 % (ref 35–47)
HGB BLD-MCNC: 12.2 G/DL (ref 11.7–15.7)
IMM GRANULOCYTES # BLD: 0 10E3/UL
IMM GRANULOCYTES NFR BLD: 0 %
LYMPHOCYTES # BLD AUTO: 1.1 10E3/UL (ref 0.8–5.3)
LYMPHOCYTES NFR BLD AUTO: 21 %
MCH RBC QN AUTO: 29.5 PG (ref 26.5–33)
MCHC RBC AUTO-ENTMCNC: 33.2 G/DL (ref 31.5–36.5)
MCV RBC AUTO: 89 FL (ref 78–100)
MONOCYTES # BLD AUTO: 0.4 10E3/UL (ref 0–1.3)
MONOCYTES NFR BLD AUTO: 8 %
NEUTROPHILS # BLD AUTO: 3.4 10E3/UL (ref 1.6–8.3)
NEUTROPHILS NFR BLD AUTO: 69 %
NRBC # BLD AUTO: 0 10E3/UL
NRBC BLD AUTO-RTO: 0 /100
PLATELET # BLD AUTO: 168 10E3/UL (ref 150–450)
RBC # BLD AUTO: 4.13 10E6/UL (ref 3.8–5.2)
WBC # BLD AUTO: 4.9 10E3/UL (ref 4–11)

## 2023-12-07 PROCEDURE — 36415 COLL VENOUS BLD VENIPUNCTURE: CPT | Performed by: INTERNAL MEDICINE

## 2023-12-07 PROCEDURE — 96401 CHEMO ANTI-NEOPL SQ/IM: CPT | Performed by: INTERNAL MEDICINE

## 2023-12-07 PROCEDURE — 96374 THER/PROPH/DIAG INJ IV PUSH: CPT | Performed by: PHYSICIAN ASSISTANT

## 2023-12-07 PROCEDURE — 85025 COMPLETE CBC W/AUTO DIFF WBC: CPT | Performed by: INTERNAL MEDICINE

## 2023-12-07 PROCEDURE — 99215 OFFICE O/P EST HI 40 MIN: CPT | Mod: 25 | Performed by: NURSE PRACTITIONER

## 2023-12-07 RX ORDER — METHYLPREDNISOLONE SODIUM SUCCINATE 125 MG/2ML
125 INJECTION, POWDER, LYOPHILIZED, FOR SOLUTION INTRAMUSCULAR; INTRAVENOUS ONCE
Status: COMPLETED | OUTPATIENT
Start: 2023-12-07 | End: 2023-12-07

## 2023-12-07 RX ORDER — DIPHENHYDRAMINE HCL 25 MG
50 CAPSULE ORAL ONCE
Status: COMPLETED | OUTPATIENT
Start: 2023-12-07 | End: 2023-12-07

## 2023-12-07 RX ORDER — ACETAMINOPHEN 325 MG/1
650 TABLET ORAL ONCE
Status: COMPLETED | OUTPATIENT
Start: 2023-12-07 | End: 2023-12-07

## 2023-12-07 RX ADMIN — Medication 50 MG: at 08:12

## 2023-12-07 RX ADMIN — Medication 250 ML: at 08:30

## 2023-12-07 RX ADMIN — METHYLPREDNISOLONE SODIUM SUCCINATE 125 MG: 125 INJECTION INTRAMUSCULAR; INTRAVENOUS at 08:33

## 2023-12-07 RX ADMIN — ACETAMINOPHEN 650 MG: 325 TABLET ORAL at 08:13

## 2023-12-07 ASSESSMENT — PAIN SCALES - GENERAL: PAINLEVEL: NO PAIN (0)

## 2023-12-07 NOTE — LETTER
12/7/2023         RE: Hilda Potter  [address]  [facility] [address]        Dear Colleague,    Thank you for referring your patient, Hilda Potter, to the [facility]. Please see a copy of my visit note below.    Oncology Follow Up Visit: December 7, 2023    Oncologist: Dr Bang Saunders  PCP: Ame Dobson    Diagnosis: Grade IV follicular lymphoma  Hilda Potter is an 87 yo female 6 month follow-up lymphoma  11/2011- presented with night sweats and lymphadenopathy. Negative infectious work-up.   4/2012-CT demonstrated multiple borderline mildly enlarged retroperitoneal and mesenteric lymph nodes, with the largest size of 1.5 cm. CT guided biopsy of intraabdominal lymph node c/w low grade follicular lymphoma. Flow cytometry showed kappa monotypic CD10 positive B cells. BMBx demonstrated 0.2% follicular lymphoma by flow cytometry.   Treatment:   9/2012 Completed weekly Rituxan x 4, premed solumedrol  [id number] Observation   9/2023 CT CAP for abdominal pain and fatigue- finding abdominal adenopathy   10/2023 Recommended colonoscopy, then Rituxan x 4  11/21/2023 began Rituxan- planned for 4 weekly doses    Interval History: Ms. Potter and  come to clinic  prior to day 15 of Rituxan to treat her follicular lymphoma. Pt reports she noted the injection side from day 8 to the abdomen turned dark pink and was more firm after the injection and has since improved but the area is still noticeable though not warm to touch. Bowels are still variable but has Planned colonoscopy for 12/12/2023 and is aware of prep. She denies change in appetite or any nausea. Sleep affected by the steroids the first night. Denies fevers, headaches, SOB or new aches.  Does not like IV and wasn't to switch to oral steroid.   Rest of comprehensive and complete ROS is reviewed and is negative.   Past Medical History:   Diagnosis Date     AR (allergic rhinitis)      Atrophic  "vaginitis      Chronic kidney disease, stage 3 (H) 7/14/2021     Compression fracture of L1 lumbar vertebra (H) 4/2015     Glaucoma (increased eye pressure)      HTN (hypertension)      Migraine      Nonsenile cataract      Osteoporosis      Reflux esophagitis      Thyroid disease      Current Outpatient Medications   Medication     Aspirin-Acetaminophen-Caffeine (EXCEDRIN MIGRAINE PO)     calcium carbonate-vitamin D (OS-FIDENCIO) 600-400 MG-UNIT chewable tablet     carboxymethylcellulose (REFRESH PLUS) 0.5 % SOLN     clobetasol (TEMOVATE) 0.05 % external cream     hydrochlorothiazide (HYDRODIURIL) 25 MG tablet     latanoprost (XALATAN) 0.005 % ophthalmic solution     levothyroxine (SYNTHROID/LEVOTHROID) 50 MCG tablet     MELATONIN PO     Multiple Vitamin (MULTI-VITAMIN) per tablet     sertraline (ZOLOFT) 25 MG tablet     timolol maleate (TIMOPTIC) 0.5 % ophthalmic solution     No current facility-administered medications for this visit.     Allergies   Allergen Reactions     Diatrizoate Other (See Comments)     Zoster Vaccine Live Anaphylaxis     Bisphosphonates GI Disturbance     GI distress     Ivp Dye [Contrast Dye] Swelling     Lisinopril Cough     Losartan Hives     But can tolerate Hyzaar.      Morphine Hives     Morphine Sulfate Nausea and Vomiting     Prilosec [Omeprazole] Hives     Latex Rash       Physical Exam:BP (!) 167/73 (BP Location: Right arm, Patient Position: Chair, Cuff Size: Adult Regular)   Pulse 67   Temp 97.9  F (36.6  C) (Oral)   Ht 1.61 m (5' 3.39\")   Wt 62.1 kg (137 lb)   LMP 10/01/1986   SpO2 97%   BMI 23.97 kg/m     ECOG PS- 0  Constitutional: Alert, cooperative, and in no distress.   ENT: Eyes bright, No mouth sores  Neck: Supple, No adenopathy.  Cardiac: Heart rate and rhythm is regular and strong without murmur  Respiratory: Breathing easy. Lung sounds clear to auscultation  GI: Abdomen is soft, non-tender, does have a 6cm darker pink area around previous injection site.   BS " normal. No masses or organomegaly  MS: Muscle tone normal, extremities normal with no edema. Moving well for age- no assist needed to exam table.  Skin: left mid abdomen has a darker pink area that is about 5 cms but without firmness or warmth.   Neuro: Sensory grossly WNL, gait normal.   Lymph: Normal ant/post cervical, axillary, supraclavicular nodes  Psych: Mentation appears normal and affect normal/bright with good conversation    Laboratory/Imaging Results:   Results for orders placed or performed in visit on 12/07/23   CBC with platelets and differential     Status: None   Result Value Ref Range    WBC Count 4.9 4.0 - 11.0 10e3/uL    RBC Count 4.13 3.80 - 5.20 10e6/uL    Hemoglobin 12.2 11.7 - 15.7 g/dL    Hematocrit 36.7 35.0 - 47.0 %    MCV 89 78 - 100 fL    MCH 29.5 26.5 - 33.0 pg    MCHC 33.2 31.5 - 36.5 g/dL    RDW 13.2 10.0 - 15.0 %    Platelet Count 168 150 - 450 10e3/uL    % Neutrophils 69 %    % Lymphocytes 21 %    % Monocytes 8 %    % Eosinophils 1 %    % Basophils 1 %    % Immature Granulocytes 0 %    NRBCs per 100 WBC 0 <1 /100    Absolute Neutrophils 3.4 1.6 - 8.3 10e3/uL    Absolute Lymphocytes 1.1 0.8 - 5.3 10e3/uL    Absolute Monocytes 0.4 0.0 - 1.3 10e3/uL    Absolute Eosinophils 0.1 0.0 - 0.7 10e3/uL    Absolute Basophils 0.0 0.0 - 0.2 10e3/uL    Absolute Immature Granulocytes 0.0 <=0.4 10e3/uL    Absolute NRBCs 0.0 10e3/uL   CBC with platelets differential     Status: None    Narrative    The following orders were created for panel order CBC with platelets differential.  Procedure                               Abnormality         Status                     ---------                               -----------         ------                     CBC with platelets and d...[[id number]]                      Final result                 Please view results for these tests on the individual orders.     Assessment and Plan:   Grade IV follicular lymphoma-Pt has completed days 1 and 8 of plan for 4  Rituxan Hycela injections.  She is meeting goals for treatment today. She has noted some redness and firmness from day 8 to the abdomen which does appear as reaction and not infection. The color change does seem to be improving and we will use opposite side of abdomen for todays injection.   - pt does not like IVs and would like to move to oral steroid for final injection   Will return for 4th and final injection next week   Has imaging and review set for March followup.   The total time of this encounter amounted to  40 minutes. This time included face to face time spent with the patient, prep work, ordering tests, and performing post visit documentation.  Doris Anguiano Cnp      Again, thank you for allowing me to participate in the care of your patient.        Sincerely,        Doris Anguiano NP, APRN CNP

## 2023-12-07 NOTE — PROGRESS NOTES
Infusion Nursing Note:  Hidla Potter presents today for week 3 of 4, of Riuxbarber Hycela.    Patient seen by provider today: No   present during visit today: Not Applicable.    Note:   With very first IV Rituxan in 2012 patient had tingling lips.  Thus, now patient gets premeds prior to Rituxan treatments. Oral Tylenol and Benadryl and IV Solumedrol push are ordered.  Patient states she's had complications with IVs in the past.  Questioned the possibility of oral steroids rather than IV steroid premed.  Sent message to Doris ONTIVEROS, whom saw patient today, for possible change in route of steroid administration for next week's shot.    Intravenous Access:  Peripheral IV placed.    Treatment Conditions:  Lab Results   Component Value Date    HGB 12.2 12/07/2023    WBC 4.9 12/07/2023    ANEU 3.0 05/07/2021    ANEUTAUTO 3.4 12/07/2023     12/07/2023        Results reviewed, labs MET treatment parameters, ok to proceed with treatment.      Post Infusion Assessment:  Patient tolerated infusion without incident.  Patient tolerated injection without incident.  Patient observed for 15 minutes post Rituxan Hycela per protocol.  Blood return noted pre and post infusion.  Site patent and intact, free from redness, edema or discomfort.  No evidence of extravasations.  Access discontinued per protocol.       Discharge Plan:   AVS to patient via MYCHART.  Patient will return 12/14/23 for next appointment.   Patient discharged in stable condition accompanied by: .  Departure Mode: Ambulatory.      Shauna Burnett RN

## 2023-12-07 NOTE — NURSING NOTE
"Oncology Rooming Note    December 7, 2023 7:22 AM   Hilda Potter is a 88 year old female who presents for:    Chief Complaint   Patient presents with    Oncology Clinic Visit     Follow up     Initial Vitals: BP (!) 167/73 (BP Location: Right arm, Patient Position: Chair, Cuff Size: Adult Regular)   Pulse 67   Temp 97.9  F (36.6  C) (Oral)   Ht 1.61 m (5' 3.39\")   Wt 62.1 kg (137 lb)   LMP 10/01/1986   SpO2 97%   BMI 23.97 kg/m   Estimated body mass index is 23.97 kg/m  as calculated from the following:    Height as of this encounter: 1.61 m (5' 3.39\").    Weight as of this encounter: 62.1 kg (137 lb). Body surface area is 1.67 meters squared.  No Pain (0) Comment: Data Unavailable   Patient's last menstrual period was 10/01/1986.  Allergies reviewed: Yes  Medications reviewed: Yes    Medications: Medication refills not needed today.  Pharm[facility] into [id number] - [address]  AT [facility]    Clinical concerns: NO  Doris was notified.      Cecilia Blanco CMA              "

## 2023-12-07 NOTE — TELEPHONE ENCOUNTER
Wondering if it is possible for patient to get oral steroids, rather than IV steroids, prior to future Rituxan shots.    IV is started for one minute push of Solumedrol only.  Patient states she has had complications with IVs in the past.    Routing to Doris Anguiano NP whom saw patient today for possible change from IV to oral steroids.    If patient can change, please have care coordinator contact patient and notify.    Shauna Burnett RN on 12/7/2023 at 3:39 PM

## 2023-12-08 RX ORDER — DEXAMETHASONE 4 MG/1
TABLET ORAL
Qty: 6 TABLET | Refills: 0 | Status: SHIPPED | OUTPATIENT
Start: 2023-12-08 | End: 2024-03-26

## 2023-12-08 RX ORDER — METHYLPREDNISOLONE SODIUM SUCCINATE 125 MG/2ML
125 INJECTION, POWDER, LYOPHILIZED, FOR SOLUTION INTRAMUSCULAR; INTRAVENOUS ONCE
Status: CANCELLED
Start: 2023-12-14 | End: 2023-12-14

## 2023-12-08 NOTE — TELEPHONE ENCOUNTER
oral dex  Received: Today  Doris Anguiano APRN CNP Neubauer, Julie, LAUREL Kauffman- pt is looking to stop IV steroid prior to final rituxan injection next week. I have ordered 6 tabs of 4 mg dexamethasone 2 hours prior to injection. Can you call her and make her aware we have changed this?       Patient contacted, informed that prescription has been sent for oral steroid to [facility] in [place] and reviewed dosing instructions - no IV start for medication at next infusion visit.  Patient verbalizes understanding and agrees to plan.    Daly Leon, RN

## 2023-12-14 ENCOUNTER — INFUSION THERAPY VISIT (OUTPATIENT)
Dept: INFUSION THERAPY | Facility: CLINIC | Age: 88
End: 2023-12-14
Attending: INTERNAL MEDICINE
Payer: MEDICARE

## 2023-12-14 ENCOUNTER — LAB (OUTPATIENT)
Dept: LAB | Facility: CLINIC | Age: 88
End: 2023-12-14
Payer: MEDICARE

## 2023-12-14 VITALS
BODY MASS INDEX: 23.78 KG/M2 | TEMPERATURE: 98.3 F | DIASTOLIC BLOOD PRESSURE: 72 MMHG | RESPIRATION RATE: 16 BRPM | OXYGEN SATURATION: 97 % | SYSTOLIC BLOOD PRESSURE: 146 MMHG | WEIGHT: 135.9 LBS | HEART RATE: 60 BPM

## 2023-12-14 DIAGNOSIS — C82.00 GRADE I FOLLICULAR SMALL CLEAVED CELL LYMPHOMA (H): Primary | ICD-10-CM

## 2023-12-14 LAB
BASOPHILS # BLD AUTO: 0 10E3/UL (ref 0–0.2)
BASOPHILS NFR BLD AUTO: 0 %
EOSINOPHIL # BLD AUTO: 0 10E3/UL (ref 0–0.7)
EOSINOPHIL NFR BLD AUTO: 1 %
ERYTHROCYTE [DISTWIDTH] IN BLOOD BY AUTOMATED COUNT: 13.2 % (ref 10–15)
HCT VFR BLD AUTO: 37.2 % (ref 35–47)
HGB BLD-MCNC: 12.5 G/DL (ref 11.7–15.7)
HOLD SPECIMEN: NORMAL
HOLD SPECIMEN: NORMAL
IMM GRANULOCYTES # BLD: 0 10E3/UL
IMM GRANULOCYTES NFR BLD: 1 %
LYMPHOCYTES # BLD AUTO: 0.8 10E3/UL (ref 0.8–5.3)
LYMPHOCYTES NFR BLD AUTO: 12 %
MCH RBC QN AUTO: 29.3 PG (ref 26.5–33)
MCHC RBC AUTO-ENTMCNC: 33.6 G/DL (ref 31.5–36.5)
MCV RBC AUTO: 87 FL (ref 78–100)
MONOCYTES # BLD AUTO: 0.4 10E3/UL (ref 0–1.3)
MONOCYTES NFR BLD AUTO: 6 %
NEUTROPHILS # BLD AUTO: 5.5 10E3/UL (ref 1.6–8.3)
NEUTROPHILS NFR BLD AUTO: 80 %
NRBC # BLD AUTO: 0 10E3/UL
NRBC BLD AUTO-RTO: 0 /100
PLATELET # BLD AUTO: 194 10E3/UL (ref 150–450)
RBC # BLD AUTO: 4.26 10E6/UL (ref 3.8–5.2)
WBC # BLD AUTO: 6.8 10E3/UL (ref 4–11)

## 2023-12-14 PROCEDURE — 85025 COMPLETE CBC W/AUTO DIFF WBC: CPT | Performed by: INTERNAL MEDICINE

## 2023-12-14 PROCEDURE — 36415 COLL VENOUS BLD VENIPUNCTURE: CPT | Performed by: INTERNAL MEDICINE

## 2023-12-14 PROCEDURE — 96401 CHEMO ANTI-NEOPL SQ/IM: CPT | Performed by: INTERNAL MEDICINE

## 2023-12-14 RX ORDER — ACETAMINOPHEN 325 MG/1
650 TABLET ORAL ONCE
Status: COMPLETED | OUTPATIENT
Start: 2023-12-14 | End: 2023-12-14

## 2023-12-14 RX ORDER — DIPHENHYDRAMINE HCL 25 MG
50 CAPSULE ORAL ONCE
Status: COMPLETED | OUTPATIENT
Start: 2023-12-14 | End: 2023-12-14

## 2023-12-14 RX ADMIN — ACETAMINOPHEN 650 MG: 325 TABLET ORAL at 14:40

## 2023-12-14 RX ADMIN — Medication 50 MG: at 14:40

## 2023-12-14 ASSESSMENT — PAIN SCALES - GENERAL: PAINLEVEL: NO PAIN (0)

## 2023-12-14 NOTE — PROGRESS NOTES
Infusion Nursing Note:  Hilda Potter presents today for Rituxan Hycela.    Patient seen by provider today: No   present during visit today: Not Applicable.    Note: The patient reports feeling well today.     The patient reports she took her oral Decadron at home 2 hours prior to her injection.      Intravenous Access:  No Intravenous access/labs at this visit.    Treatment Conditions:  Lab Results   Component Value Date    HGB 12.5 12/14/2023    WBC 6.8 12/14/2023    ANEU 3.0 05/07/2021    ANEUTAUTO 5.5 12/14/2023     12/14/2023        Results reviewed, labs MET treatment parameters, ok to proceed with treatment.      Post Infusion Assessment:  Patient tolerated injection without incident.  Patient observed for 15 minutes post Rituxan Hycela per protocol.  Site patent and intact, free from redness, edema or discomfort.       Discharge Plan:   AVS to patient via MYCHART.  Patient will return as directed by her provider for next appointment.  Patient discharged in stable condition accompanied by: self.  Departure Mode: Ambulatory.      Florecita Centeno RN

## 2024-01-19 ENCOUNTER — TELEPHONE (OUTPATIENT)
Dept: OPHTHALMOLOGY | Facility: CLINIC | Age: 89
End: 2024-01-19
Payer: MEDICARE

## 2024-01-19 DIAGNOSIS — H40.003 GLAUCOMA SUSPECT OF BOTH EYES: ICD-10-CM

## 2024-01-19 RX ORDER — TIMOLOL MALEATE 5 MG/ML
1 SOLUTION/ DROPS OPHTHALMIC 2 TIMES DAILY
Qty: 20 ML | Refills: 4 | Status: SHIPPED | OUTPATIENT
Start: 2024-01-19

## 2024-01-19 NOTE — TELEPHONE ENCOUNTER
Hilda is wondering if she can get her timolol in a larger bottle, she feels like she is running out of the small bottles too quickly.      She can be reached at:  [phone]     Sherlyn Gonzales

## 2024-01-19 NOTE — TELEPHONE ENCOUNTER
Last visit 10/30/23 - told to start Timolol BID both eyes, increased from before. Next appt 4/24/24. Will update script and send 90 day supply like her Latanoprost.

## 2024-02-20 ENCOUNTER — TELEPHONE (OUTPATIENT)
Dept: FAMILY MEDICINE | Facility: CLINIC | Age: 89
End: 2024-02-20
Payer: MEDICARE

## 2024-02-20 DIAGNOSIS — F43.23 ADJUSTMENT DISORDER WITH MIXED ANXIETY AND DEPRESSED MOOD: ICD-10-CM

## 2024-02-20 NOTE — TELEPHONE ENCOUNTER
Noted.   Patient is on a low dose of Sertraline 25 mg/day- she can just stop it and we can follow up in a month. Tele visit is ok.

## 2024-02-20 NOTE — TELEPHONE ENCOUNTER
"Patient calling, wants to get off her sertraline, makes her feel sleepy and \"woozy\".    She just picked up a refill and has no further refills.    She started taking it just every other day a couple weeks ago.   She feels better on the days she doesn't take it, anxiety not an issue.  A lot of her anxiety was situational and due to medical issues that are now under control.    She wonders if she can just stop it at this point or does she need to wean further before stopping?    Routed to Dr. Dobson to address.    Katie JEONG RN  [facility] Triage    "

## 2024-02-20 NOTE — TELEPHONE ENCOUNTER
Spoke with patient, relayed providers message below and patient verbalized understanding. Pt states that she has appointment in June, 2024 with pcp and will follow up at that time. Pt also noted that she has appointment with oncology in March. RN informed pt that if she is wanting to try a different medication, an appointment with pcp will be needed to discuss this. Pt stated no further questions.     Linda Galan RN on 2/20/2024 at 12:43 PM

## 2024-03-08 ENCOUNTER — ANCILLARY PROCEDURE (OUTPATIENT)
Dept: CT IMAGING | Facility: CLINIC | Age: 89
End: 2024-03-08
Attending: INTERNAL MEDICINE
Payer: MEDICARE

## 2024-03-08 ENCOUNTER — LAB (OUTPATIENT)
Dept: LAB | Facility: CLINIC | Age: 89
End: 2024-03-08
Payer: MEDICARE

## 2024-03-08 DIAGNOSIS — C82.03 FOLLICULAR LYMPHOMA GRADE I OF INTRA-ABDOMINAL LYMPH NODES (H): ICD-10-CM

## 2024-03-08 LAB
ALBUMIN SERPL BCG-MCNC: 4.1 G/DL (ref 3.5–5.2)
ALP SERPL-CCNC: 74 U/L (ref 40–150)
ALT SERPL W P-5'-P-CCNC: 21 U/L (ref 0–50)
ANION GAP SERPL CALCULATED.3IONS-SCNC: 9 MMOL/L (ref 7–15)
AST SERPL W P-5'-P-CCNC: 26 U/L (ref 0–45)
BASOPHILS # BLD AUTO: 0 10E3/UL (ref 0–0.2)
BASOPHILS NFR BLD AUTO: 1 %
BILIRUB SERPL-MCNC: 0.6 MG/DL
BUN SERPL-MCNC: 21.5 MG/DL (ref 8–23)
CALCIUM SERPL-MCNC: 9.8 MG/DL (ref 8.8–10.2)
CHLORIDE SERPL-SCNC: 98 MMOL/L (ref 98–107)
CREAT SERPL-MCNC: 0.85 MG/DL (ref 0.51–0.95)
DEPRECATED HCO3 PLAS-SCNC: 33 MMOL/L (ref 22–29)
EGFRCR SERPLBLD CKD-EPI 2021: 66 ML/MIN/1.73M2
EOSINOPHIL # BLD AUTO: 0.2 10E3/UL (ref 0–0.7)
EOSINOPHIL NFR BLD AUTO: 4 %
ERYTHROCYTE [DISTWIDTH] IN BLOOD BY AUTOMATED COUNT: 13.1 % (ref 10–15)
GLUCOSE SERPL-MCNC: 88 MG/DL (ref 70–99)
HCT VFR BLD AUTO: 40 % (ref 35–47)
HGB BLD-MCNC: 13.2 G/DL (ref 11.7–15.7)
IMM GRANULOCYTES # BLD: 0 10E3/UL
IMM GRANULOCYTES NFR BLD: 0 %
LDH SERPL L TO P-CCNC: 188 U/L (ref 0–250)
LYMPHOCYTES # BLD AUTO: 1.1 10E3/UL (ref 0–5.3)
LYMPHOCYTES NFR BLD AUTO: 18 %
MCH RBC QN AUTO: 29.1 PG (ref 26.5–33)
MCHC RBC AUTO-ENTMCNC: 33 G/DL (ref 31.5–36.5)
MCV RBC AUTO: 88 FL (ref 78–100)
MONOCYTES # BLD AUTO: 0.8 10E3/UL (ref 0–1.3)
MONOCYTES NFR BLD AUTO: 14 %
NEUTROPHILS # BLD AUTO: 3.8 10E3/UL (ref 1.6–8.3)
NEUTROPHILS NFR BLD AUTO: 64 %
NRBC # BLD AUTO: 0 10E3/UL
NRBC BLD AUTO-RTO: 0 /100
PLATELET # BLD AUTO: 188 10E3/UL (ref 150–450)
POTASSIUM SERPL-SCNC: 3.7 MMOL/L (ref 3.4–5.3)
PROT SERPL-MCNC: 7 G/DL (ref 6.4–8.3)
RBC # BLD AUTO: 4.54 10E6/UL (ref 3.8–5.2)
SODIUM SERPL-SCNC: 140 MMOL/L (ref 135–145)
WBC # BLD AUTO: 5.9 10E3/UL (ref 4–11)

## 2024-03-08 PROCEDURE — 85025 COMPLETE CBC W/AUTO DIFF WBC: CPT

## 2024-03-08 PROCEDURE — 36415 COLL VENOUS BLD VENIPUNCTURE: CPT

## 2024-03-08 PROCEDURE — G1010 CDSM STANSON: HCPCS | Performed by: RADIOLOGY

## 2024-03-08 PROCEDURE — 74176 CT ABD & PELVIS W/O CONTRAST: CPT | Mod: MG | Performed by: RADIOLOGY

## 2024-03-08 PROCEDURE — 80053 COMPREHEN METABOLIC PANEL: CPT

## 2024-03-08 PROCEDURE — 83615 LACTATE (LD) (LDH) ENZYME: CPT

## 2024-03-08 PROCEDURE — 71250 CT THORAX DX C-: CPT | Mod: MG | Performed by: RADIOLOGY

## 2024-03-12 ENCOUNTER — ONCOLOGY VISIT (OUTPATIENT)
Dept: ONCOLOGY | Facility: CLINIC | Age: 89
End: 2024-03-12
Attending: INTERNAL MEDICINE
Payer: MEDICARE

## 2024-03-12 VITALS
HEART RATE: 68 BPM | RESPIRATION RATE: 16 BRPM | WEIGHT: 138.4 LBS | BODY MASS INDEX: 24.52 KG/M2 | HEIGHT: 63 IN | DIASTOLIC BLOOD PRESSURE: 69 MMHG | OXYGEN SATURATION: 97 % | SYSTOLIC BLOOD PRESSURE: 136 MMHG

## 2024-03-12 DIAGNOSIS — C82.03 FOLLICULAR LYMPHOMA GRADE I OF INTRA-ABDOMINAL LYMPH NODES (H): Primary | ICD-10-CM

## 2024-03-12 DIAGNOSIS — R59.0 MESENTERIC LYMPHADENOPATHY: ICD-10-CM

## 2024-03-12 DIAGNOSIS — Z91.041 CONTRAST MEDIA ALLERGY: ICD-10-CM

## 2024-03-12 DIAGNOSIS — L90.0 LICHEN SCLEROSUS: ICD-10-CM

## 2024-03-12 PROCEDURE — 99214 OFFICE O/P EST MOD 30 MIN: CPT | Performed by: INTERNAL MEDICINE

## 2024-03-12 PROCEDURE — G0463 HOSPITAL OUTPT CLINIC VISIT: HCPCS | Performed by: INTERNAL MEDICINE

## 2024-03-12 ASSESSMENT — PAIN SCALES - GENERAL: PAINLEVEL: NO PAIN (0)

## 2024-03-12 NOTE — LETTER
"    3/12/2024         RE: Hilda Potter  [address] [facility] [address]        Dear Colleague,    Thank you for referring your patient, Hilda Potter, to the [facility] [place]. Please see a copy of my visit note below.    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  # Sep 2012 - ongoing Surveillance.  # Dec 2023 Rituximab weekly x4; mixed stricture of clinical improvement but radiographically stable disease.    Continue observation; clinically improved with resolution of constitutional symptoms but radiographically stable disease to partial response.  Stable counts, chemistry and LDH.  Unfortunately, lichen sclerosus did not respond to rituximab; continue topical therapies.  RTC 6 months w/ labs and non-contrast CT CAP.    Bang Saunders MD    --------------------    Interval History:  Hilda returns for follow up of follicular lymphoma.  All in all, she remains well.  Resolution of sweats.  Abdominal symptoms are improved.  Unfortunately her lichen sclerosus did not resolve.  No long-term side effects from treatment.  Bowels remain comfortable and good.      --------------------    Physical Exam:  VS: /69 (BP Location: Right arm)   Pulse 68   Resp 16   Ht 1.61 m (5' 3.39\")   Wt 62.8 kg (138 lb 6.4 oz)   LMP 10/01/1986   SpO2 97%   BMI 24.22 kg/m  .  GEN: Well appearing.  JEFFERY: No cervical, clavicular, axillary adenopathy    Labs / Imaging:  Reviewed " CBC, LDH, CMP.  Reviewed CT chest abdomen pelvis w/ independent review.      Again, thank you for allowing me to participate in the care of your patient.        Sincerely,        Bang Saunders MD

## 2024-03-12 NOTE — PROGRESS NOTES
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  # Sep 2012 - ongoing Surveillance.  # Dec 2023 Rituximab weekly x4; mixed stricture of clinical improvement but radiographically stable disease.    Continue observation; clinically improved with resolution of constitutional symptoms but radiographically stable disease to partial response.  Stable counts, chemistry and LDH.  Unfortunately, lichen sclerosus did not respond to rituximab; continue topical therapies.  RTC 6 months w/ labs and non-contrast CT CAP.    Bang Saunders MD    --------------------    Interval History:  Hilda returns for follow up of follicular lymphoma.  All in all, she remains well.  Resolution of sweats.  Abdominal symptoms are improved.  Unfortunately her lichen sclerosus did not resolve.  No long-term side effects from treatment.  Bowels remain comfortable and good.      --------------------    Physical Exam:  VS: /69 (BP Location: Right arm)   Pulse 68   Resp 16   Ht 1.61 m (5' 3.39\")   Wt 62.8 kg (138 lb 6.4 oz)   LMP 10/01/1986   SpO2 97%   BMI 24.22 kg/m  .  GEN: Well appearing.  JEFFERY: No cervical, clavicular, axillary adenopathy    Labs / Imaging:  Reviewed CBC, LDH, CMP.  Reviewed CT chest abdomen pelvis w/ independent review.  "

## 2024-03-12 NOTE — NURSING NOTE
"Oncology Rooming Note    March 12, 2024 2:03 PM   Hilda Potter is a 88 year old female who presents for:    Chief Complaint   Patient presents with    Oncology Clinic Visit     Follow up     Initial Vitals: /69 (BP Location: Right arm)   Pulse 68   Resp 16   Ht 1.61 m (5' 3.39\")   Wt 62.8 kg (138 lb 6.4 oz)   LMP 10/01/1986   SpO2 97%   BMI 24.22 kg/m   Estimated body mass index is 24.22 kg/m  as calculated from the following:    Height as of this encounter: 1.61 m (5' 3.39\").    Weight as of this encounter: 62.8 kg (138 lb 6.4 oz). Body surface area is 1.68 meters squared.  No Pain (0) Comment: Data Unavailable   Patient's last menstrual period was 10/01/1986.  Allergies reviewed: Yes  Medications reviewed: Yes    Medications: Medication refills not needed today.  Pharmacy name entered into [facility]: [id number] - [address]  AT [facility]    Frailty Screening:   Is the patient here for a new oncology consult visit in cancer care? 2. No      Clinical concerns: results       Tanya Conner LPN              "

## 2024-03-14 ENCOUNTER — TELEPHONE (OUTPATIENT)
Dept: ONCOLOGY | Facility: CLINIC | Age: 89
End: 2024-03-14
Payer: MEDICARE

## 2024-03-14 DIAGNOSIS — C82.00 GRADE I FOLLICULAR SMALL CLEAVED CELL LYMPHOMA (H): Primary | ICD-10-CM

## 2024-03-14 RX ORDER — METHYLPREDNISOLONE 32 MG/1
32 TABLET ORAL DAILY
Qty: 1 TABLET | Refills: 0 | Status: SHIPPED | OUTPATIENT
Start: 2024-03-14 | End: 2024-03-26

## 2024-03-14 RX ORDER — DIPHENHYDRAMINE HCL 25 MG
25 CAPSULE ORAL ONCE
Qty: 1 CAPSULE | Refills: 0 | Status: SHIPPED | OUTPATIENT
Start: 2024-03-14 | End: 2024-03-14

## 2024-03-14 NOTE — TELEPHONE ENCOUNTER
Please cancel solumedrol and benadryl scripts and reorder her CT CAP as non-contrast.    Thank you in advance.    Bang Saunders MD.

## 2024-03-14 NOTE — TELEPHONE ENCOUNTER
CT reordered, medications cancelled. Patient notified.     Marie Chiang, RN, BSN, PHN, OCN  [facility]     carvediolol 3.125 down from 25 BID  lorsartan d/c  No diuretic currently  AICD  Daily weight

## 2024-03-14 NOTE — TELEPHONE ENCOUNTER
Patient received phone call from pharmacy letting her know her prescriptions for diphenhydramine and methylPREDNISolone (MEDROL) 32 MG table are ready for . Patient was not expecting this medications and is asking if they are needed.    Per patient, she does not have any upcoming imaging appts and does not usually do contrast. Next follow up will be in September.    Routing to provider to review and advise.     Marie Chiang RN, BSN, PHN, OCN  [facility]

## 2024-03-18 ENCOUNTER — TELEPHONE (OUTPATIENT)
Dept: FAMILY MEDICINE | Facility: CLINIC | Age: 89
End: 2024-03-18
Payer: MEDICARE

## 2024-03-18 NOTE — TELEPHONE ENCOUNTER
Reason for Call:  Appointment Request    Patient requesting this type of appt: Chronic Diease Management/Medication/Follow-Up    Requested provider: Ame Dobson    Reason patient unable to be scheduled: Not within requested timeframe    When does patient want to be seen/preferred time:  Sooner than May 8th - pt scheduled this date (first available to [facility]) as a place stewart until she hears back from care team     Comments: Pt needs appointment to recheck items suggested by oncologist - Lichen sclerosus & the start of possible neuropathy in feet. Oncologist wants this checked with pt's PCP.     Could we send this information to you in [facility] or would you prefer to receive a phone call?:   Patient would prefer a phone call   Okay to leave a detailed message?: Yes at Home number on file [phone] (home)    Call taken on 3/18/2024 at 2:21 PM by Lia Hughes

## 2024-03-26 ENCOUNTER — OFFICE VISIT (OUTPATIENT)
Dept: FAMILY MEDICINE | Facility: CLINIC | Age: 89
End: 2024-03-26
Payer: MEDICARE

## 2024-03-26 VITALS
BODY MASS INDEX: 26.5 KG/M2 | WEIGHT: 135 LBS | HEART RATE: 80 BPM | TEMPERATURE: 97.8 F | DIASTOLIC BLOOD PRESSURE: 75 MMHG | RESPIRATION RATE: 20 BRPM | SYSTOLIC BLOOD PRESSURE: 121 MMHG | OXYGEN SATURATION: 98 % | HEIGHT: 60 IN

## 2024-03-26 DIAGNOSIS — G57.61 MORTON'S NEUROMA OF RIGHT FOOT: ICD-10-CM

## 2024-03-26 DIAGNOSIS — R20.0 NUMBNESS AND TINGLING OF FOOT: ICD-10-CM

## 2024-03-26 DIAGNOSIS — L90.0 LICHEN SCLEROSUS: Primary | ICD-10-CM

## 2024-03-26 DIAGNOSIS — R20.2 NUMBNESS AND TINGLING OF FOOT: ICD-10-CM

## 2024-03-26 DIAGNOSIS — C82.00 GRADE I FOLLICULAR SMALL CLEAVED CELL LYMPHOMA (H): ICD-10-CM

## 2024-03-26 PROBLEM — N18.30 CHRONIC KIDNEY DISEASE, STAGE 3 (H): Status: RESOLVED | Noted: 2021-07-14 | Resolved: 2024-03-26

## 2024-03-26 PROCEDURE — 82607 VITAMIN B-12: CPT | Performed by: FAMILY MEDICINE

## 2024-03-26 PROCEDURE — 84443 ASSAY THYROID STIM HORMONE: CPT | Performed by: FAMILY MEDICINE

## 2024-03-26 PROCEDURE — 36415 COLL VENOUS BLD VENIPUNCTURE: CPT | Performed by: FAMILY MEDICINE

## 2024-03-26 PROCEDURE — 99214 OFFICE O/P EST MOD 30 MIN: CPT | Performed by: FAMILY MEDICINE

## 2024-03-26 ASSESSMENT — PAIN SCALES - GENERAL: PAINLEVEL: NO PAIN (0)

## 2024-03-26 NOTE — PROGRESS NOTES
Assessment & Plan     Hilda was seen today for follow up.    Diagnoses and all orders for this visit:    Lichen sclerosus - not responding to high dose topical steroid per patient  -     Ob/Gyn  Referral; Future- for possible biopsy  -      Patient education on chronic, progressive nature of the disease and need for biopsy if not responding to treatment.     Numbness and tingling of foot  -     Vitamin B12  -     TSH with free T4 reflex  -     In the interim recommended taking a multivitamin to bridge any nutritional gaps and wear wide based shoes    Pierce's neuroma of right foot  -     Orthopedic  Referral; Future  -     Recommended wide toe -based shoes in the interim  Patient education and Handout with home care instructions given. See AVS for details.      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       Return in about 3 months (around 6/26/2024) for Annual Wellness Visit.      Subjective   Hilda is a 88 year old, presenting for the following health issues:  Follow Up (Recheck items per pt oncologist - Lichen sclerosus & check foot for possible neuropathy)        3/26/2024     9:31 AM   Additional Questions   Roomed by Jose   Accompanied by self         3/26/2024     9:31 AM   Patient Reported Additional Medications   Patient reports taking the following new medications No     History of Present Illness       Reason for visit:  Lichen schlerosis and possibke neuropathy  Symptom onset:  1-2 weeks ago  Symptoms include:  Red, stiff foot with itching.  Symptom intensity:  Mild  Symptom progression:  Worsening  Had these symptoms before:  No  What makes it worse:  Walking  What makes it better:  No    She eats 2-3 servings of fruits and vegetables daily.She consumes 1 sweetened beverage(s) daily.She exercises with enough effort to increase her heart rate 10 to 19 minutes per day.  She exercises  with enough effort to increase her heart rate 3 or less days per week.   She is taking medications regularly.         Follow up Lichen and foot check (mainly right side)  How many servings of fruits and vegetables do you eat daily?  2-3  On average, how many sweetened beverages do you drink each day (Examples: soda, juice, sweet tea, etc.  Do NOT count diet or artificially sweetened beverages)?   1  How many days per week do you exercise enough to make your heart beat faster? 3 or less  How many minutes a day do you exercise enough to make your heart beat faster? 10 - 19  How many days per week do you miss taking your medication? 0    Patient has had ongoing Lichen sclerosus- states that it responded minimally to high dose steroid therapy - Clobetasol.     Has a known history of Follicular Lymphoma- her Oncologist Dr Saunders was hopeful that her Lichen sclerosus would respond to Rituximab but it didn't. Recommended to continue topical therapies.     Patient states that she has been using OTC vaseline which is actually helpful. Still has the Clobetasol at home.     Additional Concern-  Reports tingling and numbness in the ball of her foot over the last month or so, especially in between the Right 2nd and 3rd toe but now occurring in both feet. Worse on walking.   She is concerned about possible neuropathy.         Review of Systems  Constitutional, HEENT, cardiovascular, pulmonary, gi and gu systems are negative, except as otherwise noted.      Objective    /75 (BP Location: Left arm, Patient Position: Sitting, Cuff Size: Adult Regular)   Pulse 80   Temp 97.8  F (36.6  C) (Temporal)   Resp 20   Ht 1.524 m (5')   Wt 61.2 kg (135 lb)   LMP 10/01/1986   SpO2 98%   BMI 26.37 kg/m    Body mass index is 26.37 kg/m .  Physical Exam   GENERAL: alert and no distress   (female): Noted mild erythema of the female external genitalia. No whitening or fissuring noted. Normal urethral meatus, normal vaginal  mucosa,  MS: no gross musculoskeletal defects noted, no edema. Pain is reproduced on applying pressure between the toes and squeezing the foot. Sensation is intact. Pedal pulse present. No cyanosis.    DATA  Labs drawn and in process        Signed Electronically by: Ame Dobson MD

## 2024-03-27 LAB
TSH SERPL DL<=0.005 MIU/L-ACNC: 1.61 UIU/ML (ref 0.3–4.2)
VIT B12 SERPL-MCNC: 532 PG/ML (ref 232–1245)

## 2024-04-02 ENCOUNTER — OFFICE VISIT (OUTPATIENT)
Dept: PODIATRY | Facility: CLINIC | Age: 89
End: 2024-04-02
Attending: FAMILY MEDICINE
Payer: MEDICARE

## 2024-04-02 ENCOUNTER — ANCILLARY PROCEDURE (OUTPATIENT)
Dept: GENERAL RADIOLOGY | Facility: CLINIC | Age: 89
End: 2024-04-02
Attending: PODIATRIST
Payer: MEDICARE

## 2024-04-02 VITALS — DIASTOLIC BLOOD PRESSURE: 80 MMHG | HEART RATE: 67 BPM | SYSTOLIC BLOOD PRESSURE: 148 MMHG

## 2024-04-02 DIAGNOSIS — M77.8 CAPSULITIS OF FOOT, RIGHT: Primary | ICD-10-CM

## 2024-04-02 DIAGNOSIS — M77.8 CAPSULITIS OF FOOT, RIGHT: ICD-10-CM

## 2024-04-02 PROCEDURE — 73630 X-RAY EXAM OF FOOT: CPT | Mod: TC | Performed by: RADIOLOGY

## 2024-04-02 PROCEDURE — 99203 OFFICE O/P NEW LOW 30 MIN: CPT | Performed by: PODIATRIST

## 2024-04-02 NOTE — LETTER
4/2/2024         RE: Hilda Potter  [address] [facility] [address]        Dear Colleague,    Thank you for referring your patient, Hilda Potter, to the [facility] [place]. Please see a copy of my visit note below.    S:  Patient seen today in consult from Dr. Dobson and complains of left foot pain.  Points to plantar second metatarsal head.  Describes as a burning pain.  aggravated by activity and relieved by rest.  Has had this for 2 months.  No pain anywhere else on feet.  Slippers around house.  Starting to get pain in left foot same place.  Denies erythema, edema, weakness, numbness.  Has osteoporosis.      ROS:  see above       Allergies   Allergen Reactions     Diatrizoate Other (See Comments)     Zoster Vaccine Live Anaphylaxis     Bisphosphonates GI Disturbance     GI distress     Ivp Dye [Contrast Dye] Swelling     Lisinopril Cough     Losartan Hives     But can tolerate Hyzaar.      Morphine Hives     Morphine Sulfate Nausea and Vomiting     Omeprazole Hives     Other Food Allergy      Apples, peaches, pears, cherries, apricots     Tree Nuts [Nuts]      Almonds, pecans     Latex Rash       Current Outpatient Medications   Medication Sig Dispense Refill     Aspirin-Acetaminophen-Caffeine (EXCEDRIN MIGRAINE PO) Take 1 tablet by mouth as needed       calcium carbonate-vitamin D (OS-FIDENCIO) 600-400 MG-UNIT chewable tablet Take 1 chew tab by mouth 2 times daily       carboxymethylcellulose (REFRESH PLUS) 0.5 % SOLN Place 1 drop into both eyes 3 times daily as needed for dry eyes       clobetasol (TEMOVATE) 0.05 % external cream Apply topically 2 times daily Sparingly to affected area x 1 week then 3 times a week thereafter for 2 weeks and taper done to once a week for maintenance. 45 g 0     hydrochlorothiazide (HYDRODIURIL) 25 MG tablet Take 1 tablet (25 mg) by mouth daily 90 tablet 3     latanoprost (XALATAN) 0.005 % ophthalmic solution Place 1 drop into  both eyes At Bedtime 10 mL 3     levothyroxine (SYNTHROID/LEVOTHROID) 50 MCG tablet TAKE 1 TABLET(50 MCG) BY MOUTH DAILY 90 tablet 3     MELATONIN PO Take 3 mg by mouth nightly as needed        Multiple Vitamin (MULTI-VITAMIN) per tablet Take 1 tablet by mouth daily       timolol maleate (TIMOPTIC) 0.5 % ophthalmic solution Place 1 drop into both eyes 2 times daily Wait at least 5 minutes between drops if using more than one at a time. 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)     Premature atrial beats     Compression fracture of L1 lumbar vertebra, seen on CT     Lung nodule < 6cm on CT     Dupuytren's contracture of right hand     Hypothyroidism, unspecified type     Essential hypertension with goal blood pressure less than 140/90     Combined forms of age-related cataract, mild-mod, both eyes     Migraine without status migrainosus, not intractable, unspecified migraine type     Epigastric discomfort     Rhinorrhea     Abdominal fullness     Posterior vitreous detachment of both eyes     Primary open angle glaucoma (POAG) of both eyes, mild stage       Past Medical History:   Diagnosis Date     AR (allergic rhinitis)      Atrophic vaginitis      Chronic kidney disease, stage 3 (H) 7/14/2021     Compression fracture of L1 lumbar vertebra (H) 4/2015     Glaucoma (increased eye pressure)      HTN (hypertension)      Migraine      Nonsenile cataract      Osteoporosis      Reflux esophagitis      Thyroid disease        Past Surgical History:   Procedure Laterality Date     APPENDECTOMY OPEN  1954     BLEPHAROPLASTY BILATERAL  10/2007    both eyes, upper lids      CHOLECYSTECTOMY, OPEN  1992     COLONOSCOPY  6/02, 10/13    Q 5 years, polyps     D & C       REPAIR PTOSIS       SALPINGO OOPHORECTOMY,R/L/LUIS DANIEL      left ovary and tube     SMALL BOWEL RESECTION  1986    colon resection      TONSILLECTOMY & ADENOIDECTOMY      childhood     TUBAL LIGATION       UPPER GI ENDOSCOPY  6/02, 8/11     ZZC LENGTHEN,TENDON,HAND/FINGER  1993    repair of dupytrons's contracture       Family History   Problem Relation Age of Onset     Hypertension Mother      Arthritis Mother      Cardiovascular Mother      Retinal detachment Mother      Migraines Mother      C.A.D. Father      Hypertension Father         Father.  Passed away.     Cerebrovascular Disease Father      Arthritis Father      Cardiovascular Father      Eye Disorder Father      Heart Disease Father      Glaucoma Father      Parkinsonism Father      Cardiovascular Brother      Glaucoma Brother      Peripheral Neuropathy Brother      Macular Degeneration No family hx of      Bleeding Disorder No family hx of      Anesthesia Reaction No family hx of        Social History     Tobacco Use     Smoking status: Never     Passive exposure: Never     Smokeless tobacco: Never   Substance Use Topics     Alcohol use: Not Currently     Comment: wine occasionally         O:   BP (!) 148/80   Pulse 67   LMP 10/01/1986 .      Constitutional/ general:  Pt is in no apparent distress, appears well-nourished.  Cooperative with history and physical exam.     Psych:  The patient answered questions appropriately.  Normal affect.  Seems to have reasonable expectations, in terms of treatment.     Lungs:  Non labored breathing, non labored speech. No cough.  No audible wheezing. Even, quiet breathing.       Vascular:  Pedal pulses are palpable bilaterally for both the DP and PT arteries.  CFT < 3 sec.  Ankle edema and varicosities.  Pedal hair growth noted.     Neuro:  Alert and oriented x 3. Coordinated gait.  Light touch sensation is intact     Derm: thin  and shiny.      Musculoskeletal:    Lower extremity muscle strength is normal.  Patient is ambulatory without an assistive device or brace.  Normal arch with weightbearing.  No forefoot or rear foot deformities noted.  MS 5/5 all compartments.  Normal ROM all fore foot and rearfoot joints.  No equinus.  Pain under right second metatarsal head plantar.  Negative Lachmans test.  Negative Mulders click.  No pain anywhere else.  No erythema, edema, ecchymosis, or subcutaneous masses noted.      Radiographic Exam:   Long second metatarsal, but no other bone abnormalities.     A:  Subsecond capsulitis right foot     P:  X rays taken today of right foot.   Discussed cause with patient.  Ice bid.  Instructed to wear stiff soles shoes at all times and I made suggestions both inside and outside.  .  Dispensed budin splint, metatarsal pads.  Avoid activities that bother this and discussed which activities will aggravate.  RETURN TO CLINIC PRN.  Thank you for allowing me participate in the care of this patient.        Heriberto Rosenberg DPM, FACFAS       Again, thank you for allowing me to participate in the care of your patient.        Sincerely,        Heriberto Rosenberg DPM

## 2024-04-02 NOTE — PROGRESS NOTES
S:  Patient seen today in consult from Dr. Dobson and complains of left foot pain.  Points to plantar second metatarsal head.  Describes as a burning pain.  aggravated by activity and relieved by rest.  Has had this for 2 months.  No pain anywhere else on feet.  Slippers around house.  Starting to get pain in left foot same place.  Denies erythema, edema, weakness, numbness.  Has osteoporosis.      ROS:  see above       Allergies   Allergen Reactions    Diatrizoate Other (See Comments)    Zoster Vaccine Live Anaphylaxis    Bisphosphonates GI Disturbance     GI distress    Ivp Dye [Contrast Dye] Swelling    Lisinopril Cough    Losartan Hives     But can tolerate Hyzaar.     Morphine Hives    Morphine Sulfate Nausea and Vomiting    Omeprazole Hives    Other Food Allergy      Apples, peaches, pears, cherries, apricots    Tree Nuts [Nuts]      Almonds, pecans    Latex Rash       Current Outpatient Medications   Medication Sig Dispense Refill    Aspirin-Acetaminophen-Caffeine (EXCEDRIN MIGRAINE PO) Take 1 tablet by mouth as needed      calcium carbonate-vitamin D (OS-FIDENCIO) 600-400 MG-UNIT chewable tablet Take 1 chew tab by mouth 2 times daily      carboxymethylcellulose (REFRESH PLUS) 0.5 % SOLN Place 1 drop into both eyes 3 times daily as needed for dry eyes      clobetasol (TEMOVATE) 0.05 % external cream Apply topically 2 times daily Sparingly to affected area x 1 week then 3 times a week thereafter for 2 weeks and taper done to once a week for maintenance. 45 g 0    hydrochlorothiazide (HYDRODIURIL) 25 MG tablet Take 1 tablet (25 mg) by mouth daily 90 tablet 3    latanoprost (XALATAN) 0.005 % ophthalmic solution Place 1 drop into both eyes At Bedtime 10 mL 3    levothyroxine (SYNTHROID/LEVOTHROID) 50 MCG tablet TAKE 1 TABLET(50 MCG) BY MOUTH DAILY 90 tablet 3    MELATONIN PO Take 3 mg by mouth nightly as needed       Multiple Vitamin (MULTI-VITAMIN) per tablet Take 1 tablet by mouth daily      timolol maleate  (TIMOPTIC) 0.5 % ophthalmic solution Place 1 drop into both eyes 2 times daily Wait at least 5 minutes between drops if using more than one at a time. 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)    Premature atrial beats    Compression fracture of L1 lumbar vertebra, seen on CT    Lung nodule < 6cm on CT    Dupuytren's contracture of right hand    Hypothyroidism, unspecified type    Essential hypertension with goal blood pressure less than 140/90    Combined forms of age-related cataract, mild-mod, both eyes    Migraine without status migrainosus, not intractable, unspecified migraine type    Epigastric discomfort    Rhinorrhea    Abdominal fullness    Posterior vitreous detachment of both eyes    Primary open angle glaucoma (POAG) of both eyes, mild stage       Past Medical History:   Diagnosis Date    AR (allergic rhinitis)     Atrophic vaginitis     Chronic kidney disease, stage 3 (H) 7/14/2021    Compression fracture of L1 lumbar vertebra (H) 4/2015    Glaucoma (increased eye pressure)     HTN (hypertension)     Migraine     Nonsenile cataract     Osteoporosis     Reflux esophagitis     Thyroid disease        Past Surgical History:   Procedure Laterality Date    APPENDECTOMY OPEN  1954    BLEPHAROPLASTY BILATERAL  10/2007    both eyes, upper lids    CHOLECYSTECTOMY, OPEN  1992    COLONOSCOPY  6/02, 10/13    Q 5 years, polyps    D & C      REPAIR PTOSIS      SALPINGO OOPHORECTOMY,R/L/LUIS DANIEL      left ovary and tube    SMALL BOWEL RESECTION  1986    colon resection     TONSILLECTOMY & ADENOIDECTOMY      childhood    TUBAL LIGATION      UPPER GI ENDOSCOPY  6/02, 8/11    [facility]  LENGTHEN,TENDON,HAND/FINGER  1993    repair of dupytrons's contracture       Family History   Problem Relation Age of Onset    Hypertension Mother     Arthritis Mother     Cardiovascular Mother     Retinal detachment Mother     Migraines Mother     C.A.D. Father     Hypertension Father         Father.  Passed away.    Cerebrovascular Disease Father     Arthritis Father     Cardiovascular Father     Eye Disorder Father     Heart Disease Father     Glaucoma Father     Parkinsonism Father     Cardiovascular Brother     Glaucoma Brother     Peripheral Neuropathy Brother     Macular Degeneration No family hx of     Bleeding Disorder No family hx of     Anesthesia Reaction No family hx of        Social History     Tobacco Use    Smoking status: Never     Passive exposure: Never    Smokeless tobacco: Never   Substance Use Topics    Alcohol use: Not Currently     Comment: wine occasionally         O:   BP (!) 148/80   Pulse 67   LMP 10/01/1986 .      Constitutional/ general:  Pt is in no apparent distress, appears well-nourished.  Cooperative with history and physical exam.     Psych:  The patient answered questions appropriately.  Normal affect.  Seems to have reasonable expectations, in terms of treatment.     Lungs:  Non labored breathing, non labored speech. No cough.  No audible wheezing. Even, quiet breathing.       Vascular:  Pedal pulses are palpable bilaterally for both the DP and PT arteries.  CFT < 3 sec.  Ankle edema and varicosities.  Pedal hair growth noted.     Neuro:  Alert and oriented x 3. Coordinated gait.  Light touch sensation is intact     Derm: thin and shiny.      Musculoskeletal:    Lower extremity muscle strength is normal.  Patient is ambulatory without an assistive device or brace.  Normal arch with weightbearing.  No forefoot or rear foot deformities noted.  MS 5/5 all compartments.  Normal ROM all fore foot and rearfoot joints.  No equinus.  Pain under right second metatarsal head plantar.   Negative Lachmans test.  Negative Mulders click.  No pain anywhere else.  No erythema, edema, ecchymosis, or subcutaneous masses noted.      Radiographic Exam:   Long second metatarsal, but no other bone abnormalities.     A:  Subsecond capsulitis right foot     P:  X rays taken today of right foot.   Discussed cause with patient.  Ice bid.  Instructed to wear stiff soles shoes at all times and I made suggestions both inside and outside.  .  Dispensed budin splint, metatarsal pads.  Avoid activities that bother this and discussed which activities will aggravate.  RETURN TO CLINIC PRN.  Thank you for allowing me participate in the care of this patient.        Heriberto Rosenberg DPM, FACFAS

## 2024-04-19 ENCOUNTER — OFFICE VISIT (OUTPATIENT)
Dept: OBGYN | Facility: CLINIC | Age: 89
End: 2024-04-19
Payer: MEDICARE

## 2024-04-19 VITALS
BODY MASS INDEX: 27.68 KG/M2 | DIASTOLIC BLOOD PRESSURE: 75 MMHG | WEIGHT: 141 LBS | HEIGHT: 60 IN | SYSTOLIC BLOOD PRESSURE: 150 MMHG | RESPIRATION RATE: 16 BRPM | TEMPERATURE: 97 F

## 2024-04-19 DIAGNOSIS — N90.89 VULVAR LESION: Primary | ICD-10-CM

## 2024-04-19 PROCEDURE — 99459 PELVIC EXAMINATION: CPT | Performed by: STUDENT IN AN ORGANIZED HEALTH CARE EDUCATION/TRAINING PROGRAM

## 2024-04-19 PROCEDURE — 88305 TISSUE EXAM BY PATHOLOGIST: CPT | Performed by: PATHOLOGY

## 2024-04-19 PROCEDURE — 56605 BIOPSY OF VULVA/PERINEUM: CPT | Performed by: STUDENT IN AN ORGANIZED HEALTH CARE EDUCATION/TRAINING PROGRAM

## 2024-04-19 PROCEDURE — 99213 OFFICE O/P EST LOW 20 MIN: CPT | Mod: 25 | Performed by: STUDENT IN AN ORGANIZED HEALTH CARE EDUCATION/TRAINING PROGRAM

## 2024-04-19 NOTE — PROGRESS NOTES
[facility]  Gynecology Office Note    Assessment and Plan:   Hilda Potter, 88 year old  with follicular lymphoma on weekly Rituximab, presents for vulvar itching that's not improved with clobetasol. Punch biopsy was obtained at the perineum elevated lesion.  Recommended continuing Vaseline for symptomatic relief and stopping the clobetasol at this time.  Will call patient pending on result to discuss further management options.  Vulvar biopsy procedure note  Hilda Potter   [id number]   1935     Written consent obtained after discussion of risks including bleeding and infection. Pt placed in dorsal lithotomy position. Exam noted an elevated perineum lesion at ~1cm long and 0.5cm wide. Procedure explained to patient that a piece of tissue needs to be removed in order to help with diagnosis and eventual treatment.  Consent signed.    Area treated with 20% benzocaine for 1 minute.  Site cleaned with betadyne and then 3ccs of 1% lidocaine with epinephrine was injected into the area.  A 3 mm punch biopsy device was used to sample the affected area. Just 1 biopsy was taken and sent to pathology.  Area treated with pressure.  Complete hemostasis was obtained.    She was discharged with instructions to call for heavy bleeding, foul smelling discharge, fevers, chills, pain or concerns.     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    She first noticed vulvar itching, soreness, and redness in summer 2023. She saw PCP and tried topical clobetasol taper regimen (from daily, twice per week, weekly, to every other week) for empiric lichen sclerosus  treatment.  No biopsy was ever done. The clobetasol did not give her any symptom relief.  She has stopped using the clobetasol.  Currently, the only thing that helps is Vaseline.    The itching and soreness are bothersome to her.  She has tried avoiding tight clothing in general and wearing underwear at night, which does not seem to be helping. Denied changes in vaginal discharge or vaginal bleeding.   Denied dysuria.  She does have stress urinary incontinence (coughing laughing would induce leakage).  However, she has not required a pad at baseline.  No urge incontinence. Denied stool incontinence.     ROS: A 10 pt ROS was completed and found to be otherwise negative unless mentioned in the HPI.     OBHx: .  x2    GYN Hx: s/p LSO.  Denied history of abnormal Pap smears.    Physical Exam:   Vitals:    24 1101   BP: (!) 150/75   BP Location: Left arm   Patient Position: Sitting   Cuff Size: Adult Regular   Resp: 16   Temp: 97  F (36.1  C)   Weight: 64 kg (141 lb)   Height: 1.524 m (5')      Estimated body mass index is 27.54 kg/m  as calculated from the following:    Height as of this encounter: 1.524 m (5').    Weight as of this encounter: 64 kg (141 lb).    General appearance: well-hydrated, A&O x 3, no apparent distress  Lungs: Equal expansion bilaterally, no accessory muscle use  Heart: No heaves or thrills.   Constitutional: See vitals  Genitourinary:  External genitalia: A oval-shaped erythematous elevated lesion noted at the perineum.  The spot is tender to touch.    Urethral meatus appropriate location without lesions or prolapse  Urethra: No masses, tenderness, or scarring  Bladder no fullness, masses, or tenderness.  Anus and Perineum: Unremarkable, no visible lesions  Vagina: Normal, healthy pink mucosa without any lesions. Physiologic vaginal discharge.   Cervix: normal appearance, no cervical motion tenderness.   Uterus: normal size, shape and consistency.   Adnexa: no masses or  tenderness bilaterally.

## 2024-04-24 ENCOUNTER — OFFICE VISIT (OUTPATIENT)
Dept: OPHTHALMOLOGY | Facility: CLINIC | Age: 89
End: 2024-04-24
Payer: MEDICARE

## 2024-04-24 DIAGNOSIS — Z01.01 ENCOUNTER FOR EXAMINATION OF EYES AND VISION WITH ABNORMAL FINDINGS: ICD-10-CM

## 2024-04-24 DIAGNOSIS — Z98.890 HISTORY OF BLEPHAROPLASTY: ICD-10-CM

## 2024-04-24 DIAGNOSIS — H40.1131 PRIMARY OPEN ANGLE GLAUCOMA (POAG) OF BOTH EYES, MILD STAGE: ICD-10-CM

## 2024-04-24 DIAGNOSIS — H52.4 PRESBYOPIA: ICD-10-CM

## 2024-04-24 DIAGNOSIS — H43.813 POSTERIOR VITREOUS DETACHMENT OF BOTH EYES: ICD-10-CM

## 2024-04-24 DIAGNOSIS — H25.813 COMBINED FORMS OF AGE-RELATED CATARACT OF BOTH EYES: Primary | ICD-10-CM

## 2024-04-24 PROBLEM — R10.13 EPIGASTRIC DISCOMFORT: Status: RESOLVED | Noted: 2021-11-15 | Resolved: 2024-04-24

## 2024-04-24 PROBLEM — R19.8 ABDOMINAL FULLNESS: Status: RESOLVED | Noted: 2022-03-21 | Resolved: 2024-04-24

## 2024-04-24 LAB
PATH REPORT.COMMENTS IMP SPEC: NORMAL
PATH REPORT.FINAL DX SPEC: NORMAL
PATH REPORT.GROSS SPEC: NORMAL
PATH REPORT.MICROSCOPIC SPEC OTHER STN: NORMAL
PATH REPORT.RELEVANT HX SPEC: NORMAL

## 2024-04-24 PROCEDURE — 92015 DETERMINE REFRACTIVE STATE: CPT | Mod: GY | Performed by: OPHTHALMOLOGY

## 2024-04-24 PROCEDURE — 92014 COMPRE OPH EXAM EST PT 1/>: CPT | Performed by: OPHTHALMOLOGY

## 2024-04-24 RX ORDER — LATANOPROST 50 UG/ML
1 SOLUTION/ DROPS OPHTHALMIC AT BEDTIME
Qty: 7.5 ML | Refills: 3 | Status: SHIPPED | OUTPATIENT
Start: 2024-04-24

## 2024-04-24 ASSESSMENT — REFRACTION_WEARINGRX
OS_AXIS: 004
SPECS_TYPE: PAL
OS_CYLINDER: +1.00
OS_ADD: +2.75
OS_SPHERE: +3.00
OD_ADD: +2.75
OD_AXIS: 175
OD_SPHERE: +2.75
OD_CYLINDER: +2.50

## 2024-04-24 ASSESSMENT — CONF VISUAL FIELD
OD_INFERIOR_NASAL_RESTRICTION: 0
OS_INFERIOR_NASAL_RESTRICTION: 0
OS_SUPERIOR_NASAL_RESTRICTION: 0
OS_INFERIOR_TEMPORAL_RESTRICTION: 0
OS_NORMAL: 1
OD_SUPERIOR_NASAL_RESTRICTION: 0
OS_SUPERIOR_TEMPORAL_RESTRICTION: 0
OD_INFERIOR_TEMPORAL_RESTRICTION: 0
OD_NORMAL: 1
OD_SUPERIOR_TEMPORAL_RESTRICTION: 0

## 2024-04-24 ASSESSMENT — VISUAL ACUITY
OS_CC+: -3
OS_CC: 20/20
CORRECTION_TYPE: GLASSES
OD_CC: 20/25
OD_CC+: -1
OD_CC: J1
OS_CC: J1
METHOD: SNELLEN - LINEAR

## 2024-04-24 ASSESSMENT — REFRACTION_MANIFEST
OS_CYLINDER: +1.00
OD_AXIS: 175
OS_AXIS: 180
OD_SPHERE: +2.75
OS_ADD: +3.00
OS_SPHERE: +2.75
OD_ADD: +3.00
OD_CYLINDER: +2.25

## 2024-04-24 ASSESSMENT — EXTERNAL EXAM - RIGHT EYE: OD_EXAM: NORMAL

## 2024-04-24 ASSESSMENT — TONOMETRY
OS_IOP_MMHG: 15
IOP_METHOD: APPLANATION
OD_IOP_MMHG: 17

## 2024-04-24 ASSESSMENT — CUP TO DISC RATIO
OD_RATIO: 0.7
OS_RATIO: 0.7

## 2024-04-24 ASSESSMENT — EXTERNAL EXAM - LEFT EYE: OS_EXAM: NORMAL

## 2024-04-24 NOTE — PROGRESS NOTES
" Current Eye Medications:  timolol both eyes twice a day, Latanoprost both eyes every evening.  Last drops:  8am.     Subjective:  Comprehensive Eye Exam.  The patient complains of decreasing vision in her right eye, especially when watching TV.   Vision in her left eye appears to be about same.    Her daughter (lives in [place]), heard about some new medications to treat macular degeneration potentially for Bill.  Her daughter contacted [employer] to see if they would see her dad, but was told [employer] requires a referral and eye records.  They are wondering if Dr. Bay would be willing to do this.       Objective:  See Ophthalmology Exam.       Assessment:  Mild disc change left eye; otherwise stable eye exam.      ICD-10-CM    1. Combined forms of age-related cataract, mild-mod, both eyes  H25.813       2. Primary open angle glaucoma (POAG) of both eyes, mild stage  H40.1131       3. History of blepharoplasty, upper lids, ou  Z98.890       4. Posterior vitreous detachment of both eyes  H43.813       5. Encounter for examination of eyes and vision with abnormal findings  Z01.01 EYE EXAM (SIMPLE-NONBILLABLE)      6. Presbyopia  H52.4 REFRACTIVE STATUS           Plan:  Continue:   Latanoprost (green top) every evening both eyes.  Timolol (yellow top) twice daily in both eyes.      May use artificial tears up to four times a day (like Refresh Optive, Systane Balance, or TheraTears. Avoid \"get the red out\" drops and generic artifical tears).     Wait at least 5 minutes between drops if using more than one at a time.     Glasses prescription given - optional    Return visit 6 months for an intraocular pressure check, glaucoma OCT, retinal OCT, and Enciso Visual Field.  May need lower pressures.    Andi Bay M.D.  [phone]       "

## 2024-04-24 NOTE — LETTER
"    4/24/2024         RE: Hilda Potter  [address]  [place] [address]        Dear Colleague,    Thank you for referring your patient, Hilda Potter, to the [facility]. Please see a copy of my visit note below.     Current Eye Medications:  timolol both eyes twice a day, Latanoprost both eyes every evening.  Last drops:  8am.     Subjective:  Comprehensive Eye Exam.  The patient complains of decreasing vision in her right eye, especially when watching TV.   Vision in her left eye appears to be about same.    Her daughter (lives in [place]), heard about some new medications to treat macular degeneration potentially for Bill.  Her daughter contacted [facility] to see if they would see her dad, but was told [employer] requires a referral and eye records.  They are wondering if Dr. Bay would be willing to do this.       Objective:  See Ophthalmology Exam.       Assessment:  Mild disc change left eye; otherwise stable eye exam.      ICD-10-CM    1. Combined forms of age-related cataract, mild-mod, both eyes  H25.813       2. Primary open angle glaucoma (POAG) of both eyes, mild stage  H40.1131       3. History of blepharoplasty, upper lids, ou  Z98.890       4. Posterior vitreous detachment of both eyes  H43.813       5. Encounter for examination of eyes and vision with abnormal findings  Z01.01 EYE EXAM (SIMPLE-NONBILLABLE)      6. Presbyopia  H52.4 REFRACTIVE STATUS           Plan:  Continue:   Latanoprost (green top) every evening both eyes.  Timolol (yellow top) twice daily in both eyes.      May use artificial tears up to four times a day (like Refresh Optive, Systane Balance, or TheraTears. Avoid \"get the red out\" drops and generic artifical tears).     Wait at least 5 minutes between drops if using more than one at a time.     Glasses prescription given - optional    Return visit 6 months for an intraocular pressure check, glaucoma OCT, retinal OCT, and Enciso Visual " Field.  May need lower pressures.    Andi Bay M.D.  [phone]           Again, thank you for allowing me to participate in the care of your patient.        Sincerely,        Andi Bay MD

## 2024-04-24 NOTE — PROGRESS NOTES
Current Eye Medications:  timolol - both eyes *** BID- last dose: ***  Latanoprost - both eyes hs - last dose: ***     Subjective:  comprehensive eye exam - ***     Objective:  See Ophthalmology Exam.       Assessment:      Plan:   See Patient Instructions.

## 2024-04-24 NOTE — PATIENT INSTRUCTIONS
"Continue:   Latanoprost (green top) every evening both eyes.  Timolol (yellow top) twice daily in both eyes.      May use artificial tears up to four times a day (like Refresh Optive, Systane Balance, or TheraTears. Avoid \"get the red out\" drops and generic artifical tears).     Wait at least 5 minutes between drops if using more than one at a time.     Glasses prescription given - optional    Return visit 6 months for an intraocular pressure check, glaucoma OCT, retinal OCT, and Enciso Visual Field.    Andi Bay M.D.  [phone]    "

## 2024-04-26 DIAGNOSIS — N95.2 VAGINAL ATROPHY: Primary | ICD-10-CM

## 2024-04-26 RX ORDER — ESTRADIOL 0.1 MG/G
1 CREAM VAGINAL DAILY
Qty: 42.5 G | Refills: 0 | Status: SHIPPED | OUTPATIENT
Start: 2024-04-26 | End: 2024-06-03

## 2024-04-29 ENCOUNTER — TELEPHONE (OUTPATIENT)
Dept: OBGYN | Facility: CLINIC | Age: 89
End: 2024-04-29
Payer: MEDICARE

## 2024-04-29 NOTE — TELEPHONE ENCOUNTER
[facility] Call Center    Phone Message    May a detailed message be left on voicemail: yes     Reason for Call: Medication Question or concern regarding medication   Prescription Clarification  Name of Medication: estradiol (ESTRACE) 0.1 MG/GM vaginal cream   Prescribing Provider: Dr. Mary Hernandez   Pharmacy: [id number] [place] [id number] [facility]    What on the order needs clarification? Insurance won't cover the medication. Pharmacist is wondering what it was prescribed for and told pt all the side effects and pt is wondering if she should still take it since it is not covered or if there is another alternative.       Action Taken: Message routed to:  Other: [place] [facility]    Travel Screening: Not Applicable

## 2024-04-29 NOTE — TELEPHONE ENCOUNTER
Alternative medication requested.    Patient also concerned with all the side effects asking if medication is still recommended.    Please advise.    Thank you.    Gabriela BARRAGAN   Ob/Gyn Clinic

## 2024-05-01 NOTE — TELEPHONE ENCOUNTER
Call pt 05/01/24 8:16 AM. No insurance coverage issue with estradiol (ESTRACE) 0.1 MG/GM vaginal cream anymore. Recommended topical application of the vaginal cream to affected area, area where the itching and burning is bothersome, once daily till she sees me in 6/2024.    Mary Hernandez MD  Obstetrics and Gynecology  [facility]   05/01/2024 8:21 AM

## 2024-06-03 ENCOUNTER — OFFICE VISIT (OUTPATIENT)
Dept: OBGYN | Facility: CLINIC | Age: 89
End: 2024-06-03
Payer: MEDICARE

## 2024-06-03 VITALS
WEIGHT: 142 LBS | SYSTOLIC BLOOD PRESSURE: 172 MMHG | TEMPERATURE: 98.3 F | RESPIRATION RATE: 18 BRPM | HEART RATE: 68 BPM | HEIGHT: 60 IN | DIASTOLIC BLOOD PRESSURE: 71 MMHG | BODY MASS INDEX: 27.88 KG/M2

## 2024-06-03 DIAGNOSIS — N95.2 VAGINAL ATROPHY: ICD-10-CM

## 2024-06-03 PROCEDURE — 99213 OFFICE O/P EST LOW 20 MIN: CPT | Performed by: STUDENT IN AN ORGANIZED HEALTH CARE EDUCATION/TRAINING PROGRAM

## 2024-06-03 RX ORDER — ESTRADIOL 0.1 MG/G
1 CREAM VAGINAL DAILY
Qty: 42.5 G | Refills: 2 | Status: SHIPPED | OUTPATIENT
Start: 2024-06-03

## 2024-06-03 NOTE — NURSING NOTE
Initial BP (!) 172/71 (BP Location: Right arm, Patient Position: Chair, Cuff Size: Adult Regular)   Pulse 68   Temp 98.3  F (36.8  C) (Tympanic)   Resp 18   Ht 1.524 m (5')   Wt 64.4 kg (142 lb)   LMP 10/01/1986   BMI 27.73 kg/m   Estimated body mass index is 27.73 kg/m  as calculated from the following:    Height as of this encounter: 1.524 m (5').    Weight as of this encounter: 64.4 kg (142 lb). .

## 2024-06-03 NOTE — PROGRESS NOTES
"[facility]  Gynecology Office Note    Assessment and Plan:   Hilda Potter, 88 year old  with follicular lymphoma on weekly Rituximab, presents for vulvar atrophy follow up.    Recommended increasing vaginal estrogen cream to 1g TID for 2024, 1g BID for 2024, and 1g once daily for 2024. Plan to follow up at the end of 2024 or beginning of 2024.     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      She first noticed vulvar itching, soreness, and redness in summer 2023. She saw PCP and tried topical clobetasol taper regimen (from daily, twice per week, weekly, to every other week) for empiric lichen sclerosus treatment.  No biopsy was ever done. The clobetasol did not give her any symptom relief.  She has stopped using the clobetasol.      She saw me on 2024. Vulvar biopsy was taken, which returned with \"mild epidermal hyperplasia and perivascular inflammation\". She has been using vaginal estrogen cream 1g daily to twice daily with vaseline, which has been helping her symptoms.    ROS: A 10 pt ROS was completed and found to be otherwise negative unless mentioned in the HPI.      OBHx: .  x2     GYN Hx: s/p LSO.  Denied history of abnormal Pap smears.    O:   Vitals:    24 1049   BP: (!) 172/71   BP Location: Right arm   Patient Position: Chair   Cuff Size: Adult Regular   Pulse: 68   Resp: 18   Temp: 98.3  F (36.8  C)   TempSrc: Tympanic   Weight: 64.4 kg (142 lb)   Height: 1.524 m (5')      Estimated body mass index is 27.73 kg/m  as calculated from the following:    Height as of this encounter: 1.524 m (5').    Weight as of " this encounter: 64.4 kg (142 lb).    General appearance: well-hydrated, A&O x 3, no apparent distress  Lungs: Equal expansion bilaterally, no accessory muscle use  Heart: No heaves or thrills.   Constitutional: See vitals  Genitourinary:  External genitalia: the whole vaginal introitus appeared less erythematous compared to last time. No distinctive lesions noted.  Urethral meatus appropriate location without lesions or prolapse  Urethra: No masses, tenderness, or scarring  Bladder no fullness, masses, or tenderness.  Anus and Perineum: Unremarkable, no visible lesions  Vagina: Normal, healthy pink mucosa without any lesions. Physiologic vaginal discharge.   Cervix: normal appearance

## 2024-06-14 SDOH — HEALTH STABILITY: PHYSICAL HEALTH: ON AVERAGE, HOW MANY MINUTES DO YOU ENGAGE IN EXERCISE AT THIS LEVEL?: 20 MIN

## 2024-06-14 SDOH — HEALTH STABILITY: PHYSICAL HEALTH: ON AVERAGE, HOW MANY DAYS PER WEEK DO YOU ENGAGE IN MODERATE TO STRENUOUS EXERCISE (LIKE A BRISK WALK)?: 3 DAYS

## 2024-06-14 ASSESSMENT — SOCIAL DETERMINANTS OF HEALTH (SDOH): HOW OFTEN DO YOU GET TOGETHER WITH FRIENDS OR RELATIVES?: TWICE A WEEK

## 2024-06-19 ENCOUNTER — OFFICE VISIT (OUTPATIENT)
Dept: FAMILY MEDICINE | Facility: CLINIC | Age: 89
End: 2024-06-19
Attending: FAMILY MEDICINE
Payer: MEDICARE

## 2024-06-19 VITALS
OXYGEN SATURATION: 97 % | HEART RATE: 77 BPM | RESPIRATION RATE: 12 BRPM | DIASTOLIC BLOOD PRESSURE: 72 MMHG | BODY MASS INDEX: 25.05 KG/M2 | HEIGHT: 63 IN | TEMPERATURE: 98 F | SYSTOLIC BLOOD PRESSURE: 120 MMHG | WEIGHT: 141.4 LBS

## 2024-06-19 DIAGNOSIS — G43.109 MIGRAINE WITH AURA AND WITHOUT STATUS MIGRAINOSUS, NOT INTRACTABLE: ICD-10-CM

## 2024-06-19 DIAGNOSIS — N95.2 VAGINAL ATROPHY: ICD-10-CM

## 2024-06-19 DIAGNOSIS — I10 HYPERTENSION GOAL BP (BLOOD PRESSURE) < 140/90: ICD-10-CM

## 2024-06-19 DIAGNOSIS — Z00.00 ENCOUNTER FOR MEDICARE ANNUAL WELLNESS EXAM: Primary | ICD-10-CM

## 2024-06-19 DIAGNOSIS — E03.9 HYPOTHYROIDISM, UNSPECIFIED TYPE: ICD-10-CM

## 2024-06-19 DIAGNOSIS — Z23 NEED FOR VACCINATION: ICD-10-CM

## 2024-06-19 DIAGNOSIS — C82.00 GRADE I FOLLICULAR SMALL CLEAVED CELL LYMPHOMA (H): ICD-10-CM

## 2024-06-19 PROCEDURE — G0439 PPPS, SUBSEQ VISIT: HCPCS | Performed by: FAMILY MEDICINE

## 2024-06-19 PROCEDURE — 99214 OFFICE O/P EST MOD 30 MIN: CPT | Mod: 25 | Performed by: FAMILY MEDICINE

## 2024-06-19 PROCEDURE — 91320 SARSCV2 VAC 30MCG TRS-SUC IM: CPT | Performed by: FAMILY MEDICINE

## 2024-06-19 PROCEDURE — 90480 ADMN SARSCOV2 VAC 1/ONLY CMP: CPT | Performed by: FAMILY MEDICINE

## 2024-06-19 RX ORDER — HYDROCHLOROTHIAZIDE 25 MG/1
25 TABLET ORAL DAILY
Qty: 90 TABLET | Refills: 3 | Status: SHIPPED | OUTPATIENT
Start: 2024-06-19

## 2024-06-19 RX ORDER — LEVOTHYROXINE SODIUM 50 UG/1
50 TABLET ORAL DAILY
Qty: 90 TABLET | Refills: 3 | Status: SHIPPED | OUTPATIENT
Start: 2024-06-19

## 2024-06-19 RX ORDER — RESPIRATORY SYNCYTIAL VIRUS VACCINE 120MCG/0.5
0.5 KIT INTRAMUSCULAR ONCE
Qty: 1 EACH | Refills: 0 | Status: CANCELLED | OUTPATIENT
Start: 2024-06-19 | End: 2024-06-19

## 2024-06-19 ASSESSMENT — PAIN SCALES - GENERAL: PAINLEVEL: NO PAIN (0)

## 2024-06-19 NOTE — PROGRESS NOTES
Prior to immunization administration, verified patients identity using patient s name and date of birth. Please see Immunization Activity for additional information.     Screening Questionnaire for Adult Immunization    Are you sick today?   No   Do you have allergies to medications, food, a vaccine component or latex?   No   Have you ever had a serious reaction after receiving a vaccination?   No   Do you have a long-term health problem with heart, lung, kidney, or metabolic disease (e.g., diabetes), asthma, a blood disorder, no spleen, complement component deficiency, a cochlear implant, or a spinal fluid leak?  Are you on long-term aspirin therapy?   No   Do you have cancer, leukemia, HIV/AIDS, or any other immune system problem?   No   Do you have a parent, brother, or sister with an immune system problem?   No   In the past 3 months, have you taken medications that affect  your immune system, such as prednisone, other steroids, or anticancer drugs; drugs for the treatment of rheumatoid arthritis, Crohn s disease, or psoriasis; or have you had radiation treatments?   No   Have you had a seizure, or a brain or other nervous system problem?   No   During the past year, have you received a transfusion of blood or blood    products, or been given immune (gamma) globulin or antiviral drug?   No   For women: Are you pregnant or is there a chance you could become       pregnant during the next month?   No   Have you received any vaccinations in the past 4 weeks?   No     Immunization questionnaire answers were all negative.      Patient instructed to remain in clinic for 15 minutes afterwards, and to report any adverse reactions.     Screening performed by Rosenda Arciniega MA on 6/19/2024 at 11:02 AM.

## 2024-06-19 NOTE — PROGRESS NOTES
"Preventive Care Visit  [facility] JEREMY Dobson MD, Family Medicine  Jun 19, 2024      Assessment & Plan   Hilda was seen today for wellness visit.    Diagnoses and all orders for this visit:    Encounter for Medicare annual wellness exam  -     PRIMARY CARE FOLLOW-UP SCHEDULING  -     REVIEW OF HEALTH MAINTENANCE PROTOCOL ORDERS    Vaginal atrophy        - Vulvar biopsy was taken, which returned with mild epidermal hyperplasia and perivascular inflammation. Currently off Clobetasol. Using Estrogen cream.         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  Following with oncology.  Next appointment scheduled 9/18/2024    Migraine with aura and without status migrainosus, not intractable         - On OTC Excedrin Migraine and Imitrex as needed    Need for vaccination  -     COVID-19 12+ (2023-24) (PFIZER)          Patient has been advised of split billing requirements and indicates understanding: Yes        BMI  Estimated body mass index is 25.05 kg/m  as calculated from the following:    Height as of this encounter: 1.6 m (5' 3\").    Weight as of this encounter: 64.1 kg (141 lb 6.4 oz).   Weight management plan: Discussed healthy diet and exercise guidelines    Counseling  Appropriate preventive services were discussed with this patient, including applicable screening as appropriate for fall prevention, nutrition, physical activity, Tobacco-use cessation, weight loss and cognition.  Checklist reviewing preventive services available has been given to the patient.  Reviewed patient's diet, addressing concerns " and/or questions.   She is at risk for lack of exercise and has been provided with information to increase physical activity for the benefit of her well-being.   Discussed possible causes of fatigue. Information on urinary incontinence and treatment options given to patient.       Return in about 6 months (around 12/19/2024) for Medication check.      Neptali Stevens is a 88 year old, presenting for the following:  Wellness Visit        6/19/2024    10:06 AM   Additional Questions   Roomed by Rosenda GIVENS CMA   Accompanied by alone         6/19/2024    10:06 AM   Patient Reported Additional Medications   Patient reports taking the following new medications none           HPI  88 year old female patient of mine here for her Annual Wellness visit and chronic disease management.         HYPERTENSION - Patient has longstanding history of HTN , currently denies any symptoms referable to elevated blood pressure. Specifically denies chest pain, palpitations, dyspnea, orthopnea, PND or peripheral edema. Blood pressure readings have been in normal range. Current medication regimen is as listed below. Patient denies any side effects of medication.- Hydrochlorothiazide 25 mg/day   BP Readings from Last 3 Encounters:   06/19/24 120/72   06/03/24 (!) 172/71   04/19/24 (!) 150/75         HYPOTHYROIDISM - Patient has a longstanding history of chronic Hypothyroidism. Patient has been doing well, noting no tremor, insomnia, hair loss or changes in skin texture. Continues to take medications as directed, without adverse reactions or side effects. Last TSH   Lab Results   Component Value Date    TSH 1.61 03/26/2024       Recently seen by OBGYN- had a vulvar biopsy. No lichen sclerosus. Thought to have vaginal atrophy. Given Estradial cream- reports minimal improvement. Reports that she is still early in treatment process.     Saw Podiatry recently on 4/2/2024-felt to have subsecond capsulitis of the right foot.  X-rays of the right foot  were taken see epic for details.   Was recommended to wear stiff soled shoes at all times inside and outside and to avoid aggravating activities.  Patient reports that this is helping so far.    History of migraine headaches-  Takes OTC Excedrin Migraine and sumatriptan as needed.  Reports that this is still effective at this time.  Occasionally reports headaches are triggered by pressure changes in the atmosphere.    History of lymphoma currently following with oncologist Dr. Sanuders.  Clinically stable with stable blood counts.   Next visit is due 9/13/2024    HEALTH CARE MAINTENANCE: Labs are up-to-date.  Due for COVID-vaccine.        6/14/2024   General Health   How would you rate your overall physical health? Good   Feel stress (tense, anxious, or unable to sleep) To some extent      (!) STRESS CONCERN      6/14/2024   Nutrition   Diet: Regular (no restrictions)            6/14/2024   Exercise   Days per week of moderate/strenous exercise 3 days   Average minutes spent exercising at this level 20 min            6/14/2024   Social Factors   Frequency of gathering with friends or relatives Twice a week   Worry food won't last until get money to buy more No   Food not last or not have enough money for food? No   Do you have housing? (Housing is defined as stable permanent housing and does not include staying ouside in a car, in a tent, in an abandoned building, in an overnight shelter, or couch-surfing.) Yes   Are you worried about losing your housing? No   Lack of transportation? No   Unable to get utilities (heat,electricity)? No            6/14/2024   Fall Risk   Fallen 2 or more times in the past year? No    No   Trouble with walking or balance? No    No       Multiple values from one day are sorted in reverse-chronological order          6/14/2024   Activities of Daily Living- Home Safety   Needs help with the following daily activites None of the above   Safety concerns in the home None of the above             6/14/2024   Dental   Dentist two times every year? Yes            6/14/2024   Hearing Screening   Hearing concerns? None of the above            6/14/2024   Driving Risk Screening   Patient/family members have concerns about driving No            6/14/2024   General Alertness/Fatigue Screening   Have you been more tired than usual lately? (!) YES            6/14/2024   Urinary Incontinence Screening   Bothered by leaking urine in past 6 months Yes            6/14/2024   TB Screening   Were you born outside of the [place]? No            Today's PHQ-2 Score:       6/19/2024    10:01 AM   PHQ-2 ( 1999 Pf[employer])   Q1: Little interest or pleasure in doing things 0   Q2: Feeling down, depressed or hopeless 0   PHQ-2 Score 0   Q1: Little interest or pleasure in doing things Not at all   Q2: Feeling down, depressed or hopeless Not at all   PHQ-2 Score 0           6/14/2024   Substance Use   Alcohol more than 3/day or more than 7/wk No   Do you have a current opioid prescription? No   How severe/bad is pain from 1 to 10? 1/10   Do you use any other substances recreationally? No        Social History     Tobacco Use    Smoking status: Never     Passive exposure: Never    Smokeless tobacco: Never   Vaping Use    Vaping status: Never Used   Substance Use Topics    Alcohol use: Not Currently     Comment: wine occasionally    Drug use: No           1/24/2023   LAST FHS-7 RESULTS   1st degree relative breast or ovarian cancer No   Any relative bilateral breast cancer No   Any male have breast cancer No   Any ONE woman have BOTH breast AND ovarian cancer No   Any woman with breast cancer before 50yrs No   2 or more relatives with breast AND/OR ovarian cancer No   2 or more relatives with breast AND/OR bowel cancer No           Mammogram Screening - After age 74- determine frequency with patient based on health status, life expectancy and patient goals      Reviewed and updated as needed this visit by Provider                     Past Medical History:   Diagnosis Date    AR (allergic rhinitis)     Atrophic vaginitis     Cancer (H)     Chronic kidney disease, stage 3 (H) 07/14/2021    Compression fracture of L1 lumbar vertebra (H) 04/2015    Glaucoma (increased eye pressure)     HTN (hypertension)     Migraine     Nonsenile cataract     Osteoporosis     Reflux esophagitis     Thyroid disease      Past Surgical History:   Procedure Laterality Date    ABDOMEN SURGERY      APPENDECTOMY OPEN  1954    BLEPHAROPLASTY BILATERAL  10/2007    both eyes, upper lids    CHOLECYSTECTOMY, OPEN  1992    COLONOSCOPY  6/02, 10/13    Q 5 years, polyps    D & C      REPAIR PTOSIS      SALPINGO OOPHORECTOMY,R/L/LUIS DANIEL      left ovary and tube    SMALL BOWEL RESECTION  1986    colon resection     TONSILLECTOMY & ADENOIDECTOMY      childhood    TUBAL LIGATION      UPPER GI ENDOSCOPY  6/02, 8/11    ZZC LENGTHEN,TENDON,HAND/FINGER  1993    repair of dupytrons's contracture     BP Readings from Last 3 Encounters:   06/19/24 120/72   06/03/24 (!) 172/71   04/19/24 (!) 150/75    Wt Readings from Last 3 Encounters:   06/19/24 64.1 kg (141 lb 6.4 oz)   06/03/24 64.4 kg (142 lb)   04/19/24 64 kg (141 lb)                  Patient Active Problem List   Diagnosis    AR (allergic rhinitis)    Osteoporosis    Atrophic vaginitis    CARDIOVASCULAR SCREENING; LDL GOAL LESS THAN 130    GERD (gastroesophageal reflux disease)    History of blepharoplasty, upper lids, ou    Migraine    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)    Premature atrial beats    Compression fracture of L1 lumbar vertebra, seen on CT    Lung nodule < 6cm on CT    Dupuytren's contracture of right hand    Hypothyroidism, unspecified type    Essential hypertension with goal blood pressure less than 140/90    Combined forms of age-related cataract, mild-mod, both eyes    Migraine without status migrainosus, not intractable, unspecified migraine type    Rhinorrhea    Posterior vitreous detachment of both  eyes    Primary open angle glaucoma (POAG) of both eyes, mild stage     Past Surgical History:   Procedure Laterality Date    ABDOMEN SURGERY      APPENDECTOMY OPEN  1954    BLEPHAROPLASTY BILATERAL  10/2007    both eyes, upper lids    CHOLECYSTECTOMY, OPEN  1992    COLONOSCOPY  6/02, 10/13    Q 5 years, polyps    D & C      REPAIR PTOSIS      SALPINGO OOPHORECTOMY,R/L/LUIS DANIEL      left ovary and tube    SMALL BOWEL RESECTION  1986    colon resection     TONSILLECTOMY & ADENOIDECTOMY      childhood    TUBAL LIGATION      UPPER GI ENDOSCOPY  6/02, 8/11    ZZC LENGTHEN,TENDON,HAND/FINGER  1993    repair of dupytrons's contracture       Social History     Tobacco Use    Smoking status: Never     Passive exposure: Never    Smokeless tobacco: Never   Substance Use Topics    Alcohol use: Not Currently     Comment: wine occasionally     Family History   Problem Relation Age of Onset    Hypertension Mother     Arthritis Mother     Cardiovascular Mother     Retinal detachment Mother     Migraines Mother     C.A.D. Father     Hypertension Father         Father.  Passed away.    Cerebrovascular Disease Father     Arthritis Father     Cardiovascular Father     Eye Disorder Father     Heart Disease Father     Glaucoma Father     Parkinsonism Father     Cardiovascular Brother     Glaucoma Brother     Peripheral Neuropathy Brother     Macular Degeneration No family hx of     Bleeding Disorder No family hx of     Anesthesia Reaction No family hx of          Current Outpatient Medications   Medication Sig Dispense Refill    Aspirin-Acetaminophen-Caffeine (EXCEDRIN MIGRAINE PO) Take 1 tablet by mouth as needed      calcium carbonate-vitamin D (OS-FIDENCIO) 600-400 MG-UNIT chewable tablet Take 1 chew tab by mouth 2 times daily      carboxymethylcellulose (REFRESH PLUS) 0.5 % SOLN Place 1 drop into both eyes 3 times daily as needed for dry eyes      estradiol (ESTRACE) 0.1 MG/GM vaginal cream Place 1 g vaginally daily 1g TID for 6/2024. 1g BID  for 7/2024. 1g daily for 8/2024 till follow up 42.5 g 2    hydrochlorothiazide (HYDRODIURIL) 25 MG tablet Take 1 tablet (25 mg) by mouth daily 90 tablet 3    latanoprost (XALATAN) 0.005 % ophthalmic solution Place 1 drop into both eyes at bedtime 7.5 mL 3    levothyroxine (SYNTHROID/LEVOTHROID) 50 MCG tablet Take 1 tablet (50 mcg) by mouth daily 90 tablet 3    MELATONIN PO Take 3 mg by mouth nightly as needed       Multiple Vitamin (MULTI-VITAMIN) per tablet Take 1 tablet by mouth daily      timolol maleate (TIMOPTIC) 0.5 % ophthalmic solution Place 1 drop into both eyes 2 times daily Wait at least 5 minutes between drops if using more than one at a time. 20 mL 4     Allergies   Allergen Reactions    Diatrizoate Other (See Comments)    Zoster Vaccine Live Anaphylaxis    Bisphosphonates GI Disturbance     GI distress    Ivp Dye [Contrast Dye] Swelling    Lisinopril Cough    Losartan Hives     But can tolerate Hyzaar.     Morphine Hives    Morphine Sulfate Nausea and Vomiting    Omeprazole Hives    Other Food Allergy      Apples, peaches, pears, cherries, apricots    Tree Nuts [Nuts]      Almonds, pecans    Latex Rash     Current providers sharing in care for this patient include:  Patient Care Team:  Ame Dobson MD as PCP - General (Family Practice)  Ame Dobson MD as Assigned PCP  Andi Bay MD as Assigned Surgical Provider  Agbeh, Cephas Mawuena, MD as MD (OB/Gyn)  Amira Harmon APRN CNP as Assigned Neuroscience Provider  Doris Anguiano APRN CNP as Assigned Cancer Care Provider  Bang Saunders MD as Assigned Pediatric Specialist Provider  Mary Hernandez MD as Physician (OB/Gyn)  Mary Hernandez MD as Assigned OBGYN Provider    The following health maintenance items are reviewed in Epic and correct as of today:  Health Maintenance   Topic Date Due    RSV VACCINE (Pregnancy & 60+) (1 - 1-dose 60+ series) Never done    ZOSTER IMMUNIZATION (2 of 2) 07/24/2018    COVID-19 Vaccine (8 - 2023-24  "season) 08/14/2024    BMP  03/08/2025    TSH W/FREE T4 REFLEX  03/26/2025    EYE EXAM  04/24/2025    MEDICARE ANNUAL WELLNESS VISIT  06/19/2025    ANNUAL REVIEW OF HM ORDERS  06/19/2025    FALL RISK ASSESSMENT  06/19/2025    ADVANCE CARE PLANNING  06/26/2028    DTAP/TDAP/TD IMMUNIZATION (3 - Td or Tdap) 08/23/2033    DEXA  06/21/2036    MIGRAINE ACTION PLAN  Completed    PHQ-2 (once per calendar year)  Completed    INFLUENZA VACCINE  Completed    Pneumococcal Vaccine: 65+ Years  Completed    IPV IMMUNIZATION  Aged Out    HPV IMMUNIZATION  Aged Out    MENINGITIS IMMUNIZATION  Aged Out    RSV MONOCLONAL ANTIBODY  Aged Out         Review of Systems  Constitutional, HEENT, cardiovascular, pulmonary, gi and gu systems are negative, except as otherwise noted.     Objective    Exam  /72   Pulse 77   Temp 98  F (36.7  C) (Temporal)   Resp 12   Ht 1.6 m (5' 3\")   Wt 64.1 kg (141 lb 6.4 oz)   LMP 10/01/1986   SpO2 97%   BMI 25.05 kg/m     Estimated body mass index is 25.05 kg/m  as calculated from the following:    Height as of this encounter: 1.6 m (5' 3\").    Weight as of this encounter: 64.1 kg (141 lb 6.4 oz).    Physical Exam  GENERAL: alert and no distress  EYES: Eyes grossly normal to inspection, PERRL and conjunctivae and sclerae normal  HENT: ear canals and TM's normal, nose and mouth without ulcers or lesions  NECK: no adenopathy, no asymmetry, masses, or scars  RESP: lungs clear to auscultation - no rales, rhonchi or wheezes  CV: regular rate and rhythm, normal S1 S2, no S3 or S4, no murmur, click or rub, no peripheral edema  ABDOMEN: soft, nontender, no hepatosplenomegaly, no masses and bowel sounds normal  MS: no gross musculoskeletal defects noted, no edema  SKIN: no suspicious lesions or rashes  NEURO: Normal strength and tone, mentation intact and speech normal  PSYCH: mentation appears normal, affect normal/bright         6/19/2024   Mini Cog   Clock Draw Score 2 Normal   3 Item Recall 3 objects " recalled   Mini Cog Total Score 5        DATA  Labs drawn and in process    Signed Electronically by: Ame Dobson MD

## 2024-08-20 ENCOUNTER — OFFICE VISIT (OUTPATIENT)
Dept: PODIATRY | Facility: CLINIC | Age: 89
End: 2024-08-20
Payer: MEDICARE

## 2024-08-20 VITALS — DIASTOLIC BLOOD PRESSURE: 70 MMHG | SYSTOLIC BLOOD PRESSURE: 160 MMHG | HEART RATE: 78 BPM

## 2024-08-20 DIAGNOSIS — M77.8 CAPSULITIS OF FOOT, RIGHT: Primary | ICD-10-CM

## 2024-08-20 PROCEDURE — 99213 OFFICE O/P EST LOW 20 MIN: CPT | Performed by: PODIATRIST

## 2024-08-20 NOTE — PATIENT INSTRUCTIONS
We wish you continued good healing. If you have any questions or concerns, please do not hesitate to contact us at  [phone]    [employer] (secure e-mail communication and access to your chart) to send a message or to make an appointment.    Please remember to call and schedule a follow up appointment if one was recommended at your earliest convenience.     PODIATRY CLINIC HOURS  TELEPHONE NUMBER    Dr. Heriberto NEWELLPKONG FACFAS        Clinics:  [facility]   [place]  Tuesday 1PM-6PM  [place]  Wednesday 745AM-330PM  [place]  Monday 2nd,4th  830AM-4PM  Thursday/Friday 745AM-230PM     [place] APPOINTMENTS  [phone]    [place] APPOINTMENTS  [phone]          If you need a medication refill, please contact us you may need lab work and/or a follow up visit prior to your refill (i.e. Antifungal medications).  If MRI needed please call Imaging at [phone]   HOW DO I GET MY KNEE SCOOTER? Knee scooters can be picked up at ANY Medical Supply stores with your knee scooter Prescription.  OR  Bring your signed prescription to an [facility] showroom.   Set up an appointment for your custom Orthotics. Call any Orthotics locations call [phone]

## 2024-08-20 NOTE — LETTER
8/20/2024      Hilda Potter  [address] [facility] [address]      Dear Colleague,    Thank you for referring your patient, Hilda Potter, to the [facility]. Please see a copy of my visit note below.    S:      4/2/24   Patient seen today in consult from Dr. Dobson and complains of left foot pain.  Points to plantar second metatarsal head.  Describes as a burning pain.  aggravated by activity and relieved by rest.  Has had this for 2 months.  No pain anywhere else on feet.  Slippers around house.  Starting to get pain in left foot same place.  Denies erythema, edema, weakness, numbness.  Has osteoporosis.      8/20/24 patient returns for left foot pain.  Points to plantar second metatarsal head.  Since last visit she has gotten better shoes.  She is only iced it a couple times.  This has helped.  Mostly not painful when walking but she does feel somewhat abnormal.  Also notices her second toes more elevated and medially deviated.  Wondering about treatment options for this.  Denies any new edema or ecchymosis.    ROS:  see above       Allergies   Allergen Reactions     Diatrizoate Other (See Comments)     Zoster Vaccine Live Anaphylaxis     Bisphosphonates GI Disturbance     GI distress     Ivp Dye [Contrast Dye] Swelling     Lisinopril Cough     Losartan Hives     But can tolerate Hyzaar.      Morphine Hives     Morphine Sulfate Nausea and Vomiting     Omeprazole Hives     Other Food Allergy      Apples, peaches, pears, cherries, apricots     Tree Nuts [Nuts]      Almonds, pecans     Latex Rash       Current Outpatient Medications   Medication Sig Dispense Refill     Aspirin-Acetaminophen-Caffeine (EXCEDRIN MIGRAINE PO) Take 1 tablet by mouth as needed       calcium carbonate-vitamin D (OS-FIDENCIO) 600-400 MG-UNIT chewable tablet Take 1 chew tab by mouth 2 times daily       carboxymethylcellulose (REFRESH PLUS) 0.5 % SOLN Place 1 drop into both eyes 3 times daily  as needed for dry eyes       estradiol (ESTRACE) 0.1 MG/GM vaginal cream Place 1 g vaginally daily 1g TID for 6/2024. 1g BID for 7/2024. 1g daily for 8/2024 till follow up 42.5 g 2     hydrochlorothiazide (HYDRODIURIL) 25 MG tablet Take 1 tablet (25 mg) by mouth daily 90 tablet 3     latanoprost (XALATAN) 0.005 % ophthalmic solution Place 1 drop into both eyes at bedtime 7.5 mL 3     levothyroxine (SYNTHROID/LEVOTHROID) 50 MCG tablet Take 1 tablet (50 mcg) by mouth daily 90 tablet 3     MELATONIN PO Take 3 mg by mouth nightly as needed        Multiple Vitamin (MULTI-VITAMIN) per tablet Take 1 tablet by mouth daily       timolol maleate (TIMOPTIC) 0.5 % ophthalmic solution Place 1 drop into both eyes 2 times daily Wait at least 5 minutes between drops if using more than one at a time. 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)     Premature atrial beats     Compression fracture of L1 lumbar vertebra, seen on CT     Lung nodule < 6cm on CT     Dupuytren's contracture of right hand     Hypothyroidism, unspecified type     Essential hypertension with goal blood pressure less than 140/90     Combined forms of age-related cataract, mild-mod, both eyes     Migraine without status migrainosus, not intractable, unspecified migraine type     Rhinorrhea     Posterior vitreous detachment of both eyes     Primary open angle glaucoma (POAG) of both eyes, mild stage       Past Medical History:   Diagnosis Date     AR (allergic rhinitis)      Atrophic vaginitis      Cancer (H)      Chronic kidney disease, stage 3 (H) 07/14/2021     Compression fracture of L1 lumbar vertebra (H) 04/2015     Glaucoma (increased eye pressure)      HTN (hypertension)       Migraine      Nonsenile cataract      Osteoporosis      Reflux esophagitis      Thyroid disease        Past Surgical History:   Procedure Laterality Date     ABDOMEN SURGERY       APPENDECTOMY OPEN  1954     BLEPHAROPLASTY BILATERAL  10/2007    both eyes, upper lids     CHOLECYSTECTOMY, OPEN  1992     COLONOSCOPY  6/02, 10/13    Q 5 years, polyps     D & C       REPAIR PTOSIS       SALPINGO OOPHORECTOMY,R/L/LUIS DANIEL      left ovary and tube     SMALL BOWEL RESECTION  1986    colon resection      TONSILLECTOMY & ADENOIDECTOMY      childhood     TUBAL LIGATION       UPPER GI ENDOSCOPY  6/02, 8/11     ZZC LENGTHEN,TENDON,HAND/FINGER  1993    repair of dupytrons's contracture       Family History   Problem Relation Age of Onset     Hypertension Mother      Arthritis Mother      Cardiovascular Mother      Retinal detachment Mother      Migraines Mother      C.A.D. Father      Hypertension Father         Father.  Passed away.     Cerebrovascular Disease Father      Arthritis Father      Cardiovascular Father      Eye Disorder Father      Heart Disease Father      Glaucoma Father      Parkinsonism Father      Cardiovascular Brother      Glaucoma Brother      Peripheral Neuropathy Brother      Macular Degeneration No family hx of      Bleeding Disorder No family hx of      Anesthesia Reaction No family hx of        Social History     Tobacco Use     Smoking status: Never     Passive exposure: Never     Smokeless tobacco: Never   Substance Use Topics     Alcohol use: Not Currently     Comment: wine occasionally         O:   LMP 10/01/1986 .      Constitutional/ general:  Pt is in no apparent distress, appears well-nourished.  Cooperative with history and physical exam.     Psych:  The patient answered questions appropriately.  Normal affect.  Seems to have reasonable expectations, in terms of treatment.     Lungs:  Non labored breathing, non labored speech. No cough.  No audible wheezing. Even, quiet breathing.        Vascular:  Pedal pulses are palpable bilaterally for both the DP and PT arteries.  CFT < 3 sec.  Ankle edema and varicosities.  Pedal hair growth noted.     Neuro:  Alert and oriented x 3. Coordinated gait.  Light touch sensation is intact     Derm: thin and shiny.      Musculoskeletal:    Lower extremity muscle strength is normal.  Patient is ambulatory without an assistive device or brace.  Normal arch with weightbearing.  Lesser toes hammered.  Can reduce all these except the second.  MS 5/5 all compartments.  Normal ROM all fore foot and rearfoot joints.  No equinus.  Pain under right second metatarsal head plantar.  Negative Lachmans test.  Negative Mulders click.  No pain anywhere else.  No erythema, edema, ecchymosis, or subcutaneous masses noted.      Radiographic Exam:   Long second metatarsal, but no other bone abnormalities.     A:  Subsecond capsulitis right foot        Hammertoes    P: Explained cause of hammertoes.  Will keep the supple.  In addition to Budin splint showed patient how to reduce this with a Band-Aid.  She has a good shoe on today.  Discussed icing aggressively may help offload second metatarsal head.  She will do this 15 minutes 3 times a day for a week.  Continue good shoes.  If still painful discussed other treatment options such as steroid injection in this joint.  RTC as needed.      Heriberto Rosenberg DPM, FACFAS       Again, thank you for allowing me to participate in the care of your patient.        Sincerely,        Heriberto Rosenberg DPM

## 2024-08-20 NOTE — PROGRESS NOTES
S:      4/2/24   Patient seen today in consult from Dr. Dobson and complains of left foot pain.  Points to plantar second metatarsal head.  Describes as a burning pain.  aggravated by activity and relieved by rest.  Has had this for 2 months.  No pain anywhere else on feet.  Slippers around house.  Starting to get pain in left foot same place.  Denies erythema, edema, weakness, numbness.  Has osteoporosis.      8/20/24 patient returns for left foot pain.  Points to plantar second metatarsal head.  Since last visit she has gotten better shoes.  She is only iced it a couple times.  This has helped.  Mostly not painful when walking but she does feel somewhat abnormal.  Also notices her second toes more elevated and medially deviated.  Wondering about treatment options for this.  Denies any new edema or ecchymosis.    ROS:  see above       Allergies   Allergen Reactions    Diatrizoate Other (See Comments)    Zoster Vaccine Live Anaphylaxis    Bisphosphonates GI Disturbance     GI distress    Ivp Dye [Contrast Dye] Swelling    Lisinopril Cough    Losartan Hives     But can tolerate Hyzaar.     Morphine Hives    Morphine Sulfate Nausea and Vomiting    Omeprazole Hives    Other Food Allergy      Apples, peaches, pears, cherries, apricots    Tree Nuts [Nuts]      Almonds, pecans    Latex Rash       Current Outpatient Medications   Medication Sig Dispense Refill    Aspirin-Acetaminophen-Caffeine (EXCEDRIN MIGRAINE PO) Take 1 tablet by mouth as needed      calcium carbonate-vitamin D (OS-FIDENCIO) 600-400 MG-UNIT chewable tablet Take 1 chew tab by mouth 2 times daily      carboxymethylcellulose (REFRESH PLUS) 0.5 % SOLN Place 1 drop into both eyes 3 times daily as needed for dry eyes      estradiol (ESTRACE) 0.1 MG/GM vaginal cream Place 1 g vaginally daily 1g TID for 6/2024. 1g BID for 7/2024. 1g daily for 8/2024 till follow up 42.5 g 2    hydrochlorothiazide (HYDRODIURIL) 25 MG tablet Take 1 tablet (25 mg) by mouth daily 90  tablet 3    latanoprost (XALATAN) 0.005 % ophthalmic solution Place 1 drop into both eyes at bedtime 7.5 mL 3    levothyroxine (SYNTHROID/LEVOTHROID) 50 MCG tablet Take 1 tablet (50 mcg) by mouth daily 90 tablet 3    MELATONIN PO Take 3 mg by mouth nightly as needed       Multiple Vitamin (MULTI-VITAMIN) per tablet Take 1 tablet by mouth daily      timolol maleate (TIMOPTIC) 0.5 % ophthalmic solution Place 1 drop into both eyes 2 times daily Wait at least 5 minutes between drops if using more than one at a time. 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)    Premature atrial beats    Compression fracture of L1 lumbar vertebra, seen on CT    Lung nodule < 6cm on CT    Dupuytren's contracture of right hand    Hypothyroidism, unspecified type    Essential hypertension with goal blood pressure less than 140/90    Combined forms of age-related cataract, mild-mod, both eyes    Migraine without status migrainosus, not intractable, unspecified migraine type    Rhinorrhea    Posterior vitreous detachment of both eyes    Primary open angle glaucoma (POAG) of both eyes, mild stage       Past Medical History:   Diagnosis Date    AR (allergic rhinitis)     Atrophic vaginitis     Cancer (H)     Chronic kidney disease, stage 3 (H) 07/14/2021    Compression fracture of L1 lumbar vertebra (H) 04/2015    Glaucoma (increased eye pressure)     HTN (hypertension)     Migraine     Nonsenile cataract     Osteoporosis     Reflux esophagitis     Thyroid disease        Past Surgical History:   Procedure Laterality Date    ABDOMEN SURGERY      APPENDECTOMY OPEN  1954    BLEPHAROPLASTY BILATERAL  10/2007    both eyes, upper lids    CHOLECYSTECTOMY, OPEN  1992     COLONOSCOPY  6/02, 10/13    Q 5 years, polyps    D & C      REPAIR PTOSIS      SALPINGO OOPHORECTOMY,R/L/LUIS DANIEL      left ovary and tube    SMALL BOWEL RESECTION  1986    colon resection     TONSILLECTOMY & ADENOIDECTOMY      childhood    TUBAL LIGATION      UPPER GI ENDOSCOPY  6/02, 8/11    ZZC LENGTHEN,TENDON,HAND/FINGER  1993    repair of dupytrons's contracture       Family History   Problem Relation Age of Onset    Hypertension Mother     Arthritis Mother     Cardiovascular Mother     Retinal detachment Mother     Migraines Mother     C.A.D. Father     Hypertension Father         Father.  Passed away.    Cerebrovascular Disease Father     Arthritis Father     Cardiovascular Father     Eye Disorder Father     Heart Disease Father     Glaucoma Father     Parkinsonism Father     Cardiovascular Brother     Glaucoma Brother     Peripheral Neuropathy Brother     Macular Degeneration No family hx of     Bleeding Disorder No family hx of     Anesthesia Reaction No family hx of        Social History     Tobacco Use    Smoking status: Never     Passive exposure: Never    Smokeless tobacco: Never   Substance Use Topics    Alcohol use: Not Currently     Comment: wine occasionally         O:   LMP 10/01/1986 .      Constitutional/ general:  Pt is in no apparent distress, appears well-nourished.  Cooperative with history and physical exam.     Psych:  The patient answered questions appropriately.  Normal affect.  Seems to have reasonable expectations, in terms of treatment.     Lungs:  Non labored breathing, non labored speech. No cough.  No audible wheezing. Even, quiet breathing.       Vascular:  Pedal pulses are palpable bilaterally for both the DP and PT arteries.  CFT < 3 sec.  Ankle edema and varicosities.  Pedal hair growth noted.     Neuro:  Alert and oriented x 3. Coordinated gait.  Light touch sensation is intact     Derm: thin and shiny.      Musculoskeletal:    Lower extremity muscle strength is normal.  Patient is  ambulatory without an assistive device or brace.  Normal arch with weightbearing.  Lesser toes hammered.  Can reduce all these except the second.  MS 5/5 all compartments.  Normal ROM all fore foot and rearfoot joints.  No equinus.  Pain under right second metatarsal head plantar.  Negative Lachmans test.  Negative Mulders click.  No pain anywhere else.  No erythema, edema, ecchymosis, or subcutaneous masses noted.      Radiographic Exam:   Long second metatarsal, but no other bone abnormalities.     A:  Subsecond capsulitis right foot        Hammertoes    P: Explained cause of hammertoes.  Will keep the supple.  In addition to Budin splint showed patient how to reduce this with a Band-Aid.  She has a good shoe on today.  Discussed icing aggressively may help offload second metatarsal head.  She will do this 15 minutes 3 times a day for a week.  Continue good shoes.  If still painful discussed other treatment options such as steroid injection in this joint.  RTC as needed.      Heriberto Rosenberg DPM, FACFAS

## 2024-09-06 ENCOUNTER — TRANSFERRED RECORDS (OUTPATIENT)
Dept: HEALTH INFORMATION MANAGEMENT | Facility: CLINIC | Age: 89
End: 2024-09-06
Payer: MEDICARE

## 2024-09-13 ENCOUNTER — LAB (OUTPATIENT)
Dept: LAB | Facility: CLINIC | Age: 89
End: 2024-09-13
Payer: MEDICARE

## 2024-09-13 ENCOUNTER — ANCILLARY PROCEDURE (OUTPATIENT)
Dept: CT IMAGING | Facility: CLINIC | Age: 89
End: 2024-09-13
Attending: INTERNAL MEDICINE
Payer: MEDICARE

## 2024-09-13 DIAGNOSIS — C82.00 GRADE I FOLLICULAR SMALL CLEAVED CELL LYMPHOMA (H): ICD-10-CM

## 2024-09-13 DIAGNOSIS — C82.03 FOLLICULAR LYMPHOMA GRADE I OF INTRA-ABDOMINAL LYMPH NODES (H): ICD-10-CM

## 2024-09-13 LAB
ALBUMIN SERPL BCG-MCNC: 4 G/DL (ref 3.5–5.2)
ALP SERPL-CCNC: 69 U/L (ref 40–150)
ALT SERPL W P-5'-P-CCNC: 23 U/L (ref 0–50)
ANION GAP SERPL CALCULATED.3IONS-SCNC: 8 MMOL/L (ref 7–15)
AST SERPL W P-5'-P-CCNC: 24 U/L (ref 0–45)
BASOPHILS # BLD AUTO: 0 10E3/UL (ref 0–0.2)
BASOPHILS NFR BLD AUTO: 0 %
BILIRUB SERPL-MCNC: 0.6 MG/DL
BUN SERPL-MCNC: 20.7 MG/DL (ref 8–23)
CALCIUM SERPL-MCNC: 9.7 MG/DL (ref 8.8–10.4)
CHLORIDE SERPL-SCNC: 100 MMOL/L (ref 98–107)
CREAT SERPL-MCNC: 0.93 MG/DL (ref 0.51–0.95)
EGFRCR SERPLBLD CKD-EPI 2021: 59 ML/MIN/1.73M2
EOSINOPHIL # BLD AUTO: 0.1 10E3/UL (ref 0–0.7)
EOSINOPHIL NFR BLD AUTO: 2 %
ERYTHROCYTE [DISTWIDTH] IN BLOOD BY AUTOMATED COUNT: 13.2 % (ref 10–15)
GLUCOSE SERPL-MCNC: 98 MG/DL (ref 70–99)
HCO3 SERPL-SCNC: 31 MMOL/L (ref 22–29)
HCT VFR BLD AUTO: 38.7 % (ref 35–47)
HGB BLD-MCNC: 12.7 G/DL (ref 11.7–15.7)
IMM GRANULOCYTES # BLD: 0 10E3/UL
IMM GRANULOCYTES NFR BLD: 0 %
LDH SERPL L TO P-CCNC: 172 U/L (ref 0–250)
LYMPHOCYTES # BLD AUTO: 1.2 10E3/UL (ref 0.8–5.3)
LYMPHOCYTES NFR BLD AUTO: 22 %
MCH RBC QN AUTO: 29.1 PG (ref 26.5–33)
MCHC RBC AUTO-ENTMCNC: 32.8 G/DL (ref 31.5–36.5)
MCV RBC AUTO: 89 FL (ref 78–100)
MONOCYTES # BLD AUTO: 0.6 10E3/UL (ref 0–1.3)
MONOCYTES NFR BLD AUTO: 10 %
NEUTROPHILS # BLD AUTO: 3.7 10E3/UL (ref 1.6–8.3)
NEUTROPHILS NFR BLD AUTO: 66 %
NRBC # BLD AUTO: 0 10E3/UL
NRBC BLD AUTO-RTO: 0 /100
PLATELET # BLD AUTO: 172 10E3/UL (ref 150–450)
POTASSIUM SERPL-SCNC: 3.9 MMOL/L (ref 3.4–5.3)
PROT SERPL-MCNC: 6.8 G/DL (ref 6.4–8.3)
RBC # BLD AUTO: 4.36 10E6/UL (ref 3.8–5.2)
SODIUM SERPL-SCNC: 139 MMOL/L (ref 135–145)
WBC # BLD AUTO: 5.5 10E3/UL (ref 4–11)

## 2024-09-13 PROCEDURE — 83615 LACTATE (LD) (LDH) ENZYME: CPT | Performed by: INTERNAL MEDICINE

## 2024-09-13 PROCEDURE — 36415 COLL VENOUS BLD VENIPUNCTURE: CPT | Performed by: INTERNAL MEDICINE

## 2024-09-13 PROCEDURE — 80053 COMPREHEN METABOLIC PANEL: CPT | Performed by: INTERNAL MEDICINE

## 2024-09-13 PROCEDURE — 85025 COMPLETE CBC W/AUTO DIFF WBC: CPT | Performed by: INTERNAL MEDICINE

## 2024-09-13 PROCEDURE — 71250 CT THORAX DX C-: CPT | Mod: MG | Performed by: STUDENT IN AN ORGANIZED HEALTH CARE EDUCATION/TRAINING PROGRAM

## 2024-09-13 PROCEDURE — G1010 CDSM STANSON: HCPCS | Performed by: STUDENT IN AN ORGANIZED HEALTH CARE EDUCATION/TRAINING PROGRAM

## 2024-09-13 PROCEDURE — 74176 CT ABD & PELVIS W/O CONTRAST: CPT | Mod: MG | Performed by: STUDENT IN AN ORGANIZED HEALTH CARE EDUCATION/TRAINING PROGRAM

## 2024-09-18 ENCOUNTER — ONCOLOGY VISIT (OUTPATIENT)
Dept: ONCOLOGY | Facility: CLINIC | Age: 89
End: 2024-09-18
Attending: INTERNAL MEDICINE
Payer: MEDICARE

## 2024-09-18 VITALS
OXYGEN SATURATION: 96 % | HEART RATE: 64 BPM | WEIGHT: 142 LBS | DIASTOLIC BLOOD PRESSURE: 74 MMHG | BODY MASS INDEX: 25.16 KG/M2 | SYSTOLIC BLOOD PRESSURE: 158 MMHG | HEIGHT: 63 IN

## 2024-09-18 DIAGNOSIS — C82.00 GRADE I FOLLICULAR SMALL CLEAVED CELL LYMPHOMA (H): Primary | ICD-10-CM

## 2024-09-18 DIAGNOSIS — R59.0 MESENTERIC LYMPHADENOPATHY: ICD-10-CM

## 2024-09-18 DIAGNOSIS — Z91.041 CONTRAST MEDIA ALLERGY: ICD-10-CM

## 2024-09-18 DIAGNOSIS — R61 NIGHT SWEATS: ICD-10-CM

## 2024-09-18 PROCEDURE — 99214 OFFICE O/P EST MOD 30 MIN: CPT | Performed by: INTERNAL MEDICINE

## 2024-09-18 PROCEDURE — G2211 COMPLEX E/M VISIT ADD ON: HCPCS | Performed by: INTERNAL MEDICINE

## 2024-09-18 PROCEDURE — G0463 HOSPITAL OUTPT CLINIC VISIT: HCPCS | Performed by: INTERNAL MEDICINE

## 2024-09-18 RX ORDER — FAMOTIDINE 20 MG/1
1 TABLET, FILM COATED ORAL DAILY
COMMUNITY
Start: 2024-08-24 | End: 2024-10-23

## 2024-09-18 ASSESSMENT — PAIN SCALES - GENERAL: PAINLEVEL: NO PAIN (0)

## 2024-09-18 NOTE — LETTER
2024      Hilda Potter  [address]  [facility] [address]      Dear Colleague,    Thank you for referring your patient, Hilda Potter, to the [facility]. Please see a copy of my visit note below.    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  # Sep 2012 - ongoing Surveillance.  # Dec 2023 Rituximab weekly x4; mixed stricture of clinical improvement but radiographically stable disease.    Continue observation for follicular lymphoma; clinically and radiographically CECILIA.  Clinically, she has had a nice response from an abdominal standpoint.  Blood counts are within normal limits.  Chemistries are within normal limits outside of some mild abnormalities.  We will monitor the stable, occasional sweats fir changes in severity or frequency.  I did encourage her to trial half a dose of hydrochlorothiazide (lower) for orthostatic hypotension complaints.  Will plan to see her back in about 6 months time with her next scan being in about a year.    Bang Saunders MD    --------------------    Interval History:  Hilda returns for follow-up of follicular lymphoma accompanied by her .  All in all from a health standpoint, she feels quite well.  She continues to experience some night sweats maybe twice per week but they are stable in frequency and severity.  She notices some itchy skin here and they are without any clear triggers.  She is felt a little  "lightheaded at time with orthostatic complaints in particular.  She has been prescribed vaginal estrogen for vaginal changes with biopsy not showing lichen sclerosis.    --------------------    Physical Exam:  VS: BP (!) 158/74 (BP Location: Left arm, Patient Position: Chair, Cuff Size: Adult Regular)   Pulse 64   Ht 1.6 m (5' 3\")   Wt 64.4 kg (142 lb)   LMP 10/01/1986   SpO2 96%   BMI 25.15 kg/m  .  GEN: Well appearing.  JEFFERY: No cervical, clavicular, axillary adenopathy    Labs / Imaging:  Reviewed CBC, LDH, CMP.  Reviewed CT chest abdomen pelvis w/ independent review.      Again, thank you for allowing me to participate in the care of your patient.        Sincerely,        Bang Saunders MD  "

## 2024-09-18 NOTE — NURSING NOTE
"Oncology Rooming Note    September 18, 2024 9:51 AM   Hilda Potter is a 89 year old female who presents for:    Chief Complaint   Patient presents with    Oncology Clinic Visit     Follow up     Initial Vitals: BP (!) 158/74 (BP Location: Left arm, Patient Position: Chair, Cuff Size: Adult Regular)   Pulse 64   Ht 1.6 m (5' 3\")   Wt 64.4 kg (142 lb)   LMP 10/01/1986   SpO2 96%   BMI 25.15 kg/m   Estimated body mass index is 25.15 kg/m  as calculated from the following:    Height as of this encounter: 1.6 m (5' 3\").    Weight as of this encounter: 64.4 kg (142 lb). Body surface area is 1.69 meters squared.  No Pain (0) Comment: Data Unavailable   Patient's last menstrual period was 10/01/1986.  Allergies reviewed: Yes  Medications reviewed: Yes    Medications: Medication refills not needed today.  Pharmacy name entered into [facility]: [facility] - [address]  AT [facility]    Frailty Screening:   Is the patient here for a new oncology consult visit in cancer care? 2. No      Clinical concerns: NO       Cecilia Blanco CMA              "

## 2024-09-18 NOTE — PROGRESS NOTES
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  # Sep 2012 - ongoing Surveillance.  # Dec 2023 Rituximab weekly x4; mixed stricture of clinical improvement but radiographically stable disease.    Continue observation for follicular lymphoma; clinically and radiographically CECILIA.  Clinically, she has had a nice response from an abdominal standpoint.  Blood counts are within normal limits.  Chemistries are within normal limits outside of some mild abnormalities.  We will monitor the stable, occasional sweats fir changes in severity or frequency.  I did encourage her to trial half a dose of hydrochlorothiazide (lower) for orthostatic hypotension complaints.  Will plan to see her back in about 6 months time with her next scan being in about a year.    Bang Saunders MD    --------------------    Interval History:  Anish returns for follow-up of follicular lymphoma accompanied by her .  All in all from a health standpoint, she feels quite well.  She continues to experience some night sweats maybe twice per week but they are stable in frequency and severity.  She notices some itchy skin here and they are without any clear triggers.  She is felt a little lightheaded at time with orthostatic complaints in particular.  She has been prescribed vaginal estrogen for vaginal changes with biopsy not showing lichen sclerosis.    --------------------    Physical Exam:  VS: BP (!) 158/74 (BP Location: Left arm, Patient  "Position: Chair, Cuff Size: Adult Regular)   Pulse 64   Ht 1.6 m (5' 3\")   Wt 64.4 kg (142 lb)   LMP 10/01/1986   SpO2 96%   BMI 25.15 kg/m  .  GEN: Well appearing.  JEFFERY: No cervical, clavicular, axillary adenopathy    Labs / Imaging:  Reviewed CBC, LDH, CMP.  Reviewed CT chest abdomen pelvis w/ independent review.  "

## 2024-09-20 RX ORDER — DIPHENHYDRAMINE HCL 25 MG
25 CAPSULE ORAL EVERY 6 HOURS PRN
Qty: 1 CAPSULE | Refills: 0 | Status: SHIPPED | OUTPATIENT
Start: 2024-09-20

## 2024-09-20 RX ORDER — METHYLPREDNISOLONE 32 MG/1
32 TABLET ORAL DAILY
Qty: 1 TABLET | Refills: 0 | Status: SHIPPED | OUTPATIENT
Start: 2024-09-20

## 2024-09-20 NOTE — PATIENT INSTRUCTIONS
1) NANDA MG 6 months w/ labs (CBC, CMP, LDH).  2) BJT MG 12 months w/ labs (CBC, CMP, LDH) and CT CAP.    Bang Saunders MD.   Problem: Post op day 2 CABG/Heart Valve Replacement  Goal: Optimal care of the post op CABG/heart valve replacement post op day 2  Outcome: PROGRESSING SLOWER THAN EXPECTED  Intervention: Up in chair for all meals  Patient in chair for all meals.  Intervention: Stand at sink and wash up with assistance. Clean incisions twice daily with soap and water.  Incision cleaned with soap and water.  Intervention: IS q 1 hour while awake and record best IS volume  Best 750.  Intervention: Consider removal of rosales and chest tube if not already done  Rosales placed for urinary retention per MD.

## 2024-09-24 ENCOUNTER — PATIENT OUTREACH (OUTPATIENT)
Dept: ONCOLOGY | Facility: CLINIC | Age: 89
End: 2024-09-24
Payer: MEDICARE

## 2024-09-24 DIAGNOSIS — C82.00 GRADE I FOLLICULAR SMALL CLEAVED CELL LYMPHOMA (H): Primary | ICD-10-CM

## 2024-09-24 NOTE — PROGRESS NOTES
Patient contacted about prescriptions sent, they are premedication for CT w/contrast - however, not due until next year.  Patient reports she is unable to take IVP dye, states she had it once at [facility] and had an anaphylactic reaction and had she not been hospitalized at the time she could have .  Patient states she was told to never take it again.  Informed patient that Dr. Saunders did state it could be changed to non-contrast CT if patient prefers.     Daly Leon RN

## 2024-09-26 NOTE — PROGRESS NOTES
CT order update with no contrast per patient request, provider had approved.  Discontinued premedications.        Daly Leon RN

## 2024-10-22 ENCOUNTER — OFFICE VISIT (OUTPATIENT)
Dept: FAMILY MEDICINE | Facility: CLINIC | Age: 89
End: 2024-10-22
Payer: MEDICARE

## 2024-10-22 VITALS
DIASTOLIC BLOOD PRESSURE: 60 MMHG | WEIGHT: 142.2 LBS | HEART RATE: 72 BPM | OXYGEN SATURATION: 97 % | TEMPERATURE: 97.3 F | BODY MASS INDEX: 25.2 KG/M2 | RESPIRATION RATE: 16 BRPM | SYSTOLIC BLOOD PRESSURE: 112 MMHG | HEIGHT: 63 IN

## 2024-10-22 DIAGNOSIS — R13.10 DYSPHAGIA, UNSPECIFIED TYPE: ICD-10-CM

## 2024-10-22 DIAGNOSIS — I10 HYPERTENSION GOAL BP (BLOOD PRESSURE) < 140/90: ICD-10-CM

## 2024-10-22 DIAGNOSIS — Z01.818 PREOP GENERAL PHYSICAL EXAM: Primary | ICD-10-CM

## 2024-10-22 PROCEDURE — 99214 OFFICE O/P EST MOD 30 MIN: CPT | Performed by: FAMILY MEDICINE

## 2024-10-22 RX ORDER — HYDROCHLOROTHIAZIDE 12.5 MG/1
12.5 TABLET ORAL DAILY
Qty: 90 TABLET | Refills: 3 | Status: SHIPPED | OUTPATIENT
Start: 2024-10-22

## 2024-10-22 NOTE — PATIENT INSTRUCTIONS
How to Take Your Medication Before Surgery  Preoperative Medication Instructions   Antiplatelet or Anticoagulation Medication Instructions   - Patient is on no antiplatelet or anticoagulation medications.    Additional Medication Instructions  Take all scheduled medications on the day of surgery EXCEPT for modifications listed below:  Hydrochlorothiazide the morning of procedure.   Ok to take Levothyroxine the morning of surgery with small sips of water.        Patient Education   Preparing for Your Surgery  For Adults  Getting started  In most cases, a nurse will call to review your health history and instructions. They will give you an arrival time based on your scheduled surgery time. Please be ready to share:  Your doctor's clinic name and phone number  Your medical, surgical, and anesthesia history  A list of allergies and sensitivities  A list of medicines, including herbal treatments and over-the-counter drugs  Whether the patient has a legal guardian (ask how to send us the papers in advance)  Note: You may not receive a call if you were seen at our PAC (Preoperative Assessment Center).  Please tell us if you're pregnant--or if there's any chance you might be pregnant. Some surgeries may injure a fetus (unborn baby), so they require a pregnancy test. Surgeries that are safe for a fetus don't always need a test, and you can choose whether to have one.   Preparing for surgery  Within 10 to 30 days of surgery: Have a pre-op exam (sometimes called an H&P, or History and Physical). This can be done at a clinic or pre-operative center.  If you're having a , you may not need this exam. Talk to your care team.  At your pre-op exam, talk to your care team about all medicines you take. (This includes CBD oil and any drugs, such as THC, marijuana, and other forms of cannabis.) If you need to stop any medicine before surgery, ask when to start taking it again.  This is for your safety. Many medicines and drugs  can make you bleed too much during surgery. Some change how well surgery (anesthesia) drugs work.  Call your insurance company to let them know you're having surgery. (If you don't have insurance, call [phone].)  Call your clinic if there's any change in your health. This includes a scrape or scratch near the surgery site, or any signs of a cold (sore throat, runny nose, cough, rash, fever).  Eating and drinking guidelines  For your safety: Unless your surgeon tells you otherwise, follow the guidelines below.  Eat and drink as normal until 8 hours before you arrive for surgery. After that, no food or milk. You can spit out gum when you arrive.  Drink clear liquids until 2 hours before you arrive. These are liquids you can see through, like water, Gatorade, and Propel Water. They also include plain black coffee and tea (no cream or milk).  No alcohol for 24 hours before you arrive. The night before surgery, stop any drinks that contain THC.  If your care team tells you to take medicine on the morning of surgery, it's okay to take it with a sip of water. No other medicines or drugs are allowed (including CBD oil)--follow your care team's instructions.  If you have questions the day of surgery, call your hospital or surgery center.   Preventing infection  Shower or bathe the night before and the morning of surgery. Follow the instructions your clinic gave you. (If no instructions, use regular soap.)  Don't shave or clip hair near your surgery site. We'll remove the hair if needed.  Don't smoke or vape the morning of surgery. No chewing tobacco for 6 hours before you arrive. A nicotine patch is okay. You may spit out nicotine gum when you arrive.  For some surgeries, the surgeon will tell you to fully quit smoking and nicotine.  We will make every effort to keep you safe from infection. We will:  Clean our hands often with soap and water (or an alcohol-based hand rub).  Clean the skin at your surgery site with a  special soap that kills germs.  Give you a special gown to keep you warm. (Cold raises the risk of infection.)  Wear hair covers, masks, gowns, and gloves during surgery.  Give antibiotic medicine, if prescribed. Not all surgeries need this medicine.  What to bring on the day of surgery  Photo ID and insurance card  Copy of your health care directive, if you have one  Glasses and hearing aids (bring cases)  You can't wear contacts during surgery  Inhaler and eye drops, if you use them (tell us about these when you arrive)  CPAP machine or breathing device, if you use them  A few personal items, if spending the night  If you have . . .  A pacemaker, ICD (cardiac defibrillator), or other implant: Bring the ID card.  An implanted stimulator: Bring the remote control.  A legal guardian: Bring a copy of the certified (court-stamped) guardianship papers.  Please remove any jewelry, including body piercings. Leave jewelry and other valuables at home.  If you're going home the day of surgery  You must have a responsible adult drive you home. They should stay with you overnight as well.  If you don't have someone to stay with you, and you aren't safe to go home alone, we may keep you overnight. Insurance often won't pay for this.  After surgery  If it's hard to control your pain or you need more pain medicine, please call your surgeon's office.  Questions?   If you have any questions for your care team, list them here:   ____________________________________________________________________________________________________________________________________________________________________________________________________________________________________________________________  For informational purposes only. Not to replace the advice of your health care provider. Copyright   2003, 2019 [facility]. All rights reserved. Clinically reviewed by Cody Alonso MD. [employer] [id number] - REV 08/24.

## 2024-10-22 NOTE — PROGRESS NOTES
Preoperative Evaluation  [facility] [place]  [address]  [place] [address]  Phone: [phone]  Primary Provider: Ame Dobson MD  Pre-op Performing Provider: Ame Dobson MD  Oct 22, 2024           10/17/2024   Surgical Information   What procedure is being done? Endoscopy    Facility or Hospital where procedure/surgery will be performed: [facility]    Who is doing the procedure / surgery? Stanton Sharif, DO    Date of surgery / procedure: November 13    Time of surgery / procedure: 10:15 am    Where do you plan to recover after surgery? at home with family        Patient-reported     Fax number for surgical facility: [phone]    Assessment & Plan     The proposed surgical procedure is considered LOW risk.    Hilda was seen today for pre-op exam.    Diagnoses and all orders for this visit:    Preop general physical exam    Dysphagia, unspecified type, worsening       -      Recent episode of food (steak) getting stuck that prompted an ED visit.    Hypertension goal BP (blood pressure) < 140/90  -     hydrochlorothiazide 12.5 MG tablet; Take 1 tablet (12.5 mg) by mouth daily.  -     Hold Hydrochlorothiazide the morning of surgery. Will consider        - No identified additional risk factors other than previously addressed    Preoperative Medication Instructions  Antiplatelet or Anticoagulation Medication Instructions   - Patient is on no antiplatelet or anticoagulation medications.    Additional Medication Instructions  Take all scheduled medications on the day of surgery EXCEPT for modifications listed below:  Hydrochlorothiazide the morning of procedure.   Ok to take Levothyroxine the morning of surgery with small sips of water.     Recommendation  Approval given to proceed with proposed procedure, without further diagnostic evaluation.        Neptali Stevens is a 89 year old, presenting for the following:  Pre-Op Exam          10/22/2024    10:16 AM    Additional Questions   Roomed by Emerita   Accompanied by Self     HPI related to upcoming procedure:       Shubham is a pleasant 89 year old female patient of mine here for a Pre-op. .    She has a known history of dysphagia secondary to a Schatzki's ring that was dilated on her EGD on 9/1/2021- this improved her symptoms significantly.  Reports worsening symptoms of heartburn, regurgitation with an episode of steak getting stuck on 8/24/2024 prompting an ED visit. This resolved without endoscopic intervention.  Currently taking Pepcid 20 mg at bedtime.  Was seen and evaluated by [facility]-recommended the repeat EGD to evaluate for stricture or narrowing that is causing her narrowing.  If a narrowing is found,  plan will be to stretch it open at the same time as the procedure.  Patient states that she understands the risks and benefits of the procedure and wishes to proceed.    Denies having any new concerns today.       10/17/2024   Pre-Op Questionnaire   Have you ever had a heart attack or stroke? No    Have you ever had surgery on your heart or blood vessels, such as a stent placement, a coronary artery bypass, or surgery on an artery in your head, neck, heart, or legs? No    Do you have chest pain with activity? No    Do you have a history of heart failure? No    Do you currently have a cold, bronchitis or symptoms of other infection? No    Do you have a cough, shortness of breath, or wheezing? No    Do you or anyone in your family have previous history of blood clots? No    Do you or does anyone in your family have a serious bleeding problem such as prolonged bleeding following surgeries or cuts? No    Have you ever had problems with anemia or been told to take iron pills? No    Have you had any abnormal blood loss such as black, tarry or bloody stools, or abnormal vaginal bleeding? No    Have you ever had a blood transfusion? No    Are you willing to have a blood transfusion if it is medically needed before,  during, or after your surgery? Yes    Have you or any of your relatives ever had problems with anesthesia? No    Do you have sleep apnea, excessive snoring or daytime drowsiness? No    Do you have any artifical heart valves or other implanted medical devices like a pacemaker, defibrillator, or continuous glucose monitor? No    Do you have artificial joints? No    Are you allergic to latex? (!) YES        Patient-reported     Health Care Directive  Patient has a Health Care Directive on file      Preoperative Review of    reviewed - no record of controlled substances prescribed.      Status of Chronic Conditions:  See problem list for active medical problems.  Problems all longstanding and stable, except as noted/documented.  See ROS for pertinent symptoms related to these conditions.    Patient Active Problem List    Diagnosis Date Noted    Primary open angle glaucoma (POAG) of both eyes, mild stage 11/25/2023     Priority: Medium    Posterior vitreous detachment of both eyes 04/01/2022     Priority: Medium    Rhinorrhea 03/21/2022     Priority: Medium    Migraine without status migrainosus, not intractable, unspecified migraine type 09/10/2018     Priority: Medium    Combined forms of age-related cataract, mild-mod, both eyes 11/30/2017     Priority: Medium    Essential hypertension with goal blood pressure less than 140/90 10/03/2017     Priority: Medium    Hypothyroidism, unspecified type 09/16/2016     Priority: Medium    Dupuytren's contracture of right hand 11/09/2015     Priority: Medium    Lung nodule < 6cm on CT 05/15/2015     Priority: Medium    Compression fracture of L1 lumbar vertebra, seen on CT 04/01/2015     Priority: Medium    Premature atrial beats 10/29/2014     Priority: Medium    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) 08/07/2012     Priority: Medium    Migraine 06/13/2012     Priority: Medium    History of blepharoplasty, upper lids, ou 08/08/2011     Priority: Medium    GERD  (gastroesophageal reflux disease) 06/02/2011     Priority: Medium    CARDIOVASCULAR SCREENING; LDL GOAL LESS THAN 130 05/26/2011     Priority: Medium    Osteoporosis 10/01/2009     Priority: Medium    AR (allergic rhinitis)      Priority: Medium    Atrophic vaginitis      Priority: Medium      Past Medical History:   Diagnosis Date    AR (allergic rhinitis)     Atrophic vaginitis     Cancer (H)     Chronic kidney disease, stage 3 (H) 07/14/2021    Compression fracture of L1 lumbar vertebra (H) 04/2015    Glaucoma (increased eye pressure)     HTN (hypertension)     Migraine     Nonsenile cataract     Osteoporosis     Reflux esophagitis     Thyroid disease      Past Surgical History:   Procedure Laterality Date    ABDOMEN SURGERY      APPENDECTOMY OPEN  1954    BLEPHAROPLASTY BILATERAL  10/2007    both eyes, upper lids    CHOLECYSTECTOMY, OPEN  1992    COLONOSCOPY  6/02, 10/13    Q 5 years, polyps    D & C      REPAIR PTOSIS      SALPINGO OOPHORECTOMY,R/L/LUIS DANIEL      left ovary and tube    SMALL BOWEL RESECTION  1986    colon resection     TONSILLECTOMY & ADENOIDECTOMY      childhood    TUBAL LIGATION      UPPER GI ENDOSCOPY  6/02, 8/11    ZZC LENGTHEN,TENDON,HAND/FINGER  1993    repair of dupytrons's contracture     Current Outpatient Medications   Medication Sig Dispense Refill    Aspirin-Acetaminophen-Caffeine (EXCEDRIN MIGRAINE PO) Take 1 tablet by mouth as needed      calcium carbonate-vitamin D (OS-FIDENCIO) 600-400 MG-UNIT chewable tablet Take 1 chew tab by mouth 2 times daily      carboxymethylcellulose (REFRESH PLUS) 0.5 % SOLN Place 1 drop into both eyes 3 times daily as needed for dry eyes      estradiol (ESTRACE) 0.1 MG/GM vaginal cream Place 1 g vaginally daily 1g TID for 6/2024. 1g BID for 7/2024. 1g daily for 8/2024 till follow up 42.5 g 2    famotidine (PEPCID) 20 MG tablet Take 1 tablet by mouth daily.      hydrochlorothiazide 12.5 MG tablet Take 1 tablet (12.5 mg) by mouth daily. 90 tablet 3    latanoprost  (XALATAN) 0.005 % ophthalmic solution Place 1 drop into both eyes at bedtime 7.5 mL 3    levothyroxine (SYNTHROID/LEVOTHROID) 50 MCG tablet Take 1 tablet (50 mcg) by mouth daily 90 tablet 3    MELATONIN PO Take 3 mg by mouth nightly as needed       Multiple Vitamin (MULTI-VITAMIN) per tablet Take 1 tablet by mouth daily      timolol maleate (TIMOPTIC) 0.5 % ophthalmic solution Place 1 drop into both eyes 2 times daily Wait at least 5 minutes between drops if using more than one at a time. 20 mL 4       Allergies   Allergen Reactions    Diatrizoate Other (See Comments)    Zoster Vaccine Live Anaphylaxis    Bisphosphonates GI Disturbance     GI distress    Ivp Dye [Contrast Dye] Swelling    Lisinopril Cough    Losartan Hives     But can tolerate Hyzaar.     Morphine Hives    Morphine Sulfate Nausea and Vomiting    Omeprazole Hives    Other Food Allergy      Apples, peaches, pears, cherries, apricots    Tree Nuts [Nuts]      Almonds, pecans    Latex Rash        Social History     Tobacco Use    Smoking status: Never     Passive exposure: Never    Smokeless tobacco: Never   Substance Use Topics    Alcohol use: Not Currently     Comment: wine occasionally     Family History   Problem Relation Age of Onset    Hypertension Mother     Arthritis Mother     Cardiovascular Mother     Retinal detachment Mother     Migraines Mother     C.A.D. Father     Hypertension Father         Father.  Passed away.    Cerebrovascular Disease Father     Arthritis Father     Cardiovascular Father     Eye Disorder Father     Heart Disease Father     Glaucoma Father     Parkinsonism Father     Cardiovascular Brother     Glaucoma Brother     Peripheral Neuropathy Brother     Macular Degeneration No family hx of     Bleeding Disorder No family hx of     Anesthesia Reaction No family hx of      History   Drug Use No             Review of Systems  Constitutional, HEENT, cardiovascular, pulmonary, gi and gu systems are negative, except as otherwise  "noted.    Objective    /60   Pulse 72   Temp 97.3  F (36.3  C) (Temporal)   Resp 16   Ht 1.604 m (5' 3.15\")   Wt 64.5 kg (142 lb 3.2 oz)   LMP 10/01/1986   SpO2 97%   BMI 25.07 kg/m     Estimated body mass index is 25.07 kg/m  as calculated from the following:    Height as of this encounter: 1.604 m (5' 3.15\").    Weight as of this encounter: 64.5 kg (142 lb 3.2 oz).  Physical Exam  GENERAL: alert and no distress  EYES: Eyes grossly normal to inspection, PERRL and conjunctivae and sclerae normal  HENT: ear canals and TM's normal, nose and mouth without ulcers or lesions  NECK: no adenopathy, no asymmetry, masses, or scars  RESP: lungs clear to auscultation - no rales, rhonchi or wheezes  CV: regular rate and rhythm, normal S1 S2, no S3 or S4, no murmur, click or rub, no peripheral edema  ABDOMEN: soft, nontender, no hepatosplenomegaly, no masses and bowel sounds normal  MS: no gross musculoskeletal defects noted, no edema  SKIN: no suspicious lesions or rashes  NEURO: Normal strength and tone, mentation intact and speech normal  PSYCH: mentation appears normal, affect normal/bright    Recent Labs   Lab Test 09/13/24  1030 03/08/24  1056   HGB 12.7 13.2    188    140   POTASSIUM 3.9 3.7   CR 0.93 0.85        Diagnostics  No labs were ordered during this visit.   No EKG required for low risk surgery (cataract, skin procedure, breast biopsy, etc).    Revised Cardiac Risk Index (RCRI)  The patient has the following serious cardiovascular risks for perioperative complications:   - No serious cardiac risks = 0 points     RCRI Interpretation: 0 points: Class I (very low risk - 0.4% complication rate)         Signed Electronically by: Ame Dobson MD  A copy of this evaluation report is provided to the requesting physician.      "

## 2024-11-01 ENCOUNTER — OFFICE VISIT (OUTPATIENT)
Dept: OPHTHALMOLOGY | Facility: CLINIC | Age: 89
End: 2024-11-01
Payer: MEDICARE

## 2024-11-01 DIAGNOSIS — H40.1131 PRIMARY OPEN ANGLE GLAUCOMA (POAG) OF BOTH EYES, MILD STAGE: Primary | ICD-10-CM

## 2024-11-01 PROCEDURE — 92012 INTRM OPH EXAM EST PATIENT: CPT | Performed by: OPHTHALMOLOGY

## 2024-11-01 PROCEDURE — 92133 CPTRZD OPH DX IMG PST SGM ON: CPT | Performed by: OPHTHALMOLOGY

## 2024-11-01 PROCEDURE — 92083 EXTENDED VISUAL FIELD XM: CPT | Performed by: OPHTHALMOLOGY

## 2024-11-01 RX ORDER — TIMOLOL MALEATE 5 MG/ML
1 SOLUTION/ DROPS OPHTHALMIC 2 TIMES DAILY
Qty: 20 ML | Refills: 4 | Status: SHIPPED | OUTPATIENT
Start: 2024-11-01

## 2024-11-01 ASSESSMENT — VISUAL ACUITY
OS_CC+: -2
METHOD: SNELLEN - LINEAR
OD_CC+: -2
OD_CC: 20/25
OS_CC: 20/25

## 2024-11-01 ASSESSMENT — EXTERNAL EXAM - RIGHT EYE: OD_EXAM: NORMAL

## 2024-11-01 ASSESSMENT — TONOMETRY
OS_IOP_MMHG: 19
IOP_METHOD: APPLANATION
OD_IOP_MMHG: 19

## 2024-11-01 ASSESSMENT — EXTERNAL EXAM - LEFT EYE: OS_EXAM: NORMAL

## 2024-11-01 NOTE — PATIENT INSTRUCTIONS
"Continue:   Latanoprost (green top) every evening both eyes.  Timolol (yellow top) twice daily in both eyes.    May use artificial tears up to four times a day (like Refresh Optive, Systane Balance, or TheraTears. Avoid \"get the red out\" drops and generic artifical tears).     Wait at least 5 minutes between drops if using more than one at a time.     Return visit in 6 months for a complete exam.     Andi Bay M.D.  [phone]    "

## 2024-11-01 NOTE — LETTER
"11/1/2024      Hilda Potter  [address]  [facility] [address]      Dear Colleague,    Thank you for referring your patient, Hilda Potter, to the [facility]. Please see a copy of my visit note below.     Current Eye Medications:  Latanoprost (green top) every evening both eyes last took at 10 pm.  Timolol (yellow top) twice daily in both eyes, last took at 8 am.   Refresh daily both eyes as needed      Subjective:  6 month follow up for Glaucoma Testing (OCT/ HVF/ IOP). Vision is doing OK both eyes distance and near. Can get migraine next day if reading to much. No eye pain or discomfort in either eye.      Objective:  See Ophthalmology Exam.       Assessment:  Stable intraocular pressures, glaucoma OCT, and Enciso Visual Field both eyes in patient with mild open angle glaucoma.      Plan:  Continue:   Latanoprost (green top) every evening both eyes.  Timolol (yellow top) twice daily in both eyes.    May use artificial tears up to four times a day (like Refresh Optive, Systane Balance, or TheraTears. Avoid \"get the red out\" drops and generic artifical tears).     Wait at least 5 minutes between drops if using more than one at a time.     Return visit in 6 months for a complete exam.     Andi Bay M.D.  [phone]         Again, thank you for allowing me to participate in the care of your patient.        Sincerely,        Andi Bay MD  "

## 2024-11-01 NOTE — PROGRESS NOTES
" Current Eye Medications:  Latanoprost (green top) every evening both eyes last took at 10 pm.  Timolol (yellow top) twice daily in both eyes, last took at 8 am.   Refresh daily both eyes as needed      Subjective:  6 month follow up for Glaucoma Testing (OCT/ HVF/ IOP). Vision is doing OK both eyes distance and near. Can get migraine next day if reading to much. No eye pain or discomfort in either eye.      Objective:  See Ophthalmology Exam.       Assessment:  Stable intraocular pressures, glaucoma OCT, and Enciso Visual Field both eyes in patient with mild open angle glaucoma.      Plan:  Continue:   Latanoprost (green top) every evening both eyes.  Timolol (yellow top) twice daily in both eyes.    May use artificial tears up to four times a day (like Refresh Optive, Systane Balance, or TheraTears. Avoid \"get the red out\" drops and generic artifical tears).     Wait at least 5 minutes between drops if using more than one at a time.     Return visit in 6 months for a complete exam.     Andi Bay M.D.  [phone]         "

## 2024-11-02 ASSESSMENT — PACHYMETRY
OD_CT(UM): 567
OS_CT(UM): 567

## 2024-11-09 ASSESSMENT — ANXIETY QUESTIONNAIRES: GAD7 TOTAL SCORE: 8

## 2024-11-11 ENCOUNTER — OFFICE VISIT (OUTPATIENT)
Dept: OBGYN | Facility: CLINIC | Age: 89
End: 2024-11-11
Payer: MEDICARE

## 2024-11-11 VITALS
WEIGHT: 145 LBS | HEART RATE: 60 BPM | SYSTOLIC BLOOD PRESSURE: 136 MMHG | HEIGHT: 63 IN | RESPIRATION RATE: 18 BRPM | DIASTOLIC BLOOD PRESSURE: 75 MMHG | TEMPERATURE: 97.3 F | BODY MASS INDEX: 25.69 KG/M2

## 2024-11-11 DIAGNOSIS — N95.2 VAGINAL ATROPHY: Primary | ICD-10-CM

## 2024-11-11 PROCEDURE — 99213 OFFICE O/P EST LOW 20 MIN: CPT | Performed by: STUDENT IN AN ORGANIZED HEALTH CARE EDUCATION/TRAINING PROGRAM

## 2024-11-11 NOTE — PROGRESS NOTES
"[facility]  Gynecology Office Note    Assessment and Plan:   Hilda Potter, 89 year old  with follicular lymphoma on weekly Rituximab, presents for vulvar atrophy follow up.     Recommended continuing vaginal estrogen cream to 1g once per week. Can send in more vaginal estrogen cream refills as needed. Follow up in 2025.    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      She first noticed vulvar itching, soreness, and redness in summer . She saw PCP and tried topical clobetasol taper regimen (from daily, twice per week, weekly, to every other week) for empiric lichen sclerosus treatment.  No biopsy was ever done. The clobetasol did not give her any symptom relief.  She has stopped using the clobetasol.       She saw me on 2024. Vulvar biopsy was taken, which returned with \"mild epidermal hyperplasia and perivascular inflammation\".  She has been using vaginal estrogen cream 1g now once per week with vaseline, which has been helping her symptoms. She is having minimal vulvar symptoms with rare right sided vaginal opening itching that's not too bothersome.    Physical Exam:   Vitals:    24 1025   BP: 136/75   BP Location: Left arm   Patient Position: Chair   Cuff Size: Adult Regular   Pulse: 60   Resp: 18   Temp: 97.3  F (36.3  C)   TempSrc: Tympanic   Weight: 65.8 kg (145 lb)   Height: 1.6 m (5' 3\")      Estimated body mass index is 25.69 kg/m  as calculated from the following:    Height as of this encounter: 1.6 m (5' 3\").    Weight as of this encounter: 65.8 kg (145 lb).    General appearance: well-hydrated, A&O x 3, no apparent distress  Lungs: Equal " expansion bilaterally, no accessory muscle use  Heart: No heaves or thrills.   Constitutional: See vitals  Genitourinary:  External genitalia: no erythema, no lesions.   Urethral meatus appropriate location without lesions or prolapse  Urethra: No masses, tenderness, or scarring  Bladder no fullness, masses, or tenderness.  Anus and Perineum: Unremarkable, no visible lesions  Vagina: Normal, healthy pink mucosa without any lesions. Physiologic vaginal discharge.   Cervix: normal appearance

## 2024-11-11 NOTE — NURSING NOTE
"Initial /75 (BP Location: Left arm, Patient Position: Chair, Cuff Size: Adult Regular)   Pulse 60   Temp 97.3  F (36.3  C) (Tympanic)   Resp 18   Ht 1.6 m (5' 3\")   Wt 65.8 kg (145 lb)   LMP 10/01/1986   BMI 25.69 kg/m   Estimated body mass index is 25.69 kg/m  as calculated from the following:    Height as of this encounter: 1.6 m (5' 3\").    Weight as of this encounter: 65.8 kg (145 lb). .      "

## 2024-11-18 ENCOUNTER — NURSE TRIAGE (OUTPATIENT)
Dept: NURSING | Facility: CLINIC | Age: 89
End: 2024-11-18
Payer: MEDICARE

## 2024-11-18 DIAGNOSIS — R11.0 NAUSEA: Primary | ICD-10-CM

## 2024-11-18 RX ORDER — ONDANSETRON 4 MG/1
4 TABLET, ORALLY DISINTEGRATING ORAL EVERY 8 HOURS PRN
Qty: 20 TABLET | Refills: 0 | Status: SHIPPED | OUTPATIENT
Start: 2024-11-18

## 2024-11-19 DIAGNOSIS — G43.909 ACUTE MIGRAINE: Primary | ICD-10-CM

## 2024-11-19 RX ORDER — SUMATRIPTAN 50 MG/1
50 TABLET, FILM COATED ORAL
Qty: 9 TABLET | Refills: 0 | Status: SHIPPED | OUTPATIENT
Start: 2024-11-19

## 2024-11-19 NOTE — TELEPHONE ENCOUNTER
Nurse Triage SBAR    Is this a 2nd Level Triage? YES, LICENSED PRACTITIONER REVIEW IS REQUIRED    Situation: migraine, acid reflux, muscle jerking    Background: Patient calling to report migraine headache today with auras at 12, 2, and 5 PM.  Notes that she has a history of migraine and typically takes Excedrin Migraine, however, she used to take sumatriptan but doesn't have any anymore. She reports a 5 minute episode of muscle jerking while lying in bed that was in her arms and legs.  Notes that she has had this on other occasions and it doesn't seem concerning to her. She believes the jerking to be caused by the caffeine in the Excedrin Migraine as she does not normally have any caffeine.  Rates the pain of her headache a 5/10.  Notes that she has been very nauseous and is experiencing acid reflux that began at 0130.  Reports having an endoscopy with 3 biopsies on 11/13. Notes that she took famotidine for the acid reflux around 330 pm today with some relief. Has eaten very little today, including crackers early this AM and some soup around noon.  She is able to drink water and is urinating.  Notes that she is concerned about vomiting with the recent biopsies and their risk for bleeding. Denies fever.       Assessment: 5 minute episode of muscle jerking, headache 5/10, nausea 5/10, acid reflux    Protocol Recommended Disposition:   GO TO ED NOW (OR PCP TRIAGE)    Recommendation: Advised patient to do home care and  prescription, if needed.  Reviewed concerning symptoms and when to call back.     Paged to provider    Provider consult indicated.     Reason for page: muscle jerking    Page sent to Dr. Thorpe by RN at 1822.   No call back at 1843. Call to answering service to have Dr. Thorpe cell called at [phone]. Connected via cell at 1848.      Provider recommended plan of care: Offer prescription for ondansetron for nausea.  Recommend ED if patient is concerned about muscle jerking and if not, it is safe to stay  home. Advised that PEPCID is safe to take following endoscopy.  Follow up with PCP as needed.     Provider Recommendation Follow Up:   Reached patient/caregiver. Informed of provider's recommendations. Patient verbalized understanding and agrees with the plan.   [id number]}  Does the patient meet one of the following criteria for ADS visit consideration? 16+ years old, with an [facility] PCP       Juany Weinberg RN on 11/18/2024 at 6:35 PM      Reason for Disposition   [1] New-onset muscle jerks AND [2] episode lasts > 1 minute AND [3] resolved    Additional Information   Negative: Difficult to awaken or acting confused (e.g., disoriented, slurred speech)   Negative: [1] Weakness of the face, arm or leg on one side of the body AND [2] new-onset   Negative: [1] Numbness of the face, arm or leg on one side of the body AND [2] new-onset   Negative: [1] Loss of speech or garbled speech AND [2] new-onset   Negative: Passed out (i.e., lost consciousness, collapsed and was not responding)   Negative: Sounds like a life-threatening emergency to the triager   Negative: Difficult to awaken or acting confused (e.g., disoriented, slurred speech)   Negative: Sounds like a life-threatening emergency to the triager   Negative: [1] Muscle rigidity or tightness AND [2] present now   Negative: [1] New-onset muscle jerks (twitches, spasms) AND [2] present now    Protocols used: Headache-A-AH, Muscle Jerks - Tics - [id number]-L-XD

## 2024-11-19 NOTE — TELEPHONE ENCOUNTER
Called and advised patient of the orders below per PCP.  Patient stated understanding and agreeable with the plan of care.     Blanquita CASTRO RN  Triage Nurse  [facility]

## 2024-11-19 NOTE — TELEPHONE ENCOUNTER
Pt's  walked into clinic asking for pcp advice, RN was informed by  pt was not present at  for face to face triage if needed.     Pt requesting sumatriptan for migraine and declined ED and OV visit with a provider.     Epic secure chat sent to pcp to advise    Linda Galan RN on 11/19/2024 at 11:00 AM

## 2024-11-19 NOTE — TELEPHONE ENCOUNTER
Routing message to provider.    Patient calling back to state she still has migraine that is not relieved with Excedrin and cannot take anymore or she will exceed daily dose.    She did sleep well over night but woke up with migraine.    Muscle twitch. nausea and aura are now gone , just has headache.    Asking for RX for sumatriptan as she had in past and it helped.    ER was recommended but patient again declined asking for PCP to weigh in and send RX if ok.  On call provider was contacted last evening and also recommended follow-up with PCP. Offered virtual appointment with a different provider but she also declined.    Pharmacy pended if agreeable.    Please advise.    Christine M Klisch, RN

## 2024-12-09 ENCOUNTER — OFFICE VISIT (OUTPATIENT)
Dept: FAMILY MEDICINE | Facility: CLINIC | Age: 89
End: 2024-12-09
Payer: MEDICARE

## 2024-12-09 VITALS
OXYGEN SATURATION: 97 % | WEIGHT: 140.4 LBS | SYSTOLIC BLOOD PRESSURE: 126 MMHG | RESPIRATION RATE: 16 BRPM | HEART RATE: 74 BPM | TEMPERATURE: 97.9 F | DIASTOLIC BLOOD PRESSURE: 60 MMHG | HEIGHT: 63 IN | BODY MASS INDEX: 24.88 KG/M2

## 2024-12-09 DIAGNOSIS — N28.9 RENAL INSUFFICIENCY: ICD-10-CM

## 2024-12-09 DIAGNOSIS — R13.10 DYSPHAGIA, UNSPECIFIED TYPE: Primary | ICD-10-CM

## 2024-12-09 DIAGNOSIS — Z98.890 STATUS POST DILATATION OF ESOPHAGEAL STRICTURE: ICD-10-CM

## 2024-12-09 DIAGNOSIS — Z87.19 STATUS POST DILATATION OF ESOPHAGEAL STRICTURE: ICD-10-CM

## 2024-12-09 DIAGNOSIS — G43.909 MIGRAINE WITHOUT STATUS MIGRAINOSUS, NOT INTRACTABLE, UNSPECIFIED MIGRAINE TYPE: ICD-10-CM

## 2024-12-09 DIAGNOSIS — R09.82 POSTNASAL DRIP: ICD-10-CM

## 2024-12-09 PROCEDURE — 36415 COLL VENOUS BLD VENIPUNCTURE: CPT | Performed by: FAMILY MEDICINE

## 2024-12-09 PROCEDURE — 82565 ASSAY OF CREATININE: CPT | Performed by: FAMILY MEDICINE

## 2024-12-09 PROCEDURE — 99214 OFFICE O/P EST MOD 30 MIN: CPT | Performed by: FAMILY MEDICINE

## 2024-12-09 RX ORDER — FLUTICASONE PROPIONATE 50 MCG
2 SPRAY, SUSPENSION (ML) NASAL DAILY
Qty: 48 G | Refills: 0 | Status: SHIPPED | OUTPATIENT
Start: 2024-12-09

## 2024-12-09 RX ORDER — SUMATRIPTAN 50 MG/1
50 TABLET, FILM COATED ORAL
Qty: 9 TABLET | Refills: 3 | Status: SHIPPED | OUTPATIENT
Start: 2024-12-09

## 2024-12-09 RX ORDER — FLUTICASONE PROPIONATE 50 MCG
2 SPRAY, SUSPENSION (ML) NASAL DAILY
Qty: 16 G | Refills: 0 | Status: SHIPPED | OUTPATIENT
Start: 2024-12-09 | End: 2024-12-09

## 2024-12-09 RX ORDER — FAMOTIDINE 10 MG
10 TABLET ORAL 2 TIMES DAILY
COMMUNITY
End: 2024-12-09

## 2024-12-09 NOTE — PROGRESS NOTES
Assessment & Plan     Hilda was seen today for results.    Diagnoses and all orders for this visit:    Dysphagia, unspecified type Status post dilatation of esophageal stricture      -       Doing well post. Pathology report findings were benign. No Helicobacter. No recurrent chocking episodes reported per patient    Postnasal drip  -     Trial: Futicasone (FLONASE) 50 MCG/ACT nasal spray; Spray 2 sprays into both nostrils daily.    Migraine without status migrainosus, not intractable, unspecified migraine type  -     Refill: SUMAtriptan (IMITREX) 50 MG tablet; Take 1 tablet (50 mg) by mouth at onset of headache for migraine. May repeat in 2 hours. Max 4 tablets/24 hours.  -     Encouraged to keep a headache diary     Renal insufficiency  -     Repeat kidney function labs: Creatinine        Return in about 6 months (around 6/9/2025) for Annual Wellness Visit and as needed.      Neptali Stevens is a 89 year old, presenting for the following health issues:  Results (Endoscopy though [facility] 11/13/24)      12/9/2024    10:06 AM   Additional Questions   Roomed by Emerita   Accompanied by Self     History of Present Illness       Reason for visit:  Find out results of endoscopy    She eats 2-3 servings of fruits and vegetables daily.She consumes 1 sweetened beverage(s) daily.She exercises with enough effort to increase her heart rate 20 to 29 minutes per day.    She is taking medications regularly.     Multiple Concerns  - Discuss recent Endoscopy results  - Postnasal drip.   - Migraine headaches.   - Kidney function tests.     Had an Upper Endoscopy   Noted to have a benign-appearing esophageal stenosis. Dilated with a 12-13.5-15 mm balloon dilator was performed to 15 mm.  Biopsies were taken with a cold forceps in the mid esophagus and in the   distal esophagus for histology.  The entire examined stomach was normal. Biopsies were taken with a cold forceps for histology.  The examined duodenum was  "reported normal.      Pathology report-  Normal gastric antral and body mucosae. Normal esophageal squamous mucosa    Patient reports feeling better. Denies having any recurrent chocking episodes      Additional Concern-    History of Migraine headaches with aura.   Reports that on average she has about 4-5 headaches/month.   Recently had a bad headache that was not relieved with OTC analgesics- Excedrin. Reached out to the clinic and was prescribed Imitrex. States that the migraine headache resolved.  Denies having any headaches at this time.       Patient wonders about something she can take for her post nasal drip symptoms, and about how long she needs to stay on Pepcid.     Concerned about a past diagnosis of renal insufficiency in the recent past. Would like to repeat kidney tests today.    Review of Systems  Constitutional, HEENT, cardiovascular, pulmonary, gi and gu systems are negative, except as otherwise noted.      Objective    /60   Pulse 74   Temp 97.9  F (36.6  C) (Temporal)   Resp 16   Ht 1.604 m (5' 3.15\")   Wt 63.7 kg (140 lb 6.4 oz)   LMP 10/01/1986   SpO2 97%   BMI 24.75 kg/m    Body mass index is 24.75 kg/m .  Physical Exam   GENERAL: alert and no distress  EYES: Eyes grossly normal to inspection, PERRL and conjunctivae and sclerae normal  HENT: ear canals and TM's normal, nose and mouth without ulcers or lesions  NECK: no adenopathy, no asymmetry, masses, or scars  RESP: lungs clear to auscultation - no rales, rhonchi or wheezes  CV: regular rate and rhythm, normal S1 S2, no S3 or S4, no murmur, click or rub, no peripheral edema  PSYCH: mentation appears normal, affect normal/bright    DATA  Recent labs and upper Endoscopy results reviewed with patient.        Signed Electronically by: Ame Dobson MD    "

## 2024-12-10 LAB
CREAT SERPL-MCNC: 0.86 MG/DL (ref 0.51–0.95)
EGFRCR SERPLBLD CKD-EPI 2021: 65 ML/MIN/1.73M2

## 2025-01-15 ENCOUNTER — TELEPHONE (OUTPATIENT)
Dept: FAMILY MEDICINE | Facility: CLINIC | Age: 89
End: 2025-01-15
Payer: MEDICARE

## 2025-01-15 DIAGNOSIS — R09.89 CHRONIC SINUS COMPLAINTS: Primary | ICD-10-CM

## 2025-01-15 NOTE — TELEPHONE ENCOUNTER
Noted.   Please check in with patient what persistent symptoms she is having at this time. Thanks

## 2025-01-15 NOTE — TELEPHONE ENCOUNTER
Patient Returning Call    Reason for call:  Patient is calling in regards to a prior conversation for an ENT referral - wanting to move forward at this time. Please call and advise.     Information relayed to patient:  Message sent     Patient has additional questions:  No    What are your questions/concerns:  Patient is calling in regards to a prior conversation for an ENT referral - wanting to move forward at this time. Please call and advise.     Could we send this information to you in [facility] or would you prefer to receive a phone call?:   Patient would prefer a phone call   Okay to leave a detailed message?: Yes at Home number on file [phone] (home)

## 2025-01-16 NOTE — TELEPHONE ENCOUNTER
Ame Dobson MD Physician9:50 AM     Edit     Delete     Copy     Noted.  ENT referral completed.   Can call # [phone] to schedule.

## 2025-01-16 NOTE — TELEPHONE ENCOUNTER
I see last office visit was 12/9/24.    Trouble swallowing and post-nasal drip are the possible ENT issues discussed that day.   Patient is not due to see PCP again until June of 2025.    What symptoms is she currently having for which she hopes to be referred to ENT?    Attempted to call patient at home/mobile number, no answer, left message on voicemail; patient was instructed to return call to [facility] at [phone].    Katie JEONG RN  [facility] Triage

## 2025-01-16 NOTE — TELEPHONE ENCOUNTER
Routing to PCP. Patient calling back.    She is having continued ear problems, sinus drainage, sore throat from the drainage. Patient said she discussed this with PCP at visit in December and was told to call back if sx persisting for an ENT referral    Symptoms have not worsened, but not improving at all. She said at last visit, pcp noted water behind her ear. She is still experiencing those ear symptoms and not feeling any improvement.     Uses the flonase at night. It does help while she is sleeping, but no improvement during the day.    She is not having any blood in her mucus. Occasionally her nose bleeds a mild amount - nasal passages feel more dry.     Diana Glez RN

## 2025-01-16 NOTE — TELEPHONE ENCOUNTER
I called and spoke to patient, advised her of referral placed and gave her phone number to call to schedule.    Patient verbalized understanding of and agreement with plan.    Katie JEONG RN  [facility] Triage

## 2025-02-06 ENCOUNTER — OFFICE VISIT (OUTPATIENT)
Dept: AUDIOLOGY | Facility: CLINIC | Age: 89
End: 2025-02-06
Payer: MEDICARE

## 2025-02-06 DIAGNOSIS — H90.3 SENSORINEURAL HEARING LOSS, BILATERAL: Primary | ICD-10-CM

## 2025-02-06 NOTE — PROGRESS NOTES
AUDIOLOGY REPORT    SUBJECTIVE:  Hilda Potter is a 89 year old female who was seen in the Audiology Clinic at the [facility] for audiologic evaluation, referred by  self, prior to ENT appointment tomorrow.  The patient reports a feeling of sinus congestion that seems to impact her ears with feeling of pressure at times.  She denies issues with her hearing. The patient denies  bilateral tinnitus, bilateral otalgia, bilateral drainage, family history of hearing loss, and history of noise exposure.  She is unaccompanied today.    OBJECTIVE:  Abuse Screening:  Do you feel unsafe at home or work/school? No  Do you feel threatened by someone? No  Does anyone try to keep you from having contact with others, or doing things outside of your home? No  Physical signs of abuse present? No     Fall Risk Screen:  1. Have you fallen two or more times in the past year? No  2. Have you fallen and had an injury in the past year? No      Otoscopic exam indicates ears are clear of cerumen bilaterally     Pure Tone Thresholds assessed using conventional audiometry with good  reliability from 250-8000 Hz bilaterally using insert earphones and circumaural headphones     RIGHT:  normal through 4 kHz sloping to moderate sensorineural hearing loss    LEFT:    normal threough 4 kHz sloping to moderate sensorineural hearing loss    Tympanogram:    RIGHT: normal eardrum mobility    LEFT:   normal eardrum mobility    Reflexes (reported by stimulus ear): could not maintain seal to evaluate reflexes.     Speech Reception Threshold:    RIGHT: 15 dB HL    LEFT:   15 dB HL  Word Recognition Score:     RIGHT: 100% at 55 dB HL using NU-6 recorded word list.    LEFT:   100% at 55 dB HL using NU-6 recorded word list.      ASSESSMENT:   No diagnosis found.    Bilateral sensorineural hearing loss.    Today s results were discussed with the patient in detail.     PLAN:  It is recommended that the patient return for hearing testing  if changes are noticed. .  Please call this clinic with questions regarding these results or recommendations.        Javier Gomez.   Doctor of Audiology  License #[id number]

## 2025-02-18 ENCOUNTER — ANCILLARY PROCEDURE (OUTPATIENT)
Dept: CT IMAGING | Facility: CLINIC | Age: 89
End: 2025-02-18
Attending: PHYSICIAN ASSISTANT
Payer: MEDICARE

## 2025-02-18 DIAGNOSIS — R09.82 POST-NASAL DRIP: ICD-10-CM

## 2025-02-18 PROCEDURE — 70486 CT MAXILLOFACIAL W/O DYE: CPT | Mod: TC | Performed by: RADIOLOGY

## 2025-03-10 NOTE — PROGRESS NOTES
Oncology Follow Up Visit: March 11, 2025     Oncologist: Dr Bang Saunders  PCP: Ame Dobson    Diagnosis: Grade IV follicular lymphoma  Hilda Potter is an 87 yo female 6 month follow-up lymphoma  11/2011- presented with night sweats and lymphadenopathy. Negative infectious work-up.   4/2012-CT demonstrated multiple borderline mildly enlarged retroperitoneal and mesenteric lymph nodes, with the largest size of 1.5 cm. CT guided biopsy of intraabdominal lymph node c/w low grade follicular lymphoma. Flow cytometry showed kappa monotypic CD10 positive B cells. BMBx demonstrated 0.2% follicular lymphoma by flow cytometry.   Treatment:   9/2012 Completed weekly Rituxan x 4, premed solumedrol  [id number] Observation   9/2023 CT CAP for abdominal pain and fatigue- finding abdominal adenopathy   10/2023 Recommended colonoscopy, then Rituxan x 4  12/2023 Rituximab weekly x4; mixed stricture of clinical improvement but radiographically stable disease.    Interval History: Ms. Potter comes alone to clinic for follow up to her follicular lymphoma. Pt states she has been healthy and has no new  illness or complaints - denies  weight loss, night sweats, fevers, or new masses. She tries to stay active with short walks.   Reports she does have some varicose veins to back of right leg the are occasionally noted with some pain.    Rest of comprehensive and complete ROS is reviewed and is negative.   Past Medical History:   Diagnosis Date    AR (allergic rhinitis)     Atrophic vaginitis     Cancer (H)     Chronic kidney disease, stage 3 (H) 07/14/2021    Compression fracture of L1 lumbar vertebra (H) 04/2015    Glaucoma (increased eye pressure)     HTN (hypertension)     Migraine     Nonsenile cataract     Osteoporosis     Reflux esophagitis     Thyroid disease      Current Outpatient Medications   Medication Sig Dispense Refill    Aspirin-Acetaminophen-Caffeine (EXCEDRIN MIGRAINE PO) Take 1 tablet by mouth as needed       calcium carbonate-vitamin D (OS-FIDENCIO) 600-400 MG-UNIT chewable tablet Take 1 chew tab by mouth 2 times daily      carboxymethylcellulose (REFRESH PLUS) 0.5 % SOLN Place 1 drop into both eyes 3 times daily as needed for dry eyes      estradiol (ESTRACE) 0.1 MG/GM vaginal cream Place 1 g vaginally daily 1g TID for 6/2024. 1g BID for 7/2024. 1g daily for 8/2024 till follow up 42.5 g 2    latanoprost (XALATAN) 0.005 % ophthalmic solution Place 1 drop into both eyes at bedtime 7.5 mL 3    levothyroxine (SYNTHROID/LEVOTHROID) 50 MCG tablet Take 1 tablet (50 mcg) by mouth daily 90 tablet 3    MELATONIN PO Take 3 mg by mouth nightly as needed       Multiple Vitamin (MULTI-VITAMIN) per tablet Take 1 tablet by mouth daily      SUMAtriptan (IMITREX) 50 MG tablet Take 1 tablet (50 mg) by mouth at onset of headache for migraine. May repeat in 2 hours. Max 4 tablets/24 hours. 9 tablet 3    timolol maleate (TIMOPTIC) 0.5 % ophthalmic solution Place 1 drop into both eyes 2 times daily. Wait at least 5 minutes between drops if using more than one at a time. 20 mL 4    triamcinolone (KENALOG) 0.025 % cream Apply a fingertip amount of cream to the opening of the itchy ear canal(s) one to two times a week a night as needed 15 g 1    hydrochlorothiazide 12.5 MG tablet Take 1 tablet (12.5 mg) by mouth daily. (Patient not taking: Reported on 3/11/2025) 90 tablet 3     No current facility-administered medications for this visit.     Allergies   Allergen Reactions    Diatrizoate Other (See Comments)    Zoster Vaccine Live Anaphylaxis    Bisphosphonates GI Disturbance     GI distress    Ivp Dye [Contrast Dye] Swelling    Lisinopril Cough    Losartan Hives     But can tolerate Hyzaar.     Morphine Hives    Morphine Sulfate Nausea and Vomiting    Omeprazole Hives    Other Food Allergy      Apples, peaches, pears, cherries, apricots    Tree Nuts [Nuts]      Almonds, pecans    Latex Rash       Physical Exam:BP (!) 153/67 (BP Location: Left arm)   " Pulse 62   Ht 1.604 m (5' 3.15\")   Wt 64.7 kg (142 lb 11.2 oz)   LMP 10/01/1986   SpO2 98%   BMI 25.16 kg/m     ECOG PS- 0  Constitutional: Alert, cooperative, and in no distress.   ENT: Eyes bright, No mouth sores  Neck: Supple, No adenopathy.  Cardiac: Heart rate and rhythm is regular and strong without murmur  Respiratory: Breathing easy. Lung sounds clear to auscultation  GI: Abdomen is soft, non-tender,. BS normal. No masses or organomegaly  MS: Muscle tone normal, extremities normal with no edema. Moving well for age- no assist needed to exam table.  Back o f right leg with ropy varicose veins but no open wounds.   Skin:no suspicious lesions or open wounds.   Neuro: Sensory grossly WNL, gait normal.   Lymph: Normal ant/post cervical, axillary, supraclavicular nodes  Psych: Mentation appears normal and affect normal/bright with good conversation    Laboratory/Imaging Results:   Results for orders placed or performed in visit on 03/11/25   Comprehensive metabolic panel     Status: Abnormal   Result Value Ref Range    Sodium 141 135 - 145 mmol/L    Potassium 4.7 3.4 - 5.3 mmol/L    Carbon Dioxide (CO2) 30 (H) 22 - 29 mmol/L    Anion Gap 8 7 - 15 mmol/L    Urea Nitrogen 21.7 8.0 - 23.0 mg/dL    Creatinine 0.93 0.51 - 0.95 mg/dL    GFR Estimate 59 (L) >60 mL/min/1.73m2    Calcium 9.9 8.8 - 10.4 mg/dL    Chloride 103 98 - 107 mmol/L    Glucose 111 (H) 70 - 99 mg/dL    Alkaline Phosphatase 73 40 - 150 U/L    AST 20 0 - 45 U/L    ALT 19 0 - 50 U/L    Protein Total 7.0 6.4 - 8.3 g/dL    Albumin 4.2 3.5 - 5.2 g/dL    Bilirubin Total 0.7 <=1.2 mg/dL   Lactate Dehydrogenase     Status: Normal   Result Value Ref Range    Lactate Dehydrogenase 185 0 - 250 U/L   Extra Tube     Status: None (In process)    Narrative    The following orders were created for panel order Extra Tube.  Procedure                               Abnormality         Status                     ---------                               -----------   "       ------                     Extra Serum Separator T...[[id number]]                      In process                   Please view results for these tests on the individual orders.   CBC with platelets and differential     Status: None   Result Value Ref Range    WBC Count 4.8 4.0 - 11.0 10e3/uL    RBC Count 4.36 3.80 - 5.20 10e6/uL    Hemoglobin 12.8 11.7 - 15.7 g/dL    Hematocrit 38.7 35.0 - 47.0 %    MCV 89 78 - 100 fL    MCH 29.4 26.5 - 33.0 pg    MCHC 33.1 31.5 - 36.5 g/dL    RDW 13.1 10.0 - 15.0 %    Platelet Count 164 150 - 450 10e3/uL    % Neutrophils 67 %    % Lymphocytes 20 %    % Monocytes 10 %    % Eosinophils 2 %    % Basophils 0 %    % Immature Granulocytes 0 %    NRBCs per 100 WBC 0 <1 /100    Absolute Neutrophils 3.2 1.6 - 8.3 10e3/uL    Absolute Lymphocytes 1.0 0.8 - 5.3 10e3/uL    Absolute Monocytes 0.5 0.0 - 1.3 10e3/uL    Absolute Eosinophils 0.1 0.0 - 0.7 10e3/uL    Absolute Basophils 0.0 0.0 - 0.2 10e3/uL    Absolute Immature Granulocytes 0.0 <=0.4 10e3/uL    Absolute NRBCs 0.0 10e3/uL   CBC with Platelets & Differential     Status: None    Narrative    The following orders were created for panel order CBC with Platelets & Differential.  Procedure                               Abnormality         Status                     ---------                               -----------         ------                     CBC with platelets and ...[[id number]]                      Final result                 Please view results for these tests on the individual orders.       Assessment and Plan:   Follicular lymphoma- clinically and radiographically CECILIA.  - no sign of recurrence or unusual finds today with review and labs and exam. Does have mild CKD stage 3a and questions by pt were answered about this.  - continue to monitor every 6 months with CBC, CMP and LDH.   - to have CT CAP at 1 year - prior to next visit.     Lower extremity edema- mild - tried hydrochlorothiazide which did help with the edema  but felt occasionally light headed and so stopped this.   - discussed that she could use intermittently as needed for edema.     Varicose veins- known in family and progressing to right lower leg with occasional pain. Encouraged use of support hose, not crossing legs and regular exercise with prompt treatment of any wound. May need referral to vein specialist in the future.   .   This visit included face to face time spent with the patient, prep work, ordering tests, and performing post visit documentation.  Doris Anguiano,Cnp

## 2025-03-11 ENCOUNTER — ONCOLOGY VISIT (OUTPATIENT)
Dept: ONCOLOGY | Facility: CLINIC | Age: 89
End: 2025-03-11
Attending: NURSE PRACTITIONER
Payer: MEDICARE

## 2025-03-11 ENCOUNTER — LAB (OUTPATIENT)
Dept: INFUSION THERAPY | Facility: CLINIC | Age: 89
End: 2025-03-11
Attending: INTERNAL MEDICINE
Payer: MEDICARE

## 2025-03-11 VITALS
BODY MASS INDEX: 25.29 KG/M2 | DIASTOLIC BLOOD PRESSURE: 67 MMHG | HEART RATE: 62 BPM | WEIGHT: 142.7 LBS | SYSTOLIC BLOOD PRESSURE: 153 MMHG | OXYGEN SATURATION: 98 % | HEIGHT: 63 IN

## 2025-03-11 DIAGNOSIS — C82.03 FOLLICULAR LYMPHOMA GRADE I OF INTRA-ABDOMINAL LYMPH NODES (H): Primary | ICD-10-CM

## 2025-03-11 DIAGNOSIS — I83.91 VARICOSE VEINS OF RIGHT LOWER LEG: ICD-10-CM

## 2025-03-11 DIAGNOSIS — C82.00 GRADE I FOLLICULAR SMALL CLEAVED CELL LYMPHOMA (H): ICD-10-CM

## 2025-03-11 LAB
ALBUMIN SERPL BCG-MCNC: 4.2 G/DL (ref 3.5–5.2)
ALP SERPL-CCNC: 73 U/L (ref 40–150)
ALT SERPL W P-5'-P-CCNC: 19 U/L (ref 0–50)
ANION GAP SERPL CALCULATED.3IONS-SCNC: 8 MMOL/L (ref 7–15)
AST SERPL W P-5'-P-CCNC: 20 U/L (ref 0–45)
BASOPHILS # BLD AUTO: 0 10E3/UL (ref 0–0.2)
BASOPHILS NFR BLD AUTO: 0 %
BILIRUB SERPL-MCNC: 0.7 MG/DL
BUN SERPL-MCNC: 21.7 MG/DL (ref 8–23)
CALCIUM SERPL-MCNC: 9.9 MG/DL (ref 8.8–10.4)
CHLORIDE SERPL-SCNC: 103 MMOL/L (ref 98–107)
CREAT SERPL-MCNC: 0.93 MG/DL (ref 0.51–0.95)
EGFRCR SERPLBLD CKD-EPI 2021: 59 ML/MIN/1.73M2
EOSINOPHIL # BLD AUTO: 0.1 10E3/UL (ref 0–0.7)
EOSINOPHIL NFR BLD AUTO: 2 %
ERYTHROCYTE [DISTWIDTH] IN BLOOD BY AUTOMATED COUNT: 13.1 % (ref 10–15)
GLUCOSE SERPL-MCNC: 111 MG/DL (ref 70–99)
HCO3 SERPL-SCNC: 30 MMOL/L (ref 22–29)
HCT VFR BLD AUTO: 38.7 % (ref 35–47)
HGB BLD-MCNC: 12.8 G/DL (ref 11.7–15.7)
HOLD SPECIMEN: NORMAL
IMM GRANULOCYTES # BLD: 0 10E3/UL
IMM GRANULOCYTES NFR BLD: 0 %
LDH SERPL L TO P-CCNC: 185 U/L (ref 0–250)
LYMPHOCYTES # BLD AUTO: 1 10E3/UL (ref 0.8–5.3)
LYMPHOCYTES NFR BLD AUTO: 20 %
MCH RBC QN AUTO: 29.4 PG (ref 26.5–33)
MCHC RBC AUTO-ENTMCNC: 33.1 G/DL (ref 31.5–36.5)
MCV RBC AUTO: 89 FL (ref 78–100)
MONOCYTES # BLD AUTO: 0.5 10E3/UL (ref 0–1.3)
MONOCYTES NFR BLD AUTO: 10 %
NEUTROPHILS # BLD AUTO: 3.2 10E3/UL (ref 1.6–8.3)
NEUTROPHILS NFR BLD AUTO: 67 %
NRBC # BLD AUTO: 0 10E3/UL
NRBC BLD AUTO-RTO: 0 /100
PLATELET # BLD AUTO: 164 10E3/UL (ref 150–450)
POTASSIUM SERPL-SCNC: 4.7 MMOL/L (ref 3.4–5.3)
PROT SERPL-MCNC: 7 G/DL (ref 6.4–8.3)
RBC # BLD AUTO: 4.36 10E6/UL (ref 3.8–5.2)
SODIUM SERPL-SCNC: 141 MMOL/L (ref 135–145)
WBC # BLD AUTO: 4.8 10E3/UL (ref 4–11)

## 2025-03-11 PROCEDURE — 80048 BASIC METABOLIC PNL TOTAL CA: CPT

## 2025-03-11 PROCEDURE — 85025 COMPLETE CBC W/AUTO DIFF WBC: CPT

## 2025-03-11 PROCEDURE — 99214 OFFICE O/P EST MOD 30 MIN: CPT | Performed by: NURSE PRACTITIONER

## 2025-03-11 PROCEDURE — G0463 HOSPITAL OUTPT CLINIC VISIT: HCPCS | Performed by: NURSE PRACTITIONER

## 2025-03-11 PROCEDURE — 36415 COLL VENOUS BLD VENIPUNCTURE: CPT

## 2025-03-11 PROCEDURE — 83615 LACTATE (LD) (LDH) ENZYME: CPT

## 2025-03-11 PROCEDURE — 84520 ASSAY OF UREA NITROGEN: CPT

## 2025-03-11 PROCEDURE — 82435 ASSAY OF BLOOD CHLORIDE: CPT

## 2025-03-11 ASSESSMENT — PAIN SCALES - GENERAL: PAINLEVEL_OUTOF10: NO PAIN (0)

## 2025-03-11 NOTE — NURSING NOTE
"Oncology Rooming Note    March 11, 2025 1:38 PM   Hilda Potter is a 89 year old female who presents for:    Chief Complaint   Patient presents with    Oncology Clinic Visit     Initial Vitals: BP (!) 153/67 (BP Location: Left arm)   Pulse 62   Ht 1.604 m (5' 3.15\")   Wt 64.7 kg (142 lb 11.2 oz)   LMP 10/01/1986   SpO2 98%   BMI 25.16 kg/m   Estimated body mass index is 25.16 kg/m  as calculated from the following:    Height as of this encounter: 1.604 m (5' 3.15\").    Weight as of this encounter: 64.7 kg (142 lb 11.2 oz). Body surface area is 1.7 meters squared.  No Pain (0) Comment: Data Unavailable   Patient's last menstrual period was 10/01/1986.  Allergies reviewed: Yes  Medications reviewed: No    Medications: Medication refills not needed today.  Pharmacy name entered into [employer]: [id number] - [address]  AT [facility]    Frailty Screening:   Is the patient here for a new oncology consult visit in cancer care? 2. No    PHQ9:  Did this patient require a PHQ9?: No      Clinical concerns: No concerns        Linnea Do MA            "

## 2025-03-11 NOTE — LETTER
3/11/2025      Hilda Potter  [address]  [facility] [address]      Dear Colleague,    Thank you for referring your patient, Hilda Potter, to the [facility]. Please see a copy of my visit note below.    Oncology Follow Up Visit: March 11, 2025     Oncologist: Dr Bang Saunders  PCP: Ame Dobson    Diagnosis: Grade IV follicular lymphoma  Hilda Potter is an 87 yo female 6 month follow-up lymphoma  11/2011- presented with night sweats and lymphadenopathy. Negative infectious work-up.   4/2012-CT demonstrated multiple borderline mildly enlarged retroperitoneal and mesenteric lymph nodes, with the largest size of 1.5 cm. CT guided biopsy of intraabdominal lymph node c/w low grade follicular lymphoma. Flow cytometry showed kappa monotypic CD10 positive B cells. BMBx demonstrated 0.2% follicular lymphoma by flow cytometry.   Treatment:   9/2012 Completed weekly Rituxan x 4, premed solumedrol  [id number] Observation   9/2023 CT CAP for abdominal pain and fatigue- finding abdominal adenopathy   10/2023 Recommended colonoscopy, then Rituxan x 4  12/2023 Rituximab weekly x4; mixed stricture of clinical improvement but radiographically stable disease.    Interval History: Ms. Potter comes alone to clinic for follow up to her follicular lymphoma. Pt states she has been healthy and has no new  illness or complaints - denies  weight loss, night sweats, fevers, or new masses. She tries to stay active with short walks.   Reports she does have some varicose veins to back of right leg the are occasionally noted with some pain.    Rest of comprehensive and complete ROS is reviewed and is negative.   Past Medical History:   Diagnosis Date     AR (allergic rhinitis)      Atrophic vaginitis      Cancer (H)      Chronic kidney disease, stage 3 (H) 07/14/2021     Compression fracture of L1 lumbar vertebra (H) 04/2015     Glaucoma (increased eye pressure)      HTN  (hypertension)      Migraine      Nonsenile cataract      Osteoporosis      Reflux esophagitis      Thyroid disease      Current Outpatient Medications   Medication Sig Dispense Refill     Aspirin-Acetaminophen-Caffeine (EXCEDRIN MIGRAINE PO) Take 1 tablet by mouth as needed       calcium carbonate-vitamin D (OS-FIDENCIO) 600-400 MG-UNIT chewable tablet Take 1 chew tab by mouth 2 times daily       carboxymethylcellulose (REFRESH PLUS) 0.5 % SOLN Place 1 drop into both eyes 3 times daily as needed for dry eyes       estradiol (ESTRACE) 0.1 MG/GM vaginal cream Place 1 g vaginally daily 1g TID for 6/2024. 1g BID for 7/2024. 1g daily for 8/2024 till follow up 42.5 g 2     latanoprost (XALATAN) 0.005 % ophthalmic solution Place 1 drop into both eyes at bedtime 7.5 mL 3     levothyroxine (SYNTHROID/LEVOTHROID) 50 MCG tablet Take 1 tablet (50 mcg) by mouth daily 90 tablet 3     MELATONIN PO Take 3 mg by mouth nightly as needed        Multiple Vitamin (MULTI-VITAMIN) per tablet Take 1 tablet by mouth daily       SUMAtriptan (IMITREX) 50 MG tablet Take 1 tablet (50 mg) by mouth at onset of headache for migraine. May repeat in 2 hours. Max 4 tablets/24 hours. 9 tablet 3     timolol maleate (TIMOPTIC) 0.5 % ophthalmic solution Place 1 drop into both eyes 2 times daily. Wait at least 5 minutes between drops if using more than one at a time. 20 mL 4     triamcinolone (KENALOG) 0.025 % cream Apply a fingertip amount of cream to the opening of the itchy ear canal(s) one to two times a week a night as needed 15 g 1     hydrochlorothiazide 12.5 MG tablet Take 1 tablet (12.5 mg) by mouth daily. (Patient not taking: Reported on 3/11/2025) 90 tablet 3     No current facility-administered medications for this visit.     Allergies   Allergen Reactions     Diatrizoate Other (See Comments)     Zoster Vaccine Live Anaphylaxis     Bisphosphonates GI Disturbance     GI distress     Ivp Dye [Contrast Dye] Swelling     Lisinopril Cough     Losartan  "Hives     But can tolerate Hyzaar.      Morphine Hives     Morphine Sulfate Nausea and Vomiting     Omeprazole Hives     Other Food Allergy      Apples, peaches, pears, cherries, apricots     Tree Nuts [Nuts]      Almonds, pecans     Latex Rash       Physical Exam:BP (!) 153/67 (BP Location: Left arm)   Pulse 62   Ht 1.604 m (5' 3.15\")   Wt 64.7 kg (142 lb 11.2 oz)   LMP 10/01/1986   SpO2 98%   BMI 25.16 kg/m     ECOG PS- 0  Constitutional: Alert, cooperative, and in no distress.   ENT: Eyes bright, No mouth sores  Neck: Supple, No adenopathy.  Cardiac: Heart rate and rhythm is regular and strong without murmur  Respiratory: Breathing easy. Lung sounds clear to auscultation  GI: Abdomen is soft, non-tender,. BS normal. No masses or organomegaly  MS: Muscle tone normal, extremities normal with no edema. Moving well for age- no assist needed to exam table.  Back o f right leg with ropy varicose veins but no open wounds.   Skin:no suspicious lesions or open wounds.   Neuro: Sensory grossly WNL, gait normal.   Lymph: Normal ant/post cervical, axillary, supraclavicular nodes  Psych: Mentation appears normal and affect normal/bright with good conversation    Laboratory/Imaging Results:   Results for orders placed or performed in visit on 03/11/25   Comprehensive metabolic panel     Status: Abnormal   Result Value Ref Range    Sodium 141 135 - 145 mmol/L    Potassium 4.7 3.4 - 5.3 mmol/L    Carbon Dioxide (CO2) 30 (H) 22 - 29 mmol/L    Anion Gap 8 7 - 15 mmol/L    Urea Nitrogen 21.7 8.0 - 23.0 mg/dL    Creatinine 0.93 0.51 - 0.95 mg/dL    GFR Estimate 59 (L) >60 mL/min/1.73m2    Calcium 9.9 8.8 - 10.4 mg/dL    Chloride 103 98 - 107 mmol/L    Glucose 111 (H) 70 - 99 mg/dL    Alkaline Phosphatase 73 40 - 150 U/L    AST 20 0 - 45 U/L    ALT 19 0 - 50 U/L    Protein Total 7.0 6.4 - 8.3 g/dL    Albumin 4.2 3.5 - 5.2 g/dL    Bilirubin Total 0.7 <=1.2 mg/dL   Lactate Dehydrogenase     Status: Normal   Result Value Ref Range "    Lactate Dehydrogenase 185 0 - 250 U/L   Extra Tube     Status: None (In process)    Narrative    The following orders were created for panel order Extra Tube.  Procedure                               Abnormality         Status                     ---------                               -----------         ------                     Extra Serum Separator T...[[id number]]                      In process                   Please view results for these tests on the individual orders.   CBC with platelets and differential     Status: None   Result Value Ref Range    WBC Count 4.8 4.0 - 11.0 10e3/uL    RBC Count 4.36 3.80 - 5.20 10e6/uL    Hemoglobin 12.8 11.7 - 15.7 g/dL    Hematocrit 38.7 35.0 - 47.0 %    MCV 89 78 - 100 fL    MCH 29.4 26.5 - 33.0 pg    MCHC 33.1 31.5 - 36.5 g/dL    RDW 13.1 10.0 - 15.0 %    Platelet Count 164 150 - 450 10e3/uL    % Neutrophils 67 %    % Lymphocytes 20 %    % Monocytes 10 %    % Eosinophils 2 %    % Basophils 0 %    % Immature Granulocytes 0 %    NRBCs per 100 WBC 0 <1 /100    Absolute Neutrophils 3.2 1.6 - 8.3 10e3/uL    Absolute Lymphocytes 1.0 0.8 - 5.3 10e3/uL    Absolute Monocytes 0.5 0.0 - 1.3 10e3/uL    Absolute Eosinophils 0.1 0.0 - 0.7 10e3/uL    Absolute Basophils 0.0 0.0 - 0.2 10e3/uL    Absolute Immature Granulocytes 0.0 <=0.4 10e3/uL    Absolute NRBCs 0.0 10e3/uL   CBC with Platelets & Differential     Status: None    Narrative    The following orders were created for panel order CBC with Platelets & Differential.  Procedure                               Abnormality         Status                     ---------                               -----------         ------                     CBC with platelets and ...[[id number]]                      Final result                 Please view results for these tests on the individual orders.       Assessment and Plan:   Follicular lymphoma- clinically and radiographically CECILIA.  - no sign of recurrence or unusual finds today with  review and labs and exam. Does have mild CKD stage 3a and questions by pt were answered about this.  - continue to monitor every 6 months with CBC, CMP and LDH.   - to have CT CAP at 1 year - prior to next visit.     Lower extremity edema- mild - tried hydrochlorothiazide which did help with the edema but felt occasionally light headed and so stopped this.   - discussed that she could use intermittently as needed for edema.     Varicose veins- known in family and progressing to right lower leg with occasional pain. Encouraged use of support hose, not crossing legs and regular exercise with prompt treatment of any wound. May need referral to vein specialist in the future.   .   This visit included face to face time spent with the patient, prep work, ordering tests, and performing post visit documentation.  Doris Anguiano Cnp      Again, thank you for allowing me to participate in the care of your patient.        Sincerely,        Doris Anguiano NP, APRN CNP    Electronically signed

## 2025-05-12 ENCOUNTER — OFFICE VISIT (OUTPATIENT)
Dept: OPHTHALMOLOGY | Facility: CLINIC | Age: 89
End: 2025-05-12
Payer: MEDICARE

## 2025-05-12 DIAGNOSIS — Z98.890 HISTORY OF BLEPHAROPLASTY: ICD-10-CM

## 2025-05-12 DIAGNOSIS — Z01.01 ENCOUNTER FOR EXAMINATION OF EYES AND VISION WITH ABNORMAL FINDINGS: ICD-10-CM

## 2025-05-12 DIAGNOSIS — H52.4 PRESBYOPIA: ICD-10-CM

## 2025-05-12 DIAGNOSIS — H25.813 COMBINED FORMS OF AGE-RELATED CATARACT OF BOTH EYES: ICD-10-CM

## 2025-05-12 DIAGNOSIS — H40.1131 PRIMARY OPEN ANGLE GLAUCOMA (POAG) OF BOTH EYES, MILD STAGE: Primary | ICD-10-CM

## 2025-05-12 DIAGNOSIS — H43.813 POSTERIOR VITREOUS DETACHMENT OF BOTH EYES: ICD-10-CM

## 2025-05-12 PROCEDURE — 92014 COMPRE OPH EXAM EST PT 1/>: CPT | Performed by: OPHTHALMOLOGY

## 2025-05-12 PROCEDURE — 92015 DETERMINE REFRACTIVE STATE: CPT | Mod: GY | Performed by: OPHTHALMOLOGY

## 2025-05-12 RX ORDER — LATANOPROST 50 UG/ML
1 SOLUTION/ DROPS OPHTHALMIC AT BEDTIME
Qty: 7.5 ML | Refills: 3 | Status: SHIPPED | OUTPATIENT
Start: 2025-05-12

## 2025-05-12 ASSESSMENT — REFRACTION_MANIFEST
OS_AXIS: 175
OD_ADD: +3.00
OS_ADD: +3.00
OD_CYLINDER: +2.75
OS_CYLINDER: +1.25
OD_SPHERE: +2.50
OS_SPHERE: +2.50
OD_AXIS: 175

## 2025-05-12 ASSESSMENT — TONOMETRY
OD_IOP_MMHG: 19
OS_IOP_MMHG: 18
IOP_METHOD: APPLANATION

## 2025-05-12 ASSESSMENT — REFRACTION_WEARINGRX
OS_ADD: +2.75
OD_ADD: +2.75
OD_SPHERE: +2.75
OD_CYLINDER: +2.50
SPECS_TYPE: PAL
OS_CYLINDER: +1.25
OS_AXIS: 175
OD_AXIS: 175
OS_SPHERE: +2.75

## 2025-05-12 ASSESSMENT — CONF VISUAL FIELD
OS_INFERIOR_NASAL_RESTRICTION: 0
OD_SUPERIOR_TEMPORAL_RESTRICTION: 0
OD_INFERIOR_NASAL_RESTRICTION: 0
OD_INFERIOR_TEMPORAL_RESTRICTION: 0
OS_INFERIOR_TEMPORAL_RESTRICTION: 0
OD_SUPERIOR_NASAL_RESTRICTION: 0
OS_NORMAL: 1
OS_SUPERIOR_TEMPORAL_RESTRICTION: 0
OD_NORMAL: 1
OS_SUPERIOR_NASAL_RESTRICTION: 0

## 2025-05-12 ASSESSMENT — CUP TO DISC RATIO
OD_RATIO: 0.7
OS_RATIO: 0.7

## 2025-05-12 ASSESSMENT — VISUAL ACUITY
OS_CC+: -1
OD_CC+: -1
OS_CC: J2
OD_CC: J1
METHOD: SNELLEN - LINEAR
CORRECTION_TYPE: GLASSES
OD_CC: 20/25
OS_CC: 20/25

## 2025-05-12 ASSESSMENT — EXTERNAL EXAM - RIGHT EYE: OD_EXAM: NORMAL

## 2025-05-12 ASSESSMENT — EXTERNAL EXAM - LEFT EYE: OS_EXAM: NORMAL

## 2025-05-12 NOTE — PROGRESS NOTES
" Current Eye Medications:  Latanoprost at bedtime both eyes @ 10PM  Timolol twice a day @ 8:00 AM and Refresh Plus daily both eyes      Subjective:  Complete exam: patient is satisfied with his distance visual acuity and near visual acuity and is not having any issues with her drops.     Objective:  See Ophthalmology Exam.       Assessment:  Mild worsening of cataract left eye.  Stable intraocular pressure and discs.  Otherwise stable eye exam.      ICD-10-CM    1. Primary open angle glaucoma (POAG) of both eyes, mild stage  H40.1131 latanoprost (XALATAN) 0.005 % ophthalmic solution      2. Combined forms of age-related cataract, mild-mod, of both eyes  H25.813 REFRACTIVE STATUS     EYE EXAM (SIMPLE-NONBILLABLE)      3. History of blepharoplasty, upper lids, ou  Z98.890       4. Posterior vitreous detachment of both eyes  H43.813       5. Encounter for examination of eyes and vision with abnormal findings  Z01.01 REFRACTIVE STATUS     EYE EXAM (SIMPLE-NONBILLABLE)      6. Presbyopia  H52.4            Plan:  Continue:   Latanoprost (green top) every evening both eyes.  Timolol (yellow top) twice daily both eyes. About 12 hours apart.     May use artificial tears up to four times a day (like Refresh Optive, Systane Balance, or TheraTears. Avoid \"get the red out\" drops and generic artifical tears).     Wait at least 5 minutes between drops if using more than one at a time.     Glasses prescription given - optional    Return visit 6 months for an intraocular pressure check, glaucoma OCT, retinal OCT, and Enciso Visual Field.     Discussed possible cataract surgery with [facility] in the future.    Andi Bay M.D.  [phone]         "

## 2025-05-12 NOTE — Clinical Note
5/12/2025      Hilda Potter  [address]  [facility] [address]      Dear Colleague,    Thank you for referring your patient, Hilda Potter, to the [facility]. Please see a copy of my visit note below.     Current Eye Medications:  Latanoprost at bedtime both eyes @ 10PM  Timolol twice a day @ 8:00 AM and Refresh Plus daily both eyes      Subjective:  Complete exam: patient is satisfied with his distance visual acuity and near visual acuity and is not having any issues with her drops.     Objective:  See Ophthalmology Exam.       Assessment:      Plan:   See Patient Instructions.           Again, thank you for allowing me to participate in the care of your patient.        Sincerely,        Andi Bay MD    Electronically signed

## 2025-05-12 NOTE — PATIENT INSTRUCTIONS
"Continue:   Latanoprost (green top) every evening both eyes.  Timolol (yellow top) twice daily both eyes. About 12 hours apart.     May use artificial tears up to four times a day (like Refresh Optive, Systane Balance, or TheraTears. Avoid \"get the red out\" drops and generic artifical tears).     Wait at least 5 minutes between drops if using more than one at a time.     Glasses prescription given - optional    Return visit 6 months for an intraocular pressure check, glaucoma OCT, retinal OCT, and Enciso Visual Field.     Andi Bay M.D.  [phone]    "

## 2025-05-28 ENCOUNTER — OFFICE VISIT (OUTPATIENT)
Dept: OBGYN | Facility: CLINIC | Age: 89
End: 2025-05-28
Payer: MEDICARE

## 2025-05-28 VITALS
HEIGHT: 63 IN | SYSTOLIC BLOOD PRESSURE: 148 MMHG | WEIGHT: 143 LBS | HEART RATE: 62 BPM | RESPIRATION RATE: 18 BRPM | DIASTOLIC BLOOD PRESSURE: 74 MMHG | BODY MASS INDEX: 25.34 KG/M2 | TEMPERATURE: 98.1 F

## 2025-05-28 DIAGNOSIS — N76.5 VAGINAL ULCER: Primary | ICD-10-CM

## 2025-05-28 LAB
HSV1 IGG SERPL QL IA: 26.4 INDEX
HSV1 IGG SERPL QL IA: ABNORMAL
HSV2 IGG SERPL QL IA: 0.07 INDEX
HSV2 IGG SERPL QL IA: ABNORMAL
LAB ORDER RESULT STATUS: NORMAL
Lab: NORMAL
PERFORMING LABORATORY: NORMAL
T PALLIDUM AB SER QL: NONREACTIVE
TEST NAME: NORMAL

## 2025-05-28 PROCEDURE — 3078F DIAST BP <80 MM HG: CPT | Performed by: STUDENT IN AN ORGANIZED HEALTH CARE EDUCATION/TRAINING PROGRAM

## 2025-05-28 PROCEDURE — 99214 OFFICE O/P EST MOD 30 MIN: CPT | Performed by: STUDENT IN AN ORGANIZED HEALTH CARE EDUCATION/TRAINING PROGRAM

## 2025-05-28 PROCEDURE — 87529 HSV DNA AMP PROBE: CPT | Performed by: STUDENT IN AN ORGANIZED HEALTH CARE EDUCATION/TRAINING PROGRAM

## 2025-05-28 PROCEDURE — 3077F SYST BP >= 140 MM HG: CPT | Performed by: STUDENT IN AN ORGANIZED HEALTH CARE EDUCATION/TRAINING PROGRAM

## 2025-05-28 RX ORDER — ACYCLOVIR 400 MG/1
400 TABLET ORAL EVERY 8 HOURS
Qty: 30 TABLET | Refills: 0 | Status: SHIPPED | OUTPATIENT
Start: 2025-05-28 | End: 2025-06-07

## 2025-05-28 RX ORDER — KETOCONAZOLE 20 MG/ML
2 SHAMPOO, SUSPENSION TOPICAL
COMMUNITY
Start: 2025-04-14

## 2025-05-28 RX ORDER — TRIAMCINOLONE ACETONIDE 1 MG/G
CREAM TOPICAL
COMMUNITY
Start: 2024-06-04

## 2025-05-28 RX ORDER — AZITHROMYCIN 500 MG/1
1000 TABLET, FILM COATED ORAL DAILY
Qty: 2 TABLET | Refills: 0 | Status: SHIPPED | OUTPATIENT
Start: 2025-05-28 | End: 2025-05-29

## 2025-05-28 NOTE — NURSING NOTE
"Initial BP (!) 148/74 (BP Location: Left arm, Patient Position: Chair, Cuff Size: Adult Regular)   Pulse 62   Temp 98.1  F (36.7  C) (Tympanic)   Resp 18   Ht 1.6 m (5' 3\")   Wt 64.9 kg (143 lb)   LMP 10/01/1986   BMI 25.33 kg/m   Estimated body mass index is 25.33 kg/m  as calculated from the following:    Height as of this encounter: 1.6 m (5' 3\").    Weight as of this encounter: 64.9 kg (143 lb). .      "

## 2025-05-28 NOTE — PROGRESS NOTES
[facility]  Gynecology Office Note    Assessment and Plan:   Hilda Potter, 89 year old  with follicular lymphoma, presented for worsening bilateral vulvar itching (R>L) and left sided vulvar soreness. A new 5 o'clock vaginal white plaque ulcer was noted. Plan to screen for STIs: syphilis, herpes, Haemophlius ducreyi. Plan to stop vaginal estrogen cream for now and continue aquaphor/vaseline. Plan to treat empirically for genital herpes and Haemophlius ducreyi. Plan to return to clinic in 1 month for exam. If symptoms are not better, plan for vulvar biopsy.  Orders Placed This Encounter   Procedures    Treponema Abs w Reflex to RPR and Titer    Herpes Simplex Virus 1 and 2 IgG    Laboratory Miscellaneous Result    [facility]; [id number]; Genital Ulcer Disease Panel by PCR (Laboratory Miscellaneous Order)    [id number]; Genital Ulcer Disease Panel by PCR: [facility] Miscellaneous Test    Herpes Simplex Virus 1&2 by PCR       acyclovir (ZOVIRAX) 400 MG tablet          Take 1 tablet (400 mg) by mouth every 8 hours for 10 days., Disp-30 tablet, R-0, E-Prescribe       azithromycin (ZITHROMAX) 500 MG tablet         Take 2 tablets (1,000 mg) by mouth daily for 1 day., Disp-2 tablet, R-0, E-Prescribe       Mary Hernandez MD  Obstetrics and Gynecology  [facility]   2025     -----------------------------------------------------------------------------------------------------------------------------------  S: Hilda Potter, 89 year old  with follicular lymphoma, presented for worsening vulvar vaginal itching.     She first noticed vulvar itching, soreness, and redness in summer 2023. She saw PCP and tried topical clobetasol taper regimen (from daily, twice per week, weekly, to every other week) for empiric lichen sclerosus treatment.  No biopsy was ever done. The clobetasol did not give her any symptom relief.  She has stopped using the clobetasol.       She saw  "me on 4/19/2024. Vulvar biopsy was taken, which returned with \"mild epidermal hyperplasia and perivascular inflammation\".  She has been using vaginal estrogen cream 1g now x1-2 times per week with vaseline, which was helping her symptoms. However, recently she started having worsening bilateral vulvar itching (R>L) and left sided vulvar soreness.     Physical Exam:   Vitals:    05/28/25 1100 05/28/25 1102   BP: (!) 145/73 (!) 148/74   BP Location: Left arm Left arm   Patient Position: Chair Chair   Cuff Size: Adult Regular Adult Regular   Pulse: 60 62   Resp: 18    Temp: 98.1  F (36.7  C)    TempSrc: Tympanic    Weight: 64.9 kg (143 lb)    Height: 1.6 m (5' 3\")       Estimated body mass index is 25.33 kg/m  as calculated from the following:    Height as of this encounter: 1.6 m (5' 3\").    Weight as of this encounter: 64.9 kg (143 lb).    General appearance: well-hydrated, A&O x 3, no apparent distress  External genitalia: no erythema, no lesions. At 6 o'clock of the perineum, the old biopsy site is still obvious, not not abnormal. At 5 o'clock of the hymenal ring, a shallow white ulcer noted.  Urethral meatus appropriate location without lesions or prolapse  Vagina: Normal, healthy pink mucosa without any lesions. Physiologic vaginal discharge.   Cervix: normal appearance                "

## 2025-05-29 LAB
HSV1 DNA SPEC QL NAA+PROBE: NOT DETECTED
HSV2 DNA SPEC QL NAA+PROBE: NOT DETECTED
SPECIMEN TYPE: NORMAL

## 2025-05-30 ENCOUNTER — RESULTS FOLLOW-UP (OUTPATIENT)
Dept: OBGYN | Facility: CLINIC | Age: 89
End: 2025-05-30

## 2025-06-10 ENCOUNTER — PATIENT OUTREACH (OUTPATIENT)
Dept: CARE COORDINATION | Facility: CLINIC | Age: 89
End: 2025-06-10
Payer: MEDICARE

## 2025-06-22 SDOH — HEALTH STABILITY: PHYSICAL HEALTH: ON AVERAGE, HOW MANY MINUTES DO YOU ENGAGE IN EXERCISE AT THIS LEVEL?: 30 MIN

## 2025-06-22 SDOH — HEALTH STABILITY: PHYSICAL HEALTH: ON AVERAGE, HOW MANY DAYS PER WEEK DO YOU ENGAGE IN MODERATE TO STRENUOUS EXERCISE (LIKE A BRISK WALK)?: 4 DAYS

## 2025-06-22 ASSESSMENT — SOCIAL DETERMINANTS OF HEALTH (SDOH): HOW OFTEN DO YOU GET TOGETHER WITH FRIENDS OR RELATIVES?: TWICE A WEEK

## 2025-06-23 ENCOUNTER — OFFICE VISIT (OUTPATIENT)
Dept: FAMILY MEDICINE | Facility: CLINIC | Age: 89
End: 2025-06-23
Payer: MEDICARE

## 2025-06-23 VITALS
DIASTOLIC BLOOD PRESSURE: 60 MMHG | OXYGEN SATURATION: 97 % | TEMPERATURE: 97.1 F | WEIGHT: 141.4 LBS | HEIGHT: 63 IN | RESPIRATION RATE: 18 BRPM | HEART RATE: 68 BPM | BODY MASS INDEX: 25.05 KG/M2 | SYSTOLIC BLOOD PRESSURE: 138 MMHG

## 2025-06-23 DIAGNOSIS — E03.9 ACQUIRED HYPOTHYROIDISM: ICD-10-CM

## 2025-06-23 DIAGNOSIS — Z13.1 SCREENING FOR DIABETES MELLITUS: ICD-10-CM

## 2025-06-23 DIAGNOSIS — Z00.00 ENCOUNTER FOR MEDICARE ANNUAL WELLNESS EXAM: Primary | ICD-10-CM

## 2025-06-23 DIAGNOSIS — I10 HYPERTENSION GOAL BP (BLOOD PRESSURE) < 140/90: ICD-10-CM

## 2025-06-23 DIAGNOSIS — Z23 NEED FOR VACCINATION: ICD-10-CM

## 2025-06-23 DIAGNOSIS — Z13.220 LIPID SCREENING: ICD-10-CM

## 2025-06-23 LAB
EST. AVERAGE GLUCOSE BLD GHB EST-MCNC: 114 MG/DL
HBA1C MFR BLD: 5.6 % (ref 0–5.6)

## 2025-06-23 PROCEDURE — 3078F DIAST BP <80 MM HG: CPT | Performed by: FAMILY MEDICINE

## 2025-06-23 PROCEDURE — 84443 ASSAY THYROID STIM HORMONE: CPT | Performed by: FAMILY MEDICINE

## 2025-06-23 PROCEDURE — 80061 LIPID PANEL: CPT | Performed by: FAMILY MEDICINE

## 2025-06-23 PROCEDURE — G0439 PPPS, SUBSEQ VISIT: HCPCS | Performed by: FAMILY MEDICINE

## 2025-06-23 PROCEDURE — 83036 HEMOGLOBIN GLYCOSYLATED A1C: CPT | Performed by: FAMILY MEDICINE

## 2025-06-23 PROCEDURE — 36415 COLL VENOUS BLD VENIPUNCTURE: CPT | Performed by: FAMILY MEDICINE

## 2025-06-23 PROCEDURE — 3044F HG A1C LEVEL LT 7.0%: CPT | Performed by: FAMILY MEDICINE

## 2025-06-23 PROCEDURE — 90480 ADMN SARSCOV2 VAC 1/ONLY CMP: CPT | Performed by: FAMILY MEDICINE

## 2025-06-23 PROCEDURE — 1126F AMNT PAIN NOTED NONE PRSNT: CPT | Performed by: FAMILY MEDICINE

## 2025-06-23 PROCEDURE — 3075F SYST BP GE 130 - 139MM HG: CPT | Performed by: FAMILY MEDICINE

## 2025-06-23 PROCEDURE — G2211 COMPLEX E/M VISIT ADD ON: HCPCS | Performed by: FAMILY MEDICINE

## 2025-06-23 PROCEDURE — 99214 OFFICE O/P EST MOD 30 MIN: CPT | Mod: 25 | Performed by: FAMILY MEDICINE

## 2025-06-23 PROCEDURE — 91320 SARSCV2 VAC 30MCG TRS-SUC IM: CPT | Performed by: FAMILY MEDICINE

## 2025-06-23 RX ORDER — HYDROCHLOROTHIAZIDE 12.5 MG/1
12.5 TABLET ORAL DAILY
Qty: 90 TABLET | Refills: 3 | Status: SHIPPED | OUTPATIENT
Start: 2025-06-23

## 2025-06-23 ASSESSMENT — PAIN SCALES - GENERAL: PAINLEVEL_OUTOF10: NO PAIN (0)

## 2025-06-23 NOTE — PATIENT INSTRUCTIONS
Patient Education   Preventive Care Advice   This is general advice given by our system to help you stay healthy. However, your care team may have specific advice just for you. Please talk to your care team about your preventive care needs.  Nutrition  Eat 5 or more servings of fruits and vegetables each day.  Try wheat bread, brown rice and whole grain pasta (instead of white bread, rice, and pasta).  Get enough calcium and vitamin D. Check the label on foods and aim for 100% of the RDA (recommended daily allowance).  Lifestyle  Exercise at least 150 minutes each week  (30 minutes a day, 5 days a week).  Do muscle strengthening activities 2 days a week. These help control your weight and prevent disease.  No smoking.  Wear sunscreen to prevent skin cancer.  Have a dental exam and cleaning every 6 months.  Yearly exams  See your health care team every year to talk about:  Any changes in your health.  Any medicines your care team has prescribed.  Preventive care, family planning, and ways to prevent chronic diseases.  Shots (vaccines)   HPV shots (up to age 26), if you've never had them before.  Hepatitis B shots (up to age 59), if you've never had them before.  COVID-19 shot: Get this shot when it's due.  Flu shot: Get a flu shot every year.  Tetanus shot: Get a tetanus shot every 10 years.  Pneumococcal, hepatitis A, and RSV shots: Ask your care team if you need these based on your risk.  Shingles shot (for age 50 and up)  General health tests  Diabetes screening:  Starting at age 35, Get screened for diabetes at least every 3 years.  If you are younger than age 35, ask your care team if you should be screened for diabetes.  Cholesterol test: At age 39, start having a cholesterol test every 5 years, or more often if advised.  Bone density scan (DEXA): At age 50, ask your care team if you should have this scan for osteoporosis (brittle bones).  Hepatitis C: Get tested at least once in your life.  STIs (sexually  transmitted infections)  Before age 24: Ask your care team if you should be screened for STIs.  After age 24: Get screened for STIs if you're at risk. You are at risk for STIs (including HIV) if:  You are sexually active with more than one person.  You don't use condoms every time.  You or a partner was diagnosed with a sexually transmitted infection.  If you are at risk for HIV, ask about PrEP medicine to prevent HIV.  Get tested for HIV at least once in your life, whether you are at risk for HIV or not.  Cancer screening tests  Cervical cancer screening: If you have a cervix, begin getting regular cervical cancer screening tests starting at age 21.  Breast cancer scan (mammogram): If you've ever had breasts, begin having regular mammograms starting at age 40. This is a scan to check for breast cancer.  Colon cancer screening: It is important to start screening for colon cancer at age 45.  Have a colonoscopy test every 10 years (or more often if you're at risk) Or, ask your provider about stool tests like a FIT test every year or Cologuard test every 3 years.  To learn more about your testing options, visit:   .  For help making a decision, visit:   https://bit.ly/jk25824.  Prostate cancer screening test: If you have a prostate, ask your care team if a prostate cancer screening test (PSA) at age 55 is right for you.  Lung cancer screening: If you are a current or former smoker ages 50 to 80, ask your care team if ongoing lung cancer screenings are right for you.  For informational purposes only. Not to replace the advice of your health care provider. Copyright   2023 [facility]. All rights reserved. Clinically reviewed by the [facility]. [employer] [id number] - REV 01/24.  Learning About Stress  What is stress?     Stress is your body's response to a hard situation. Your body can have a physical, emotional, or mental response. Stress is a fact of life for most people, and it  affects everyone differently. What causes stress for you may not be stressful for someone else.  A lot of things can cause stress. You may feel stress when you go on a job interview, take a test, or run a race. This kind of short-term stress is normal and even useful. It can help you if you need to work hard or react quickly. For example, stress can help you finish an important job on time.  Long-term stress is caused by ongoing stressful situations or events. Examples of long-term stress include long-term health problems, ongoing problems at work, or conflicts in your family. Long-term stress can harm your health.  How does stress affect your health?  When you are stressed, your body responds as though you are in danger. It makes hormones that speed up your heart, make you breathe faster, and give you a burst of energy. This is called the fight-or-flight stress response. If the stress is over quickly, your body goes back to normal and no harm is done.  But if stress happens too often or lasts too long, it can have bad effects. Long-term stress can make you more likely to get sick, and it can make symptoms of some diseases worse. If you tense up when you are stressed, you may develop neck, shoulder, or low back pain. Stress is linked to high blood pressure and heart disease.  Stress also harms your emotional health. It can make you farmer, tense, or depressed. Your relationships may suffer, and you may not do well at work or school.  What can you do to manage stress?  You can try these things to help manage stress:   Do something active. Exercise or activity can help reduce stress. Walking is a great way to get started. Even everyday activities such as housecleaning or yard work can help.  Try yoga or amaury chi. These techniques combine exercise and meditation. You may need some training at first to learn them.  Do something you enjoy. For example, listen to music or go to a movie. Practice your hobby or do volunteer  "work.  Meditate. This can help you relax, because you are not worrying about what happened before or what may happen in the future.  Do guided imagery. Imagine yourself in any setting that helps you feel calm. You can use online videos, books, or a teacher to guide you.  Do breathing exercises. For example:  From a standing position, bend forward from the waist with your knees slightly bent. Let your arms dangle close to the floor.  Breathe in slowly and deeply as you return to a standing position. Roll up slowly and lift your head last.  Hold your breath for just a few seconds in the standing position.  Breathe out slowly and bend forward from the waist.  Let your feelings out. Talk, laugh, cry, and express anger when you need to. Talking with supportive friends or family, a counselor, or a isaak leader about your feelings is a healthy way to relieve stress. Avoid discussing your feelings with people who make you feel worse.  Write. It may help to write about things that are bothering you. This helps you find out how much stress you feel and what is causing it. When you know this, you can find better ways to cope.  What can you do to prevent stress?  You might try some of these things to help prevent stress:  Manage your time. This helps you find time to do the things you want and need to do.  Get enough sleep. Your body recovers from the stresses of the day while you are sleeping.  Get support. Your family, friends, and community can make a difference in how you experience stress.  Limit your news feed. Avoid or limit time on social media or news that may make you feel stressed.  Do something active. Exercise or activity can help reduce stress. Walking is a great way to get started.  Where can you learn more?  Go to https://www.[employer].net/patiented  Enter N032 in the search box to learn more about \"Learning About Stress.\"  Current as of: October 24, 2024  Content Version: 14.5 2024-2025 [employer]   Care instructions adapted under license by your healthcare professional. If you have questions about a medical condition or this instruction, always ask your healthcare professional. [employer] disclaims any warranty or liability for your use of this information.    Learning About Sleeping Well  What does sleeping well mean?     Sleeping well means getting enough sleep to feel good and stay healthy. How much sleep is enough varies among people.  The number of hours you sleep and how you feel when you wake up are both important. If you do not feel refreshed, you probably need more sleep. Another sign of not getting enough sleep is feeling tired during the day.  Experts recommend that adults get at least 7 or more hours of sleep per day. Children and older adults need more sleep.  Why is getting enough sleep important?  Getting enough quality sleep is a basic part of good health. When your sleep suffers, your physical health, mood, and your thoughts can suffer too. You may find yourself feeling more grumpy or stressed. Not getting enough sleep also can lead to serious problems, including injury, accidents, anxiety, and depression.  What might cause poor sleeping?  Many things can cause sleep problems, including:  Changes to your sleep schedule.  Stress. Stress can be caused by fear about a single event, such as giving a speech. Or you may have ongoing stress, such as worry about work or school.  Depression, anxiety, and other mental or emotional conditions.  Changes in your sleep habits or surroundings. This includes changes that happen where you sleep, such as noise, light, or sleeping in a different bed. It also includes changes in your sleep pattern, such as having jet lag or working a late shift.  Health problems, such as pain, breathing problems, and restless legs syndrome.  Lack of regular exercise.  Using alcohol, nicotine, or caffeine before bed.  How can you help yourself?  Here are some  "tips that may help you sleep more soundly and wake up feeling more refreshed.  Your sleeping area   Use your bedroom only for sleeping and sex. A bit of light reading may help you fall asleep. But if it doesn't, do your reading elsewhere in the house. Try not to use your TV, computer, smartphone, or tablet while you are in bed.  Be sure your bed is big enough to stretch out comfortably, especially if you have a sleep partner.  Keep your bedroom quiet, dark, and cool. Use curtains, blinds, or a sleep mask to block out light. To block out noise, use earplugs, soothing music, or a \"white noise\" machine.  Your evening and bedtime routine   Create a relaxing bedtime routine. You might want to take a warm shower or bath, or listen to soothing music.  Go to bed at the same time every night. And get up at the same time every morning, even if you feel tired.  What to avoid   Limit caffeine (coffee, tea, caffeinated sodas) during the day, and don't have any for at least 6 hours before bedtime.  Avoid drinking alcohol before bedtime. Alcohol can cause you to wake up more often during the night.  Try not to smoke or use tobacco, especially in the evening. Nicotine can keep you awake.  Limit naps during the day, especially close to bedtime.  Avoid lying in bed awake for too long. If you can't fall asleep or if you wake up in the middle of the night and can't get back to sleep within about 20 minutes, get out of bed and go to another room until you feel sleepy.  Avoid taking medicine right before bed that may keep you awake or make you feel hyper or energized. Your doctor can tell you if your medicine may do this and if you can take it earlier in the day.  If you can't sleep   Imagine yourself in a peaceful, pleasant scene. Focus on the details and feelings of being in a place that is relaxing.  Get up and do a quiet or boring activity until you feel sleepy.  Avoid drinking any liquids before going to bed to help prevent waking " "up often to use the bathroom.  Where can you learn more?  Go to https://www.[employer].net/patiented  Enter J942 in the search box to learn more about \"Learning About Sleeping Well.\"  Current as of: July 31, 2024  Content Version: 14.5 2024-2025 [employer]   Care instructions adapted under license by your healthcare professional. If you have questions about a medical condition or this instruction, always ask your healthcare professional. [employer] disclaims any warranty or liability for your use of this information.    Bladder Training: Care Instructions  Your Care Instructions     Bladder training is used to treat urge incontinence and stress incontinence. Urge incontinence means that the need to urinate comes on so fast that you can't get to a toilet in time. Stress incontinence means that you leak urine because of pressure on your bladder. For example, it may happen when you laugh, cough, or lift something heavy.  Bladder training can increase how long you can wait before you have to urinate. It can also help your bladder hold more urine. And it can give you better control over the urge to urinate.  It is important to remember that bladder training takes a few weeks to a few months to make a difference. You may not see results right away, but don't give up.  Follow-up care is a key part of your treatment and safety. Be sure to make and go to all appointments, and call your doctor if you are having problems. It's also a good idea to know your test results and keep a list of the medicines you take.  How can you care for yourself at home?  Work with your doctor to come up with a bladder training program that is right for you. You may use one or more of the following methods.  Delayed urination  In the beginning, try to keep from urinating for 5 minutes after you first feel the need to go.  While you wait, take deep, slow breaths to relax. Kegel exercises can also help you delay the need " "to go to the bathroom.  After some practice, when you can easily wait 5 minutes to urinate, try to wait 10 minutes before you urinate.  Slowly increase the waiting period until you are able to control when you have to urinate.  Scheduled urination  Empty your bladder when you first wake up in the morning.  Schedule times throughout the day when you will urinate.  Start by going to the bathroom every hour, even if you don't need to go.  Slowly increase the time between trips to the bathroom.  When you have found a schedule that works well for you, keep doing it.  If you wake up during the night and have to urinate, do it. Apply your schedule to waking hours only.  Kegel exercises  These tighten and strengthen pelvic muscles, which can help you control the flow of urine. (If doing these exercises causes pain, stop doing them and talk with your doctor.) To do Kegel exercises:  Squeeze your muscles as if you were trying not to pass gas. Or squeeze your muscles as if you were stopping the flow of urine. Your belly, legs, and buttocks shouldn't move.  Hold the squeeze for 3 seconds, then relax for 5 to 10 seconds.  Start with 3 seconds, then add 1 second each week until you are able to squeeze for 10 seconds.  Repeat the exercise 10 times a session. Do 3 to 8 sessions a day.  When should you call for help?  Watch closely for changes in your health, and be sure to contact your doctor if:    Your incontinence is getting worse.     You do not get better as expected.   Where can you learn more?  Go to https://www.[employer].net/patiented  Enter V684 in the search box to learn more about \"Bladder Training: Care Instructions.\"  Current as of: April 30, 2024  Content Version: 14.5    6903-2349 [employer].   Care instructions adapted under license by your healthcare professional. If you have questions about a medical condition or this instruction, always ask your healthcare professional. [employer] " disclaims any warranty or liability for your use of this information.

## 2025-06-23 NOTE — PROGRESS NOTES
"Preventive Care Visit  [facility] JEREMY Dobson MD, Family Medicine  Jun 23, 2025      Assessment & Plan     Hilda was seen today for wellness visit.    Diagnoses and all orders for this visit:    Encounter for Medicare annual wellness exam  -     REVIEW OF HEALTH MAINTENANCE PROTOCOL ORDERS  -     PRIMARY CARE FOLLOW-UP SCHEDULING; Future    Lipid screening  -     Lipid panel reflex to direct LDL Non-fasting    Acquired hypothyroidism on Levothyroxine  -     TSH WITH FREE T4 REFLEX  -      Refill Levothyroxine after labs     Hypertension goal BP (blood pressure) < 140/90, controlled  -     Refill: hydroCHLOROthiazide 12.5 MG tablet; Take 1 tablet (12.5 mg) by mouth daily.    Screening for diabetes mellitus  -     Hemoglobin A1c    Need for vaccination  -     COVID-19 12+ (PFIZER)  -     RSV vaccine, bivalent, ABRYSVO, injection; Inject 0.5 mLs into the muscle once for 1 dose. Pharmacist administered          Patient has been advised of split billing requirements and indicates understanding: Yes        BMI  Estimated body mass index is 25.05 kg/m  as calculated from the following:    Height as of this encounter: 1.6 m (5' 3\").    Weight as of this encounter: 64.1 kg (141 lb 6.4 oz).   Weight management plan: Discussed healthy diet and exercise guidelines  Reviewed preventive health counseling, as reflected in patient instructions       Regular exercise       Healthy diet/nutrition  Counseling  Appropriate preventive services were addressed with this patient via screening, questionnaire, or discussion as appropriate for fall prevention, nutrition, physical activity, Tobacco-use cessation, social engagement, weight loss and cognition.  Checklist reviewing preventive services available has been given to the patient.  Reviewed patient's diet, addressing concerns and/or questions.   She is at risk for psychosocial distress and has been provided with information to reduce risk.   Discussed " possible causes of fatigue. Information on urinary incontinence and treatment options given to patient.     The longitudinal plan of care for the diagnosis(es)/condition(s) as documented were addressed during this visit. Due to the added complexity in care, I will continue to support Hilda in the subsequent management and with ongoing continuity of care.      Return in about 1 year (around 6/23/2026) for Annual Wellness Visit and as needed.      Subjective   Hilda is a 89 year old, presenting for the following:  Wellness Visit        6/23/2025    10:58 AM   Additional Questions   Roomed by Kristan Thibodeaux CMA   Accompanied by N/A         6/23/2025    10:58 AM   Patient Reported Additional Medications   Patient reports taking the following new medications No new medications          HPI  Hilda is a pleasant 89 year old female patient of mine here for her Annual Physical and chronic disease management.     HYPERTENSION - Patient has longstanding history of HTN , currently denies any symptoms referable to elevated blood pressure. Specifically denies chest pain, palpitations, dyspnea, orthopnea, PND or peripheral edema. Blood pressure readings have been in normal range. Current medication regimen is as listed below. Patient denies any side effects of medication.   BP Readings from Last 3 Encounters:   06/23/25 138/60   05/28/25 (!) 148/74   03/11/25 (!) 153/67         HYPOTHYROIDISM - Patient has a longstanding history of chronic Hypothyroidism. Patient has been doing well, noting no tremor, insomnia, hair loss or changes in skin texture. Continues to take medications as directed, without adverse reactions or side effects. Last TSH   Lab Results   Component Value Date    TSH 1.61 03/26/2024     Following with OBGYN regarding a vaginal ulcer. On Acyclovir.     HEALTH CARE MAINTENANCE: Due for COVID vaccine and labs.     Advance Care Planning  Advance care planning document is on file but is outdated.  Patient encouraged to  update or provider to update POLST.        6/22/2025   General Health   How would you rate your overall physical health? (!) FAIR   Feel stress (tense, anxious, or unable to sleep) To some extent   (!) STRESS CONCERN      6/22/2025   Nutrition   Diet: Regular (no restrictions)         6/22/2025   Exercise   Days per week of moderate/strenous exercise 4 days   Average minutes spent exercising at this level 30 min         6/22/2025   Social Factors   Frequency of gathering with friends or relatives Twice a week   Worry food won't last until get money to buy more No   Food not last or not have enough money for food? No   Do you have housing? (Housing is defined as stable permanent housing and does not include staying outside in a car, in a tent, in an abandoned building, in an overnight shelter, or couch-surfing.) Yes   Are you worried about losing your housing? No   Lack of transportation? No   Unable to get utilities (heat,electricity)? No         6/23/2025   Fall Risk   Reason Gait Speed Test Not Completed Patient declines          6/22/2025   Activities of Daily Living- Home Safety   Needs help with the following daily activites None of the above   Safety concerns in the home None of the above         6/22/2025   Dental   Dentist two times every year? Yes         6/22/2025   Hearing Screening   Hearing concerns? None of the above         6/22/2025   Driving Risk Screening   Patient/family members have concerns about driving No         6/22/2025   General Alertness/Fatigue Screening   Have you been more tired than usual lately? (!) YES         6/22/2025   Urinary Incontinence Screening   Bothered by leaking urine in past 6 months Yes         Today's PHQ-2 Score:       6/22/2025     8:24 PM   PHQ-2 ( 1999 Pf[employer])   Q1: Little interest or pleasure in doing things 0   Q2: Feeling down, depressed or hopeless 1   PHQ-2 Score 1    Q1: Little interest or pleasure in doing things Not at all   Q2: Feeling down, depressed or  hopeless Several days   PHQ-2 Score 1       Patient-reported           6/22/2025   Substance Use   Alcohol more than 3/day or more than 7/wk Not Applicable   Do you have a current opioid prescription? No   How severe/bad is pain from 1 to 10? 2/10   Do you use any other substances recreationally? No     Social History     Tobacco Use    Smoking status: Never     Passive exposure: Never    Smokeless tobacco: Never   Vaping Use    Vaping status: Never Used   Substance Use Topics    Alcohol use: Not Currently     Comment: wine occasionally    Drug use: No           1/24/2023   LAST FHS-7 RESULTS   1st degree relative breast or ovarian cancer No   Any relative bilateral breast cancer No   Any male have breast cancer No   Any ONE woman have BOTH breast AND ovarian cancer No   Any woman with breast cancer before 50yrs No   2 or more relatives with breast AND/OR ovarian cancer No   2 or more relatives with breast AND/OR bowel cancer No        Mammogram Screening - After age 74- determine frequency with patient based on health status, life expectancy and patient goals          Reviewed and updated as needed this visit by Provider     Meds  Problems               Past Medical History:   Diagnosis Date    AR (allergic rhinitis)     Atrophic vaginitis     Cancer (H)     Chronic kidney disease, stage 3 (H) 07/14/2021    Compression fracture of L1 lumbar vertebra (H) 04/2015    Glaucoma (increased eye pressure)     HTN (hypertension)     Migraine     Nonsenile cataract     Osteoporosis     Reflux esophagitis     Thyroid disease      Past Surgical History:   Procedure Laterality Date    ABDOMEN SURGERY      APPENDECTOMY OPEN  1954    BLEPHAROPLASTY BILATERAL  10/2007    both eyes, upper lids    CHOLECYSTECTOMY, OPEN  1992    COLONOSCOPY  6/02, 10/13    Q 5 years, polyps    D & C      REPAIR PTOSIS      SALPINGO OOPHORECTOMY,R/L/LUIS DANIEL      left ovary and tube    SMALL BOWEL RESECTION  1986    colon resection     TONSILLECTOMY &  ADENOIDECTOMY      childhood    TUBAL LIGATION      UPPER GI ENDOSCOPY  6/02, 8/11    ZZC LENGTHEN,TENDON,HAND/FINGER  1993    repair of dupytrons's contracture     Lab work is in process  Labs reviewed in EPIC  BP Readings from Last 3 Encounters:   06/23/25 138/60   05/28/25 (!) 148/74   03/11/25 (!) 153/67    Wt Readings from Last 3 Encounters:   06/23/25 64.1 kg (141 lb 6.4 oz)   05/28/25 64.9 kg (143 lb)   03/11/25 64.7 kg (142 lb 11.2 oz)                  Patient Active Problem List   Diagnosis    AR (allergic rhinitis)    Osteoporosis    Atrophic vaginitis    CARDIOVASCULAR SCREENING; LDL GOAL LESS THAN 130    GERD (gastroesophageal reflux disease)    History of blepharoplasty, upper lids, ou    Migraine    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)    Premature atrial beats    Compression fracture of L1 lumbar vertebra, seen on CT    Lung nodule < 6cm on CT    Dupuytren's contracture of right hand    Hypothyroidism, unspecified type    Essential hypertension with goal blood pressure less than 140/90    Combined forms of age-related cataract, mild-mod, both eyes    Migraine without status migrainosus, not intractable, unspecified migraine type    Rhinorrhea    Posterior vitreous detachment of both eyes    Primary open angle glaucoma (POAG) of both eyes, mild stage     Past Surgical History:   Procedure Laterality Date    ABDOMEN SURGERY      APPENDECTOMY OPEN  1954    BLEPHAROPLASTY BILATERAL  10/2007    both eyes, upper lids    CHOLECYSTECTOMY, OPEN  1992    COLONOSCOPY  6/02, 10/13    Q 5 years, polyps    D & C      REPAIR PTOSIS      SALPINGO OOPHORECTOMY,R/L/LUIS DANIEL      left ovary and tube    SMALL BOWEL RESECTION  1986    colon resection     TONSILLECTOMY & ADENOIDECTOMY      childhood    TUBAL LIGATION      UPPER GI ENDOSCOPY  6/02, 8/11    ZZC LENGTHEN,TENDON,HAND/FINGER  1993    repair of dupytrons's contracture       Social History     Tobacco Use    Smoking status: Never     Passive exposure:  Never    Smokeless tobacco: Never   Substance Use Topics    Alcohol use: Not Currently     Comment: wine occasionally     Family History   Problem Relation Age of Onset    Hypertension Mother         Passed away    Arthritis Mother     Cardiovascular Mother     Retinal detachment Mother     Migraines Mother     C.A.D. Father     Hypertension Father         Father.  Passed away.    Cerebrovascular Disease Father     Arthritis Father     Cardiovascular Father     Eye Disorder Father     Heart Disease Father     Glaucoma Father     Parkinsonism Father     Cardiovascular Brother     Glaucoma Brother     Peripheral Neuropathy Brother     Macular Degeneration No family hx of     Bleeding Disorder No family hx of     Anesthesia Reaction No family hx of          Current Outpatient Medications   Medication Sig Dispense Refill    acyclovir (ZOVIRAX) 400 MG tablet Take 1 tablet (400 mg) by mouth every 8 hours for 10 days. 30 tablet 0    Aspirin-Acetaminophen-Caffeine (EXCEDRIN MIGRAINE PO) Take 1 tablet by mouth as needed      calcium carbonate-vitamin D (OS-FIDENCIO) 600-400 MG-UNIT chewable tablet Take 1 chew tab by mouth 2 times daily      carboxymethylcellulose (REFRESH PLUS) 0.5 % SOLN Place 1 drop into both eyes 3 times daily as needed for dry eyes      estradiol (ESTRACE) 0.1 MG/GM vaginal cream Place 1 g vaginally daily 1g TID for 6/2024. 1g BID for 7/2024. 1g daily for 8/2024 till follow up 42.5 g 2    hydroCHLOROthiazide 12.5 MG tablet Take 1 tablet (12.5 mg) by mouth daily. 90 tablet 3    latanoprost (XALATAN) 0.005 % ophthalmic solution Place 1 drop into both eyes at bedtime. 7.5 mL 3    levothyroxine (SYNTHROID/LEVOTHROID) 50 MCG tablet Take 1 tablet (50 mcg) by mouth daily 90 tablet 3    MELATONIN PO Take 3 mg by mouth nightly as needed       Multiple Vitamin (MULTI-VITAMIN) per tablet Take 1 tablet by mouth daily      RSV vaccine, bivalent, ABRYSVO, injection Inject 0.5 mLs into the muscle once for 1 dose. Pharmacist  administered 0.5 mL 0    SUMAtriptan (IMITREX) 50 MG tablet Take 1 tablet (50 mg) by mouth at onset of headache for migraine. May repeat in 2 hours. Max 4 tablets/24 hours. 9 tablet 3    timolol maleate (TIMOPTIC) 0.5 % ophthalmic solution Place 1 drop into both eyes 2 times daily. Wait at least 5 minutes between drops if using more than one at a time. 20 mL 4    triamcinolone (KENALOG) 0.025 % cream Apply a fingertip amount of cream to the opening of the itchy ear canal(s) one to two times a week a night as needed 15 g 1    ketoconazole (NIZORAL) 2 % external shampoo Apply 2 % topically.       Allergies   Allergen Reactions    Diatrizoate Other (See Comments)    Zoster Vaccine Live Anaphylaxis    Bisphosphonates GI Disturbance     GI distress    Ivp Dye [Contrast Dye] Swelling    Lisinopril Cough    Losartan Hives     But can tolerate Hyzaar.     Morphine Hives    Morphine Sulfate Nausea and Vomiting    Omeprazole Hives    Other Food Allergy      Apples, peaches, pears, cherries, apricots    Tree Nuts [Nuts]      Almonds, pecans    Latex Rash     Current providers sharing in care for this patient include:  Patient Care Team:  Ame Dobson MD as PCP - General (Family Practice)  Ame Dobson MD as Assigned PCP  Andi Bay MD as Assigned Surgical Provider  Agbeh, Cephas Mawuena, MD as MD (OB/Gyn)  Mary Hernandez MD as Physician (OB/Gyn)  Mary Hernandez MD as Assigned OBGYN Provider  Nabor Coleman PA as Physician Assistant (Otolaryngology)  Doris Anguiano APRN CNP as Assigned Cancer Care Provider    The following health maintenance items are reviewed in Epic and correct as of today:  Health Maintenance   Topic Date Due    RSV VACCINE (1 - 1-dose 75+ series) Never done    ZOSTER VACCINE (2 of 2) 07/24/2018    TSH W/FREE T4 REFLEX  03/26/2025    COVID-19 VACCINE (10 - 2024-25 season) 08/18/2025    BMP  03/11/2026    EYE EXAM  05/12/2026    MEDICARE ANNUAL WELLNESS VISIT  06/23/2026     "ANNUAL REVIEW OF  ORDERS  06/23/2026    FALL RISK ASSESSMENT  06/23/2026    ADVANCE CARE PLANNING  06/26/2028    DTAP/TDAP/TD VACCINE (3 - Td or Tdap) 08/23/2033    DEXA  06/21/2036    MIGRAINE ACTION PLAN  Completed    PHQ-2 (once per calendar year)  Completed    INFLUENZA VACCINE  Completed    PNEUMOCOCCAL VACCINE 50+ YEARS  Completed    HPV VACCINE  Aged Out    MENINGITIS VACCINE  Aged Out         Review of Systems  Constitutional, HEENT, cardiovascular, pulmonary, gi and gu systems are negative, except as otherwise noted.     Objective    Exam  /60   Pulse 68   Temp 97.1  F (36.2  C) (Temporal)   Resp 18   Ht 1.6 m (5' 3\")   Wt 64.1 kg (141 lb 6.4 oz)   LMP 10/01/1986   SpO2 97%   Breastfeeding No   BMI 25.05 kg/m     Estimated body mass index is 25.05 kg/m  as calculated from the following:    Height as of this encounter: 1.6 m (5' 3\").    Weight as of this encounter: 64.1 kg (141 lb 6.4 oz).    Physical Exam  GENERAL: alert and no distress  EYES: Eyes grossly normal to inspection, PERRL and conjunctivae and sclerae normal  HENT: ear canals and TM's normal, nose and mouth without ulcers or lesions  NECK: no adenopathy, no asymmetry, masses, or scars  RESP: lungs clear to auscultation - no rales, rhonchi or wheezes  CV: regular rate and rhythm, normal S1 S2, no S3 or S4, no murmur, click or rub, no peripheral edema  ABDOMEN: soft, nontender, no hepatosplenomegaly, no masses and bowel sounds normal  MS: no gross musculoskeletal defects noted, no edema  SKIN: no suspicious lesions or rashes  NEURO: Normal strength and tone, mentation intact and speech normal  PSYCH: mentation appears normal, affect normal/bright  Gait and balance assessed per Gait Speed Test.  Result as above.        6/23/2025   Mini Cog   Clock Draw Score 2 Normal   3 Item Recall 3 objects recalled   Mini Cog Total Score 5         6/23/2025   Vision Screen   Reason Vision Screen Not Completed Screening Recommend: Patient/Guardian " Declined   Patient wears corrective lenses (select all that apply) Comments   Vision Screen Results Pass   No Corrective Lenses, PLUS LENS REQUIRED Pass     DATA  Labs drawn and in process    Signed Electronically by: Ame Dobson MD

## 2025-06-24 DIAGNOSIS — E03.9 HYPOTHYROIDISM, UNSPECIFIED TYPE: ICD-10-CM

## 2025-06-24 LAB
CHOLEST SERPL-MCNC: 208 MG/DL
FASTING STATUS PATIENT QL REPORTED: YES
HDLC SERPL-MCNC: 58 MG/DL
LDLC SERPL CALC-MCNC: 124 MG/DL
NONHDLC SERPL-MCNC: 150 MG/DL
TRIGL SERPL-MCNC: 129 MG/DL
TSH SERPL DL<=0.005 MIU/L-ACNC: 1.79 UIU/ML (ref 0.3–4.2)

## 2025-06-25 ENCOUNTER — RESULTS FOLLOW-UP (OUTPATIENT)
Dept: FAMILY MEDICINE | Facility: CLINIC | Age: 89
End: 2025-06-25

## 2025-06-25 RX ORDER — LEVOTHYROXINE SODIUM 50 UG/1
50 TABLET ORAL DAILY
Qty: 90 TABLET | Refills: 3 | Status: SHIPPED | OUTPATIENT
Start: 2025-06-25

## 2025-07-28 ENCOUNTER — OFFICE VISIT (OUTPATIENT)
Dept: OBGYN | Facility: CLINIC | Age: 89
End: 2025-07-28
Payer: MEDICARE

## 2025-07-28 VITALS
SYSTOLIC BLOOD PRESSURE: 138 MMHG | DIASTOLIC BLOOD PRESSURE: 65 MMHG | HEART RATE: 78 BPM | RESPIRATION RATE: 18 BRPM | TEMPERATURE: 97.1 F | HEIGHT: 63 IN | WEIGHT: 140 LBS | BODY MASS INDEX: 24.8 KG/M2

## 2025-07-28 DIAGNOSIS — N95.2 VAGINAL ATROPHY: ICD-10-CM

## 2025-07-28 DIAGNOSIS — B00.9 HSV-1 INFECTION: Primary | ICD-10-CM

## 2025-07-28 PROCEDURE — 3075F SYST BP GE 130 - 139MM HG: CPT | Performed by: STUDENT IN AN ORGANIZED HEALTH CARE EDUCATION/TRAINING PROGRAM

## 2025-07-28 PROCEDURE — 3078F DIAST BP <80 MM HG: CPT | Performed by: STUDENT IN AN ORGANIZED HEALTH CARE EDUCATION/TRAINING PROGRAM

## 2025-07-28 PROCEDURE — 99213 OFFICE O/P EST LOW 20 MIN: CPT | Performed by: STUDENT IN AN ORGANIZED HEALTH CARE EDUCATION/TRAINING PROGRAM

## 2025-07-28 RX ORDER — VALACYCLOVIR HYDROCHLORIDE 500 MG/1
500 TABLET, FILM COATED ORAL 2 TIMES DAILY
Qty: 180 TABLET | Refills: 3 | Status: SHIPPED | OUTPATIENT
Start: 2025-07-28

## 2025-07-28 NOTE — PROGRESS NOTES
"[facility]  Gynecology Office Note     Assessment and Plan:   Hilda Potter, 89 year old  with follicular lymphoma, presented for vaginal and vulvar symptoms follow up. Pelvic exam normal. Pt had some tenderness with speculum exam anteriorly between the vesicovaginal junction, but no tenderness with bimanual exam.     Vaginal atrophy: plan to continue estrogen cream twice per week externally.    Internal vaginal pain: no new lesions noted. Recommended HSV1 suppression therapy       valACYclovir (VALTREX) 500 MG tablet          Take 1 tablet (500 mg) by mouth 2 times daily., Disp-180 tablet, R-3, E-Prescribe     Pt is considering 2nd opinion, which I welcomed and recommended.    Mary Hernandez MD  Obstetrics and Gynecology  [facility]   25   -----------------------------------------------------------------------------------------------------------------------------------  S: Hilda Potter, 89 year old  with follicular lymphoma on weekly Rituximab, presented for vulvar symptoms follow up.     2024: vulvar itching, soreness, and redness started in summer 2023. Failed clobetasol treatment. Vulvar biopsy was obtained, which showed \"Mild epidermal hyperplasia and perivascular inflammation\". Started on vaginal estradiol cream on 2024.    6/3/2024: increased vaginal estrogen cream to 1g TID for 2024, 1g BID for 2024, and 1g once daily for 2024. Plan to follow up at the end of 2024 or beginning of 2024.    2024: symptoms improved. Continued on vaginal estrogen cream    2025: worsening bilateral vulvar itching (R>L) and left sided vulvar soreness. A new 5 o'clock vaginal white plaque ulcer was noted. HSV IgG positive, but DNA neg. Started on acyclovir 400mg TID 10-day course    2025: symptoms improved. No more medications.    Today: occasional internal vaginal pain, bilateral vulvar redness and itching. Pt is using vaginal " "estrogen cream externally twice per week with some relief of her external vulvar itching     Physical Exam:  Vitals:    07/28/25 0826   BP: 138/65   BP Location: Right arm   Patient Position: Chair   Cuff Size: Adult Regular   Pulse: 78   Resp: 18   Temp: 97.1  F (36.2  C)   TempSrc: Tympanic   Weight: 63.5 kg (140 lb)   Height: 1.6 m (5' 3\")     Estimated body mass index is 24.8 kg/m  as calculated from the following:    Height as of this encounter: 1.6 m (5' 3\").    Weight as of this encounter: 63.5 kg (140 lb).      General appearance: well-hydrated, A&O x 3, no apparent distress  External genitalia: bilateral labial majora appears red with no new lesions. The introitus opening appears to be without the ulcer.  Vagina: Normal, healthy pink mucosa without any lesions. Physiologic vaginal discharge. Cervix appears normal    "

## 2025-07-28 NOTE — NURSING NOTE
"Initial /65 (BP Location: Right arm, Patient Position: Chair, Cuff Size: Adult Regular)   Pulse 78   Temp 97.1  F (36.2  C) (Tympanic)   Resp 18   Ht 1.6 m (5' 3\")   Wt 63.5 kg (140 lb)   LMP 10/01/1986   BMI 24.80 kg/m   Estimated body mass index is 24.8 kg/m  as calculated from the following:    Height as of this encounter: 1.6 m (5' 3\").    Weight as of this encounter: 63.5 kg (140 lb). .    "

## 2025-08-26 ENCOUNTER — TELEPHONE (OUTPATIENT)
Dept: OBGYN | Facility: CLINIC | Age: 89
End: 2025-08-26
Payer: MEDICARE

## (undated) DEVICE — PREP CHLORAPREP 26ML TINTED ORANGE  260815

## (undated) DEVICE — SOL WATER IRRIG 1000ML BOTTLE 07139-09

## (undated) RX ORDER — FENTANYL CITRATE 50 UG/ML
INJECTION, SOLUTION INTRAMUSCULAR; INTRAVENOUS
Status: DISPENSED
Start: 2019-10-30